# Patient Record
Sex: MALE | Race: WHITE | Employment: FULL TIME | URBAN - NONMETROPOLITAN AREA
[De-identification: names, ages, dates, MRNs, and addresses within clinical notes are randomized per-mention and may not be internally consistent; named-entity substitution may affect disease eponyms.]

---

## 2017-05-15 LAB
BASOPHILS ABSOLUTE: ABNORMAL /ΜL
BASOPHILS RELATIVE PERCENT: ABNORMAL %
BUN BLDV-MCNC: 45 MG/DL
CALCIUM SERPL-MCNC: 8.7 MG/DL
CHLORIDE BLD-SCNC: 101 MMOL/L
CO2: 20 MMOL/L
CREAT SERPL-MCNC: 3.23 MG/DL
EOSINOPHILS ABSOLUTE: ABNORMAL /ΜL
EOSINOPHILS RELATIVE PERCENT: ABNORMAL %
GFR CALCULATED: 22
GLUCOSE BLD-MCNC: 301 MG/DL
HCT VFR BLD CALC: 36.4 % (ref 41–53)
HEMOGLOBIN: 11.9 G/DL (ref 13.5–17.5)
LYMPHOCYTES ABSOLUTE: ABNORMAL /ΜL
LYMPHOCYTES RELATIVE PERCENT: ABNORMAL %
MCH RBC QN AUTO: ABNORMAL PG
MCHC RBC AUTO-ENTMCNC: ABNORMAL G/DL
MCV RBC AUTO: ABNORMAL FL
MONOCYTES ABSOLUTE: ABNORMAL /ΜL
MONOCYTES RELATIVE PERCENT: ABNORMAL %
NEUTROPHILS ABSOLUTE: ABNORMAL /ΜL
NEUTROPHILS RELATIVE PERCENT: ABNORMAL %
PLATELET # BLD: 240 K/ΜL
PMV BLD AUTO: ABNORMAL FL
POTASSIUM SERPL-SCNC: 5.9 MMOL/L
RBC # BLD: 4.06 10^6/ΜL
SODIUM BLD-SCNC: 136 MMOL/L
WBC # BLD: 5.5 10^3/ML

## 2017-05-17 ENCOUNTER — OFFICE VISIT (OUTPATIENT)
Dept: NEPHROLOGY | Age: 52
End: 2017-05-17

## 2017-05-17 VITALS
DIASTOLIC BLOOD PRESSURE: 76 MMHG | SYSTOLIC BLOOD PRESSURE: 148 MMHG | HEART RATE: 60 BPM | RESPIRATION RATE: 18 BRPM | WEIGHT: 123 LBS | HEIGHT: 62 IN | BODY MASS INDEX: 22.63 KG/M2 | OXYGEN SATURATION: 98 %

## 2017-05-17 DIAGNOSIS — N18.4 CKD (CHRONIC KIDNEY DISEASE) STAGE 4, GFR 15-29 ML/MIN (HCC): Primary | ICD-10-CM

## 2017-05-17 PROCEDURE — 99213 OFFICE O/P EST LOW 20 MIN: CPT | Performed by: INTERNAL MEDICINE

## 2017-06-16 LAB
BASOPHILS ABSOLUTE: ABNORMAL /ΜL
BASOPHILS RELATIVE PERCENT: ABNORMAL %
BUN BLDV-MCNC: 38 MG/DL
CALCIUM SERPL-MCNC: 8.9 MG/DL
CHLORIDE BLD-SCNC: 100 MMOL/L
CO2: 23 MMOL/L
CREAT SERPL-MCNC: 2.65 MG/DL
EOSINOPHILS ABSOLUTE: ABNORMAL /ΜL
EOSINOPHILS RELATIVE PERCENT: ABNORMAL %
GFR CALCULATED: 27
GLUCOSE BLD-MCNC: 228 MG/DL
HCT VFR BLD CALC: 38.8 % (ref 41–53)
HEMOGLOBIN: 12.7 G/DL (ref 13.5–17.5)
LYMPHOCYTES ABSOLUTE: ABNORMAL /ΜL
LYMPHOCYTES RELATIVE PERCENT: ABNORMAL %
MCH RBC QN AUTO: ABNORMAL PG
MCHC RBC AUTO-ENTMCNC: ABNORMAL G/DL
MCV RBC AUTO: ABNORMAL FL
MONOCYTES ABSOLUTE: ABNORMAL /ΜL
MONOCYTES RELATIVE PERCENT: ABNORMAL %
NEUTROPHILS ABSOLUTE: ABNORMAL /ΜL
NEUTROPHILS RELATIVE PERCENT: ABNORMAL %
PLATELET # BLD: 264 K/ΜL
PMV BLD AUTO: ABNORMAL FL
POTASSIUM SERPL-SCNC: 5.8 MMOL/L
RBC # BLD: 4.33 10^6/ΜL
SODIUM BLD-SCNC: 135 MMOL/L
WBC # BLD: 5.4 10^3/ML

## 2017-06-21 ENCOUNTER — OFFICE VISIT (OUTPATIENT)
Dept: NEPHROLOGY | Age: 52
End: 2017-06-21

## 2017-06-21 VITALS
BODY MASS INDEX: 22.63 KG/M2 | DIASTOLIC BLOOD PRESSURE: 62 MMHG | HEIGHT: 62 IN | SYSTOLIC BLOOD PRESSURE: 128 MMHG | RESPIRATION RATE: 18 BRPM | WEIGHT: 123 LBS | OXYGEN SATURATION: 96 % | HEART RATE: 61 BPM

## 2017-06-21 DIAGNOSIS — N18.30 CKD (CHRONIC KIDNEY DISEASE) STAGE 3, GFR 30-59 ML/MIN (HCC): Primary | ICD-10-CM

## 2017-06-21 PROCEDURE — 99213 OFFICE O/P EST LOW 20 MIN: CPT | Performed by: INTERNAL MEDICINE

## 2017-09-29 LAB
BASOPHILS ABSOLUTE: ABNORMAL /ΜL
BASOPHILS RELATIVE PERCENT: ABNORMAL %
BUN BLDV-MCNC: 33 MG/DL
CALCIUM SERPL-MCNC: 8.7 MG/DL
CHLORIDE BLD-SCNC: 101 MMOL/L
CO2: 21 MMOL/L
CREAT SERPL-MCNC: 2.31 MG/DL
EOSINOPHILS ABSOLUTE: ABNORMAL /ΜL
EOSINOPHILS RELATIVE PERCENT: ABNORMAL %
GFR CALCULATED: 32
GLUCOSE BLD-MCNC: 283 MG/DL
HCT VFR BLD CALC: 34.7 % (ref 41–53)
HEMOGLOBIN: 11.8 G/DL (ref 13.5–17.5)
LYMPHOCYTES ABSOLUTE: ABNORMAL /ΜL
LYMPHOCYTES RELATIVE PERCENT: ABNORMAL %
MCH RBC QN AUTO: ABNORMAL PG
MCHC RBC AUTO-ENTMCNC: ABNORMAL G/DL
MCV RBC AUTO: ABNORMAL FL
MONOCYTES ABSOLUTE: ABNORMAL /ΜL
MONOCYTES RELATIVE PERCENT: ABNORMAL %
NEUTROPHILS ABSOLUTE: ABNORMAL /ΜL
NEUTROPHILS RELATIVE PERCENT: ABNORMAL %
PLATELET # BLD: 217 K/ΜL
PMV BLD AUTO: ABNORMAL FL
POTASSIUM SERPL-SCNC: 4.8 MMOL/L
RBC # BLD: 3.77 10^6/ΜL
SODIUM BLD-SCNC: 138 MMOL/L
WBC # BLD: 4.9 10^3/ML

## 2017-10-02 ENCOUNTER — OFFICE VISIT (OUTPATIENT)
Dept: NEPHROLOGY | Age: 52
End: 2017-10-02
Payer: COMMERCIAL

## 2017-10-02 VITALS
RESPIRATION RATE: 18 BRPM | HEART RATE: 65 BPM | WEIGHT: 126 LBS | HEIGHT: 62 IN | DIASTOLIC BLOOD PRESSURE: 74 MMHG | SYSTOLIC BLOOD PRESSURE: 138 MMHG | BODY MASS INDEX: 23.19 KG/M2 | OXYGEN SATURATION: 97 %

## 2017-10-02 DIAGNOSIS — N18.30 CKD (CHRONIC KIDNEY DISEASE), STAGE III (HCC): Primary | ICD-10-CM

## 2017-10-02 PROCEDURE — 99213 OFFICE O/P EST LOW 20 MIN: CPT | Performed by: INTERNAL MEDICINE

## 2017-10-02 RX ORDER — TRAMADOL HYDROCHLORIDE 50 MG/1
TABLET ORAL
Refills: 0 | COMMUNITY
Start: 2017-07-01 | End: 2017-10-02

## 2017-10-02 NOTE — MR AVS SNAPSHOT
questions, ask your nurse or doctor. STOP taking these medications           traMADol 50 MG tablet   Commonly known as:  ULTRAM   Stopped by:  Shena Lerma, DO               Your Current Medications Are              Multiple Vitamin (MULTIVITAMIN) capsule Take by mouth    FREESTYLE LANCETS MIS     FREESTYLE INSULINX TEST strip     Insulin Lispro, Human, (HUMALOG PEN SC) Inject into the skin Sliding scale    isosorbide mononitrate (IMDUR) 30 MG CR tablet Take 30 mg by mouth daily    atorvastatin (LIPITOR) 80 MG tablet Take 80 mg by mouth daily    clopidogrel (PLAVIX) 75 MG tablet Take 75 mg by mouth daily    pantoprazole sodium (PROTONIX) 40 MG PACK packet Take 40 mg by mouth 2 times daily (before meals)    metoprolol (LOPRESSOR) 50 MG tablet Take 50 mg by mouth 2 times daily    hydrALAZINE (APRESOLINE) 50 MG tablet Take 50 mg by mouth 3 times daily    aspirin 81 MG tablet Take 81 mg by mouth daily    Insulin Degludec (TRESIBA FLEXTOUCH SC) Inject 9 Units into the skin nightly      Allergies           No Known Allergies         Additional Information        Basic Information     Date Of Birth Sex Race Ethnicity Preferred Language    1965 Male White Non-/Non  English      Problem List as of 10/2/2017  Date Reviewed: 11/16/2016                Systolic congestive heart failure (HCC)    Chest pain    Shortness of breath    Diabetes mellitus type 2 in nonobese [facility])    Essential hypertension    Chronic kidney disease, stage IV (severe) (HCC)    Coronary artery disease involving native coronary artery of native heart with angina pectoris (HCC)    Fatigue    Bloating    Anemia      Preventive Care        Date Due    Hepatitis C screening is recommended for all adults regardless of risk factors born between [facility] at least once (lifetime) who have never been tested.  1965    Diabetic Foot Exam 9/7/1974    Hemoglobin A1C (Test For Long-Term Glucose Control) 9/7/1974 Eye Exam By An Eye Doctor 9/7/1974    Cholesterol Screening 9/7/1974    HIV screening is recommended for all people regardless of risk factors  aged 15-65 years at least once (lifetime) who have never been HIV tested. 9/7/1979    Tetanus Combination Vaccine (1 - Tdap) 9/7/1983    Pneumococcal Vaccines (two) for adults aged 19-64  years who are immunocompromised or whose spleen is missing or not working  (1 of 3 - PCV13) 9/7/1983    Colonoscopy 9/7/2014    Yearly Flu Vaccine (1) 9/1/2017            MyChart Signup           Our records indicate that you have declined MyChart signup.

## 2017-10-02 NOTE — PROGRESS NOTES
[facility]    [address]  [address], [address]  [phone]       Progress Note    10/2/2017 2:32 PM    Pt Name:    Aubree Deluna  YOB: 1965  Primary Care Physician:  Jase Guallpa CNP       Chief Complaint:   Chief Complaint   Patient presents with    Chronic Kidney Disease     iii        History of Chief Complaint: CKD stage IIIB frm DM and HTN     Subjective:  I last saw the patient in clinic 06/21/17. I follow the patient for Chronic Kidney disease stage IIIB. Since our last visit the patient has not been hospitalized. The patient is sleeping well at night with 1-2 times per night nocturia. The patient has a good appetite and is remaining active. The patient denied N/V/C/D/SOB/CP. EGFR=32 Ml/Min       Objective:  VITALS:  /74 (Site: Left Arm, Position: Sitting, Cuff Size: Small Adult)  Pulse 65  Resp 18  Ht 5' 2\" (1.575 m)  Wt 126 lb (57.2 kg)  SpO2 97%  BMI 23.05 kg/m2  Weight:   Wt Readings from Last 3 Encounters:   10/02/17 126 lb (57.2 kg)   06/21/17 123 lb (55.8 kg)   05/17/17 123 lb (55.8 kg)     Body mass index is 23.05 kg/(m^2). Physical examination    General:  Alert and cooperative with exam  HEENT:  Head: Normocephalic, no lesions, without obvious abnormality. Neck:   No JVD and no bruits. Thyroid gland is normal  Lungs:  clear to auscultation bilaterally  Heart:  regular rate and rhythm, S1, S2 normal, no murmur, click, rub or gallop  Abdomen:  soft, non-tender; bowel sounds normal; no masses,  no organomegaly  Extremities:  extremities normal, atraumatic, no cyanosis or edema  Neurologic:  Mental status: Alert, oriented, thought content appropriate  Skin:                Warm and dry with no rashes. Muscles:         Hand  and leg strength are equal and strong bilaterally.      Lab Data      CBC:   Lab Results   Component Value Date    WBC 4.9 09/29/2017    HGB 11.8 (A) 09/29/2017    HCT 34.7 (A) 09/29/2017    MCV 91.7 04/09/2016     09/29/2017     BMP:    Lab Results   Component Value Date     09/29/2017     06/16/2017     05/15/2017    K 4.8 09/29/2017    K 5.8 06/16/2017    K 5.9 05/15/2017     09/29/2017     06/16/2017     05/15/2017    CO2 21 09/29/2017    CO2 23 06/16/2017    CO2 20 05/15/2017    BUN 33 09/29/2017    BUN 38 06/16/2017    BUN 45 05/15/2017    CREATININE 2.31 09/29/2017    CREATININE 2.65 06/16/2017    CREATININE 3.23 05/15/2017    GLUCOSE 283 09/29/2017    GLUCOSE 228 06/16/2017    GLUCOSE 301 05/15/2017      Hepatic:   Lab Results   Component Value Date    AST 23 04/09/2016    ALT 19 04/09/2016    BILITOT 0.3 04/09/2016    ALKPHOS 79 04/09/2016     BNP: No results found for: BNP  Lipids: No results found for: CHOL, HDL  INR: No results found for: INR  URINE: No results found for: NAUR, PROTUR  Lab Results   Component Value Date    NITRU NEGATIVE 04/09/2016    COLORU YELLOW 04/09/2016    PHUR 6.0 04/09/2016    WBCUA 0-2 04/09/2016    RBCUA 0-2 04/09/2016    YEAST NONE SEEN 04/09/2016    BACTERIA NONE 04/09/2016    LEUKOCYTESUR NEGATIVE 04/09/2016    UROBILINOGEN 0.2 04/09/2016    BILIRUBINUR NEGATIVE 04/09/2016    BLOODU NEGATIVE 04/09/2016    GLUCOSEU NEGATIVE 04/09/2016    KETUA NEGATIVE 04/09/2016      Microalbumen/Creatinine ratio:  No components found for: RUCREAT        Medications:    Current Outpatient Prescriptions   Medication Sig Dispense Refill    Multiple Vitamin (MULTIVITAMIN) capsule Take by mouth      FREESTYLE LANCETS MISC   0    FREESTYLE INSULINX TEST strip   0    Insulin Lispro, Human, (HUMALOG PEN SC) Inject into the skin Sliding scale      isosorbide mononitrate (IMDUR) 30 MG CR tablet Take 30 mg by mouth daily      atorvastatin (LIPITOR) 80 MG tablet Take 80 mg by mouth daily      clopidogrel (PLAVIX) 75 MG tablet Take 75 mg by mouth daily      pantoprazole sodium (PROTONIX) 40 MG PACK packet Take 40 mg by mouth 2 times daily (before meals)      metoprolol (LOPRESSOR) 50 MG tablet Take 50 mg by mouth 2 times daily      hydrALAZINE (APRESOLINE) 50 MG tablet Take 50 mg by mouth 3 times daily      aspirin 81 MG tablet Take 81 mg by mouth daily      Insulin Degludec (TRESIBA FLEXTOUCH SC) Inject 9 Units into the skin nightly       No current facility-administered medications for this visit. IMPRESSION:    Patient Active Problem List   Diagnosis Code    Systolic congestive heart failure ([facility]) I50.20    Chest pain R07.9    Shortness of breath R06.02    Diabetes mellitus type 2 in nonobese ([facility]) E11.9    Essential hypertension I10    Chronic kidney disease, stage IV (severe) ([facility]) N18.4    Coronary artery disease involving native coronary artery of native heart with angina pectoris ([facility]) I25.119    Fatigue R53.83    Bloating R14.0    Anemia D64.9    Anemia of chronic kidney failure N18.9, D63.1                PLAN:  1. We discussed the eGFR today. 2. We will continue all current medications without changes. 3. We will see the patient back in 4 months. Total time spent interviewing the patient and/or family, evaluating lab data and X-ray data and processing orders was 20 minutes. I personally spent more than 50% of the visit time face to face with the patient providing counseling and coordination of the patient's care.             _________________________________  Darrell Reeves.  Shade Ramirez,   [facility]      CC  Eloy Zurita, CNP

## 2018-02-20 LAB
BASOPHILS ABSOLUTE: ABNORMAL /ΜL
BASOPHILS RELATIVE PERCENT: ABNORMAL %
BUN BLDV-MCNC: 45 MG/DL
CALCIUM SERPL-MCNC: 8.8 MG/DL
CHLORIDE BLD-SCNC: 101 MMOL/L
CO2: 22 MMOL/L
CREAT SERPL-MCNC: 2.71 MG/DL
EOSINOPHILS ABSOLUTE: ABNORMAL /ΜL
EOSINOPHILS RELATIVE PERCENT: ABNORMAL %
GFR CALCULATED: 26
GLUCOSE BLD-MCNC: 95 MG/DL
HCT VFR BLD CALC: 32.6 % (ref 41–53)
HEMOGLOBIN: 11.2 G/DL (ref 13.5–17.5)
LYMPHOCYTES ABSOLUTE: ABNORMAL /ΜL
LYMPHOCYTES RELATIVE PERCENT: ABNORMAL %
MCH RBC QN AUTO: ABNORMAL PG
MCHC RBC AUTO-ENTMCNC: ABNORMAL G/DL
MCV RBC AUTO: ABNORMAL FL
MONOCYTES ABSOLUTE: ABNORMAL /ΜL
MONOCYTES RELATIVE PERCENT: ABNORMAL %
NEUTROPHILS ABSOLUTE: ABNORMAL /ΜL
NEUTROPHILS RELATIVE PERCENT: ABNORMAL %
PLATELET # BLD: 267 K/ΜL
PMV BLD AUTO: ABNORMAL FL
POTASSIUM SERPL-SCNC: 4.7 MMOL/L
RBC # BLD: 3.78 10^6/ΜL
SODIUM BLD-SCNC: 138 MMOL/L
WBC # BLD: 5.4 10^3/ML

## 2018-04-18 ENCOUNTER — OFFICE VISIT (OUTPATIENT)
Dept: NEPHROLOGY | Age: 53
End: 2018-04-18
Payer: MEDICARE

## 2018-04-18 VITALS
HEART RATE: 66 BPM | WEIGHT: 128 LBS | HEIGHT: 62 IN | SYSTOLIC BLOOD PRESSURE: 148 MMHG | BODY MASS INDEX: 23.55 KG/M2 | OXYGEN SATURATION: 99 % | RESPIRATION RATE: 18 BRPM | DIASTOLIC BLOOD PRESSURE: 72 MMHG

## 2018-04-18 DIAGNOSIS — N18.30 CKD (CHRONIC KIDNEY DISEASE), STAGE III (HCC): Primary | ICD-10-CM

## 2018-04-18 PROCEDURE — G8598 ASA/ANTIPLAT THER USED: HCPCS | Performed by: INTERNAL MEDICINE

## 2018-04-18 PROCEDURE — 3017F COLORECTAL CA SCREEN DOC REV: CPT | Performed by: INTERNAL MEDICINE

## 2018-04-18 PROCEDURE — 99213 OFFICE O/P EST LOW 20 MIN: CPT | Performed by: INTERNAL MEDICINE

## 2018-04-18 PROCEDURE — G8427 DOCREV CUR MEDS BY ELIG CLIN: HCPCS | Performed by: INTERNAL MEDICINE

## 2018-04-18 PROCEDURE — G8420 CALC BMI NORM PARAMETERS: HCPCS | Performed by: INTERNAL MEDICINE

## 2018-04-18 PROCEDURE — 1036F TOBACCO NON-USER: CPT | Performed by: INTERNAL MEDICINE

## 2018-04-18 RX ORDER — NITROGLYCERIN 0.4 MG/1
TABLET SUBLINGUAL
Refills: 0 | COMMUNITY
Start: 2018-01-25 | End: 2022-06-22

## 2018-04-18 RX ORDER — INSULIN LISPRO 100 [IU]/ML
INJECTION, SOLUTION INTRAVENOUS; SUBCUTANEOUS
Refills: 4 | Status: ON HOLD | COMMUNITY
Start: 2018-03-15 | End: 2019-04-10

## 2018-04-18 RX ORDER — FUROSEMIDE 20 MG/1
TABLET ORAL
Refills: 0 | Status: ON HOLD | COMMUNITY
Start: 2018-01-25 | End: 2019-04-10

## 2018-08-14 ENCOUNTER — TELEPHONE (OUTPATIENT)
Dept: NEPHROLOGY | Age: 53
End: 2018-08-14

## 2018-08-14 LAB
BASOPHILS ABSOLUTE: ABNORMAL /ΜL
BASOPHILS RELATIVE PERCENT: ABNORMAL %
BUN BLDV-MCNC: 35 MG/DL
CALCIUM SERPL-MCNC: 9.5 MG/DL
CHLORIDE BLD-SCNC: 101 MMOL/L
CO2: 20 MMOL/L
CREAT SERPL-MCNC: 3.22 MG/DL
EOSINOPHILS ABSOLUTE: ABNORMAL /ΜL
EOSINOPHILS RELATIVE PERCENT: ABNORMAL %
GFR CALCULATED: 22
GLUCOSE BLD-MCNC: 43 MG/DL
HCT VFR BLD CALC: 27.3 % (ref 41–53)
HEMOGLOBIN: 8.7 G/DL (ref 13.5–17.5)
LYMPHOCYTES ABSOLUTE: ABNORMAL /ΜL
LYMPHOCYTES RELATIVE PERCENT: ABNORMAL %
MCH RBC QN AUTO: ABNORMAL PG
MCHC RBC AUTO-ENTMCNC: ABNORMAL G/DL
MCV RBC AUTO: ABNORMAL FL
MONOCYTES ABSOLUTE: ABNORMAL /ΜL
MONOCYTES RELATIVE PERCENT: ABNORMAL %
NEUTROPHILS ABSOLUTE: ABNORMAL /ΜL
NEUTROPHILS RELATIVE PERCENT: ABNORMAL %
PLATELET # BLD: 286 K/ΜL
PMV BLD AUTO: ABNORMAL FL
POTASSIUM SERPL-SCNC: 4.8 MMOL/L
RBC # BLD: 3.5 10^6/ΜL
SODIUM BLD-SCNC: 138 MMOL/L
WBC # BLD: 5.9 10^3/ML

## 2018-08-14 NOTE — TELEPHONE ENCOUNTER
Marely Abel from [facility] lab called with a critical glucose of 43 on this patient this morning-I called the patient and he said that he didn't feel good when he got his labs done-he is a diabetic-the patient is getting something to eat now

## 2018-08-15 ENCOUNTER — OFFICE VISIT (OUTPATIENT)
Dept: NEPHROLOGY | Age: 53
End: 2018-08-15
Payer: MEDICARE

## 2018-08-15 VITALS
WEIGHT: 122 LBS | SYSTOLIC BLOOD PRESSURE: 136 MMHG | OXYGEN SATURATION: 96 % | DIASTOLIC BLOOD PRESSURE: 72 MMHG | HEIGHT: 62 IN | BODY MASS INDEX: 22.45 KG/M2 | RESPIRATION RATE: 18 BRPM | HEART RATE: 61 BPM

## 2018-08-15 DIAGNOSIS — N17.9 AKI (ACUTE KIDNEY INJURY) (HCC): Primary | ICD-10-CM

## 2018-08-15 DIAGNOSIS — N18.4 CKD (CHRONIC KIDNEY DISEASE), STAGE IV (HCC): ICD-10-CM

## 2018-08-15 PROCEDURE — G8427 DOCREV CUR MEDS BY ELIG CLIN: HCPCS | Performed by: INTERNAL MEDICINE

## 2018-08-15 PROCEDURE — 3017F COLORECTAL CA SCREEN DOC REV: CPT | Performed by: INTERNAL MEDICINE

## 2018-08-15 PROCEDURE — 99213 OFFICE O/P EST LOW 20 MIN: CPT | Performed by: INTERNAL MEDICINE

## 2018-08-15 PROCEDURE — G8420 CALC BMI NORM PARAMETERS: HCPCS | Performed by: INTERNAL MEDICINE

## 2018-08-15 PROCEDURE — G8598 ASA/ANTIPLAT THER USED: HCPCS | Performed by: INTERNAL MEDICINE

## 2018-08-15 PROCEDURE — 1036F TOBACCO NON-USER: CPT | Performed by: INTERNAL MEDICINE

## 2018-08-15 RX ORDER — INSULIN GLARGINE 100 [IU]/ML
18 INJECTION, SOLUTION SUBCUTANEOUS NIGHTLY
Status: ON HOLD | COMMUNITY
Start: 2018-07-30 | End: 2019-04-10

## 2018-08-15 RX ORDER — TERAZOSIN 1 MG/1
1 CAPSULE ORAL NIGHTLY
Qty: 30 CAPSULE | Refills: 3 | Status: ON HOLD | OUTPATIENT
Start: 2018-08-15 | End: 2018-11-25

## 2018-08-15 NOTE — PROGRESS NOTES
[facility]    [address] [facility]  [address], [address]  [phone]       Progress Note    8/15/2018 11:07 AM    Pt Name:    Tvein Deluna  YOB: 1965  Primary Care Physician:  PAOLA Larose CNP       Chief Complaint:   Chief Complaint   Patient presents with    Chronic Kidney Disease     iii        History of Chief Complaint: CKD stage IV frm DM and HTN     Subjective:  I last saw the patient in clinic 04/18/18. I follow the patient for Chronic Kidney disease stage IV. Since our last visit the patient has not been hospitalized. The patient is sleeping well at night with 1-2 times per night nocturia. The patient has a good appetite and is remaining active. The patient denied N/V/C/D/SOB/CP. He feels that he is not completely emptying his bladder      Last six eGFR readings:  Lab Results   Component Value Date    LABGLOM 22 08/14/2018    LABGLOM 26 02/20/2018    LABGLOM 32 09/29/2017    LABGLOM 27 06/16/2017    LABGLOM 22 05/15/2017    LABGLOM 30 11/08/2016    LABGLOM 21 04/09/2016          Objective:  VITALS:  /72 (Site: Left Arm, Position: Sitting, Cuff Size: Small Adult)   Pulse 61   Resp 18   Ht 5' 2\" (1.575 m)   Wt 122 lb (55.3 kg)   SpO2 96%   BMI 22.31 kg/m²   Weight:   Wt Readings from Last 3 Encounters:   08/15/18 122 lb (55.3 kg)   04/18/18 128 lb (58.1 kg)   10/02/17 126 lb (57.2 kg)     Body mass index is 22.31 kg/m². Physical examination    General:  Alert and cooperative with exam  HEENT:  Head: Normocephalic, no lesions, without obvious abnormality. Neck:   No JVD and no bruits.  Thyroid gland is normal  Lungs:  clear to auscultation bilaterally  Heart:  regular rate and rhythm, S1, S2 normal, no murmur, click, rub or gallop  Abdomen:  soft, non-tender; bowel sounds normal; no masses,  no organomegaly  Extremities:  extremities normal, atraumatic, no cyanosis or edema  Neurologic:  Mental status: Alert, oriented, thought content appropriate  Skin:                Warm and dry with no rashes. Muscles:         Hand  and leg strength are equal and strong bilaterally.      Lab Data      CBC:   Lab Results   Component Value Date    WBC 5.9 08/14/2018    HGB 8.7 (A) 08/14/2018    HCT 27.3 (A) 08/14/2018    MCV 91.7 04/09/2016     08/14/2018     BMP:    Lab Results   Component Value Date     08/14/2018     02/20/2018     09/29/2017    K 4.8 08/14/2018    K 4.7 02/20/2018    K 4.8 09/29/2017     08/14/2018     02/20/2018     09/29/2017    CO2 20 08/14/2018    CO2 22 02/20/2018    CO2 21 09/29/2017    BUN 35 08/14/2018    BUN 45 02/20/2018    BUN 33 09/29/2017    CREATININE 3.22 08/14/2018    CREATININE 2.71 02/20/2018    CREATININE 2.31 09/29/2017    GLUCOSE 43 08/14/2018    GLUCOSE 95 02/20/2018    GLUCOSE 283 09/29/2017      Hepatic:   Lab Results   Component Value Date    AST 23 04/09/2016    ALT 19 04/09/2016    BILITOT 0.3 04/09/2016    ALKPHOS 79 04/09/2016     BNP: No results found for: BNP  Lipids: No results found for: CHOL, HDL  INR: No results found for: INR  URINE: No results found for: NAUR, PROTUR  Lab Results   Component Value Date    NITRU NEGATIVE 04/09/2016    COLORU YELLOW 04/09/2016    PHUR 6.0 04/09/2016    WBCUA 0-2 04/09/2016    RBCUA 0-2 04/09/2016    YEAST NONE SEEN 04/09/2016    BACTERIA NONE 04/09/2016    LEUKOCYTESUR NEGATIVE 04/09/2016    UROBILINOGEN 0.2 04/09/2016    BILIRUBINUR NEGATIVE 04/09/2016    BLOODU NEGATIVE 04/09/2016    GLUCOSEU NEGATIVE 04/09/2016    KETUA NEGATIVE 04/09/2016      Microalbumen/Creatinine ratio:  No components found for: RUCREAT        Medications:    Current Outpatient Prescriptions   Medication Sig Dispense Refill    LANTUS SOLOSTAR 100 UNIT/ML injection pen       HUMALOG KWIKPEN 100 UNIT/ML pen INJECT 1 UNIT UNDER THE SKIN PER EVERY 8 CARBS AT MEALTIME THREE TIMES A DAY  4    furosemide (LASIX) 20 MG tablet TAKE 1 TABLET BY MOUTH EVERY DAY AS NEEDED FOR FLUID  0    Multiple Vitamin (MULTIVITAMIN) capsule Take by mouth      FREESTYLE LANCETS MISC   0    FREESTYLE INSULINX TEST strip   0    isosorbide mononitrate (IMDUR) 30 MG CR tablet Take 30 mg by mouth daily      atorvastatin (LIPITOR) 80 MG tablet Take 80 mg by mouth daily      clopidogrel (PLAVIX) 75 MG tablet Take 75 mg by mouth daily      pantoprazole sodium (PROTONIX) 40 MG PACK packet Take 40 mg by mouth 2 times daily (before meals)      metoprolol (LOPRESSOR) 50 MG tablet Take 50 mg by mouth 2 times daily      hydrALAZINE (APRESOLINE) 50 MG tablet Take 50 mg by mouth 3 times daily      aspirin 81 MG tablet Take 81 mg by mouth daily      nitroGLYCERIN (NITROSTAT) 0.4 MG SL tablet DISSOLVE 1 TABLET UNDER THE TONGUE EVERY 5 MIN AS NEEDED FOR CHEST PAIN  0     No current facility-administered medications for this visit. IMPRESSIONS:      1. Acute kidney injury. Perhaps from outlet obstruction  2. CKD stage IV from DM and HTN         PLAN:  1. We discussed the eGFR today. 2. We will continue all current medications without changes. 3. Adding flomax  4. Kidney biopsy. Will also look for huydronpehrosis  5. We will see the patient back in .75 months.               _________________________________  Rocky Pruett.  Tony Maxwell, DO  [facility]      CC  Nacho Jones, APRN - CNP

## 2018-08-16 ENCOUNTER — TELEPHONE (OUTPATIENT)
Dept: NEPHROLOGY | Age: 53
End: 2018-08-16

## 2018-08-17 NOTE — TELEPHONE ENCOUNTER
I called Donald Bravo about the information below. He expressed understanding and said he is going to write it down. He said I hope I don't forget.

## 2018-08-17 NOTE — TELEPHONE ENCOUNTER
Luisa Juniorjana does do renal biopsy. It is scheduled for 11:30 am arrival procedure at 12:30 pm 8/27/18 light breakfast 5 hours prior, need , heart and blood pressure medications with sip of water, blood thinner for 7 days.

## 2018-08-27 LAB
INR BLD: 1.1
PROTIME: 12.5 SECONDS

## 2018-08-28 DIAGNOSIS — N18.4 CKD (CHRONIC KIDNEY DISEASE), STAGE IV (HCC): ICD-10-CM

## 2018-08-28 DIAGNOSIS — N17.9 AKI (ACUTE KIDNEY INJURY) (HCC): ICD-10-CM

## 2018-09-17 PROBLEM — E11.9 DIABETES MELLITUS (HCC): Status: ACTIVE | Noted: 2018-09-17

## 2018-09-17 PROBLEM — N28.9 RENAL IMPAIRMENT: Status: ACTIVE | Noted: 2018-09-17

## 2018-09-17 PROBLEM — Z86.73 HISTORY OF CVA (CEREBROVASCULAR ACCIDENT): Status: ACTIVE | Noted: 2017-05-11

## 2018-09-17 PROBLEM — I63.9 CEREBROVASCULAR ACCIDENT (HCC): Status: ACTIVE | Noted: 2018-09-17

## 2018-09-17 PROBLEM — I21.9 MI (MYOCARDIAL INFARCTION) (HCC): Status: ACTIVE | Noted: 2018-09-17

## 2018-09-17 PROBLEM — I25.10 CORONARY ATHEROSCLEROSIS: Status: ACTIVE | Noted: 2017-05-11

## 2018-09-17 LAB
BASOPHILS ABSOLUTE: ABNORMAL /ΜL
BASOPHILS RELATIVE PERCENT: ABNORMAL %
BUN BLDV-MCNC: 53 MG/DL
CALCIUM SERPL-MCNC: 9.5 MG/DL
CHLORIDE BLD-SCNC: 105 MMOL/L
CO2: 20 MMOL/L
CREAT SERPL-MCNC: 3.31 MG/DL
EOSINOPHILS ABSOLUTE: ABNORMAL /ΜL
EOSINOPHILS RELATIVE PERCENT: ABNORMAL %
GFR CALCULATED: 21
GLUCOSE BLD-MCNC: 146 MG/DL
HCT VFR BLD CALC: 24.5 % (ref 41–53)
HEMOGLOBIN: 7.7 G/DL (ref 13.5–17.5)
LYMPHOCYTES ABSOLUTE: ABNORMAL /ΜL
LYMPHOCYTES RELATIVE PERCENT: ABNORMAL %
MCH RBC QN AUTO: ABNORMAL PG
MCHC RBC AUTO-ENTMCNC: ABNORMAL G/DL
MCV RBC AUTO: ABNORMAL FL
MONOCYTES ABSOLUTE: ABNORMAL /ΜL
MONOCYTES RELATIVE PERCENT: ABNORMAL %
NEUTROPHILS ABSOLUTE: ABNORMAL /ΜL
NEUTROPHILS RELATIVE PERCENT: ABNORMAL %
PLATELET # BLD: 319 K/ΜL
PMV BLD AUTO: ABNORMAL FL
POTASSIUM SERPL-SCNC: 4.8 MMOL/L
RBC # BLD: 3.26 10^6/ΜL
SODIUM BLD-SCNC: 138 MMOL/L
WBC # BLD: 5.2 10^3/ML

## 2018-09-19 ENCOUNTER — OFFICE VISIT (OUTPATIENT)
Dept: NEPHROLOGY | Age: 53
End: 2018-09-19
Payer: MEDICARE

## 2018-09-19 VITALS
HEIGHT: 62 IN | RESPIRATION RATE: 18 BRPM | OXYGEN SATURATION: 95 % | BODY MASS INDEX: 21.9 KG/M2 | WEIGHT: 119 LBS | DIASTOLIC BLOOD PRESSURE: 62 MMHG | HEART RATE: 68 BPM | SYSTOLIC BLOOD PRESSURE: 148 MMHG

## 2018-09-19 DIAGNOSIS — N18.4 CKD (CHRONIC KIDNEY DISEASE), STAGE IV (HCC): Primary | ICD-10-CM

## 2018-09-19 PROCEDURE — G8428 CUR MEDS NOT DOCUMENT: HCPCS | Performed by: INTERNAL MEDICINE

## 2018-09-19 PROCEDURE — 99213 OFFICE O/P EST LOW 20 MIN: CPT | Performed by: INTERNAL MEDICINE

## 2018-09-19 PROCEDURE — 3017F COLORECTAL CA SCREEN DOC REV: CPT | Performed by: INTERNAL MEDICINE

## 2018-09-19 PROCEDURE — 1036F TOBACCO NON-USER: CPT | Performed by: INTERNAL MEDICINE

## 2018-09-19 PROCEDURE — G8420 CALC BMI NORM PARAMETERS: HCPCS | Performed by: INTERNAL MEDICINE

## 2018-09-19 PROCEDURE — G8598 ASA/ANTIPLAT THER USED: HCPCS | Performed by: INTERNAL MEDICINE

## 2018-09-19 NOTE — PROGRESS NOTES
[facility]    [address] [facility]  [address], [address]  [phone]       Progress Note    9/19/2018 9:58 AM    Pt Name:    Ene Deluna  YOB: 1965  Primary Care Physician:  PAOLA Peacock CNP       Chief Complaint:   Chief Complaint   Patient presents with    Chronic Kidney Disease     iii    Nephropathy    Diabetes    Hypertension        History of Chief Complaint: CKD stage IV frm DM and HTN     Subjective:  I last saw the patient in clinic 08/15/18. I follow the patient for Chronic Kidney disease stage IV. Since our last visit the patient has not been hospitalized. The patient is sleeping well at night with 1-2 times per night nocturia. The patient has a good appetite and is remaining active. The patient denied N/V/C/D/SOB/CP. Kidney biopsy showed: severe interstitial fibrosis, advanced diabetic nephrosclerosis and hypertensive nephrosclerosis. Last six eGFR readings:  Lab Results   Component Value Date    LABGLOM 21 09/17/2018    LABGLOM 22 08/14/2018    LABGLOM 26 02/20/2018    LABGLOM 32 09/29/2017    LABGLOM 27 06/16/2017    LABGLOM 22 05/15/2017    LABGLOM 21 04/09/2016          Objective:  VITALS:  BP (!) 148/62 (Site: Left Upper Arm, Position: Sitting, Cuff Size: Small Adult)   Pulse 68   Resp 18   Ht 5' 2\" (1.575 m)   Wt 119 lb (54 kg)   SpO2 95%   BMI 21.77 kg/m²   Weight:   Wt Readings from Last 3 Encounters:   09/19/18 119 lb (54 kg)   08/15/18 122 lb (55.3 kg)   04/18/18 128 lb (58.1 kg)     Body mass index is 21.77 kg/m². Physical examination    General:  Alert and cooperative with exam  HEENT:  Head: Normocephalic, no lesions, without obvious abnormality. Neck:   No JVD and no bruits.  Thyroid gland is normal  Lungs:  clear to auscultation bilaterally  Heart:  regular rate and rhythm, S1, S2 normal, no murmur, click, rub or gallop  Abdomen:  soft, non-tender; bowel sounds normal; no masses,  no organomegaly  Extremities: extremities normal, atraumatic, no cyanosis or edema  Neurologic:  Mental status: Alert, oriented, thought content appropriate  Skin:                Warm and dry with no rashes. Muscles:         Hand  and leg strength are equal and strong bilaterally.      Lab Data      CBC:   Lab Results   Component Value Date    WBC 5.2 09/17/2018    HGB 7.7 (A) 09/17/2018    HCT 24.5 (A) 09/17/2018    MCV 91.7 04/09/2016     09/17/2018     BMP:    Lab Results   Component Value Date     09/17/2018     08/14/2018     02/20/2018    K 4.8 09/17/2018    K 4.8 08/14/2018    K 4.7 02/20/2018     09/17/2018     08/14/2018     02/20/2018    CO2 20 09/17/2018    CO2 20 08/14/2018    CO2 22 02/20/2018    BUN 53 09/17/2018    BUN 35 08/14/2018    BUN 45 02/20/2018    CREATININE 3.31 09/17/2018    CREATININE 3.22 08/14/2018    CREATININE 2.71 02/20/2018    GLUCOSE 146 09/17/2018    GLUCOSE 43 08/14/2018    GLUCOSE 95 02/20/2018      Hepatic:   Lab Results   Component Value Date    AST 23 04/09/2016    ALT 19 04/09/2016    BILITOT 0.3 04/09/2016    ALKPHOS 79 04/09/2016     BNP: No results found for: BNP  Lipids: No results found for: CHOL, HDL  INR:   Lab Results   Component Value Date    INR 1.1 08/27/2018     URINE: No results found for: NAUR, PROTUR  Lab Results   Component Value Date    NITRU NEGATIVE 04/09/2016    COLORU YELLOW 04/09/2016    PHUR 6.0 04/09/2016    WBCUA 0-2 04/09/2016    RBCUA 0-2 04/09/2016    YEAST NONE SEEN 04/09/2016    BACTERIA NONE 04/09/2016    LEUKOCYTESUR NEGATIVE 04/09/2016    UROBILINOGEN 0.2 04/09/2016    BILIRUBINUR NEGATIVE 04/09/2016    BLOODU NEGATIVE 04/09/2016    GLUCOSEU NEGATIVE 04/09/2016    KETUA NEGATIVE 04/09/2016      Microalbumen/Creatinine ratio:  No components found for: RUCREAT        Medications:    Current Outpatient Prescriptions   Medication Sig Dispense Refill    LANTUS SOLOSTAR 100 UNIT/ML injection pen       terazosin (HYTRIN) 1 MG capsule Take 1 capsule by mouth nightly 30 capsule 3    HUMALOG KWIKPEN 100 UNIT/ML pen INJECT 1 UNIT UNDER THE SKIN PER EVERY 8 CARBS AT MEALTIME THREE TIMES A DAY  4    furosemide (LASIX) 20 MG tablet TAKE 1 TABLET BY MOUTH EVERY DAY AS NEEDED FOR FLUID  0    nitroGLYCERIN (NITROSTAT) 0.4 MG SL tablet DISSOLVE 1 TABLET UNDER THE TONGUE EVERY 5 MIN AS NEEDED FOR CHEST PAIN  0    Multiple Vitamin (MULTIVITAMIN) capsule Take by mouth      FREESTYLE LANCETS MISC   0    FREESTYLE INSULINX TEST strip   0    isosorbide mononitrate (IMDUR) 30 MG CR tablet Take 30 mg by mouth daily      atorvastatin (LIPITOR) 80 MG tablet Take 80 mg by mouth daily      clopidogrel (PLAVIX) 75 MG tablet Take 75 mg by mouth daily      pantoprazole sodium (PROTONIX) 40 MG PACK packet Take 40 mg by mouth 2 times daily (before meals)      metoprolol (LOPRESSOR) 50 MG tablet Take 50 mg by mouth 2 times daily      hydrALAZINE (APRESOLINE) 50 MG tablet Take 50 mg by mouth 3 times daily      aspirin 81 MG tablet Take 81 mg by mouth daily       No current facility-administered medications for this visit. IMPRESSIONS:      Kidney disease: CKD stage IV from DM, HTN and severe interstitial fibrosis  Anemia: from CKD-stable  Bone and mineral metabolism: from CKD-stable       PLAN:  1. We discussed the eGFR today. 2. We will continue all current medications without changes. 3. He is interested in getting kidney transplant before needing dialysis. Will have him see Dr. Anton Wynne  4. We will see the patient back in 2 months.               _________________________________  Shivani Reynolds.  Isamar Jaramillo,   [facility]      CC  Naman Cardona, APRN - CNP

## 2018-11-12 LAB
ALBUMIN SERPL-MCNC: 4 G/DL
ALP BLD-CCNC: 120 U/L
ALT SERPL-CCNC: 24 U/L
ANION GAP SERPL CALCULATED.3IONS-SCNC: NORMAL MMOL/L
AST SERPL-CCNC: 24 U/L
B-TYPE NATRIURETIC PEPTIDE: 2800 PG/ML
BASOPHILS ABSOLUTE: ABNORMAL /ΜL
BASOPHILS RELATIVE PERCENT: ABNORMAL %
BILIRUB SERPL-MCNC: 0.4 MG/DL (ref 0.1–1.4)
BUN BLDV-MCNC: 49 MG/DL
CALCIUM SERPL-MCNC: 9 MG/DL
CHLORIDE BLD-SCNC: 103 MMOL/L
CO2: 21 MMOL/L
CREAT SERPL-MCNC: 3.67 MG/DL
EOSINOPHILS ABSOLUTE: ABNORMAL /ΜL
EOSINOPHILS RELATIVE PERCENT: ABNORMAL %
GFR CALCULATED: 18
GLUCOSE BLD-MCNC: 259 MG/DL
HCT VFR BLD CALC: 22.4 % (ref 41–53)
HEMOGLOBIN: 6.9 G/DL (ref 13.5–17.5)
LYMPHOCYTES ABSOLUTE: ABNORMAL /ΜL
LYMPHOCYTES RELATIVE PERCENT: ABNORMAL %
MCH RBC QN AUTO: ABNORMAL PG
MCHC RBC AUTO-ENTMCNC: ABNORMAL G/DL
MCV RBC AUTO: ABNORMAL FL
MONOCYTES ABSOLUTE: ABNORMAL /ΜL
MONOCYTES RELATIVE PERCENT: ABNORMAL %
NEUTROPHILS ABSOLUTE: ABNORMAL /ΜL
NEUTROPHILS RELATIVE PERCENT: ABNORMAL %
PLATELET # BLD: 255 K/ΜL
PMV BLD AUTO: ABNORMAL FL
POTASSIUM SERPL-SCNC: 5.1 MMOL/L
RBC # BLD: 3.23 10^6/ΜL
SODIUM BLD-SCNC: 137 MMOL/L
TOTAL PROTEIN: 6.9
WBC # BLD: 5.5 10^3/ML

## 2018-11-13 ENCOUNTER — TELEPHONE (OUTPATIENT)
Dept: NEPHROLOGY | Age: 53
End: 2018-11-13

## 2018-11-19 LAB
BASOPHILS ABSOLUTE: ABNORMAL /ΜL
BASOPHILS RELATIVE PERCENT: ABNORMAL %
BUN BLDV-MCNC: 44 MG/DL
CALCIUM SERPL-MCNC: 9.1 MG/DL
CHLORIDE BLD-SCNC: 102 MMOL/L
CO2: 21 MMOL/L
CREAT SERPL-MCNC: 3.37 MG/DL
EOSINOPHILS ABSOLUTE: ABNORMAL /ΜL
EOSINOPHILS RELATIVE PERCENT: ABNORMAL %
GFR CALCULATED: 20
GLUCOSE BLD-MCNC: 267 MG/DL
HCT VFR BLD CALC: 23.7 % (ref 41–53)
HEMOGLOBIN: 7.2 G/DL (ref 13.5–17.5)
LYMPHOCYTES ABSOLUTE: ABNORMAL /ΜL
LYMPHOCYTES RELATIVE PERCENT: ABNORMAL %
MCH RBC QN AUTO: ABNORMAL PG
MCHC RBC AUTO-ENTMCNC: ABNORMAL G/DL
MCV RBC AUTO: ABNORMAL FL
MONOCYTES ABSOLUTE: ABNORMAL /ΜL
MONOCYTES RELATIVE PERCENT: ABNORMAL %
NEUTROPHILS ABSOLUTE: ABNORMAL /ΜL
NEUTROPHILS RELATIVE PERCENT: ABNORMAL %
PLATELET # BLD: 333 K/ΜL
PMV BLD AUTO: ABNORMAL FL
POTASSIUM SERPL-SCNC: 4.8 MMOL/L
RBC # BLD: 3.4 10^6/ΜL
SODIUM BLD-SCNC: 137 MMOL/L
WBC # BLD: 5.4 10^3/ML

## 2018-11-20 PROBLEM — N28.9 RENAL IMPAIRMENT: Status: RESOLVED | Noted: 2018-09-17 | Resolved: 2018-11-20

## 2018-11-20 PROBLEM — E11.9 DIABETES MELLITUS (HCC): Status: RESOLVED | Noted: 2018-09-17 | Resolved: 2018-11-20

## 2018-11-23 PROBLEM — R73.9 HYPERGLYCEMIA: Status: ACTIVE | Noted: 2018-11-23

## 2018-11-24 ENCOUNTER — HOSPITAL ENCOUNTER (INPATIENT)
Age: 53
LOS: 1 days | Discharge: HOME | DRG: 194 | End: 2018-11-25
Attending: INTERNAL MEDICINE | Admitting: INTERNAL MEDICINE
Payer: MEDICARE

## 2018-11-24 ENCOUNTER — APPOINTMENT (OUTPATIENT)
Dept: GENERAL RADIOLOGY | Age: 53
DRG: 194 | End: 2018-11-24
Attending: INTERNAL MEDICINE
Payer: MEDICARE

## 2018-11-24 PROBLEM — E11.65 DIABETES MELLITUS WITH HYPERGLYCEMIA (HCC): Status: ACTIVE | Noted: 2018-11-23

## 2018-11-24 PROBLEM — D63.1 ANEMIA IN CHRONIC KIDNEY DISEASE: Status: ACTIVE | Noted: 2018-11-24

## 2018-11-24 PROBLEM — N18.9 ANEMIA IN CHRONIC KIDNEY DISEASE: Status: ACTIVE | Noted: 2018-11-24

## 2018-11-24 PROBLEM — J18.9 PNEUMONIA: Status: ACTIVE | Noted: 2018-11-24

## 2018-11-24 PROBLEM — D64.9 SYMPTOMATIC ANEMIA: Status: ACTIVE | Noted: 2018-11-24

## 2018-11-24 LAB
ABSOLUTE RETIC #: 44 THOU/MM3 (ref 20–115)
ALBUMIN SERPL-MCNC: 3.8 G/DL (ref 3.5–5.1)
ALP BLD-CCNC: 149 U/L (ref 38–126)
ALT SERPL-CCNC: 54 U/L (ref 11–66)
ANION GAP SERPL CALCULATED.3IONS-SCNC: 17 MEQ/L (ref 8–16)
AST SERPL-CCNC: 40 U/L (ref 5–40)
BASOPHILS # BLD: 0.4 %
BASOPHILS ABSOLUTE: 0 THOU/MM3 (ref 0–0.1)
BILIRUB SERPL-MCNC: 0.5 MG/DL (ref 0.3–1.2)
BUN BLDV-MCNC: 47 MG/DL (ref 7–22)
CALCIUM SERPL-MCNC: 9.2 MG/DL (ref 8.5–10.5)
CHLORIDE BLD-SCNC: 105 MEQ/L (ref 98–111)
CO2: 15 MEQ/L (ref 23–33)
CREAT SERPL-MCNC: 3.1 MG/DL (ref 0.4–1.2)
EKG ATRIAL RATE: 76 BPM
EKG P AXIS: 40 DEGREES
EKG P-R INTERVAL: 148 MS
EKG Q-T INTERVAL: 416 MS
EKG QRS DURATION: 86 MS
EKG QTC CALCULATION (BAZETT): 468 MS
EKG R AXIS: 47 DEGREES
EKG T AXIS: -101 DEGREES
EKG VENTRICULAR RATE: 76 BPM
EOSINOPHIL # BLD: 1.9 %
EOSINOPHILS ABSOLUTE: 0.1 THOU/MM3 (ref 0–0.4)
ERYTHROCYTE [DISTWIDTH] IN BLOOD BY AUTOMATED COUNT: 19.9 % (ref 11.5–14.5)
ERYTHROCYTE [DISTWIDTH] IN BLOOD BY AUTOMATED COUNT: 53.4 FL (ref 35–45)
FERRITIN: 15 NG/ML (ref 22–322)
FOLATE: > 20 NG/ML (ref 4.8–24.2)
GFR SERPL CREATININE-BSD FRML MDRD: 21 ML/MIN/1.73M2
GLUCOSE BLD-MCNC: 124 MG/DL (ref 70–108)
GLUCOSE BLD-MCNC: 125 MG/DL (ref 70–108)
GLUCOSE BLD-MCNC: 135 MG/DL (ref 70–108)
GLUCOSE BLD-MCNC: 266 MG/DL (ref 70–108)
GLUCOSE BLD-MCNC: 310 MG/DL (ref 70–108)
GLUCOSE BLD-MCNC: 333 MG/DL (ref 70–108)
HCT VFR BLD CALC: 28.2 % (ref 42–52)
HEMOGLOBIN: 8.3 GM/DL (ref 14–18)
IMMATURE GRANS (ABS): 0.03 THOU/MM3 (ref 0–0.07)
IMMATURE GRANULOCYTES: 0.4 %
IMMATURE RETIC FRACT: 43.6 % (ref 2.3–13.4)
INR BLD: 1.05 (ref 0.85–1.13)
IRON SATURATION: 5 % (ref 20–50)
IRON: 21 UG/DL (ref 65–195)
LACTIC ACID: 0.6 MMOL/L (ref 0.5–2.2)
LV EF: 58 %
LVEF MODALITY: NORMAL
LYMPHOCYTES # BLD: 19.7 %
LYMPHOCYTES ABSOLUTE: 1.4 THOU/MM3 (ref 1–4.8)
MCH RBC QN AUTO: 22.1 PG (ref 26–33)
MCHC RBC AUTO-ENTMCNC: 29.4 GM/DL (ref 32.2–35.5)
MCV RBC AUTO: 75 FL (ref 80–94)
MONOCYTES # BLD: 8.7 %
MONOCYTES ABSOLUTE: 0.6 THOU/MM3 (ref 0.4–1.3)
NUCLEATED RED BLOOD CELLS: 0 /100 WBC
PLATELET # BLD: 302 THOU/MM3 (ref 130–400)
PMV BLD AUTO: 10.9 FL (ref 9.4–12.4)
POTASSIUM REFLEX MAGNESIUM: 4.4 MEQ/L (ref 3.5–5.2)
PROCALCITONIN: 0.14 NG/ML (ref 0.01–0.09)
RBC # BLD: 3.76 MILL/MM3 (ref 4.7–6.1)
RETIC HEMOGLOBIN: 18.4 PG (ref 28.2–35.7)
RETICULOCYTE ABSOLUTE COUNT: 1.1 % (ref 0.5–2)
SEG NEUTROPHILS: 68.9 %
SEGMENTED NEUTROPHILS ABSOLUTE COUNT: 4.8 THOU/MM3 (ref 1.8–7.7)
SODIUM BLD-SCNC: 137 MEQ/L (ref 135–145)
TOTAL IRON BINDING CAPACITY: 384 UG/DL (ref 171–450)
TOTAL PROTEIN: 6.8 G/DL (ref 6.1–8)
VITAMIN B-12: 471 PG/ML (ref 211–911)
WBC # BLD: 7 THOU/MM3 (ref 4.8–10.8)

## 2018-11-24 PROCEDURE — 83540 ASSAY OF IRON: CPT

## 2018-11-24 PROCEDURE — 93306 TTE W/DOPPLER COMPLETE: CPT

## 2018-11-24 PROCEDURE — 85025 COMPLETE CBC W/AUTO DIFF WBC: CPT

## 2018-11-24 PROCEDURE — 6370000000 HC RX 637 (ALT 250 FOR IP): Performed by: HOSPITALIST

## 2018-11-24 PROCEDURE — 85046 RETICYTE/HGB CONCENTRATE: CPT

## 2018-11-24 PROCEDURE — 87899 AGENT NOS ASSAY W/OPTIC: CPT

## 2018-11-24 PROCEDURE — 82728 ASSAY OF FERRITIN: CPT

## 2018-11-24 PROCEDURE — 82607 VITAMIN B-12: CPT

## 2018-11-24 PROCEDURE — 2580000003 HC RX 258: Performed by: INTERNAL MEDICINE

## 2018-11-24 PROCEDURE — 83550 IRON BINDING TEST: CPT

## 2018-11-24 PROCEDURE — 83605 ASSAY OF LACTIC ACID: CPT

## 2018-11-24 PROCEDURE — 36415 COLL VENOUS BLD VENIPUNCTURE: CPT

## 2018-11-24 PROCEDURE — 87449 NOS EACH ORGANISM AG IA: CPT

## 2018-11-24 PROCEDURE — 6370000000 HC RX 637 (ALT 250 FOR IP): Performed by: INTERNAL MEDICINE

## 2018-11-24 PROCEDURE — 84145 PROCALCITONIN (PCT): CPT

## 2018-11-24 PROCEDURE — 6360000002 HC RX W HCPCS: Performed by: INTERNAL MEDICINE

## 2018-11-24 PROCEDURE — 87040 BLOOD CULTURE FOR BACTERIA: CPT

## 2018-11-24 PROCEDURE — 82746 ASSAY OF FOLIC ACID SERUM: CPT

## 2018-11-24 PROCEDURE — 1200000003 HC TELEMETRY R&B

## 2018-11-24 PROCEDURE — 71046 X-RAY EXAM CHEST 2 VIEWS: CPT

## 2018-11-24 PROCEDURE — 99223 1ST HOSP IP/OBS HIGH 75: CPT | Performed by: HOSPITALIST

## 2018-11-24 PROCEDURE — 80053 COMPREHEN METABOLIC PANEL: CPT

## 2018-11-24 PROCEDURE — 2580000003 HC RX 258: Performed by: HOSPITALIST

## 2018-11-24 PROCEDURE — 82948 REAGENT STRIP/BLOOD GLUCOSE: CPT

## 2018-11-24 PROCEDURE — 99221 1ST HOSP IP/OBS SF/LOW 40: CPT | Performed by: INTERNAL MEDICINE

## 2018-11-24 PROCEDURE — 93005 ELECTROCARDIOGRAM TRACING: CPT | Performed by: INTERNAL MEDICINE

## 2018-11-24 PROCEDURE — 85610 PROTHROMBIN TIME: CPT

## 2018-11-24 PROCEDURE — 6360000002 HC RX W HCPCS: Performed by: HOSPITALIST

## 2018-11-24 RX ORDER — ISOSORBIDE MONONITRATE 30 MG/1
30 TABLET, EXTENDED RELEASE ORAL DAILY
Status: DISCONTINUED | OUTPATIENT
Start: 2018-11-24 | End: 2018-11-25 | Stop reason: HOSPADM

## 2018-11-24 RX ORDER — HYDRALAZINE HYDROCHLORIDE 50 MG/1
50 TABLET, FILM COATED ORAL 3 TIMES DAILY
Status: DISCONTINUED | OUTPATIENT
Start: 2018-11-24 | End: 2018-11-25

## 2018-11-24 RX ORDER — HEPARIN SODIUM 5000 [USP'U]/ML
5000 INJECTION, SOLUTION INTRAVENOUS; SUBCUTANEOUS EVERY 8 HOURS SCHEDULED
Status: DISCONTINUED | OUTPATIENT
Start: 2018-11-24 | End: 2018-11-25 | Stop reason: HOSPADM

## 2018-11-24 RX ORDER — FUROSEMIDE 20 MG/1
20 TABLET ORAL DAILY PRN
Status: DISCONTINUED | OUTPATIENT
Start: 2018-11-24 | End: 2018-11-25 | Stop reason: HOSPADM

## 2018-11-24 RX ORDER — METOPROLOL TARTRATE 50 MG/1
50 TABLET, FILM COATED ORAL 2 TIMES DAILY
Status: DISCONTINUED | OUTPATIENT
Start: 2018-11-24 | End: 2018-11-25 | Stop reason: HOSPADM

## 2018-11-24 RX ORDER — INSULIN GLARGINE 100 [IU]/ML
18 INJECTION, SOLUTION SUBCUTANEOUS NIGHTLY
Status: DISCONTINUED | OUTPATIENT
Start: 2018-11-24 | End: 2018-11-25 | Stop reason: HOSPADM

## 2018-11-24 RX ORDER — ALBUTEROL SULFATE 2.5 MG/3ML
2.5 SOLUTION RESPIRATORY (INHALATION) EVERY 4 HOURS PRN
Status: DISCONTINUED | OUTPATIENT
Start: 2018-11-24 | End: 2018-11-25 | Stop reason: HOSPADM

## 2018-11-24 RX ORDER — MULTIVITAMIN WITH FOLIC ACID 400 MCG
1 TABLET ORAL DAILY
Status: DISCONTINUED | OUTPATIENT
Start: 2018-11-24 | End: 2018-11-25 | Stop reason: HOSPADM

## 2018-11-24 RX ORDER — ATORVASTATIN CALCIUM 80 MG/1
80 TABLET, FILM COATED ORAL DAILY
Status: DISCONTINUED | OUTPATIENT
Start: 2018-11-24 | End: 2018-11-25 | Stop reason: HOSPADM

## 2018-11-24 RX ORDER — SODIUM CHLORIDE 0.9 % (FLUSH) 0.9 %
10 SYRINGE (ML) INJECTION PRN
Status: DISCONTINUED | OUTPATIENT
Start: 2018-11-24 | End: 2018-11-25 | Stop reason: HOSPADM

## 2018-11-24 RX ORDER — CLOPIDOGREL BISULFATE 75 MG/1
75 TABLET ORAL DAILY
Status: DISCONTINUED | OUTPATIENT
Start: 2018-11-24 | End: 2018-11-25 | Stop reason: HOSPADM

## 2018-11-24 RX ORDER — FUROSEMIDE 10 MG/ML
40 INJECTION INTRAMUSCULAR; INTRAVENOUS ONCE
Status: COMPLETED | OUTPATIENT
Start: 2018-11-24 | End: 2018-11-24

## 2018-11-24 RX ORDER — ASPIRIN 81 MG/1
81 TABLET, CHEWABLE ORAL DAILY
Status: DISCONTINUED | OUTPATIENT
Start: 2018-11-24 | End: 2018-11-25 | Stop reason: HOSPADM

## 2018-11-24 RX ORDER — PANTOPRAZOLE SODIUM 40 MG/1
40 TABLET, DELAYED RELEASE ORAL
Status: DISCONTINUED | OUTPATIENT
Start: 2018-11-24 | End: 2018-11-25 | Stop reason: HOSPADM

## 2018-11-24 RX ORDER — DEXTROSE MONOHYDRATE 25 G/50ML
12.5 INJECTION, SOLUTION INTRAVENOUS PRN
Status: DISCONTINUED | OUTPATIENT
Start: 2018-11-24 | End: 2018-11-25 | Stop reason: HOSPADM

## 2018-11-24 RX ORDER — NICOTINE POLACRILEX 4 MG
15 LOZENGE BUCCAL PRN
Status: DISCONTINUED | OUTPATIENT
Start: 2018-11-24 | End: 2018-11-25 | Stop reason: HOSPADM

## 2018-11-24 RX ORDER — SODIUM CHLORIDE 0.9 % (FLUSH) 0.9 %
10 SYRINGE (ML) INJECTION EVERY 12 HOURS SCHEDULED
Status: DISCONTINUED | OUTPATIENT
Start: 2018-11-24 | End: 2018-11-25 | Stop reason: HOSPADM

## 2018-11-24 RX ORDER — HYDRALAZINE HYDROCHLORIDE 20 MG/ML
10 INJECTION INTRAMUSCULAR; INTRAVENOUS EVERY 6 HOURS PRN
Status: DISCONTINUED | OUTPATIENT
Start: 2018-11-24 | End: 2018-11-24

## 2018-11-24 RX ORDER — ONDANSETRON 2 MG/ML
4 INJECTION INTRAMUSCULAR; INTRAVENOUS EVERY 6 HOURS PRN
Status: DISCONTINUED | OUTPATIENT
Start: 2018-11-24 | End: 2018-11-25 | Stop reason: HOSPADM

## 2018-11-24 RX ORDER — LEVOFLOXACIN 250 MG/1
250 TABLET ORAL EVERY 24 HOURS
Status: DISCONTINUED | OUTPATIENT
Start: 2018-11-24 | End: 2018-11-25 | Stop reason: HOSPADM

## 2018-11-24 RX ORDER — HYDRALAZINE HYDROCHLORIDE 20 MG/ML
10 INJECTION INTRAMUSCULAR; INTRAVENOUS EVERY 6 HOURS PRN
Status: DISCONTINUED | OUTPATIENT
Start: 2018-11-24 | End: 2018-11-25 | Stop reason: HOSPADM

## 2018-11-24 RX ORDER — DEXTROSE MONOHYDRATE 50 MG/ML
100 INJECTION, SOLUTION INTRAVENOUS PRN
Status: DISCONTINUED | OUTPATIENT
Start: 2018-11-24 | End: 2018-11-25 | Stop reason: HOSPADM

## 2018-11-24 RX ORDER — NITROGLYCERIN 0.4 MG/1
0.4 TABLET SUBLINGUAL EVERY 5 MIN PRN
Status: DISCONTINUED | OUTPATIENT
Start: 2018-11-24 | End: 2018-11-25 | Stop reason: HOSPADM

## 2018-11-24 RX ORDER — HYDRALAZINE HYDROCHLORIDE 20 MG/ML
10 INJECTION INTRAMUSCULAR; INTRAVENOUS EVERY 6 HOURS PRN
Status: DISCONTINUED | OUTPATIENT
Start: 2018-11-24 | End: 2018-11-24 | Stop reason: SDUPTHER

## 2018-11-24 RX ADMIN — Medication 7 UNITS: at 21:27

## 2018-11-24 RX ADMIN — HYDRALAZINE HYDROCHLORIDE 50 MG: 50 TABLET, FILM COATED ORAL at 21:25

## 2018-11-24 RX ADMIN — HYDRALAZINE HYDROCHLORIDE 10 MG: 20 INJECTION INTRAMUSCULAR; INTRAVENOUS at 01:18

## 2018-11-24 RX ADMIN — METOPROLOL TARTRATE 50 MG: 50 TABLET, FILM COATED ORAL at 21:25

## 2018-11-24 RX ADMIN — ASPIRIN 81 MG 81 MG: 81 TABLET ORAL at 07:43

## 2018-11-24 RX ADMIN — INSULIN LISPRO 13 UNITS: 100 INJECTION, SOLUTION INTRAVENOUS; SUBCUTANEOUS at 17:50

## 2018-11-24 RX ADMIN — PANTOPRAZOLE SODIUM 40 MG: 40 TABLET, DELAYED RELEASE ORAL at 17:43

## 2018-11-24 RX ADMIN — HYDRALAZINE HYDROCHLORIDE 50 MG: 50 TABLET, FILM COATED ORAL at 14:45

## 2018-11-24 RX ADMIN — DARBEPOETIN ALFA 100 MCG: 100 INJECTION, SOLUTION INTRAVENOUS; SUBCUTANEOUS at 17:44

## 2018-11-24 RX ADMIN — ISOSORBIDE MONONITRATE 30 MG: 30 TABLET ORAL at 07:43

## 2018-11-24 RX ADMIN — LEVOFLOXACIN 250 MG: 250 TABLET, FILM COATED ORAL at 21:26

## 2018-11-24 RX ADMIN — Medication 10 ML: at 09:02

## 2018-11-24 RX ADMIN — HEPARIN SODIUM 5000 UNITS: 5000 INJECTION INTRAVENOUS; SUBCUTANEOUS at 21:26

## 2018-11-24 RX ADMIN — CLOPIDOGREL BISULFATE 75 MG: 75 TABLET ORAL at 07:43

## 2018-11-24 RX ADMIN — FUROSEMIDE 40 MG: 10 INJECTION, SOLUTION INTRAMUSCULAR; INTRAVENOUS at 05:46

## 2018-11-24 RX ADMIN — Medication 11 UNITS: at 12:33

## 2018-11-24 RX ADMIN — METOPROLOL TARTRATE 50 MG: 50 TABLET, FILM COATED ORAL at 07:43

## 2018-11-24 RX ADMIN — ATORVASTATIN CALCIUM 80 MG: 80 TABLET, FILM COATED ORAL at 21:25

## 2018-11-24 RX ADMIN — Medication 10 ML: at 21:28

## 2018-11-24 RX ADMIN — INSULIN GLARGINE 18 UNITS: 100 INJECTION, SOLUTION SUBCUTANEOUS at 21:26

## 2018-11-24 RX ADMIN — THERA TABS 1 TABLET: TAB at 07:43

## 2018-11-24 RX ADMIN — HEPARIN SODIUM 5000 UNITS: 5000 INJECTION INTRAVENOUS; SUBCUTANEOUS at 14:43

## 2018-11-24 RX ADMIN — HEPARIN SODIUM 5000 UNITS: 5000 INJECTION INTRAVENOUS; SUBCUTANEOUS at 05:46

## 2018-11-24 RX ADMIN — IRON SUCROSE 200 MG: 20 INJECTION, SOLUTION INTRAVENOUS at 19:30

## 2018-11-24 RX ADMIN — PANTOPRAZOLE SODIUM 40 MG: 40 TABLET, DELAYED RELEASE ORAL at 05:46

## 2018-11-24 RX ADMIN — HYDRALAZINE HYDROCHLORIDE 50 MG: 50 TABLET, FILM COATED ORAL at 07:43

## 2018-11-24 ASSESSMENT — PAIN SCALES - GENERAL
PAINLEVEL_OUTOF10: 0
PAINLEVEL_OUTOF10: 0
PAINLEVEL_OUTOF10: 2

## 2018-11-24 ASSESSMENT — PAIN DESCRIPTION - PAIN TYPE: TYPE: OTHER (COMMENT)

## 2018-11-24 NOTE — CONSULTS
artery disease) October 2015    MI     Cerebral artery occlusion with cerebral infarction [facility])  march 2015    left side    CHF (congestive heart failure) (HCC)     Chronic kidney disease     Diabetes mellitus ([address]     Hyperlipidemia     Hypertension         Past Surgical History:  Past Surgical History:   Procedure Laterality Date    APPENDECTOMY      CARDIAC SURGERY  Oct. 2015    2 stents placed        Family History:  Family History   Problem Relation Age of Onset    Adopted: Yes    Diabetes Mother     Cancer Sister         Social History:  Social History     Social History    Marital status:      Spouse name: Li Abarca    Number of children: N/A    Years of education: N/A     Occupational History    Not on file. Social History Main Topics    Smoking status: Former Smoker     Packs/day: 1.50     Years: 30.00     Quit date: 10/9/2015    Smokeless tobacco: Former User      Comment: currently uses     Alcohol use 2.4 oz/week     4 Cans of beer per week      Comment: occasional, once month    Drug use: No    Sexual activity: Yes     Partners: Female     Other Topics Concern    Not on file     Social History Narrative    No narrative on file        Home Medications:  Prior to Admission medications    Medication Sig Start Date End Date Taking?  Authorizing Provider   LANTUS SOLOSTAR 100 UNIT/ML injection pen Inject 18 Units into the skin nightly  7/30/18  Yes Historical Provider, MD   HUMALOG KWIKPEN 100 UNIT/ML pen INJECT 1 UNIT UNDER THE SKIN PER EVERY 8 CARBS AT MEALTIME THREE TIMES A DAY 3/15/18  Yes Historical Provider, MD   furosemide (LASIX) 20 MG tablet TAKE 1 TABLET BY MOUTH EVERY DAY AS NEEDED FOR FLUID 1/25/18  Yes Historical Provider, MD   FREESTYLE LANCETS MISC  3/31/16  Yes Historical Provider, MD   FREESTYLE INSULINX TEST strip  3/31/16  Yes Historical Provider, MD   isosorbide mononitrate (IMDUR) 30 MG CR tablet Take 30 mg by mouth daily   Yes Historical atraumatic, no cyanosis or edema  Neurologic: Mental status: Alert, oriented, thought content appropriate  PSY: No evidence of depression. Mood is normal for the patient. Skin: Warm and dry. No unusual lesions or rashes noted. Muscles: Hand  is equal and strong bilaterally. Leg strength is equal and strong. Skeletal: No bony or skeletal abnormalities noted. Admission weight: 123 lb 4.8 oz (55.9 kg)  Wt Readings from Last 3 Encounters:   11/24/18 123 lb 4.8 oz (55.9 kg)   09/19/18 119 lb (54 kg)   08/15/18 122 lb (55.3 kg)     Body mass index is 22.55 kg/m². Clinical Impressions:    1. CKD stage IV from biopsy proven DM and HTN  2. Anemia. Probably from decreased production of erythropoietin from his kidney disease. 3. DM  4. HTN  5. Candidate for combined kidney and pancrease transplant. Plan of Care    1. No blood transfusions without the approval of nephrology unless life threatening. 2. Will start him on aranesp. He should receive this every 14 days outpatient. 3. Will check iron stores. It is OK with nephrology for the primary physician to discharge the patient when ready. Follow up visit with Dr. Petra Stewart in 14-21 days with CBC and BMP drawn 48 hours prior to the visit. Catalina Grimaldo DO  5. Silverio Grimaldo DO  [facility].

## 2018-11-24 NOTE — H&P
History & Physical        Patient:  Sarah Sanders  YOB: 1965    MRN: [id number]     Acct: [de-identified]    PCP: PAOLA De La Rosa CNP    Date of Admission: 11/24/2018    Date of Service: Pt seen/examined on 11/24/18  and Admitted to Inpatient with expected LOS greater than two midnights due to medical therapy. Chief Complaint/Reason for transfer: symptomatic anemia, pneumonia. History Of Present Illness:     46 y.o. male, with CKD 4, DM 1, CAD, CHF, directly [place] [facility] [place] with dizziness off for about 3 months duration. Evaluation remarkable for hemoglobin 7.3,  pneumonia. Was reportedly started on IV as gentamicin and Rocephin, transfuse 4 units of packed wrists as prior to transfer to [address] [place] [address]. Reports shortness of breath, orthopnea, cough productive of yellowish sputum. No fever, chills, nausea, vomiting. Past Medical History:          Diagnosis Date    Broken ankle 2016    Broke right ankle.  CAD (coronary artery disease) October 2015    MI     Cerebral artery occlusion with cerebral infarction [facility])  march 2015    left side    CHF (congestive heart failure) (HCC)     Chronic kidney disease     Diabetes mellitus ([facility] [address]     Hyperlipidemia     Hypertension        Past Surgical History:          Procedure Laterality Date    APPENDECTOMY      CARDIAC SURGERY  Oct. 2015    2 stents placed       Medications Prior to Admission:      Prior to Admission medications    Medication Sig Start Date End Date Taking?  Authorizing Provider   ALEXSANDER SOLOSTAR 100 UNIT/ML injection pen  7/30/18   Historical Provider, MD   terazosin (HYTRIN) 1 MG capsule Take 1 capsule by mouth nightly 8/15/18   Ann Grimaldo DO   HUMALOG KWIKPEN 100 UNIT/ML pen INJECT 1 UNIT UNDER THE SKIN PER EVERY 8 CARBS AT MEALTIME THREE TIMES A DAY 3/15/18   Historical Provider, MD   furosemide (LASIX) 20 MG tablet TAKE 1 TABLET BY MOUTH EVERY DAY AS NEEDED stated age and cooperative. HEENT:  Normal cephalic, atraumatic without obvious deformity. Pupils equal, round, and reactive to light. Extra ocular muscles intact. Conjunctivae/corneas clear. Neck: Supple, with full range of motion. Jugular venous distention. Trachea midline. Respiratory:  Normal respiratory effort. Fairly good entry bilaterally, with by basilar crackles. No wheezes. Cardiovascular:  Regular rate and rhythm with normal S1/S2 without murmurs, rubs or gallops. Abdomen: Soft, non-tender, non-distended with normal bowel sounds. Musculoskeletal:  No clubbing, cyanosis or edema bilaterally. Full range of motion without deformity. Skin: Skin color, texture, turgor normal.  No rashes or lesions. Neurologic:  Neurovascularly intact without any focal sensory/motor deficits. Cranial nerves: II-XII intact, grossly non-focal.  Psychiatric:  Alert and oriented, thought content appropriate, normal insight  Capillary Refill: Brisk,< 3 seconds   Peripheral Pulses: +2 palpable, equal bilaterally       Labs:     No results for input(s): WBC, HGB, HCT, PLT in the last 72 hours. No results for input(s): NA, K, CL, CO2, BUN, CREATININE, CALCIUM, PHOS in the last 72 hours. Invalid input(s): MAGNES  No results for input(s): AST, ALT, BILIDIR, BILITOT, ALKPHOS in the last 72 hours. No results for input(s): INR in the last 72 hours. No results for input(s): Frann Goes in the last 72 hours. Urinalysis:      Lab Results   Component Value Date    NITRU NEGATIVE 04/09/2016    WBCUA 0-2 04/09/2016    BACTERIA NONE 04/09/2016    RBCUA 0-2 04/09/2016    BLOODU NEGATIVE 04/09/2016    GLUCOSEU NEGATIVE 04/09/2016       Intake & Output:  No intake/output data recorded. No intake/output data recorded. Radiology:     CXR: I have reviewed the CXR with the following interpretation: right basilar infiltrate    XR CHEST STANDARD (2 VW)   Final Result   Impression:   1.  Right basilar atelectasis and or infiltrate with small effusion. 2. Changes of COPD. **This report has been created using voice recognition software. It may contain minor errors which are inherent in voice recognition technology. **      Final report electronically signed by Dr. Giana Wilson on 11/24/2018 3:30 AM               DVT prophylaxis: [] Lovenox                                 [] SCDs                                 [x] SQ Heparin                                 [] Encourage ambulation           [] Already on Anticoagulation    Code Status: Full      PT/OT Eval Status: pending    Disposition:    [x] Home       [] TCU       [] Rehab       [] Psych       [] SNF       [] [place]       [] Other-    ASSESSMENT:    Active Hospital Problems    Diagnosis Date Noted    Hyperglycemia [R73.9] 11/23/2018       PLAN:    Right basilar infiltrate concerning for Community acquired pneumonia(CAP). empiric IV azithromycin and Rocephin. blood culture, sputum c/s, Urine legionella Ag, urine strep pneumo Ag pending. DM 1 with hyperglycemia. SSI. Resume lantus    CAD. No acute issues. Resume cardioprotective regimen    Possible acute CHF exacerbation. IV furosemide. 2decho    CKD 4 per patient. Consult nephrology    HTN stage 2, uncontrolled. IV Hydralazine prn with parameters. Resume metoprolol, imdur    HLD. Resume atorvastatin    GERD. Resume PPI        Thank you PAOLA Machuca - SHAILESH for the opportunity to be involved in this patient's care.     Electronically signed by Delta Delgado MD on 11/24/2018 at 1:16 AM

## 2018-11-24 NOTE — FLOWSHEET NOTE
11/24/18 0100   Provider Notification   Reason for Communication Evaluate   Provider Name Dr. Ashley Farr   Provider Notification Physician   Method of Communication Secure Message   Response Waiting for response   Notification Time 0100     Advising that pt arrived.

## 2018-11-24 NOTE — PROGRESS NOTES
Hosptialist update note:   Pt seen by my colleague, Dr. Celeste Wilson, earlier today. Pt with CKD4, AOCD, who was admitted with \"symptomatic anemia\", possible bacterial pneumonia. Patient reports complaints of dizziness that's been on and off for the past couple of months. He was noted to have Hb of 7.3 at [facility], transfused 1 unit of PRBC and transferred here for further evaluation. He has baseline anemia, likely from CKD, with recent Hb of 7.2 on 11/19. He has not seen blood in his stool. He also reports chronic cough without fever or chills. CXR with possible infiltrate vs atelectasis. > On exam, appears stable with no significant findings. Exam was unremarkable.  > S/p 1 unit of PRBC prior to transfer. Monitor Hb closely. Check iron studies, occult stool. One dose of venofer today. Aranesp per nephro.  > Reviewed CXR, suspect more of atelectasis. Change zithromax/rocephin to PO levaquin starting tomorrow; check QT.  > Will monitor overnight. Possible discharge in the next day or two.        SIGNED: Amalia Persaud MD . 11/24/2018

## 2018-11-25 ENCOUNTER — TELEPHONE (OUTPATIENT)
Dept: NEPHROLOGY | Age: 53
End: 2018-11-25

## 2018-11-25 VITALS
TEMPERATURE: 98.1 F | BODY MASS INDEX: 22.1 KG/M2 | OXYGEN SATURATION: 96 % | WEIGHT: 120.1 LBS | DIASTOLIC BLOOD PRESSURE: 76 MMHG | HEART RATE: 77 BPM | RESPIRATION RATE: 16 BRPM | HEIGHT: 62 IN | SYSTOLIC BLOOD PRESSURE: 164 MMHG

## 2018-11-25 PROBLEM — J15.9 BACTERIAL PNEUMONIA: Status: ACTIVE | Noted: 2018-11-25

## 2018-11-25 PROBLEM — D50.9 IRON DEFICIENCY ANEMIA: Status: ACTIVE | Noted: 2018-11-25

## 2018-11-25 LAB
ALBUMIN SERPL-MCNC: 3.2 G/DL (ref 3.5–5.1)
ANION GAP SERPL CALCULATED.3IONS-SCNC: 14 MEQ/L (ref 8–16)
BASOPHILS # BLD: 0.4 %
BASOPHILS ABSOLUTE: 0 THOU/MM3 (ref 0–0.1)
BUN BLDV-MCNC: 45 MG/DL (ref 7–22)
CALCIUM SERPL-MCNC: 8.8 MG/DL (ref 8.5–10.5)
CHLORIDE BLD-SCNC: 103 MEQ/L (ref 98–111)
CO2: 18 MEQ/L (ref 23–33)
CREAT SERPL-MCNC: 3.1 MG/DL (ref 0.4–1.2)
EOSINOPHIL # BLD: 1.7 %
EOSINOPHILS ABSOLUTE: 0.1 THOU/MM3 (ref 0–0.4)
ERYTHROCYTE [DISTWIDTH] IN BLOOD BY AUTOMATED COUNT: 20.3 % (ref 11.5–14.5)
ERYTHROCYTE [DISTWIDTH] IN BLOOD BY AUTOMATED COUNT: 52.9 FL (ref 35–45)
GFR SERPL CREATININE-BSD FRML MDRD: 21 ML/MIN/1.73M2
GLUCOSE BLD-MCNC: 107 MG/DL (ref 70–108)
GLUCOSE BLD-MCNC: 186 MG/DL (ref 70–108)
GLUCOSE BLD-MCNC: 297 MG/DL (ref 70–108)
GLUCOSE BLD-MCNC: 320 MG/DL (ref 70–108)
GLUCOSE BLD-MCNC: 48 MG/DL (ref 70–108)
GLUCOSE BLD-MCNC: 62 MG/DL (ref 70–108)
HCT VFR BLD CALC: 28.5 % (ref 42–52)
HEMOGLOBIN: 8.4 GM/DL (ref 14–18)
IMMATURE GRANS (ABS): 0.04 THOU/MM3 (ref 0–0.07)
IMMATURE GRANULOCYTES: 0.5 %
LEGIONELLA URINARY AG: NEGATIVE
LYMPHOCYTES # BLD: 8.5 %
LYMPHOCYTES ABSOLUTE: 0.7 THOU/MM3 (ref 1–4.8)
MAGNESIUM: 1.9 MG/DL (ref 1.6–2.4)
MCH RBC QN AUTO: 21.7 PG (ref 26–33)
MCHC RBC AUTO-ENTMCNC: 29.5 GM/DL (ref 32.2–35.5)
MCV RBC AUTO: 73.6 FL (ref 80–94)
MONOCYTES # BLD: 8.4 %
MONOCYTES ABSOLUTE: 0.7 THOU/MM3 (ref 0.4–1.3)
NUCLEATED RED BLOOD CELLS: 0 /100 WBC
PHOSPHORUS: 3.7 MG/DL (ref 2.4–4.7)
PLATELET # BLD: 301 THOU/MM3 (ref 130–400)
PMV BLD AUTO: 10.7 FL (ref 9.4–12.4)
POTASSIUM SERPL-SCNC: 4.5 MEQ/L (ref 3.5–5.2)
RBC # BLD: 3.87 MILL/MM3 (ref 4.7–6.1)
SEG NEUTROPHILS: 80.5 %
SEGMENTED NEUTROPHILS ABSOLUTE COUNT: 6.7 THOU/MM3 (ref 1.8–7.7)
SODIUM BLD-SCNC: 135 MEQ/L (ref 135–145)
STREP PNEUMO AG, UR: NEGATIVE
WBC # BLD: 8.3 THOU/MM3 (ref 4.8–10.8)

## 2018-11-25 PROCEDURE — 83735 ASSAY OF MAGNESIUM: CPT

## 2018-11-25 PROCEDURE — 85025 COMPLETE CBC W/AUTO DIFF WBC: CPT

## 2018-11-25 PROCEDURE — 36415 COLL VENOUS BLD VENIPUNCTURE: CPT

## 2018-11-25 PROCEDURE — 82948 REAGENT STRIP/BLOOD GLUCOSE: CPT

## 2018-11-25 PROCEDURE — 6370000000 HC RX 637 (ALT 250 FOR IP): Performed by: INTERNAL MEDICINE

## 2018-11-25 PROCEDURE — 6370000000 HC RX 637 (ALT 250 FOR IP): Performed by: HOSPITALIST

## 2018-11-25 PROCEDURE — 99239 HOSP IP/OBS DSCHRG MGMT >30: CPT | Performed by: INTERNAL MEDICINE

## 2018-11-25 PROCEDURE — 2580000003 HC RX 258: Performed by: HOSPITALIST

## 2018-11-25 PROCEDURE — 80069 RENAL FUNCTION PANEL: CPT

## 2018-11-25 PROCEDURE — 6360000002 HC RX W HCPCS: Performed by: HOSPITALIST

## 2018-11-25 RX ORDER — LEVOFLOXACIN 250 MG/1
250 TABLET ORAL EVERY 24 HOURS
Qty: 5 TABLET | Refills: 0 | Status: SHIPPED | OUTPATIENT
Start: 2018-11-25 | End: 2018-11-30

## 2018-11-25 RX ORDER — DOCUSATE SODIUM 100 MG/1
100 CAPSULE, LIQUID FILLED ORAL 2 TIMES DAILY
Qty: 60 CAPSULE | Refills: 0 | Status: SHIPPED | OUTPATIENT
Start: 2018-11-25 | End: 2018-12-19 | Stop reason: ALTCHOICE

## 2018-11-25 RX ORDER — GREEN TEA/HOODIA GORDONII 315-12.5MG
1 CAPSULE ORAL DAILY
Qty: 30 TABLET | Refills: 0 | Status: SHIPPED | OUTPATIENT
Start: 2018-11-25 | End: 2018-12-19 | Stop reason: ALTCHOICE

## 2018-11-25 RX ORDER — HYDRALAZINE HYDROCHLORIDE 25 MG/1
25 TABLET, FILM COATED ORAL ONCE
Status: COMPLETED | OUTPATIENT
Start: 2018-11-25 | End: 2018-11-25

## 2018-11-25 RX ORDER — HYDRALAZINE HYDROCHLORIDE 50 MG/1
75 TABLET, FILM COATED ORAL 3 TIMES DAILY
Qty: 135 TABLET | Refills: 0 | Status: ON HOLD | OUTPATIENT
Start: 2018-11-25 | End: 2019-04-18 | Stop reason: HOSPADM

## 2018-11-25 RX ORDER — FERROUS SULFATE 325(65) MG
325 TABLET ORAL
Qty: 90 TABLET | Refills: 3 | Status: ON HOLD | OUTPATIENT
Start: 2018-11-25 | End: 2019-04-10

## 2018-11-25 RX ORDER — FERROUS SULFATE 325(65) MG
325 TABLET ORAL
Status: DISCONTINUED | OUTPATIENT
Start: 2018-11-25 | End: 2018-11-25 | Stop reason: HOSPADM

## 2018-11-25 RX ADMIN — METOPROLOL TARTRATE 50 MG: 50 TABLET, FILM COATED ORAL at 08:59

## 2018-11-25 RX ADMIN — HEPARIN SODIUM 5000 UNITS: 5000 INJECTION INTRAVENOUS; SUBCUTANEOUS at 05:34

## 2018-11-25 RX ADMIN — THERA TABS 1 TABLET: TAB at 08:59

## 2018-11-25 RX ADMIN — ISOSORBIDE MONONITRATE 30 MG: 30 TABLET ORAL at 09:00

## 2018-11-25 RX ADMIN — INSULIN LISPRO 8 UNITS: 100 INJECTION, SOLUTION INTRAVENOUS; SUBCUTANEOUS at 12:56

## 2018-11-25 RX ADMIN — HYDRALAZINE HYDROCHLORIDE 50 MG: 50 TABLET, FILM COATED ORAL at 08:59

## 2018-11-25 RX ADMIN — INSULIN LISPRO 8 UNITS: 100 INJECTION, SOLUTION INTRAVENOUS; SUBCUTANEOUS at 09:01

## 2018-11-25 RX ADMIN — PANTOPRAZOLE SODIUM 40 MG: 40 TABLET, DELAYED RELEASE ORAL at 05:34

## 2018-11-25 RX ADMIN — ASPIRIN 81 MG 81 MG: 81 TABLET ORAL at 08:59

## 2018-11-25 RX ADMIN — FERROUS SULFATE TAB 325 MG (65 MG ELEMENTAL FE) 325 MG: 325 (65 FE) TAB at 13:09

## 2018-11-25 RX ADMIN — CLOPIDOGREL BISULFATE 75 MG: 75 TABLET ORAL at 08:59

## 2018-11-25 RX ADMIN — Medication 10 ML: at 09:00

## 2018-11-25 RX ADMIN — HYDRALAZINE HYDROCHLORIDE 25 MG: 25 TABLET, FILM COATED ORAL at 11:52

## 2018-11-25 ASSESSMENT — PAIN SCALES - GENERAL
PAINLEVEL_OUTOF10: 0

## 2018-11-25 NOTE — DISCHARGE SUMMARY
Hospital Discharge Summary    Patient's PCP: PAOLA Lazcano CNP  Admit Date: 11/24/2018   Discharge Date: 11/25/2018    Admitting Physician: Dr. Melany Ruiz MD  Discharge Physician: Dr. Chung Haskins:   Jonh Covarrubias HPI: Patient admitted with shortness of breath. Brief hospital course: 53M with hx of CKD4, AOCD, who was admitted with \"symptomatic anemia\", possible bacterial pneumonia. Patient reports complaints of dizziness that's been on and off for the past couple of months. He was noted to have Hb of 7.3 at [facility], transfused 1 unit of PRBC and transferred here for further evaluation. He has baseline anemia, likely from CKD, with recent Hb of 7.2 on 11/19. He has not seen blood in his stool. He also reports chronic cough without fever or chills. CXR with possible infiltrate vs atelectasis. He was treated with antibiotics for possible bacterial pneumonia, to complete course as outpatient. He received 1 unit of PRBC prior to arrival here and Hb has since remained stable. He denies bloody stools or emesis. Iron studies confirm iron deficiency and he received a dose of venofer, to start iron tabs on discharge. He will need aranesp as outpatient - to follow up with nephrology. He reports improvement of symptoms, feeling at baseline. He is being discharged home in stable condition. Invasive procedures:  None.     Discharge Diagnoses:   Principal Problem:    Symptomatic anemia  Active Problems:    Bacterial pneumonia    Essential hypertension    CKD (chronic kidney disease) stage 4, GFR 15-29 ml/min (HCC)    Coronary artery disease involving native coronary artery of native heart with angina pectoris (HCC)    Anemia associated with chronic renal failure    History of CVA (cerebrovascular accident)    Diabetes mellitus with hyperglycemia (HCC)    Anemia in chronic kidney disease    Iron deficiency anemia  Resolved Problems:    * No resolved hospital

## 2018-11-25 NOTE — TELEPHONE ENCOUNTER
Pt being discharged from [facility] 11/25/2018 DX: symptomatic anemia, requested timeframe for appt w/ Dr. Austin León in 1 week.

## 2018-11-25 NOTE — PLAN OF CARE
Problem: Falls - Risk of:  Goal: Will remain free from falls  Will remain free from falls   Outcome: Ongoing  Pt free from falls this shift. Bed alarm on, 2/4 side rails up, call light in reach, fall band on, nonskid socks on, clear pathway, bed in locked and lowest position. Will continue to monitor. Goal: Absence of physical injury  Absence of physical injury   Outcome: Ongoing  Pt free from physical injury this shift. Bed alarm on, 2/4 side rails up, call light in reach, fall band on, nonskid socks on, clear pathway, bed in locked and lowest position. Will continue to monitor. Problem: Pain:  Goal: Pain level will decrease  Pain level will decrease   Outcome: Ongoing  Pt denied pain this shift. Will continue to monitor and manage pain appropriately. Problem: Discharge Planning:  Goal: Discharged to appropriate level of care  Discharged to appropriate level of care   Outcome: Ongoing  Pt plans to be discharged to home. Will continue to monitor. Problem: Skin Integrity:  Goal: Will show no infection signs and symptoms  Will show no infection signs and symptoms   Outcome: Ongoing  No signs or symptoms of infection noted this shift. Pt afebrile. Will continue to monitor. Goal: Absence of new skin breakdown  Absence of new skin breakdown   Outcome: Ongoing  No new signs of skin breakdown noted this shift. Will continue to monitor. Problem: Serum Glucose Level - Abnormal:  Goal: Ability to maintain appropriate glucose levels will improve to within specified parameters  Ability to maintain appropriate glucose levels will improve to within specified parameters   Outcome: Ongoing  Pt glucose in 300s this shift. Managed with lantus and humalog. Will continue to monitor and manage blood glucose appropriately. Comments: Care plan reviewed with patient. Patient verbalizes understanding of the plan of care and contribute to goal setting.

## 2018-11-26 ENCOUNTER — TELEPHONE (OUTPATIENT)
Dept: NEPHROLOGY | Age: 53
End: 2018-11-26

## 2018-11-26 PROCEDURE — 93010 ELECTROCARDIOGRAM REPORT: CPT | Performed by: INTERNAL MEDICINE

## 2018-11-29 LAB
BLOOD CULTURE, ROUTINE: NORMAL
BLOOD CULTURE, ROUTINE: NORMAL

## 2018-11-29 NOTE — TELEPHONE ENCOUNTER
Zayra Jacobsen called me back. She said the Dr. Ramakrishna [place] NP on call will co-sign the order and she will fax it to [facility]. Then we can schedule the Aranesp injections.

## 2018-11-29 NOTE — TELEPHONE ENCOUNTER
Spoke to Momo Lyles at [place]. She has not received the order from [facility] office yet. We need a co-signature for Aranesp injections.

## 2018-12-19 ENCOUNTER — OFFICE VISIT (OUTPATIENT)
Dept: NEPHROLOGY | Age: 53
End: 2018-12-19
Payer: MEDICARE

## 2018-12-19 VITALS
HEART RATE: 62 BPM | BODY MASS INDEX: 23.37 KG/M2 | SYSTOLIC BLOOD PRESSURE: 116 MMHG | RESPIRATION RATE: 18 BRPM | DIASTOLIC BLOOD PRESSURE: 58 MMHG | HEIGHT: 62 IN | WEIGHT: 127 LBS | OXYGEN SATURATION: 96 %

## 2018-12-19 DIAGNOSIS — N18.4 CKD (CHRONIC KIDNEY DISEASE), STAGE IV (HCC): Primary | ICD-10-CM

## 2018-12-19 PROCEDURE — 3017F COLORECTAL CA SCREEN DOC REV: CPT | Performed by: INTERNAL MEDICINE

## 2018-12-19 PROCEDURE — 1111F DSCHRG MED/CURRENT MED MERGE: CPT | Performed by: INTERNAL MEDICINE

## 2018-12-19 PROCEDURE — G8484 FLU IMMUNIZE NO ADMIN: HCPCS | Performed by: INTERNAL MEDICINE

## 2018-12-19 PROCEDURE — G8598 ASA/ANTIPLAT THER USED: HCPCS | Performed by: INTERNAL MEDICINE

## 2018-12-19 PROCEDURE — 99213 OFFICE O/P EST LOW 20 MIN: CPT | Performed by: INTERNAL MEDICINE

## 2018-12-19 PROCEDURE — G8420 CALC BMI NORM PARAMETERS: HCPCS | Performed by: INTERNAL MEDICINE

## 2018-12-19 PROCEDURE — 1036F TOBACCO NON-USER: CPT | Performed by: INTERNAL MEDICINE

## 2018-12-19 PROCEDURE — G8427 DOCREV CUR MEDS BY ELIG CLIN: HCPCS | Performed by: INTERNAL MEDICINE

## 2018-12-19 NOTE — PROGRESS NOTES
organomegaly  Extremities:  extremities normal, atraumatic, no cyanosis or edema  Neurologic:  Mental status: Alert, oriented, thought content appropriate  Skin:                Warm and dry with no rashes. Muscles:         Hand  and leg strength are equal and strong bilaterally.      Lab Data      CBC:   Lab Results   Component Value Date    WBC 8.3 11/25/2018    HGB 8.4 (L) 11/25/2018    HCT 28.5 (L) 11/25/2018    MCV 73.6 (L) 11/25/2018     11/25/2018     BMP:    Lab Results   Component Value Date     11/25/2018     11/24/2018     11/19/2018    K 4.5 11/25/2018    K 4.4 11/24/2018    K 4.8 11/19/2018     11/25/2018     11/24/2018     11/19/2018    CO2 18 (L) 11/25/2018    CO2 15 (L) 11/24/2018    CO2 21 11/19/2018    BUN 45 (H) 11/25/2018    BUN 47 (H) 11/24/2018    BUN 44 11/19/2018    CREATININE 3.1 (HH) 11/25/2018    CREATININE 3.1 (HH) 11/24/2018    CREATININE 3.37 11/19/2018    GLUCOSE 297 (H) 11/25/2018    GLUCOSE 125 (H) 11/24/2018    GLUCOSE 267 11/19/2018      Hepatic:   Lab Results   Component Value Date    AST 40 11/24/2018    AST 24 11/12/2018    AST 23 04/09/2016    ALT 54 11/24/2018    ALT 24 11/12/2018    ALT 19 04/09/2016    BILITOT 0.5 11/24/2018    BILITOT 0.4 11/12/2018    BILITOT 0.3 04/09/2016    ALKPHOS 149 (H) 11/24/2018    ALKPHOS 120 11/12/2018    ALKPHOS 79 04/09/2016     BNP:   Lab Results   Component Value Date    BNP 2,800.0 11/12/2018     Lipids: No results found for: CHOL, HDL  INR:   Lab Results   Component Value Date    INR 1.05 11/24/2018    INR 1.1 08/27/2018     URINE: No results found for: NAUR, PROTUR  Lab Results   Component Value Date    NITRU NEGATIVE 04/09/2016    COLORU YELLOW 04/09/2016    PHUR 6.0 04/09/2016    WBCUA 0-2 04/09/2016    RBCUA 0-2 04/09/2016    YEAST NONE SEEN 04/09/2016    BACTERIA NONE 04/09/2016    LEUKOCYTESUR NEGATIVE 04/09/2016    UROBILINOGEN 0.2 04/09/2016    BILIRUBINUR NEGATIVE 04/09/2016    BLOODU

## 2019-01-08 LAB
BASOPHILS ABSOLUTE: ABNORMAL /ΜL
BASOPHILS RELATIVE PERCENT: ABNORMAL %
BUN BLDV-MCNC: 34 MG/DL
CALCIUM SERPL-MCNC: 8.6 MG/DL
CHLORIDE BLD-SCNC: 100 MMOL/L
CO2: 22 MMOL/L
CREAT SERPL-MCNC: 2.78 MG/DL
EOSINOPHILS ABSOLUTE: ABNORMAL /ΜL
EOSINOPHILS RELATIVE PERCENT: ABNORMAL %
GFR CALCULATED: 25
GLUCOSE BLD-MCNC: 304 MG/DL
HCT VFR BLD CALC: 28.6 % (ref 41–53)
HEMOGLOBIN: 8.8 G/DL (ref 13.5–17.5)
LYMPHOCYTES ABSOLUTE: ABNORMAL /ΜL
LYMPHOCYTES RELATIVE PERCENT: ABNORMAL %
MCH RBC QN AUTO: ABNORMAL PG
MCHC RBC AUTO-ENTMCNC: ABNORMAL G/DL
MCV RBC AUTO: ABNORMAL FL
MONOCYTES ABSOLUTE: ABNORMAL /ΜL
MONOCYTES RELATIVE PERCENT: ABNORMAL %
NEUTROPHILS ABSOLUTE: ABNORMAL /ΜL
NEUTROPHILS RELATIVE PERCENT: ABNORMAL %
PLATELET # BLD: 289 K/ΜL
PMV BLD AUTO: ABNORMAL FL
POTASSIUM SERPL-SCNC: 4.6 MMOL/L
RBC # BLD: 3.88 10^6/ΜL
SODIUM BLD-SCNC: 133 MMOL/L
WBC # BLD: 5.2 10^3/ML

## 2019-01-16 ENCOUNTER — OFFICE VISIT (OUTPATIENT)
Dept: NEPHROLOGY | Age: 54
End: 2019-01-16
Payer: MEDICARE

## 2019-01-16 VITALS
HEART RATE: 62 BPM | RESPIRATION RATE: 18 BRPM | HEIGHT: 62 IN | DIASTOLIC BLOOD PRESSURE: 72 MMHG | BODY MASS INDEX: 23.19 KG/M2 | OXYGEN SATURATION: 97 % | WEIGHT: 126 LBS | SYSTOLIC BLOOD PRESSURE: 136 MMHG

## 2019-01-16 DIAGNOSIS — N18.4 CKD (CHRONIC KIDNEY DISEASE), STAGE IV (HCC): Primary | ICD-10-CM

## 2019-01-16 PROCEDURE — 1036F TOBACCO NON-USER: CPT | Performed by: INTERNAL MEDICINE

## 2019-01-16 PROCEDURE — G8420 CALC BMI NORM PARAMETERS: HCPCS | Performed by: INTERNAL MEDICINE

## 2019-01-16 PROCEDURE — 99213 OFFICE O/P EST LOW 20 MIN: CPT | Performed by: INTERNAL MEDICINE

## 2019-01-16 PROCEDURE — G8427 DOCREV CUR MEDS BY ELIG CLIN: HCPCS | Performed by: INTERNAL MEDICINE

## 2019-01-16 PROCEDURE — G8484 FLU IMMUNIZE NO ADMIN: HCPCS | Performed by: INTERNAL MEDICINE

## 2019-01-16 PROCEDURE — 3017F COLORECTAL CA SCREEN DOC REV: CPT | Performed by: INTERNAL MEDICINE

## 2019-01-16 PROCEDURE — G8598 ASA/ANTIPLAT THER USED: HCPCS | Performed by: INTERNAL MEDICINE

## 2019-02-27 LAB
HCT VFR BLD CALC: 30.4 % (ref 41–53)
HEMOGLOBIN: 9.3 G/DL (ref 13.5–17.5)

## 2019-03-13 LAB
HCT VFR BLD CALC: 30.1 % (ref 41–53)
HEMOGLOBIN: 9.1 G/DL (ref 13.5–17.5)

## 2019-03-18 ENCOUNTER — TELEPHONE (OUTPATIENT)
Dept: NEPHROLOGY | Age: 54
End: 2019-03-18

## 2019-04-10 ENCOUNTER — HOSPITAL ENCOUNTER (EMERGENCY)
Age: 54
Discharge: HOME | DRG: 291 | End: 2019-04-10
Attending: FAMILY MEDICINE
Payer: COMMERCIAL

## 2019-04-10 ENCOUNTER — APPOINTMENT (OUTPATIENT)
Dept: GENERAL RADIOLOGY | Age: 54
DRG: 291 | End: 2019-04-10
Payer: COMMERCIAL

## 2019-04-10 ENCOUNTER — HOSPITAL ENCOUNTER (INPATIENT)
Age: 54
LOS: 8 days | Discharge: HOME | DRG: 291 | End: 2019-04-18
Attending: FAMILY MEDICINE | Admitting: FAMILY MEDICINE
Payer: COMMERCIAL

## 2019-04-10 ENCOUNTER — APPOINTMENT (OUTPATIENT)
Dept: CT IMAGING | Age: 54
DRG: 291 | End: 2019-04-10
Payer: COMMERCIAL

## 2019-04-10 VITALS
DIASTOLIC BLOOD PRESSURE: 78 MMHG | RESPIRATION RATE: 18 BRPM | SYSTOLIC BLOOD PRESSURE: 155 MMHG | BODY MASS INDEX: 21.69 KG/M2 | HEART RATE: 58 BPM | HEIGHT: 66 IN | WEIGHT: 135 LBS | OXYGEN SATURATION: 92 % | TEMPERATURE: 97.9 F

## 2019-04-10 DIAGNOSIS — N17.9 AKI (ACUTE KIDNEY INJURY) (HCC): ICD-10-CM

## 2019-04-10 DIAGNOSIS — N17.9 ACUTE KIDNEY INJURY SUPERIMPOSED ON CHRONIC KIDNEY DISEASE (HCC): Primary | ICD-10-CM

## 2019-04-10 DIAGNOSIS — N18.4 CKD (CHRONIC KIDNEY DISEASE) STAGE 4, GFR 15-29 ML/MIN (HCC): Primary | ICD-10-CM

## 2019-04-10 DIAGNOSIS — R77.8 ELEVATED TROPONIN: ICD-10-CM

## 2019-04-10 DIAGNOSIS — E03.9 HYPOTHYROIDISM, UNSPECIFIED TYPE: ICD-10-CM

## 2019-04-10 DIAGNOSIS — N18.9 ACUTE KIDNEY INJURY SUPERIMPOSED ON CHRONIC KIDNEY DISEASE (HCC): Primary | ICD-10-CM

## 2019-04-10 DIAGNOSIS — I50.9 ACUTE ON CHRONIC CONGESTIVE HEART FAILURE, UNSPECIFIED HEART FAILURE TYPE (HCC): ICD-10-CM

## 2019-04-10 DIAGNOSIS — R06.09 EXERTIONAL DYSPNEA: ICD-10-CM

## 2019-04-10 PROBLEM — G93.40 ENCEPHALOPATHY: Status: ACTIVE | Noted: 2019-04-10

## 2019-04-10 LAB
ALBUMIN SERPL-MCNC: 3.6 G/DL (ref 3.5–5.1)
ALBUMIN SERPL-MCNC: 4 G/DL (ref 3.5–5.1)
ALP BLD-CCNC: 129 U/L (ref 38–126)
ALP BLD-CCNC: 138 U/L (ref 38–126)
ALT SERPL-CCNC: 38 U/L (ref 11–66)
ALT SERPL-CCNC: 47 U/L (ref 11–66)
ANION GAP SERPL CALCULATED.3IONS-SCNC: 13 MEQ/L (ref 8–16)
ANION GAP SERPL CALCULATED.3IONS-SCNC: 9 MEQ/L (ref 8–16)
APTT: 44.9 SECONDS (ref 22–38)
AST SERPL-CCNC: 62 U/L (ref 5–40)
AST SERPL-CCNC: 75 U/L (ref 5–40)
AVERAGE GLUCOSE: 159 MG/DL (ref 70–126)
BACTERIA: ABNORMAL /HPF
BASE EXCESS MIXED: -5.7 MMOL/L (ref -2–3)
BASOPHILS # BLD: 0.4 %
BASOPHILS # BLD: 0.7 %
BASOPHILS ABSOLUTE: 0 THOU/MM3 (ref 0–0.1)
BASOPHILS ABSOLUTE: 0 THOU/MM3 (ref 0–0.1)
BILIRUB SERPL-MCNC: 0.6 MG/DL (ref 0.3–1.2)
BILIRUB SERPL-MCNC: 0.7 MG/DL (ref 0.3–1.2)
BILIRUBIN DIRECT: 0.3 MG/DL (ref 0–0.3)
BILIRUBIN URINE: NEGATIVE
BLOOD, URINE: ABNORMAL
BUN BLDV-MCNC: 33 MG/DL (ref 7–22)
BUN BLDV-MCNC: 36 MG/DL (ref 7–22)
CALCIUM SERPL-MCNC: 8.8 MG/DL (ref 8.5–10.5)
CALCIUM SERPL-MCNC: 8.9 MG/DL (ref 8.5–10.5)
CASTS 2: ABNORMAL /LPF
CASTS UA: ABNORMAL /LPF
CHARACTER, URINE: CLEAR
CHLORIDE BLD-SCNC: 103 MEQ/L (ref 98–111)
CHLORIDE BLD-SCNC: 103 MEQ/L (ref 98–111)
CO2: 18 MEQ/L (ref 23–33)
CO2: 22 MEQ/L (ref 23–33)
COLLECTED BY:: ABNORMAL
COLOR: YELLOW
CREAT SERPL-MCNC: 3 MG/DL (ref 0.4–1.2)
CREAT SERPL-MCNC: 3.1 MG/DL (ref 0.4–1.2)
CRENATED RBC'S: ABNORMAL
CRYSTALS, UA: ABNORMAL
DEVICE: ABNORMAL
EKG ATRIAL RATE: 60 BPM
EKG P AXIS: 34 DEGREES
EKG P-R INTERVAL: 152 MS
EKG Q-T INTERVAL: 468 MS
EKG QRS DURATION: 82 MS
EKG QTC CALCULATION (BAZETT): 468 MS
EKG R AXIS: 46 DEGREES
EKG T AXIS: -90 DEGREES
EKG VENTRICULAR RATE: 60 BPM
EOSINOPHIL # BLD: 0.5 %
EOSINOPHIL # BLD: 2.4 %
EOSINOPHILS ABSOLUTE: 0 THOU/MM3 (ref 0–0.4)
EOSINOPHILS ABSOLUTE: 0.1 THOU/MM3 (ref 0–0.4)
EPITHELIAL CELLS, UA: ABNORMAL /HPF
ERYTHROCYTE [DISTWIDTH] IN BLOOD BY AUTOMATED COUNT: 22 % (ref 11.5–14.5)
ERYTHROCYTE [DISTWIDTH] IN BLOOD BY AUTOMATED COUNT: 22 % (ref 11.5–14.5)
ERYTHROCYTE [DISTWIDTH] IN BLOOD BY AUTOMATED COUNT: 58.6 FL (ref 35–45)
ERYTHROCYTE [DISTWIDTH] IN BLOOD BY AUTOMATED COUNT: 59.6 FL (ref 35–45)
GFR SERPL CREATININE-BSD FRML MDRD: 21 ML/MIN/1.73M2
GFR SERPL CREATININE-BSD FRML MDRD: 22 ML/MIN/1.73M2
GLUCOSE BLD-MCNC: 101 MG/DL (ref 70–108)
GLUCOSE BLD-MCNC: 223 MG/DL (ref 70–108)
GLUCOSE BLD-MCNC: 253 MG/DL (ref 70–108)
GLUCOSE BLD-MCNC: 261 MG/DL (ref 70–108)
GLUCOSE URINE: NEGATIVE MG/DL
HBA1C MFR BLD: 7.3 % (ref 4.4–6.4)
HCO3, MIXED: 19 MMOL/L (ref 23–28)
HCT VFR BLD CALC: 28.7 % (ref 42–52)
HCT VFR BLD CALC: 30.2 % (ref 42–52)
HEMOGLOBIN: 8 GM/DL (ref 14–18)
HEMOGLOBIN: 8.4 GM/DL (ref 14–18)
HYPOCHROMIA: PRESENT
HYPOCHROMIA: PRESENT
IMMATURE GRANS (ABS): 0.01 THOU/MM3 (ref 0–0.07)
IMMATURE GRANS (ABS): 0.02 THOU/MM3 (ref 0–0.07)
IMMATURE GRANULOCYTES: 0.2 %
IMMATURE GRANULOCYTES: 0.4 %
INR BLD: 1.01 (ref 0.85–1.13)
KETONES, URINE: NEGATIVE
LEUKOCYTE ESTERASE, URINE: NEGATIVE
LIPASE: 32 U/L (ref 5.6–51.3)
LYMPHOCYTES # BLD: 14.8 %
LYMPHOCYTES # BLD: 15.6 %
LYMPHOCYTES ABSOLUTE: 0.8 THOU/MM3 (ref 1–4.8)
LYMPHOCYTES ABSOLUTE: 0.9 THOU/MM3 (ref 1–4.8)
MCH RBC QN AUTO: 20.8 PG (ref 26–33)
MCH RBC QN AUTO: 20.9 PG (ref 26–33)
MCHC RBC AUTO-ENTMCNC: 27.8 GM/DL (ref 32.2–35.5)
MCHC RBC AUTO-ENTMCNC: 27.9 GM/DL (ref 32.2–35.5)
MCV RBC AUTO: 74.9 FL (ref 80–94)
MCV RBC AUTO: 74.9 FL (ref 80–94)
MISCELLANEOUS 2: ABNORMAL
MONOCYTES # BLD: 10.1 %
MONOCYTES # BLD: 9.4 %
MONOCYTES ABSOLUTE: 0.5 THOU/MM3 (ref 0.4–1.3)
MONOCYTES ABSOLUTE: 0.6 THOU/MM3 (ref 0.4–1.3)
NITRITE, URINE: NEGATIVE
NUCLEATED RED BLOOD CELLS: 0 /100 WBC
NUCLEATED RED BLOOD CELLS: 0 /100 WBC
O2 SAT, MIXED: 78 %
OSMOLALITY CALCULATION: 275.6 MOSMOL/KG (ref 275–300)
PCO2, MIXED VENOUS: 31 MMHG (ref 41–51)
PH UA: 7 (ref 5–9)
PH, MIXED: 7.38 (ref 7.31–7.41)
PLATELET # BLD: 246 THOU/MM3 (ref 130–400)
PLATELET # BLD: 259 THOU/MM3 (ref 130–400)
PLATELET ESTIMATE: ADEQUATE
PMV BLD AUTO: 10.4 FL (ref 9.4–12.4)
PMV BLD AUTO: 10.7 FL (ref 9.4–12.4)
PO2 MIXED: 42 MMHG (ref 25–40)
POIKILOCYTES: ABNORMAL
POIKILOCYTES: ABNORMAL
POTASSIUM REFLEX MAGNESIUM: 4.9 MEQ/L (ref 3.5–5.2)
POTASSIUM REFLEX MAGNESIUM: 6.1 MEQ/L (ref 3.5–5.2)
PRO-BNP: ABNORMAL PG/ML (ref 0–900)
PRO-BNP: ABNORMAL PG/ML (ref 0–900)
PROTEIN UA: 300
RBC # BLD: 3.83 MILL/MM3 (ref 4.7–6.1)
RBC # BLD: 4.03 MILL/MM3 (ref 4.7–6.1)
RBC URINE: ABNORMAL /HPF
RENAL EPITHELIAL, UA: ABNORMAL
SCAN OF BLOOD SMEAR: NORMAL
SCAN OF BLOOD SMEAR: NORMAL
SEG NEUTROPHILS: 71 %
SEG NEUTROPHILS: 74.5 %
SEGMENTED NEUTROPHILS ABSOLUTE COUNT: 4.1 THOU/MM3 (ref 1.8–7.7)
SEGMENTED NEUTROPHILS ABSOLUTE COUNT: 4.2 THOU/MM3 (ref 1.8–7.7)
SODIUM BLD-SCNC: 134 MEQ/L (ref 135–145)
SODIUM BLD-SCNC: 134 MEQ/L (ref 135–145)
SPECIFIC GRAVITY, URINE: 1.01 (ref 1–1.03)
T3 TOTAL: 51 NG/DL (ref 72–181)
T4 FREE: 0.46 NG/DL (ref 0.93–1.76)
TOTAL PROTEIN: 6.6 G/DL (ref 6.1–8)
TOTAL PROTEIN: 7.2 G/DL (ref 6.1–8)
TROPONIN T: 0.13 NG/ML
TSH SERPL DL<=0.05 MIU/L-ACNC: 150.6 UIU/ML (ref 0.4–4.2)
UROBILINOGEN, URINE: 1 EU/DL (ref 0–1)
WBC # BLD: 5.6 THOU/MM3 (ref 4.8–10.8)
WBC # BLD: 5.8 THOU/MM3 (ref 4.8–10.8)
WBC UA: ABNORMAL /HPF
YEAST: ABNORMAL

## 2019-04-10 PROCEDURE — 74176 CT ABD & PELVIS W/O CONTRAST: CPT

## 2019-04-10 PROCEDURE — 80076 HEPATIC FUNCTION PANEL: CPT

## 2019-04-10 PROCEDURE — 2060000000 HC ICU INTERMEDIATE R&B

## 2019-04-10 PROCEDURE — 85025 COMPLETE CBC W/AUTO DIFF WBC: CPT

## 2019-04-10 PROCEDURE — 83880 ASSAY OF NATRIURETIC PEPTIDE: CPT

## 2019-04-10 PROCEDURE — 83690 ASSAY OF LIPASE: CPT

## 2019-04-10 PROCEDURE — 6360000002 HC RX W HCPCS: Performed by: PHYSICIAN ASSISTANT

## 2019-04-10 PROCEDURE — 71046 X-RAY EXAM CHEST 2 VIEWS: CPT

## 2019-04-10 PROCEDURE — 2580000003 HC RX 258: Performed by: PHYSICIAN ASSISTANT

## 2019-04-10 PROCEDURE — 82948 REAGENT STRIP/BLOOD GLUCOSE: CPT

## 2019-04-10 PROCEDURE — 84484 ASSAY OF TROPONIN QUANT: CPT

## 2019-04-10 PROCEDURE — 85610 PROTHROMBIN TIME: CPT

## 2019-04-10 PROCEDURE — 2709999900 HC NON-CHARGEABLE SUPPLY

## 2019-04-10 PROCEDURE — 84439 ASSAY OF FREE THYROXINE: CPT

## 2019-04-10 PROCEDURE — 99285 EMERGENCY DEPT VISIT HI MDM: CPT

## 2019-04-10 PROCEDURE — 83036 HEMOGLOBIN GLYCOSYLATED A1C: CPT

## 2019-04-10 PROCEDURE — 36415 COLL VENOUS BLD VENIPUNCTURE: CPT

## 2019-04-10 PROCEDURE — 85730 THROMBOPLASTIN TIME PARTIAL: CPT

## 2019-04-10 PROCEDURE — 80048 BASIC METABOLIC PNL TOTAL CA: CPT

## 2019-04-10 PROCEDURE — 6370000000 HC RX 637 (ALT 250 FOR IP): Performed by: PHYSICIAN ASSISTANT

## 2019-04-10 PROCEDURE — 84480 ASSAY TRIIODOTHYRONINE (T3): CPT

## 2019-04-10 PROCEDURE — 81001 URINALYSIS AUTO W/SCOPE: CPT

## 2019-04-10 PROCEDURE — 93005 ELECTROCARDIOGRAM TRACING: CPT | Performed by: NURSE PRACTITIONER

## 2019-04-10 PROCEDURE — 2500000003 HC RX 250 WO HCPCS: Performed by: PHYSICIAN ASSISTANT

## 2019-04-10 PROCEDURE — 96374 THER/PROPH/DIAG INJ IV PUSH: CPT

## 2019-04-10 PROCEDURE — 96375 TX/PRO/DX INJ NEW DRUG ADDON: CPT

## 2019-04-10 PROCEDURE — 84443 ASSAY THYROID STIM HORMONE: CPT

## 2019-04-10 PROCEDURE — 6360000002 HC RX W HCPCS: Performed by: NURSE PRACTITIONER

## 2019-04-10 PROCEDURE — 80053 COMPREHEN METABOLIC PANEL: CPT

## 2019-04-10 RX ORDER — METOPROLOL TARTRATE 50 MG/1
50 TABLET, FILM COATED ORAL 2 TIMES DAILY
Status: DISCONTINUED | OUTPATIENT
Start: 2019-04-10 | End: 2019-04-18 | Stop reason: HOSPADM

## 2019-04-10 RX ORDER — MORPHINE SULFATE 4 MG/ML
4 INJECTION, SOLUTION INTRAMUSCULAR; INTRAVENOUS ONCE
Status: COMPLETED | OUTPATIENT
Start: 2019-04-10 | End: 2019-04-10

## 2019-04-10 RX ORDER — LEVOTHYROXINE SODIUM 0.03 MG/1
25 TABLET ORAL ONCE
Status: DISCONTINUED | OUTPATIENT
Start: 2019-04-10 | End: 2019-04-10 | Stop reason: HOSPADM

## 2019-04-10 RX ORDER — LEVOTHYROXINE SODIUM 0.03 MG/1
25 TABLET ORAL DAILY
Status: DISCONTINUED | OUTPATIENT
Start: 2019-04-11 | End: 2019-04-11

## 2019-04-10 RX ORDER — CLOPIDOGREL BISULFATE 75 MG/1
75 TABLET ORAL DAILY
Status: DISCONTINUED | OUTPATIENT
Start: 2019-04-10 | End: 2019-04-18 | Stop reason: HOSPADM

## 2019-04-10 RX ORDER — HEPARIN SODIUM 5000 [USP'U]/ML
5000 INJECTION, SOLUTION INTRAVENOUS; SUBCUTANEOUS EVERY 8 HOURS SCHEDULED
Status: DISCONTINUED | OUTPATIENT
Start: 2019-04-10 | End: 2019-04-12

## 2019-04-10 RX ORDER — HYDRALAZINE HYDROCHLORIDE 50 MG/1
50 TABLET, FILM COATED ORAL 3 TIMES DAILY
Status: DISCONTINUED | OUTPATIENT
Start: 2019-04-10 | End: 2019-04-12

## 2019-04-10 RX ORDER — LEVOTHYROXINE SODIUM 25 UG/1
25 CAPSULE ORAL DAILY
Qty: 30 CAPSULE | Refills: 0 | Status: ON HOLD | OUTPATIENT
Start: 2019-04-10 | End: 2019-04-10

## 2019-04-10 RX ORDER — ISOSORBIDE MONONITRATE 30 MG/1
30 TABLET, EXTENDED RELEASE ORAL DAILY
Status: DISCONTINUED | OUTPATIENT
Start: 2019-04-10 | End: 2019-04-12

## 2019-04-10 RX ORDER — ONDANSETRON 2 MG/ML
4 INJECTION INTRAMUSCULAR; INTRAVENOUS ONCE
Status: COMPLETED | OUTPATIENT
Start: 2019-04-10 | End: 2019-04-10

## 2019-04-10 RX ORDER — DEXTROSE MONOHYDRATE 50 MG/ML
100 INJECTION, SOLUTION INTRAVENOUS PRN
Status: DISCONTINUED | OUTPATIENT
Start: 2019-04-10 | End: 2019-04-18 | Stop reason: HOSPADM

## 2019-04-10 RX ORDER — PANTOPRAZOLE SODIUM 40 MG/1
40 TABLET, DELAYED RELEASE ORAL
Status: DISCONTINUED | OUTPATIENT
Start: 2019-04-11 | End: 2019-04-15

## 2019-04-10 RX ORDER — LEVOTHYROXINE SODIUM 0.03 MG/1
25 TABLET ORAL DAILY
Status: ON HOLD | COMMUNITY
End: 2019-04-18 | Stop reason: HOSPADM

## 2019-04-10 RX ORDER — ASPIRIN 81 MG/1
81 TABLET, CHEWABLE ORAL DAILY
Status: DISCONTINUED | OUTPATIENT
Start: 2019-04-10 | End: 2019-04-18 | Stop reason: HOSPADM

## 2019-04-10 RX ORDER — DEXTROSE MONOHYDRATE 25 G/50ML
25 INJECTION, SOLUTION INTRAVENOUS ONCE
Status: COMPLETED | OUTPATIENT
Start: 2019-04-10 | End: 2019-04-10

## 2019-04-10 RX ORDER — SODIUM CHLORIDE 0.9 % (FLUSH) 0.9 %
10 SYRINGE (ML) INJECTION PRN
Status: DISCONTINUED | OUTPATIENT
Start: 2019-04-10 | End: 2019-04-18 | Stop reason: HOSPADM

## 2019-04-10 RX ORDER — ACETAMINOPHEN 325 MG/1
650 TABLET ORAL EVERY 4 HOURS PRN
Status: DISCONTINUED | OUTPATIENT
Start: 2019-04-10 | End: 2019-04-18 | Stop reason: HOSPADM

## 2019-04-10 RX ORDER — ATORVASTATIN CALCIUM 80 MG/1
80 TABLET, FILM COATED ORAL DAILY
Status: DISCONTINUED | OUTPATIENT
Start: 2019-04-10 | End: 2019-04-18 | Stop reason: HOSPADM

## 2019-04-10 RX ORDER — NICOTINE POLACRILEX 4 MG
15 LOZENGE BUCCAL PRN
Status: DISCONTINUED | OUTPATIENT
Start: 2019-04-10 | End: 2019-04-18 | Stop reason: HOSPADM

## 2019-04-10 RX ORDER — SODIUM POLYSTYRENE SULFONATE 15 G/60ML
15 SUSPENSION ORAL; RECTAL ONCE
Status: COMPLETED | OUTPATIENT
Start: 2019-04-10 | End: 2019-04-10

## 2019-04-10 RX ORDER — SODIUM CHLORIDE 9 MG/ML
INJECTION, SOLUTION INTRAVENOUS CONTINUOUS
Status: DISCONTINUED | OUTPATIENT
Start: 2019-04-10 | End: 2019-04-11

## 2019-04-10 RX ORDER — INSULIN GLARGINE 100 [IU]/ML
13 INJECTION, SOLUTION SUBCUTANEOUS NIGHTLY
Status: DISCONTINUED | OUTPATIENT
Start: 2019-04-10 | End: 2019-04-11

## 2019-04-10 RX ORDER — DEXTROSE MONOHYDRATE 25 G/50ML
12.5 INJECTION, SOLUTION INTRAVENOUS PRN
Status: DISCONTINUED | OUTPATIENT
Start: 2019-04-10 | End: 2019-04-18 | Stop reason: HOSPADM

## 2019-04-10 RX ORDER — SODIUM CHLORIDE 0.9 % (FLUSH) 0.9 %
10 SYRINGE (ML) INJECTION EVERY 12 HOURS SCHEDULED
Status: DISCONTINUED | OUTPATIENT
Start: 2019-04-10 | End: 2019-04-18 | Stop reason: HOSPADM

## 2019-04-10 RX ORDER — ONDANSETRON 2 MG/ML
4 INJECTION INTRAMUSCULAR; INTRAVENOUS EVERY 6 HOURS PRN
Status: DISCONTINUED | OUTPATIENT
Start: 2019-04-10 | End: 2019-04-18 | Stop reason: HOSPADM

## 2019-04-10 RX ADMIN — HEPARIN SODIUM 5000 UNITS: 5000 INJECTION INTRAVENOUS; SUBCUTANEOUS at 21:46

## 2019-04-10 RX ADMIN — ONDANSETRON 4 MG: 2 INJECTION INTRAMUSCULAR; INTRAVENOUS at 02:36

## 2019-04-10 RX ADMIN — INSULIN GLARGINE 13 UNITS: 100 INJECTION, SOLUTION SUBCUTANEOUS at 22:31

## 2019-04-10 RX ADMIN — SODIUM CHLORIDE: 9 INJECTION, SOLUTION INTRAVENOUS at 21:46

## 2019-04-10 RX ADMIN — METOPROLOL TARTRATE 50 MG: 50 TABLET, FILM COATED ORAL at 21:45

## 2019-04-10 RX ADMIN — SODIUM POLYSTYRENE SULFONATE 15 G: 15 SUSPENSION ORAL; RECTAL at 23:30

## 2019-04-10 RX ADMIN — ASPIRIN 81 MG 81 MG: 81 TABLET ORAL at 21:45

## 2019-04-10 RX ADMIN — ATORVASTATIN CALCIUM 80 MG: 80 TABLET, FILM COATED ORAL at 21:45

## 2019-04-10 RX ADMIN — ISOSORBIDE MONONITRATE 30 MG: 30 TABLET ORAL at 21:45

## 2019-04-10 RX ADMIN — DEXTROSE MONOHYDRATE 25 G: 25 INJECTION, SOLUTION INTRAVENOUS at 23:36

## 2019-04-10 RX ADMIN — MORPHINE SULFATE 4 MG: 4 INJECTION INTRAVENOUS at 02:37

## 2019-04-10 RX ADMIN — INSULIN LISPRO 3 UNITS: 100 INJECTION, SOLUTION INTRAVENOUS; SUBCUTANEOUS at 22:31

## 2019-04-10 RX ADMIN — Medication 50 MEQ: at 23:40

## 2019-04-10 RX ADMIN — CLOPIDOGREL BISULFATE 75 MG: 75 TABLET, FILM COATED ORAL at 21:45

## 2019-04-10 RX ADMIN — HYDRALAZINE HYDROCHLORIDE 50 MG: 50 TABLET, FILM COATED ORAL at 21:45

## 2019-04-10 RX ADMIN — INSULIN HUMAN 10 UNITS: 100 INJECTION, SOLUTION PARENTERAL at 23:33

## 2019-04-10 ASSESSMENT — ENCOUNTER SYMPTOMS
NAUSEA: 0
SHORTNESS OF BREATH: 1
DIARRHEA: 0
CHEST TIGHTNESS: 0
ANAL BLEEDING: 0
VOMITING: 0
ABDOMINAL DISTENTION: 1
COUGH: 0
ABDOMINAL PAIN: 0

## 2019-04-10 ASSESSMENT — PAIN DESCRIPTION - PAIN TYPE: TYPE: ACUTE PAIN

## 2019-04-10 ASSESSMENT — PAIN SCALES - GENERAL: PAINLEVEL_OUTOF10: 7

## 2019-04-10 ASSESSMENT — PAIN DESCRIPTION - LOCATION: LOCATION: ABDOMEN;FLANK

## 2019-04-10 NOTE — PROGRESS NOTES
Transfer  Report from [facility]: [facility]   Referring Physician: Dr. Nany Friedman   Accepting Physician: Dr. Radha Norman   Patient Condition:found unresponsive at home last evening by wife, stage 4 Kidney disease, Diabetic, glucose was 77, given D10, vitals--194/83-94.3-66-17-92% on 2 liters nc currently has bear hugger on , stated he also has pneumonia,            Potential floor/room:stepdown 4A08    IV Drips D10

## 2019-04-10 NOTE — ED PROVIDER NOTES
[facility] EMERGENCY DEPT      CHIEF COMPLAINT       Chief Complaint   Patient presents with    Nausea    Abdominal Pain    Flank Pain       Nurses Notes reviewed and I agree except as noted in the HPI. HISTORY OF PRESENT ILLNESS    Anselmo Pedraza a 46 y.o. male who presents  to the emergency Department with complaints of not feeling well feeling fatigued tired swelling of the legs and subjective abdominal distention. Patient said that the symptoms have been going on for a while but over the last 1 week it has gotten  worse. He has history of chronic kidney disease and he is scheduled for transplant list at [place]. He is being prepped for dialysis but in the meantime if  he can get to transplant before dialysis that would be a better option. Patient said that anytime he tries to do anything at all he gets very short of breath for example just putting on his own socks makes him  feel short of breath. He has no cough or congestion ,while    Resting he is not short of breath. He has no associated fevers. He denies headache neck pain or back pain he has no nausea vomiting or diarrhea no melena no hematuria no dysuria or urgency. He has no GI or  symptoms except for subjective abdominal distention most likely some ascites. He has no associated musculoskeletal dermatologic or neurologic symptoms. Clinically looks well in no acute distress with 3+ edema of the legs I suspect mild ascites with only mild subjective shifting dullness    REVIEW OF SYSTEMS     Review of Systems   Constitutional: Negative. Negative for chills and fever. HENT: Negative. Respiratory: Positive for shortness of breath. Negative for cough and chest tightness. Cardiovascular: Positive for leg swelling. Negative for chest pain and palpitations. Gastrointestinal: Positive for abdominal distention. Negative for abdominal pain, anal bleeding, diarrhea, nausea and vomiting. Genitourinary: Negative. Musculoskeletal: Negative. Skin: Negative. Negative for rash and wound. Neurological: Positive for weakness. Negative for speech difficulty. Hematological: Negative. All other systems reviewed and are negative. PASTMEDICAL HISTORY   [unfilled]    SURGICAL HISTORY      has a past surgical history that includes Appendectomy and Cardiac surgery (Oct. 2015). CURRENT MEDICATIONS       Previous Medications    ASPIRIN 81 MG TABLET    Take 81 mg by mouth daily    ATORVASTATIN (LIPITOR) 80 MG TABLET    Take 80 mg by mouth daily    CLOPIDOGREL (PLAVIX) 75 MG TABLET    Take 75 mg by mouth daily    FERROUS SULFATE 325 (65 FE) MG TABLET    Take 1 tablet by mouth 3 times daily (with meals)    FREESTYLE INSULINX TEST STRIP        FREESTYLE LANCETS MISC        FUROSEMIDE (LASIX) 20 MG TABLET    TAKE 1 TABLET BY MOUTH EVERY DAY AS NEEDED FOR FLUID    HUMALOG KWIKPEN 100 UNIT/ML PEN    INJECT 1 UNIT UNDER THE SKIN PER EVERY 8 CARBS AT MEALTIME THREE TIMES A DAY    HYDRALAZINE (APRESOLINE) 50 MG TABLET    Take 1.5 tablets by mouth 3 times daily    ISOSORBIDE MONONITRATE (IMDUR) 30 MG CR TABLET    Take 30 mg by mouth daily    LANTUS SOLOSTAR 100 UNIT/ML INJECTION PEN    Inject 18 Units into the skin nightly     METOPROLOL (LOPRESSOR) 50 MG TABLET    Take 50 mg by mouth 2 times daily    NITROGLYCERIN (NITROSTAT) 0.4 MG SL TABLET    DISSOLVE 1 TABLET UNDER THE TONGUE EVERY 5 MIN AS NEEDED FOR CHEST PAIN    PANTOPRAZOLE SODIUM (PROTONIX) 40 MG PACK PACKET    Take 40 mg by mouth 2 times daily (before meals)       ALLERGIES     is allergic to aranesp (albumin free) [darbepoetin eri]. FAMILY HISTORY     is adopted. family history includes Cancer in his sister; Diabetes in his mother. He was adopted. SOCIAL HISTORY      reports that he quit smoking about 3 years ago. He has a 45.00 pack-year smoking history. He has quit using smokeless tobacco. He reports that he drinks about 2.4 oz of alcohol per week.  He reports that he does not use drugs. PHYSICAL EXAM     INITIAL VITALS:  height is 5' 6\" (1.676 m) and weight is 135 lb (61.2 kg). His oral temperature is 97.9 °F (36.6 °C). His blood pressure is 155/78 (abnormal) and his pulse is 63. His respiration is 14 and oxygen saturation is 98%. Physical Exam   Constitutional: He is oriented to person, place, and time. He appears well-developed and well-nourished. No distress. HENT:   Head: Normocephalic and atraumatic. Right Ear: External ear normal.   Left Ear: External ear normal.   Nose: Nose normal.   Eyes: Pupils are equal, round, and reactive to light. Conjunctivae and EOM are normal. Right eye exhibits no discharge. Left eye exhibits no discharge. No scleral icterus. Neck: Normal range of motion. Neck supple. No JVD present. No tracheal deviation present. No thyromegaly present. Cardiovascular: Normal rate, regular rhythm, normal heart sounds and intact distal pulses. No murmur heard. Pulmonary/Chest: Effort normal and breath sounds normal. No respiratory distress. He has no wheezes. He has no rales. He exhibits no tenderness. Abdominal: Soft. Bowel sounds are normal. He exhibits fluid wave. He exhibits no distension and no mass. There is no hepatosplenomegaly. There is no tenderness. There is no rebound and no guarding. Musculoskeletal: Normal range of motion. He exhibits no edema or tenderness. Lymphadenopathy:     He has no cervical adenopathy. He has no axillary adenopathy. Neurological: He is alert and oriented to person, place, and time. No cranial nerve deficit. Coordination normal.   Skin: Skin is warm. No rash noted. He is not diaphoretic. No erythema. No pallor. 2-3+ edema of the legs pitting in nature bilaterally.              DIFFERENTIALDIAGNOSIS:       DIAGNOSTIC RESULTS     EKG: All EKG's are interpreted by the Emergency Department Physician who either signs or Co-signs this chart inthe absence of a cardiologist.      RADIOLOGY: non-plain film images(s) such as CT, Ultrasound and MRI are read by the radiologist.  Plain radiographic images are visualized and preliminarily interpreted by the emergency physician unless otherwise stated below.   CT ABDOMEN PELVIS WO CONTRAST Additional Contrast? None    (Results Pending)   XR CHEST STANDARD (2 VW)    (Results Pending)       LABS:   Labs Reviewed   CBC WITH AUTO DIFFERENTIAL - Abnormal; Notable for the following components:       Result Value    RBC 4.03 (*)     Hemoglobin 8.4 (*)     Hematocrit 30.2 (*)     MCV 74.9 (*)     MCH 20.8 (*)     MCHC 27.8 (*)     RDW-CV 22.0 (*)     RDW-SD 59.6 (*)     Lymphocytes # 0.9 (*)     All other components within normal limits   COMPREHENSIVE METABOLIC PANEL W/ REFLEX TO MG FOR LOW K - Abnormal; Notable for the following components:    CREATININE 3.1 (*)     BUN 33 (*)     Sodium 134 (*)     CO2 18 (*)     AST 75 (*)     Alkaline Phosphatase 138 (*)     All other components within normal limits   TROPONIN - Abnormal; Notable for the following components:    Troponin T 0.131 (*)     All other components within normal limits   BRAIN NATRIURETIC PEPTIDE - Abnormal; Notable for the following components:    Pro-BNP 19185.0 (*)     All other components within normal limits   APTT - Abnormal; Notable for the following components:    aPTT 44.9 (*)     All other components within normal limits   BLOOD GAS, VENOUS - Abnormal; Notable for the following components:    PCO2, MIXED VENOUS 31 (*)     PO2, Mixed 42 (*)     HCO3, Mixed 19 (*)     Base Exc, Mixed -5.7 (*)     All other components within normal limits   GLOMERULAR FILTRATION RATE, ESTIMATED - Abnormal; Notable for the following components:    Est, Glom Filt Rate 21 (*)     All other components within normal limits   LIPASE   PROTIME-INR   SCAN OF BLOOD SMEAR   ANION GAP   OSMOLALITY   TSH WITHOUT REFLEX   URINE RT REFLEX TO CULTURE       EMERGENCY DEPARTMENT COURSE:   Vitals:    Vitals:    04/10/19 0105   BP: (!) 155/78   Pulse: 63   Resp: 14   Temp: 97.9 °F (36.6 °C)   TempSrc: Oral   SpO2: 98%   Weight: 135 lb (61.2 kg)   Height: 5' 6\" (1.676 m)     MDM    Patient presented to the emergency Department with leg swelling and dyspnea on exertion subjective abdominal distention and initial lab work revealed a normal blood gas he is not acidotic. Hemoglobin is low at 8.4 but is chronically low. He has chronic kidney disease in today crying and 3.1 BUN 33 pretty much unchanged from previous sodium 134 and electrolytes are normal.  Liver function slightly elevated but within acceptable limits. CT abdomen and chest x-ray pending at this time. Final disposition by Tommy Carmoan provider. FINAL IMPRESSION      1. Acute kidney injury superimposed on chronic kidney disease ([address]    2. Exertional dyspnea          DISPOSITION/PLAN     DISPOSITION Ed Observation    PATIENT REFERRED TO:  No follow-up provider specified.     DISCHARGE MEDICATIONS:  New Prescriptions    No medications on file       (Please note that portions of this note were completed with avoice recognition program.  Efforts were made to edit the dictations but occasionally words are mis-transcribed.)    Victor Bryant, MD Magnolia Dance, MD  04/10/19 2823

## 2019-04-10 NOTE — LETTER
Beneficiary Notification Letter     This [place] Provider is Participating in an Innovative Payment and [address] [place] [address] [place] from [place]:   Gio is participating in a Medicare initiative called the [facility] for [address] [facility]. You are receiving this letter because your health care provider has identified you as a patient who is receiving care through this initiative. Health care providers participating in the [facility] [facility], including Gio, will work with Medicare to improve care for patients. Your Medicare rights have not been changed. You still have all the same Medicare rights and protections, including the right to choose which hospital, doctor, or other health care provider you see. However, because Gio chose to participate in the [address] [place] [address], all Medicare beneficiaries who meet the eligibility criteria of this initiative will receive care under the initiative. If you do not wish to receive care under the Bundled Payments [facility] [address] [facility], you must choose a health care provider that does not participate in this initiative for your care. Regardless of which health care provider you see, Medicare will continue to cover all of your medically necessary services. Bundled Payments for Care Improvement Advanced aims to help improve your care     The Bundled Payments [facility] [address] [facility] is an innovative Medicare initiative that encourages your doctors, hospitals, and other health care providers to work more closely together so you get better care during and following certain hospital stays.  In this initiative, doctors and hospitals may work closely with certain health care providers and suppliers that help patients recover after discharge from the hospital, including skilled nursing facilities, home health agencies, inpatient rehabilitation facilities, and long term care hospitals. [address] [place] [address] is working closely with the doctors and other health care providers that care for you during and following your hospital stay and for a period of time after you leave the hospital. By working together, the health care providers are trying to more efficiently provide well-managed, high quality, patient-centered care as you undergo treatment. Hospitals, doctors, and other health care providers that care for you following a hospital stay may receive an additional payment for providing better, more coordinated health care. Medicare will monitor your care to make sure you and others get high quality care. Your feedback is important     Medicare may also ask you to answer a survey about the services and care you received from [address] [place] [address]. The survey will be mailed to you. Your feedback will improve care for all people with Medicare who receive care from [address] [place] [address]. Completion of this survey is optional.     Get more information     For more information about the Bundled Payments for [address] [place] [address], you can:    · Visit the CMS BPCI Advanced Website at http://conway-cage.net/ initiatives/bpci-advanced   · Call the [facility] BPCI-A team at (630) [phone]. · Call 1-800-MEDICARE (4-763.833.5281). TTY users can call 2-718.958.5187     If you have concerns or complaints about your care, talk to your health care provider, or contact your Beneficiary and Family Centered Quality Improvement Organization [facility]). To get your [facility]'s phone number, visit Medicare.gov/contacts or call 1-800-MEDICARE. · To find a different hospital, visit www. [email or web address] or call

## 2019-04-11 ENCOUNTER — APPOINTMENT (OUTPATIENT)
Dept: ULTRASOUND IMAGING | Age: 54
DRG: 291 | End: 2019-04-11
Attending: FAMILY MEDICINE
Payer: COMMERCIAL

## 2019-04-11 LAB
ANION GAP SERPL CALCULATED.3IONS-SCNC: 10 MEQ/L (ref 8–16)
BUN BLDV-MCNC: 34 MG/DL (ref 7–22)
CALCIUM SERPL-MCNC: 8 MG/DL (ref 8.5–10.5)
CHLORIDE BLD-SCNC: 105 MEQ/L (ref 98–111)
CO2: 22 MEQ/L (ref 23–33)
CREAT SERPL-MCNC: 3 MG/DL (ref 0.4–1.2)
EOSINOPHIL SMEAR: NORMAL
FERRITIN: 19 NG/ML (ref 22–322)
GFR SERPL CREATININE-BSD FRML MDRD: 22 ML/MIN/1.73M2
GLUCOSE BLD-MCNC: 214 MG/DL (ref 70–108)
GLUCOSE BLD-MCNC: 227 MG/DL (ref 70–108)
GLUCOSE BLD-MCNC: 227 MG/DL (ref 70–108)
GLUCOSE BLD-MCNC: 248 MG/DL (ref 70–108)
GLUCOSE BLD-MCNC: 273 MG/DL (ref 70–108)
GLUCOSE BLD-MCNC: 330 MG/DL (ref 70–108)
GLUCOSE BLD-MCNC: 37 MG/DL (ref 70–108)
GLUCOSE BLD-MCNC: 47 MG/DL (ref 70–108)
IRON SATURATION: 7 % (ref 20–50)
IRON: 24 UG/DL (ref 65–195)
POTASSIUM SERPL-SCNC: 5.3 MEQ/L (ref 3.5–5.2)
SODIUM BLD-SCNC: 137 MEQ/L (ref 135–145)
SPECIMEN: NORMAL
TOTAL IRON BINDING CAPACITY: 327 UG/DL (ref 171–450)
TSH SERPL DL<=0.05 MIU/L-ACNC: 114.4 UIU/ML (ref 0.4–4.2)

## 2019-04-11 PROCEDURE — 83550 IRON BINDING TEST: CPT

## 2019-04-11 PROCEDURE — 99223 1ST HOSP IP/OBS HIGH 75: CPT | Performed by: PHYSICIAN ASSISTANT

## 2019-04-11 PROCEDURE — 6360000002 HC RX W HCPCS: Performed by: PHYSICIAN ASSISTANT

## 2019-04-11 PROCEDURE — 2060000000 HC ICU INTERMEDIATE R&B

## 2019-04-11 PROCEDURE — 6360000002 HC RX W HCPCS: Performed by: NURSE PRACTITIONER

## 2019-04-11 PROCEDURE — 2580000003 HC RX 258: Performed by: NURSE PRACTITIONER

## 2019-04-11 PROCEDURE — 82948 REAGENT STRIP/BLOOD GLUCOSE: CPT

## 2019-04-11 PROCEDURE — 6370000000 HC RX 637 (ALT 250 FOR IP): Performed by: PHYSICIAN ASSISTANT

## 2019-04-11 PROCEDURE — 36415 COLL VENOUS BLD VENIPUNCTURE: CPT

## 2019-04-11 PROCEDURE — 99221 1ST HOSP IP/OBS SF/LOW 40: CPT | Performed by: NURSE PRACTITIONER

## 2019-04-11 PROCEDURE — 93010 ELECTROCARDIOGRAM REPORT: CPT | Performed by: INTERNAL MEDICINE

## 2019-04-11 PROCEDURE — 6370000000 HC RX 637 (ALT 250 FOR IP): Performed by: HOSPITALIST

## 2019-04-11 PROCEDURE — 89190 NASAL SMEAR FOR EOSINOPHILS: CPT

## 2019-04-11 PROCEDURE — 86376 MICROSOMAL ANTIBODY EACH: CPT

## 2019-04-11 PROCEDURE — 6360000002 HC RX W HCPCS: Performed by: HOSPITALIST

## 2019-04-11 PROCEDURE — 99223 1ST HOSP IP/OBS HIGH 75: CPT | Performed by: INTERNAL MEDICINE

## 2019-04-11 PROCEDURE — 2709999900 HC NON-CHARGEABLE SUPPLY

## 2019-04-11 PROCEDURE — 84443 ASSAY THYROID STIM HORMONE: CPT

## 2019-04-11 PROCEDURE — 80048 BASIC METABOLIC PNL TOTAL CA: CPT

## 2019-04-11 PROCEDURE — 82728 ASSAY OF FERRITIN: CPT

## 2019-04-11 PROCEDURE — 6370000000 HC RX 637 (ALT 250 FOR IP): Performed by: INTERNAL MEDICINE

## 2019-04-11 PROCEDURE — 99233 SBSQ HOSP IP/OBS HIGH 50: CPT | Performed by: HOSPITALIST

## 2019-04-11 PROCEDURE — 2500000003 HC RX 250 WO HCPCS: Performed by: HOSPITALIST

## 2019-04-11 PROCEDURE — 83540 ASSAY OF IRON: CPT

## 2019-04-11 PROCEDURE — 86800 THYROGLOBULIN ANTIBODY: CPT

## 2019-04-11 PROCEDURE — 2580000003 HC RX 258: Performed by: PHYSICIAN ASSISTANT

## 2019-04-11 RX ORDER — LEVOTHYROXINE SODIUM 0.1 MG/1
200 TABLET ORAL DAILY
Status: DISCONTINUED | OUTPATIENT
Start: 2019-04-11 | End: 2019-04-15

## 2019-04-11 RX ORDER — BUMETANIDE 0.25 MG/ML
1 INJECTION, SOLUTION INTRAMUSCULAR; INTRAVENOUS 2 TIMES DAILY
Status: DISCONTINUED | OUTPATIENT
Start: 2019-04-11 | End: 2019-04-12

## 2019-04-11 RX ORDER — DEXAMETHASONE SODIUM PHOSPHATE 4 MG/ML
4 INJECTION, SOLUTION INTRA-ARTICULAR; INTRALESIONAL; INTRAMUSCULAR; INTRAVENOUS; SOFT TISSUE EVERY 8 HOURS
Status: DISCONTINUED | OUTPATIENT
Start: 2019-04-11 | End: 2019-04-13

## 2019-04-11 RX ORDER — FERROUS SULFATE 325(65) MG
325 TABLET ORAL
Status: DISCONTINUED | OUTPATIENT
Start: 2019-04-11 | End: 2019-04-18 | Stop reason: HOSPADM

## 2019-04-11 RX ORDER — LEVOTHYROXINE SODIUM 0.05 MG/1
50 TABLET ORAL DAILY
Status: DISCONTINUED | OUTPATIENT
Start: 2019-04-12 | End: 2019-04-11

## 2019-04-11 RX ORDER — INSULIN GLARGINE 100 [IU]/ML
13 INJECTION, SOLUTION SUBCUTANEOUS NIGHTLY
Status: DISCONTINUED | OUTPATIENT
Start: 2019-04-11 | End: 2019-04-13

## 2019-04-11 RX ADMIN — IRON SUCROSE 300 MG: 20 INJECTION, SOLUTION INTRAVENOUS at 15:04

## 2019-04-11 RX ADMIN — DEXAMETHASONE SODIUM PHOSPHATE 4 MG: 4 INJECTION, SOLUTION INTRAMUSCULAR; INTRAVENOUS at 17:34

## 2019-04-11 RX ADMIN — BUMETANIDE 1 MG: 0.25 INJECTION INTRAMUSCULAR; INTRAVENOUS at 20:56

## 2019-04-11 RX ADMIN — DEXTROSE 15 G: 15 GEL ORAL at 05:26

## 2019-04-11 RX ADMIN — METOPROLOL TARTRATE 50 MG: 50 TABLET, FILM COATED ORAL at 20:56

## 2019-04-11 RX ADMIN — ATORVASTATIN CALCIUM 80 MG: 80 TABLET, FILM COATED ORAL at 20:56

## 2019-04-11 RX ADMIN — HYDRALAZINE HYDROCHLORIDE 50 MG: 50 TABLET, FILM COATED ORAL at 15:04

## 2019-04-11 RX ADMIN — HEPARIN SODIUM 5000 UNITS: 5000 INJECTION INTRAVENOUS; SUBCUTANEOUS at 15:04

## 2019-04-11 RX ADMIN — PANTOPRAZOLE SODIUM 40 MG: 40 TABLET, DELAYED RELEASE ORAL at 06:07

## 2019-04-11 RX ADMIN — HEPARIN SODIUM 5000 UNITS: 5000 INJECTION INTRAVENOUS; SUBCUTANEOUS at 06:08

## 2019-04-11 RX ADMIN — LEVOTHYROXINE SODIUM 200 MCG: 100 TABLET ORAL at 18:35

## 2019-04-11 RX ADMIN — BUMETANIDE 1 MG: 0.25 INJECTION INTRAMUSCULAR; INTRAVENOUS at 09:58

## 2019-04-11 RX ADMIN — DEXTROSE MONOHYDRATE 12.5 G: 25 INJECTION, SOLUTION INTRAVENOUS at 05:44

## 2019-04-11 RX ADMIN — FERROUS SULFATE TAB 325 MG (65 MG ELEMENTAL FE) 325 MG: 325 (65 FE) TAB at 17:35

## 2019-04-11 RX ADMIN — METOPROLOL TARTRATE 50 MG: 50 TABLET, FILM COATED ORAL at 09:58

## 2019-04-11 RX ADMIN — ISOSORBIDE MONONITRATE 30 MG: 30 TABLET ORAL at 20:56

## 2019-04-11 RX ADMIN — HYDRALAZINE HYDROCHLORIDE 50 MG: 50 TABLET, FILM COATED ORAL at 09:58

## 2019-04-11 RX ADMIN — FERROUS SULFATE TAB 325 MG (65 MG ELEMENTAL FE) 325 MG: 325 (65 FE) TAB at 09:58

## 2019-04-11 RX ADMIN — INSULIN LISPRO 3 UNITS: 100 INJECTION, SOLUTION INTRAVENOUS; SUBCUTANEOUS at 17:57

## 2019-04-11 RX ADMIN — CLOPIDOGREL BISULFATE 75 MG: 75 TABLET, FILM COATED ORAL at 20:56

## 2019-04-11 RX ADMIN — INSULIN GLARGINE 13 UNITS: 100 INJECTION, SOLUTION SUBCUTANEOUS at 21:19

## 2019-04-11 RX ADMIN — HYDRALAZINE HYDROCHLORIDE 50 MG: 50 TABLET, FILM COATED ORAL at 20:56

## 2019-04-11 RX ADMIN — ASPIRIN 81 MG 81 MG: 81 TABLET ORAL at 20:56

## 2019-04-11 RX ADMIN — LEVOTHYROXINE SODIUM 25 MCG: 25 TABLET ORAL at 06:07

## 2019-04-11 RX ADMIN — HEPARIN SODIUM 5000 UNITS: 5000 INJECTION INTRAVENOUS; SUBCUTANEOUS at 23:27

## 2019-04-11 RX ADMIN — PANTOPRAZOLE SODIUM 40 MG: 40 TABLET, DELAYED RELEASE ORAL at 17:37

## 2019-04-11 RX ADMIN — Medication 10 ML: at 21:03

## 2019-04-11 ASSESSMENT — PAIN SCALES - GENERAL: PAINLEVEL_OUTOF10: 0

## 2019-04-11 NOTE — PROGRESS NOTES
Pt admitted to  08 transported by cart by EMS. Complaints: none at this time. IV site free of s/s of infection or infiltration. Vital signs obtained. Assessment and data collection initiated. Oriented to room. Policies and procedures for  explained All questions answered with no further questions at this time. Fall prevention and safety brochure discussed with patient. Juani Westfall 4/10/2019 10:24 PM     Patient arrived to  from [facility]. Patient had no complaints, no pain at this time. Patient vital signs were obtained. 04/10/19 1900   Vital Signs   Temp 97.5 °F (36.4 °C)   Temp Source Oral   Pulse 56   Heart Rate Source Monitor   Resp 18   BP (!) 181/90   BP Location Left upper arm   BP Upper/Lower Upper   MAP (mmHg) 127   Patient Position Semi fowlers   Level of Consciousness 0   MEWS Score 1   Patient Currently in Pain Denies   Height and Weight   Height 5' 6\" (1.676 m)   Weight 129 lb 8 oz (58.7 kg)   Weight Method Bed scale   BSA (Calculated - sq m) 1.65 sq meters   BMI (Calculated) 20.9   Oxygen Therapy   SpO2 93 %   Pulse Oximeter Device Mode Intermittent   Pulse Oximeter Device Location Finger   O2 Device None (Room air)     Patient stated they did not receive their normal medications at [facility] and this is why his blood pressure is so high. . RN notified ROSALINDA Gage of patient arrival to floor and patient elevated BP. Patient began using vape pen in room and attempted to hide vape pen behind his back in the blankets. When asked if patient smoked or had any smoking devices, Patient stated \"I'm not sure\". RN asked if she could look through belonging bags and in the patient bed and patient agreed this was okay. RN then found vape pen and locked in the cabinet in patient room.

## 2019-04-11 NOTE — PROGRESS NOTES
0400: RN asked patient if he would like RN to check patient blood sugar due to patient stating that it would get low. Patient refused RN to check blood sugar. 1737: Patient placed call light on and stated that he felt like he had low blood sugar. 9580: RN assessed blood sugar and it was 37.  0526: 2 tubes of Glucose oral gel was administered to patient. Patient requested dallas crackers with peanut butter and apple juice, supplied. 0542: Recheck of patient blood sugar was 47 after administration of glucose oral gel. 80: RN administered dextrose 12.5 g IV.  0556: Recheck of patient blood sugar 248. Will continue to monitor.

## 2019-04-11 NOTE — H&P
HOSPITALIST ADMISSION H&P      REASON FOR ADMISSION:  Encephalopathy  ESTIMATED LENGTH OF STAY:2-3 days    ATTENDING/ADMITTING PHYSICIAN: Mg Stewart PA-C  PCP: PAOLA Michaud CNP    HISTORY OF PRESENT ILLNESS:      The patient is a 46 y.o. male patient of PAOLA Michaud CNP who was transferred from [facility].  The patient was in the ER the night before to have his kidneys evaluated. He is known to have significant kidney disease and wishes to be on the transplant list.  He states he \"felt as if his kidneys were shutting down\" but was instead diagnosed with CHF and hypothyroidism. He signed out AMA \"to process my new diagnosis\". Upon returning home he went to bed and when his wife attempted to wake him she was unable. EMS found his sugar in the low 20s and temp was low. He was evaluated at [facility] ED and later transferred to [facility]. [facility] to see his nephrologist.       See below for additional PMH. Patient spdx-ingsstlduf-tihbjlxg-available records reviewed, including, but not limited to,       Past Medical History:   Diagnosis Date    Broken ankle 2016    Broke right ankle.     CAD (coronary artery disease) October 2015    MI     Cerebral artery occlusion with cerebral infarction [facility])  march 2015    left side    CHF (congestive heart failure) (HCC)     Chronic kidney disease     Diabetes mellitus ([address]     Hyperlipidemia     Hypertension            Past Surgical History:   Procedure Laterality Date    APPENDECTOMY      CARDIAC SURGERY  Oct. 2015    2 stents placed       Medications Prior to Admission:    Medications Prior to Admission: Insulin Degludec (TRESIBA) 100 UNIT/ML SOLN, Inject 13 Units into the skin nightly   insulin regular (HUMULIN R;NOVOLIN R) 100 UNIT/ML injection, Inject into the skin See Admin Instructions 1 unit for every 8 carbs consumed In addition:In the evening, If BS>200  200-250= 1unit 251-300= 2 units 301-350=3 units  hydrALAZINE (APRESOLINE) 50 MG tablet, Take 1.5 tablets by mouth 3 times daily (Patient taking differently: Take 50 mg by mouth 3 times daily )  isosorbide mononitrate (IMDUR) 30 MG CR tablet, Take 30 mg by mouth daily  atorvastatin (LIPITOR) 80 MG tablet, Take 80 mg by mouth daily  clopidogrel (PLAVIX) 75 MG tablet, Take 75 mg by mouth daily  pantoprazole sodium (PROTONIX) 40 MG PACK packet, Take 40 mg by mouth 2 times daily (before meals)  metoprolol (LOPRESSOR) 50 MG tablet, Take 50 mg by mouth 2 times daily  aspirin 81 MG tablet, Take 81 mg by mouth daily  levothyroxine (SYNTHROID) 25 MCG tablet, Take 25 mcg by mouth Daily  [DISCONTINUED] Levothyroxine Sodium 25 MCG CAPS, Take 25 mcg by mouth Daily  [DISCONTINUED] ferrous sulfate 325 (65 Fe) MG tablet, Take 1 tablet by mouth 3 times daily (with meals)  [DISCONTINUED] LANTUS SOLOSTAR 100 UNIT/ML injection pen, Inject 18 Units into the skin nightly   nitroGLYCERIN (NITROSTAT) 0.4 MG SL tablet, DISSOLVE 1 TABLET UNDER THE TONGUE EVERY 5 MIN AS NEEDED FOR CHEST PAIN  [DISCONTINUED] HUMALOG KWIKPEN 100 UNIT/ML pen, INJECT 1 UNIT UNDER THE SKIN PER EVERY 8 CARBS AT MEALTIME THREE TIMES A DAY  [DISCONTINUED] furosemide (LASIX) 20 MG tablet, TAKE 1 TABLET BY MOUTH EVERY DAY AS NEEDED FOR FLUID  FREESTYLE LANCETS MISC,   FREESTYLE INSULINX TEST strip,     Allergies:    Aranesp (albumin free) [darbepoetin eri]    Social History:    reports that he quit smoking about 3 years ago. He has a 45.00 pack-year smoking history. He has quit using smokeless tobacco. He reports that he drinks about 2.4 oz of alcohol per week. He reports that he does not use drugs. Family History:   family history includes Cancer in his sister; Diabetes in his mother. He was adopted.     REVIEW OF SYSTEMS:  See HPI and problem list; otherwise no other new complaints with respect to HEENT, neck, pulmonary, coronary, GI, , endocrine, musculoskeletal, immune system/connective tissue disease, hematologic, neuropsych, skin, lymphatics, or malignancies. PHYSICAL EXAM:  Vitals: There were no vitals taken for this visit. HEENT: PERRLA, Mucosa Pink, Moist, EMOI, Normocephalic and Atraumatic  Neck: Supple, Carotid Pulses Present, No Bruits, No Masses, Tenderness, Nodularity and No Lymphadenopathy  Chest/Lungs: Clear to Auscultation without Rales, Rhonchi, or Wheezes  Cardiac: Regular Rate and Rhythm without Rubs, Clicks, Gallops, or Murmurs  GI/Abdomen:  Bowel Sounds Present, Soft, Non-tender, without Guarding or Rebound Tenderness, No Masses and No Tenderness  : Not examined  EXT/Skin: No Edema, No Cyanosis, No Clubbing and Normal Skin Turgor  Neuro: Alert and Oriented, to Person, to Time, to Place, to Situation, No Localizing Signs/Symptoms, Cranial Nerves II-XII Grossly Intact, Sensory Grossly Intact and Motor Sensory Intact        LABS:    Recent Results (from the past 168 hour(s))   CBC Auto Differential    Collection Time: 04/10/19  1:04 AM   Result Value Ref Range    WBC 5.8 4.8 - 10.8 thou/mm3    RBC 4.03 (L) 4.70 - 6.10 mill/mm3    Hemoglobin 8.4 (L) 14.0 - 18.0 gm/dl    Hematocrit 30.2 (L) 42.0 - 52.0 %    MCV 74.9 (L) 80.0 - 94.0 fL    MCH 20.8 (L) 26.0 - 33.0 pg    MCHC 27.8 (L) 32.2 - 35.5 gm/dl    RDW-CV 22.0 (H) 11.5 - 14.5 %    RDW-SD 59.6 (H) 35.0 - 45.0 fL    Platelets 909 738 - 689 thou/mm3    MPV 10.4 9.4 - 12.4 fL    Seg Neutrophils 71.0 %    Lymphocytes 15.6 %    Monocytes 10.1 %    Eosinophils 2.4 %    Basophils 0.7 %    Immature Granulocytes 0.2 %    Platelet Estimate ADEQUATE Adequate    Segs Absolute 4.1 1.8 - 7.7 thou/mm3    Lymphocytes # 0.9 (L) 1.0 - 4.8 thou/mm3    Monocytes # 0.6 0.4 - 1.3 thou/mm3    Eosinophils # 0.1 0.0 - 0.4 thou/mm3    Basophils # 0.0 0.0 - 0.1 thou/mm3    Immature Grans (Abs) 0.01 0.00 - 0.07 thou/mm3    nRBC 0 /100 wbc    Hypochromia PRESENT Absent    Poikilocytes 1+ Absent   Comprehensive Metabolic Panel w/ Reflex to MG    Collection Time: 04/10/19  1:04 AM Result Value Ref Range    Glucose 101 70 - 108 mg/dL    CREATININE 3.1 (HH) 0.4 - 1.2 mg/dL    BUN 33 (H) 7 - 22 mg/dL    Sodium 134 (L) 135 - 145 meq/L    Potassium reflex Magnesium 4.9 3.5 - 5.2 meq/L    Chloride 103 98 - 111 meq/L    CO2 18 (L) 23 - 33 meq/L    Calcium 8.8 8.5 - 10.5 mg/dL    AST 75 (H) 5 - 40 U/L    Alkaline Phosphatase 138 (H) 38 - 126 U/L    Total Protein 7.2 6.1 - 8.0 g/dL    Alb 4.0 3.5 - 5.1 g/dL    Total Bilirubin 0.7 0.3 - 1.2 mg/dL    ALT 47 11 - 66 U/L   Lipase    Collection Time: 04/10/19  1:04 AM   Result Value Ref Range    Lipase 32.0 5.6 - 51.3 U/L   Troponin    Collection Time: 04/10/19  1:04 AM   Result Value Ref Range    Troponin T 0.131 (A) ng/ml   Brain Natriuretic Peptide    Collection Time: 04/10/19  1:04 AM   Result Value Ref Range    Pro-BNP 70244.0 (H) 0.0 - 900.0 pg/mL   Protime-INR    Collection Time: 04/10/19  1:04 AM   Result Value Ref Range    INR 1.01 0.85 - 1.13   APTT    Collection Time: 04/10/19  1:04 AM   Result Value Ref Range    aPTT 44.9 (H) 22.0 - 38.0 seconds   TSH without Reflex    Collection Time: 04/10/19  1:04 AM   Result Value Ref Range    .600 (H) 0.400 - 4.20 uIU/mL   Scan of Blood Smear    Collection Time: 04/10/19  1:04 AM   Result Value Ref Range    SCAN OF BLOOD SMEAR see below    Anion Gap    Collection Time: 04/10/19  1:04 AM   Result Value Ref Range    Anion Gap 13.0 8.0 - 16.0 meq/L   Glomerular Filtration Rate, Estimated    Collection Time: 04/10/19  1:04 AM   Result Value Ref Range    Est, Glom Filt Rate 21 (A) ml/min/1.73m2   Osmolality    Collection Time: 04/10/19  1:04 AM   Result Value Ref Range    Osmolality Calc 275.6 275.0 - 300 mOsmol/kg   T4, Free    Collection Time: 04/10/19  1:04 AM   Result Value Ref Range    T4 Free 0.46 (L) 0.93 - 1.76 ng/dL   T3    Collection Time: 04/10/19  1:04 AM   Result Value Ref Range    T3, Total 51 (L) 72 - 181 ng/dL   Blood gas, venous    Collection Time: 04/10/19  1:31 AM   Result Value Ref 70 - 108 mg/dL    BUN 36 (H) 7 - 22 mg/dL    CREATININE 3.0 (HH) 0.4 - 1.2 mg/dL    Calcium 8.9 8.5 - 10.5 mg/dL   CBC auto differential    Collection Time: 04/10/19  8:59 PM   Result Value Ref Range    WBC 5.6 4.8 - 10.8 thou/mm3    RBC 3.83 (L) 4.70 - 6.10 mill/mm3    Hemoglobin 8.0 (L) 14.0 - 18.0 gm/dl    Hematocrit 28.7 (L) 42.0 - 52.0 %    MCV 74.9 (L) 80.0 - 94.0 fL    MCH 20.9 (L) 26.0 - 33.0 pg    MCHC 27.9 (L) 32.2 - 35.5 gm/dl    RDW-CV 22.0 (H) 11.5 - 14.5 %    RDW-SD 58.6 (H) 35.0 - 45.0 fL    Platelets 817 549 - 694 thou/mm3    MPV 10.7 9.4 - 12.4 fL    Seg Neutrophils 74.5 %    Lymphocytes 14.8 %    Monocytes 9.4 %    Eosinophils 0.5 %    Basophils 0.4 %    Immature Granulocytes 0.4 %    Segs Absolute 4.2 1.8 - 7.7 thou/mm3    Lymphocytes # 0.8 (L) 1.0 - 4.8 thou/mm3    Monocytes # 0.5 0.4 - 1.3 thou/mm3    Eosinophils # 0.0 0.0 - 0.4 thou/mm3    Basophils # 0.0 0.0 - 0.1 thou/mm3    Immature Grans (Abs) 0.02 0.00 - 0.07 thou/mm3    nRBC 0 /100 wbc    CRENATED RBC'S 1+ Absent    Hypochromia PRESENT Absent    Poikilocytes 1+ Absent   Hepatic function panel    Collection Time: 04/10/19  8:59 PM   Result Value Ref Range    Alb 3.6 3.5 - 5.1 g/dL    Total Bilirubin 0.6 0.3 - 1.2 mg/dL    Bilirubin, Direct 0.3 0.0 - 0.3 mg/dL    Alkaline Phosphatase 129 (H) 38 - 126 U/L    AST 62 (H) 5 - 40 U/L    ALT 38 11 - 66 U/L    Total Protein 6.6 6.1 - 8.0 g/dL   Hemoglobin A1c    Collection Time: 04/10/19  8:59 PM   Result Value Ref Range    Hemoglobin A1C 7.3 (H) 4.4 - 6.4 %    AVERAGE GLUCOSE 159 (H) 70 - 126 mg/dL   Scan of Blood Smear    Collection Time: 04/10/19  8:59 PM   Result Value Ref Range    SCAN OF BLOOD SMEAR see below    Anion Gap    Collection Time: 04/10/19  8:59 PM   Result Value Ref Range    Anion Gap 9.0 8.0 - 16.0 meq/L   Glomerular Filtration Rate, Estimated    Collection Time: 04/10/19  8:59 PM   Result Value Ref Range    Est, Glom Filt Rate 22 (A) ml/min/1.73m2   POCT Glucose    Collection Time: 04/10/19 10:30 PM   Result Value Ref Range    POC Glucose 253 (H) 70 - 108 mg/dl         ASSESSMENT:      Patient Active Problem List   Diagnosis    CH[facility]congestive [facility] failure) ([facility])    DM type 2 causing CKD stage 5 ([facility])    Essential hypertension    CKD (chronic kidney disease) stage 4, GFR 15-29 ml/min ([facility])    Coronary artery disease involving native coronary artery of native heart with angina pectoris (HCC)    Anemia associated with chronic renal failure    Anemia of chronic kidney failure    Coronary atherosclerosis    Diabetes mellitus ([address]    History of CVA (cerebrovascular accident)    MI (myocardial infarction) ([address]    Cerebrovascular accident ([address]    Type 1 diabetes mellitus with hyperglycemia (HCC)    Diabetes mellitus with hyperglycemia ([facility])    Anemia in chronic kidney disease    Symptomatic anemia    Bacterial pneumonia    Iron deficiency anemia    Encephalopathy       PLAN:    1. Stage IV CKD - patient's kidneys are at baseline at this time. 2. DM - sliding scale insulin is ordered, patient would rather he dictate his insulin dosing, will keep medium sliding scale and make changes for any persistently high or low sugars. 3. CHF - patient is requiring O2, no edema, no ascites  4.  Anemia - seems to be chronic, CBC daily , no bleeding source noted    Home medications reviewed  See orders     Note that over 50 minutes was spent in evaluation of the patient, review of the chart and pertinent records, discussion with family/staff, etc    [employer]  PA-C  8:10 PM  4/10/2019

## 2019-04-11 NOTE — PLAN OF CARE
Problem: Falls - Risk of:  Goal: Will remain free from falls  Description  Will remain free from falls  4/11/2019 1803 by Marcus Frausto  Outcome: Met This Shift  Note:   Patient remained free from falls this shift. Fall precautions in place. Fall arm band on, fall sign posted, and non skid footwear on. Patient educated to use call light when in need of assistance with ambulating, toileting, and daily care needs. Bed exit alarm, bed in low position, and wheels locked. Hourly rounding completed to anticipate patient care needs. 4/11/2019 1529 by Marcus Frausto  Outcome: Met This Shift  Note:   Patient free from falls this shift. Fall precautions in place. Problem: Cardiac:  Goal: Ability to maintain vital signs within normal range will improve  Description  Ability to maintain vital signs within normal range will improve  Outcome: Met This Shift  Note:   Patient has medications on board to stabilize vital signs. Progress made and normal limits met this shift. Vitals:    04/11/19 0353 04/11/19 0949 04/11/19 1146 04/11/19 1542   BP: (!) 147/71 (!) 166/71 (!) 154/68 137/64   Pulse: 61 61 58 64   Resp: 16 18     Temp: 97.8 °F (36.6 °C) 98 °F (36.7 °C) 97.5 °F (36.4 °C) 98.3 °F (36.8 °C)   TempSrc: Oral Axillary Oral Oral   SpO2: 96% 94% 91% 90%   Weight: 126 lb 14.4 oz (57.6 kg)      Height: 5' 6\" (1.676 m)               Problem: Coping:  Goal: Ability to identify and develop effective coping behavior will improve  Description  Ability to identify and develop effective coping behavior will improve  Outcome: Met This Shift  Note:   Patient has effective coping skills for current situation. Has plans lined up to care for current situation.       Problem: Physical Regulation:  Goal: Complications related to the disease process, condition or treatment will be avoided or minimized  Description  Complications related to the disease process, condition or treatment will be avoided or minimized  Outcome: Met This Shift  Note:   Patient being promptly treated to decrease risk of complications associated with current condition. Problem: Discharge Planning:  Goal: Discharged to appropriate level of care  Description  Discharged to appropriate level of care  Outcome: Met This Shift  Note:   Patient planned to discharge home with significant other. Problem: Skin Integrity:  Goal: Will show no infection signs and symptoms  Description  Will show no infection signs and symptoms  Outcome: Met This Shift  Note:   Patient skin integrity remains intact. See skin assessment in head to toe for further details. Problem: Pain:  Goal: Pain level will decrease  Description  Pain level will decrease  Outcome: Met This Shift  Note:   Patient reports no signs or symptoms of pain this shift. Problem: Serum Glucose Level - Abnormal:  Goal: Ability to maintain appropriate glucose levels has stabilized  Description  Ability to maintain appropriate glucose levels has stabilized  Outcome: Ongoing  Note:   Glucose maintained per patient scale. Doctor says to allow patient to control sugars. Problem: Activity:  Goal: Risk for activity intolerance will decrease  Description  Risk for activity intolerance will decrease  Outcome: Ongoing  Note:   Patient remains fatigued with activity this shift. Problem: Nutritional:  Goal: Maintenance of adequate nutrition will be supported  Description  Maintenance of adequate nutrition will be supported  Outcome: Ongoing  Note:   Patient states having poor appetite. At least 50% of meals eaten this shift. Will continue to offer food choices that the client prefers that also comply with carb control diet. Care plan discussed with patient. Patient verbalized understanding.

## 2019-04-11 NOTE — FLOWSHEET NOTE
04/10/19 2153   Provider Notification   Reason for Communication Critical Value (comment)  (K+ and Cr )   Provider [place]   Provider Notification Physician Assistant   Method of Communication Secure Message   Response See orders   Notification Time 2153     RN messaged [facility] [place] with critical values. RN was notified at 2152 that patient CR was 3.0 and K+ was 6.1 Halima [facility] [place] ordered D5 IV solution, Insulin IV, sodium bicarb injection, and Kayexalate. RN will administer.

## 2019-04-11 NOTE — CONSULTS
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 Past Medical, Surgical, Family, Social and Allergy Histories:  I have reviewed the patient's medical history in detail and updated the computerized patient record. has a past medical history of Broken ankle, CAD (coronary artery disease), Cerebral artery occlusion with cerebral infarction [facility]), CHF (congestive heart failure) ([facility] [address] Chronic kidney disease, Diabetes mellitus ([facility] [address] Hyperlipidemia, and Hypertension. has a past surgical history that includes Appendectomy and Cardiac surgery for stents (Oct. 2015). reports that he quit smoking about 3 years ago. He has a 45.00 pack-year smoking history. He has quit using smokeless tobacco. He reports that he drinks about 2.4 oz of alcohol per week. He reports that he does not use drugs. family history includes Cancer in his sister; Diabetes in his mother. He was adopted. He is  and has children. He is disabled from complications of diabetes.   Allergies   Allergen Reactions    Aranesp (Albumin Free) [Darbepoetin Damon] Hives     Outpatient Medications Marked as Taking for the 4/10/19 encounter [facility] Encounter)   Medication Sig Dispense Refill    Insulin Degludec (TRESIBA) 100 UNIT/ML SOLN Inject 13 Units into the skin nightly       insulin regular (HUMULIN R;NOVOLIN R) 100 UNIT/ML injection Inject into the skin See Admin Instructions 1 unit for every 8 carbs consumed  In addition:In the evening, If BS>200   200-250= 1unit  251-300= 2 units  301-350=3 units      hydrALAZINE (APRESOLINE) 50 MG tablet Take 1.5 tablets by mouth 3 times daily (Patient taking differently: Take 50 mg by mouth 3 times daily ) 135 tablet 0    isosorbide mononitrate (IMDUR) 30 MG CR tablet Take 30 mg by mouth daily      atorvastatin (LIPITOR) 80 MG tablet Take 80 mg by mouth daily      clopidogrel (PLAVIX) 75 MG tablet Take 75 mg by mouth daily      pantoprazole sodium (PROTONIX) 40 MG PACK packet Take 40 mg by mouth 2 times daily (before meals)      metoprolol (LOPRESSOR) 50 MG tablet Take 50 mg by mouth 2 times daily      aspirin 81 MG tablet Take 81 mg by mouth daily       Current Facility-Administered Medications   Medication Dose Route Frequency Provider Last Rate Last Dose    ferrous sulfate tablet 325 mg  325 mg Oral TID  Steffanie Berrios MD   325 mg at 04/11/19 0958    bumetanide (BUMEX) injection 1 mg  1 mg Intravenous BID Steffanie Berrios MD   1 mg at 04/11/19 0958    iron sucrose (VENOFER) 300 mg in sodium chloride 0.9 % 250 mL IVPB  300 mg Intravenous Q48H PAOLA Rojas .7 mL/hr at 04/11/19 1504 300 mg at 04/11/19 1504    [START ON 4/12/2019] darbepoetin eri-polysorbate (ARANESP) injection 100 mcg  100 mcg Subcutaneous Once PAOLA Rojas CNP        [START ON 4/12/2019] levothyroxine (SYNTHROID) tablet 50 mcg  50 mcg Oral Daily Steffanie Berrios MD        Dexamethasone Sodium Phosphate injection 4 mg  4 mg Intravenous Q8H Steffanie Berrios MD        aspirin chewable tablet 81 mg  81 mg Oral Daily Kira England PA-C   81 mg at 04/10/19 2145    atorvastatin (LIPITOR) tablet 80 mg  80 mg Oral Daily Mela Marie PA-C   80 mg at 04/10/19 2145    clopidogrel (PLAVIX) tablet 75 mg  75 mg Oral Daily Kira England PA-C   75 mg at 04/10/19 2145    hydrALAZINE (APRESOLINE) tablet 50 mg  50 mg Oral TID Kira England PA-C   50 mg at 04/11/19 1504    isosorbide mononitrate (IMDUR) extended release tablet 30 mg  30 mg Oral Daily Kira England PA-C   30 mg at 04/10/19 2145    metoprolol tartrate (LOPRESSOR) tablet 50 mg  50 mg Oral BID Kira England PA-C   50 mg at 04/11/19 0958    pantoprazole (PROTONIX) tablet 40 mg  40 mg Oral BID SUMMER Mederos PA-C   40 mg at 04/11/19 0607    sodium chloride flush 0.9 % injection 10 mL  10 mL Intravenous 2 times per day Jason Vega PA-C        sodium chloride flush 0.9 % injection 10 mL  10 mL Intravenous PRN Jason Vega PA-C        magnesium hydroxide (MILK OF MAGNESIA) 400 MG/5ML suspension 30 mL  30 mL Oral Daily PRN TAQUERIA McgovernC        ondansetron (ZOFRAN) injection 4 mg  4 mg Intravenous Q6H PRN Jason Vega PA-C        heparin (porcine) injection 5,000 Units  5,000 Units Subcutaneous 3 times per day TAQUERIA McgovernC   5,000 Units at 04/11/19 1504    acetaminophen (TYLENOL) tablet 650 mg  650 mg Oral Q4H PRN Jason Vega PA-C        insulin lispro (HUMALOG) injection vial 0-12 Units  0-12 Units Subcutaneous TID AMILCAR De Anda PA-C        insulin lispro (HUMALOG) injection vial 0-6 Units  0-6 Units Subcutaneous Nightly Jason Vega PA-C   3 Units at 04/10/19 2231    glucose (GLUTOSE) 40 % oral gel 15 g  15 g Oral PRN Jason Vega PA-C   15 g at 04/11/19 0526    dextrose 50 % solution 12.5 g  12.5 g Intravenous PRN TAQUERIA McgovernC   12.5 g at 04/11/19 0544    glucagon (rDNA) injection 1 mg  1 mg Intramuscular PRN Mela Mederos PA-C        dextrose 5 % solution  100 mL/hr Intravenous PRN Jason Vega PA-C          Medications Prior to Admission: Insulin Degludec (TRESIBA) 100 UNIT/ML SOLN, Inject 13 Units into the skin nightly   insulin regular (HUMULIN R;NOVOLIN R) 100 UNIT/ML injection, Inject into the skin See Admin Instructions 1 unit for every 8 carbs consumed In addition:In the evening, If BS>200  200-250= 1unit 251-300= 2 units 301-350=3 units  hydrALAZINE (APRESOLINE) 50 MG tablet, Take 1.5 tablets by mouth 3 times daily (Patient taking differently: Take 50 mg by mouth 3 times daily )  isosorbide mononitrate (IMDUR) 30 MG CR tablet, Take 30 mg by mouth daily  atorvastatin (LIPITOR) 80 MG tablet, Take 80 mg by mouth daily  clopidogrel (PLAVIX) 75 MG tablet, Take 75 mg by mouth bowel sounds normal; no masses,  no organomegaly   Extremities: extremities normal, atraumatic, no cyanosis or edema   Skin: warm and dry, no hyperpigmentation, vitiligo, or suspicious lesions   Pulses: 2+ and symmetric   Neuro: normal without focal findings, mental status, speech normal, alert and oriented x3, KARLA and reflexes normal and symmetric     Lab Review    CBC:  Lab Results   Component Value Date    WBC 5.6 04/10/2019    RBC 3.83 04/10/2019    HGB 8.0 04/10/2019    HCT 28.7 04/10/2019    MCV 74.9 04/10/2019    MCH 20.9 04/10/2019    MCHC 27.9 04/10/2019    RDW 16.2 04/09/2016     04/10/2019    MPV 10.7 04/10/2019     CMP:    Lab Results   Component Value Date     04/11/2019    K 5.3 04/11/2019    K 6.1 04/10/2019     04/11/2019    CO2 22 04/11/2019    BUN 34 04/11/2019    CREATININE 3.0 04/11/2019    LABGLOM 22 04/11/2019    GLUCOSE 227 04/11/2019    PROT 6.6 04/10/2019    LABALBU 3.6 04/10/2019    CALCIUM 8.0 04/11/2019    BILITOT 0.6 04/10/2019    ALKPHOS 129 04/10/2019    AST 62 04/10/2019    ALT 38 04/10/2019     Hepatic Function Panel:    Lab Results   Component Value Date    ALKPHOS 129 04/10/2019    ALT 38 04/10/2019    AST 62 04/10/2019    PROT 6.6 04/10/2019    BILITOT 0.6 04/10/2019    BILIDIR 0.3 04/10/2019    LABALBU 3.6 04/10/2019     Magnesium:    Lab Results   Component Value Date    MG 1.9 11/25/2018   Results for HAND, William Manzanoine (MRN [id number]) as of 4/11/2019 17:02   Ref. Range 4/11/2019 05:42 4/11/2019 05:56 4/11/2019 08:10 4/11/2019 09:31 4/11/2019 13:16   POC Glucose Latest Ref Range: 70 - 108 mg/dl 47 (L) 248 (H)  227 (H) 214 (H)   Results for HAND, William Meline (MRN [id number]) as of 4/11/2019 17:02   Ref. Range 11/25/2018 05:35 1/3/2019 00:00 4/10/2019 01:04 4/10/2019 20:59 4/11/2019 08:10   BUN Latest Ref Range: 7 - 22 mg/dL 45 (H) 34 33 (H) 36 (H) 34 (H)   Results for HAND, William Meline (MRN [id number]) as of 4/11/2019 17:02   Ref.  Range 1/3/2019 00:00 4/10/2019 01:04 Impression:       1. The thyroid parenchyma demonstrates heterogeneous echogenicity. There is also increased color Doppler flow throughout the thyroid gland. The findings are suggestive of thyroiditis. 2. There are 2 hypoechoic nodules within the thyroid isthmus. These are of intermediate signal based on American thyroid Association guidelines. Follow-up ultrasound at 12-24 months can be considered for continued follow-up based on GENI guidelines. 3. Incidental note is made of hyperechogenicity within the bilateral carotid arteries. This likely corresponds to atherosclerotic disease. Further evaluation with a dedicated carotid ultrasound may be considered, if clinically warranted. TSH:    Lab Results   Component Value Date    .400 04/11/2019   Results for Denisha COOPER (MRN [id number]) as of 4/11/2019 17:02   Ref. Range 4/10/2019 01:04 4/11/2019 14:58   T3, Total Latest Ref Range: 72 - 181 ng/dL 51 (L)    TSH Latest Ref Range: 0.400 - 4.20 uIU/mL 150.600 (H) 114.400 (H)   T4 Free Latest Ref Range: 0.93 - 1.76 ng/dL 0.46 (L)    Results for Denisha COOPER (MRN [id number]) as of 4/11/2019 17:02   Ref. Range 4/10/2019 01:04 4/11/2019 14:58   T3, Total Latest Ref Range: 72 - 181 ng/dL 51 (L)    TSH Latest Ref Range: 0.400 - 4.20 uIU/mL 150.600 (H) 114.400 (H)   T4 Free Latest Ref Range: 0.93 - 1.76 ng/dL 0.46 (L)      No results for input(s): CKTOTAL, CKMB, CKMBINDEX, TROPONINI in the last 72 hours. FLP:  No results found for: TRIG, HDL, LDLCALC, LDLDIRECT, LABVLDL  DIET: DIET CARB CONTROL; Low Sodium (2 GM); Daily Fluid Restriction: 1500 ml   Assessment:     He has severe hypothyroidism contributing to heart decompensation. He has some percentage of renal failure is  from severe hypothyroidism  Diabetes mellitus type 1 with Nephrosclerosis , fibrosis and nodular glomerulosclerosis  There are two sub centimeter nodules in the isthmus, they need monitoring.   The texture of thyroid is heterogenous raising possibility of chronic thyroiditis     Plan:    will get dana aggressive with thyroid replacement and monitor free T4 after 3 days  He is on Levothroid 200 mcg daily for three days . Then we will do the Free T4  \"Thank you for asking us to see this complex patient. \"

## 2019-04-11 NOTE — FLOWSHEET NOTE
04/10/19 2109   Provider Notification   Reason for Communication Evaluate   Provider Name Malachi Maria   Provider Notification Physician Assistant   Method of Communication Secure Message   Response No new orders     RN messaged ROSALINDA Lindsay for cardiology regarding new patient consult. Reason for consult is CHF. Message was read with no response.  Added to care team.

## 2019-04-11 NOTE — PROGRESS NOTES
RN entered patient room to patient eating. RN asked patient if this RN could check patient blood sugar before patient began eating. Patient blood sugar was checked by this RN. Blood sugar resulted 263. Patient then began to eat. RN told patient that she would be back in to assess patient shortly and to administer patient nightly medications, including insulin, when pharmacy sent his medications up. Patient said \"Well since it was 263 and I'm eating carbs I'll get 1 unit\". RN explained to patient that he is on a sliding scale and the amount of insulin depends upon patient blood sugar. Patient began to get agitated stating that \"I self dose my inuslin and I carb count. I don't care what my blood sugar is I give the amount based on my carb counting. I will warn you now if you give me more insulin that 1 unit I will get very sick tonight\". ROSALINDA Mccarthy was at nurses station and heard patient getting upset regarding insulin dosing. Sandra Mccarthy explained to patient that he cannot self dose at the hospital and he doesn't get to choose what dose of insulin he gets. She explained at the hospital we keep a tight control on blood sugar and ideally try to keep blood sugar below 140. Patient stated he keeps his below 200 at home and if its above 200 he gets 1 unit. Sandra Mccarthy also explained that patient blood sugar was 263 and he was eating a big lb from [employer] and a coke. Diet education was provided. Kidney transplant education provided. Patient non-responsive to education and stated \"I don't diet and i'm not starting now, I eat what I want\". RN will continue to monitor blood sugar.

## 2019-04-11 NOTE — PLAN OF CARE
Problem: Falls - Risk of:  Goal: Will remain free from falls  Description  Will remain free from falls  Outcome: Ongoing  Note:   Patient was placed on fall precautions. Fall sign posted. Bed rails maintained at a minimum of 2/4. Call light in reach, bed in lowest position, bed alarm activated. Educated on use of call light before ambulation and when in need of assistance. Patient expressed understanding. Patient is ambulating with 1-2 assist at this time. Hourly visual checks performed and charted. Toileting offered to patient. No falls during shift, at this time. Arm band & falling star in place. Continuing to monitor. Goal: Absence of physical injury  Description  Absence of physical injury  Outcome: Ongoing  Note:   Patient was placed on fall precautions. Fall sign posted. Bed rails maintained at a minimum of 2/4. Call light in reach, bed in lowest position, bed alarm activated. Educated on use of call light before ambulation and when in need of assistance. Patient expressed understanding. Patient is ambulating with 1-2 assist at this time. Hourly visual checks performed and charted. Toileting offered to patient. No falls during shift, at this time. Arm band & falling star in place. Continuing to monitor. Problem: Cardiac:  Goal: Ability to maintain vital signs within normal range will improve  Description  Ability to maintain vital signs within normal range will improve  Outcome: Ongoing  Note:   Vitals:    04/10/19 2136   BP: (!) 147/67   Pulse: 64   Resp: 16   Temp: 98 °F (36.7 °C)   SpO2: 98%     Patient has slightly elevated BP this shift. Patient states it was due to not being administered daily medications at [facility]. Problem: Serum Glucose Level - Abnormal:  Goal: Ability to maintain appropriate glucose levels has stabilized  Description  Ability to maintain appropriate glucose levels has stabilized  Outcome: Ongoing  Note:   Patient had elevated nightly chem check patient glucose 253. Patient administered 13 units of Lantus and 3 units of Humalog. Problem: Activity:  Goal: Risk for activity intolerance will decrease  Description  Risk for activity intolerance will decrease  Outcome: Ongoing  Note:   Patient is ambulating with one assist at this time and denies any light headedness or dizziness. Problem: Coping:  Goal: Ability to identify and develop effective coping behavior will improve  Description  Ability to identify and develop effective coping behavior will improve  Outcome: Ongoing  Note:   Patient does seem to fully cope with the patient's disease process. Patient stated they first left the hospital last night due to being told he had hypothyroidism and he could not cope with this disease. Problem: Nutritional:  Goal: Maintenance of adequate nutrition will be supported  Description  Maintenance of adequate nutrition will be supported  Outcome: Ongoing  Note:   Patient is on a carb control diet. Patient states he does not like to be on a diet and he eats whatever he wants. Diet education provided to patient. Problem: Physical Regulation:  Goal: Complications related to the disease process, condition or treatment will be avoided or minimized  Description  Complications related to the disease process, condition or treatment will be avoided or minimized  Outcome: Ongoing  Note:   Patient lab work is abnormal. Patient has elevated K+ and Creatinine. Problem: Discharge Planning:  Goal: Discharged to appropriate level of care  Description  Discharged to appropriate level of care  Outcome: Ongoing  Note:   Discharge planning in process and discussed with patient/family. Care manager aware of discharge needs. Patient is from home with wife and would like to return at discharge. Discharge plans remain in progress.       Problem: Skin Integrity:  Goal: Will show no infection signs and symptoms  Description  Will show no infection signs and symptoms  Outcome: Ongoing  Note:   No signs of skin breakdown. Skin clean, dry, intact. Mucous membranes pink, moist. Patient turns self. Assistance with turns/ambulation provided PRN. Continue to monitor. Problem: Pain:  Goal: Pain level will decrease  Description  Pain level will decrease  Outcome: Ongoing  Note:   Patient able to use 0-10 pain scale. Denies pain at this time. Patient has not complained of any pain this shift. Patient stated pain goal is \"keep me comfortable\". Care plan reviewed with patient and RN. Patient and RN verbalize understanding of the plan of care and contribute to goal setting.

## 2019-04-11 NOTE — CARE COORDINATION
4/11/19, 10:26 AM      Anselmo Deluna       Admitted from: ED 4/10/2019/ [facility] day: 1   Location: 4A-08/008-A Reason for admit: Encephalopathy [G93.40] Status: IP  Admit order signed?: yes  PMH:  has a past medical history of Broken ankle, CAD (coronary artery disease), Cerebral artery occlusion with cerebral infarction ([facility] [address] CHF (congestive heart failure) ([facility] [address] Chronic kidney disease, Diabetes mellitus ([facility] [address] Hyperlipidemia, and Hypertension. Procedure: none  Pertinent abnormal Imaging:none  Medications:  Scheduled Meds:   ferrous sulfate  325 mg Oral TID WC    bumetanide  1 mg Intravenous BID    aspirin  81 mg Oral Daily    atorvastatin  80 mg Oral Daily    clopidogrel  75 mg Oral Daily    hydrALAZINE  50 mg Oral TID    isosorbide mononitrate  30 mg Oral Daily    levothyroxine  25 mcg Oral Daily    metoprolol tartrate  50 mg Oral BID    pantoprazole  40 mg Oral BID AC    sodium chloride flush  10 mL Intravenous 2 times per day    heparin (porcine)  5,000 Units Subcutaneous 3 times per day    insulin lispro  0-12 Units Subcutaneous TID WC    insulin lispro  0-6 Units Subcutaneous Nightly     Continuous Infusions:   dextrose        Pertinent Info/Orders/Treatment Plan:  BUN 34, creatinine 3.0,BNP 04269.0, diabetes management, IV Bumex, Cardiology, Nephrology consults. Diet: DIET CARB CONTROL;   Smoking status:  reports that he quit smoking about 3 years ago. He has a 45.00 pack-year smoking history.  He has quit using smokeless tobacco.   PCP: PAOLA Delacruz CNP  Readmission: no  Readmission Risk Score: 18%    Discharge Planning  Current Residence:  Private Residence  Living Arrangements:  Spouse/Significant Other   Support Systems:  Spouse/Significant Other, Parent, Children  Current Services PTA:     Potential Assistance Needed:  N/A  Potential Assistance Purchasing Medications:  No  Does patient want to participate in local refill/ meds to beds program?  No  Type of Home Care Services:  None  Patient expects to be discharged to:  home with wife  Expected Discharge date:  04/13/19  Follow Up Appointment: Best Day/ Time: Monday PM(late mornings)    Discharge Plan: Met with Haim Thompson. He currently lives at home with his spouse. Plan at discharge is to return home. Denies need for DME or HH.      Evaluation: no

## 2019-04-11 NOTE — FLOWSHEET NOTE
04/10/19 1947   Provider Notification   Reason for Communication Evaluate   Provider Name Ana [place]   Provider Notification Physician Assistant   Method of Communication Secure Message   Response See orders   Notification Time 1947     [address] [place], [place] regarding patient. SHAWN Hsu had on dayshift had messaged Dr. Kristopher Jeffrey regarding patient arrival at 1905 with no response. RN messaged Mela Mederos to place orders on new patient. See orders.

## 2019-04-11 NOTE — PROGRESS NOTES
Confirmed with Shiva Worley at [facility] that patient has been receiving darbepoetin 100 mcg every 2 weeks without any signs of allergic reaction. His last dose was darbepoetin 100 mcg on 3/28/19.

## 2019-04-11 NOTE — ED PROVIDER NOTES
[facility]  eMERGENCY dEPARTMENT eNCOUnter     Pt Name: Claudia Worley  MRN: [id number]  [place] 1965  Date of evaluation: 4/11/19        Mid-level provider Note:    I have personally performed and/or participated in the history, exam and medical decision making and agree with all pertinent clinical information as noted by the previous provider. I have also reviewed and agree with the past medical, family and social history unless otherwise noted. I have personally performed a face to face diagnostic evaluation on this patient. I have reviewed the previous provider's findings and agree. Evaluation: This patient was handed off to me at the end of his shift by Dr. Igor Santos. At that time patient was awaiting laboratory results. I did discuss the results with the patient and I recommended admission at that time. However the patient declined. The patient was given a dose of Synthroid in the emergency Department and I gave him a prescription for Synthroid he is given strict instructions to follow-up with his PCP as well as Dr. Ezequiel Avilez and an endocrinologist.  The patient then signed out AMA. 1. Acute kidney injury superimposed on chronic kidney disease ([facility] [address]    2. Exertional dyspnea    3. Elevated troponin    4. Hypothyroidism, unspecified type    5.  Acute on chronic congestive heart failure, unspecified heart failure type [facility])          DISPOSITION/PLAN  PATIENT REFERRED TO:  PAOLA Day - CNP  [address]  [phone]    Today      Arthur Whitehead DO  [facility]  [address]  [facility] [place] [address]  [address] [facility] MD [place]  [address] [facility] [address]  [phone]    Today      DISCHARGE MEDICATIONS:  Discharge Medication List as of 4/10/2019  4:35 AM      START taking these medications    Details   Levothyroxine Sodium 25 MCG CAPS Take 25 mcg by mouth Daily, Disp-30 capsule, R-0Print PAOLA Marroquin - PAOLA Ocampo - SHAILESH  04/11/19 1930

## 2019-04-11 NOTE — CARE COORDINATION
Independent  Ability to maintain home (clean home, laundry): Independent  Ability to drive and/or has transportation:  Independent  Ability to do shopping:  Independent  Ability to manage finances:   Independent  Is patient able to live independently?:  Yes  Hearing and Vision  Visual Impairment:  Reading glasses  Hearing Impairment:  None  Care Transitions Interventions  No Identified Needs         Follow Up  Future Appointments   Date Time Provider Jalil Dobson   5/15/2019  9:20 AM Roverto Galindo,  N [facility]  Health Maintenance Due   Topic Date Due    A1C test (Diabetic or Prediabetic)  09/07/1974    Lipid screen  09/07/1974       Derek Ness RN

## 2019-04-11 NOTE — CONSULTS
Nephrology Consult Note    Patient - Monique Uriarte   MRN -  [id number]      - 1965   46 y.o. Date of Admission -  4/10/2019  7:27 PM        Date of evaluation -  2019 9:15 AM    Reason for Consult  Chronic Kidney Injury   Requesting Physician:  Juan C Landry MD  History Information Obtained From: Nursing staff, Electronic medical records, Patient,    Briana [place] / HPI:   The patient is a 46 y.o. male with past medical history of DM II, HTN, CAD, CKD Stage IV who presented with c/o of gradually increasing edema and abd fullness over last week. Associated with wt gain. C/O of generally poor appetite. Denies nausea, vomiting, diarrhea. C/O of generalized weakness and just not feeling good. Reports compliance with usual meds. BS are varied. Initial /90, now running 136/-147/. + void since admission  Pt follows with Dr Teddi Councilman, last appt was 19. Work up for Transplant at [facility]. Active Problems:    CHF (congestive heart failure) (HCC)    CKD (chronic kidney disease) stage 4, GFR 15-29 ml/min (HCC)    Coronary artery disease involving native coronary artery of native heart with angina pectoris (HCC)    Anemia associated with chronic renal failure    Type 1 diabetes mellitus with hyperglycemia (HCC)    Anemia in chronic kidney disease    Encephalopathy  Resolved Problems:    * No resolved hospital problems. *       Past Medical History:      Diagnosis Date    Broken ankle     Broke right ankle.     CAD (coronary artery disease) 2015    MI     Cerebral artery occlusion with cerebral infarction [facility])  2015    left side    CHF (congestive heart failure) (HCC)     Chronic kidney disease     Diabetes mellitus ([facility] [address]     Hyperlipidemia     Hypertension        Past Surgical History:        Procedure Laterality Date    APPENDECTOMY      CARDIAC SURGERY  Oct. 2015    2 stents placed       Family History:       Adopted: Yes   Problem Relation Age of Onset    Diabetes Mother     Cancer Sister        Allergies:  Aranesp (albumin free) [darbepoetin eri]    Social and Occupational History:    TOBACCO:   Social History     Tobacco Use   Smoking Status Former Smoker    Packs/day: 1.50    Years: 30.00    Pack years: 45.00    Last attempt to quit: 10/9/2015    Years since quitting: 3.5   Smokeless Tobacco Former User   Tobacco Comment    currently uses vap pens     ETOH:   reports that he drinks about 2.4 oz of alcohol per week. reports that he does not use drugs.     Home Medications:  Medications Prior to Admission: Insulin Degludec (TRESIBA) 100 UNIT/ML SOLN, Inject 13 Units into the skin nightly   insulin regular (HUMULIN R;NOVOLIN R) 100 UNIT/ML injection, Inject into the skin See Admin Instructions 1 unit for every 8 carbs consumed In addition:In the evening, If BS>200  200-250= 1unit 251-300= 2 units 301-350=3 units  hydrALAZINE (APRESOLINE) 50 MG tablet, Take 1.5 tablets by mouth 3 times daily (Patient taking differently: Take 50 mg by mouth 3 times daily )  isosorbide mononitrate (IMDUR) 30 MG CR tablet, Take 30 mg by mouth daily  atorvastatin (LIPITOR) 80 MG tablet, Take 80 mg by mouth daily  clopidogrel (PLAVIX) 75 MG tablet, Take 75 mg by mouth daily  pantoprazole sodium (PROTONIX) 40 MG PACK packet, Take 40 mg by mouth 2 times daily (before meals)  metoprolol (LOPRESSOR) 50 MG tablet, Take 50 mg by mouth 2 times daily  aspirin 81 MG tablet, Take 81 mg by mouth daily  levothyroxine (SYNTHROID) 25 MCG tablet, Take 25 mcg by mouth Daily  [DISCONTINUED] Levothyroxine Sodium 25 MCG CAPS, Take 25 mcg by mouth Daily  [DISCONTINUED] ferrous sulfate 325 (65 Fe) MG tablet, Take 1 tablet by mouth 3 times daily (with meals)  [DISCONTINUED] LANTUS SOLOSTAR 100 UNIT/ML injection pen, Inject 18 Units into the skin nightly   nitroGLYCERIN (NITROSTAT) 0.4 MG SL tablet, DISSOLVE 1 TABLET UNDER THE TONGUE EVERY 5 MIN AS NEEDED FOR CHEST PAIN  [DISCONTINUED] HUMALOG KWIKPEN 100 UNIT/ML pen, INJECT 1 UNIT UNDER THE SKIN PER EVERY 8 CARBS AT MEALTIME THREE TIMES A DAY  [DISCONTINUED] furosemide (LASIX) 20 MG tablet, TAKE 1 TABLET BY MOUTH EVERY DAY AS NEEDED FOR FLUID  FREESTYLE LANCETS MISC,   FREESTYLE INSULINX TEST strip,     Current Medications:     ferrous sulfate  325 mg Oral TID WC    bumetanide  1 mg Intravenous BID    aspirin  81 mg Oral Daily    atorvastatin  80 mg Oral Daily    clopidogrel  75 mg Oral Daily    hydrALAZINE  50 mg Oral TID    isosorbide mononitrate  30 mg Oral Daily    levothyroxine  25 mcg Oral Daily    metoprolol tartrate  50 mg Oral BID    pantoprazole  40 mg Oral BID AC    sodium chloride flush  10 mL Intravenous 2 times per day    heparin (porcine)  5,000 Units Subcutaneous 3 times per day    insulin lispro  0-12 Units Subcutaneous TID WC    insulin lispro  0-6 Units Subcutaneous Nightly     PRN Medications:  sodium chloride flush, magnesium hydroxide, ondansetron, acetaminophen, glucose, dextrose, glucagon (rDNA), dextrose  Continuous Infusions:   dextrose       Med and Labs reviewed  24HR INTAKE/OUTPUT:      Intake/Output Summary (Last 24 hours) at 4/11/2019 0915  Last data filed at 4/11/2019 0353  Gross per 24 hour   Intake 954.3 ml   Output 350 ml   Net 604.3 ml       I/O last 3 completed shifts: In: 954.3 [P.O.:500; I.V.:454.3]  Out: 350 [Urine:350]  Patient Vitals for the past 96 hrs (Last 3 readings):   Weight   04/11/19 0353 126 lb 14.4 oz (57.6 kg)   04/10/19 1900 129 lb 8 oz (58.7 kg)          Wt Readings from Last 3 Encounters:   04/11/19 126 lb 14.4 oz (57.6 kg)   04/10/19 135 lb (61.2 kg)   01/16/19 126 lb (57.2 kg)     BP Readings from Last 3 Encounters:   04/11/19 (!) 147/71   04/10/19 (!) 155/78   01/16/19 136/72       REVIEW OF SYSTEMS:     GENERAL: Pleasant, Usual state of health. Stable weight.  Denies fever  HEENT: Denies recent vision change, Denies Upper respiratory complaints  CARDIOVASCULAR: Denies chest pain/angina. RESPIRATORY:Denies sob, cough, wheezing  ABDOMEN: Denies nausea, vomiting, diarrhea, or abdominal pain  CNS:Denies headache, dizziness  MUSCULOSKELETAL: Reports joint arthralgia  SKIN: Denies rash, pruritis  HEMATOLOGIC: Denies bruising  ENDOCRINE:  Negative for heat or cold intolerance     EXAM:    VITALS:  BP (!) 147/71   Pulse 61   Temp 97.8 °F (36.6 °C) (Oral)   Resp 16   Ht 5' 6\" (1.676 m)   Wt 126 lb 14.4 oz (57.6 kg)   SpO2 96%   BMI 20.48 kg/m²    Body mass index is 20.48 kg/m². Vitals:  Patient Vitals for the past 6 hrs:   BP Temp Temp src Pulse Resp SpO2 Height Weight   04/11/19 0353 (!) 147/71 97.8 °F (36.6 °C) Oral 61 16 96 % 5' 6\" (1.676 m) 126 lb 14.4 oz (57.6 kg)       Constitutional:  No acute distress. Skin: No rash on exposed surfaces, No significant bruises  Heent:  Head is normocephalic, Extraocular movement intact, Mucus membranes moist. Voice is clear without hoarseness or slurred speech   Neck: no jugular venous distention noted, Neck is supple  Cardiovascular:  S1, S2  regular rate and rhythm. Respiratory: Clear to ausculation bilaterally. Equal breath sounds, No crackles, No wheezes. No shortness of breath. Abdomen: Soft, non tender  Ext: Distal lower extremity temp is warm, trace lower extremity edema. Musculoskeletal: Movement is coordinated. Moves all extremities. Psychiatric: mood and affect appropriate  Neurological: Patient is alert and oriented to person, place, and time. Recent and remote   memory intact, Thought is coherant. Diagnostic Data:  Recent Labs     04/10/19  0104 04/10/19  2059 04/11/19  0810   * 134* 137   K 4.9 6.1* 5.3*    103 105   CO2 18* 22* 22*   BUN 33* 36* 34*   CREATININE 3.1* 3.0* 3.0*   LABGLOM 21* 22* 22*   GLUCOSE 101 223* 227*   CALCIUM 8.8 8.9 8.0*     Recent Labs     04/10/19  0104 04/10/19  2059   WBC 5.8 5.6   RBC 4.03* 3.83*   HGB 8.4* 8.0*   HCT 30.2* 28.7*    246       Ref.  Range 4/10/2019 01:04   T3, Total Latest Ref Range: 72 - 181 ng/dL 51 (L)   TSH Latest Ref Range: 0.400 - 4.20 uIU/mL 150.600 (H)   T4 Free Latest Ref Range: 0.93 - 1.76 ng/dL 0.46 (L)       Summary 11/24/18   Left ventricle size is normal.   Moderately increased left ventricle wall thickness.   Moderate concentric left ventricular hypertrophy.   Systolic function was normal.   There were no regional wall motion abnormalities.   Ejection fraction is visually estimated in the range of 55% to 60%.   The left atrium is Moderately dilated.     ASSESSMENT  1. Chronic Kidney Disease Stage IV 2nd to DM and HTN  2. Diabetes Mellitus Type II with nephrosclerosis with long term use of insulin, A1C 7.3%. 3. Essential Hypertension with nephrosclerosis   4. Hyperkalemia 2nd to decreased renal excretion due to Chronic Kidney Disease S/P Kayexalate, Expect improvement with Bumex. 5. Metabolic acidosis 2nd to CKD   6. Hypothyroidism, Discussed with Dr Nirav Harding  7. Fluid overload likely multifactorial including hypothyroidism. Ok with Bumex 1 mg IV twice daily. Stop IV fluids. Noted EF 55-60%, per echo, 11/24/18.    8. Anemia of chronic disease, check Iron stores. Will likely need TRAM. Meds and labs reviewed  Active Problems:    CHF (congestive heart failure) (HCC)    CKD (chronic kidney disease) stage 4, GFR 15-29 ml/min (HCC)    Coronary artery disease involving native coronary artery of native heart with angina pectoris (HCC)    Anemia associated with chronic renal failure    Type 1 diabetes mellitus with hyperglycemia (HCC)    Anemia in chronic kidney disease    Encephalopathy  Resolved Problems:    * No resolved hospital problems. *    Discussed with Dr. Sarah Jewell. Patient was advised about impression and plan. Patient verbalizes understanding and agrees with plan of care.    Thank you Joaquim Hernández MD  for allowing us to participate in care of [place] [place] PAOLA Vance - CNP 4/11/2019 9:15 AM    CC: PAOLA Day - CNP

## 2019-04-11 NOTE — PROGRESS NOTES
Hospitalist Progress Note    Patient:  Bradford Rapp Hand      Unit/Bed:4A-08/008-A    YOB: 1965    MRN: [id number]       Acct: [de-identified]     PCP: PAOLA Dunlap CNP    Date of Admission: 4/10/2019    Active Hospital Problems    Diagnosis Date Noted    Encephalopathy [G93.40] 04/10/2019    Anemia in chronic kidney disease [N18.9, D63.1] 11/24/2018    CKD (chronic kidney disease) stage 4, GFR 15-29 ml/min (HCC) [N18.4] 04/09/2016    CHF (congestive heart failure) ([facility] [address] [I50.9] 04/09/2016    Anemia associated with chronic renal failure [D63.1] 04/09/2016    Coronary artery disease involving native coronary artery of native heart with angina pectoris ([facility] [address] [I25.119] 04/09/2016    Type 1 diabetes mellitus with hyperglycemia ([facility] [address] [E10.65] 03/29/2016       Assessment/Plan:    - Acute decompensated CHF: likely diastolic; last 2D echo from 11/2018 showed preserved LVEF/sstolic function. . ProBNP [id number]. D/c'ed IVF at 75 cc/hr that pt started on overnight. Started on bumex IV 1 mg BID. Also placed on fluid/salt restriction, daily Wt and I/O monitoring. Noted cardio consulted by overnight team.    - CKD stage IV: Cr 3.0 close to baseline    - Hypothyroidism: .6 will repeat first. If accurate, severe hypothyroidism could also lead to cardiomyopathy and myxedema. Pt on Levothyroxine 25 mcg which we increased (slowly) to 50 now. Also would start on Dexamethasone 4 mg q8h for now until associated adrenal insufficiency is ruled out. (hypoK and hypoNa Although pt's not hypotensive, there is unlikely associated Addisonian crisis)    - Encephaopathy? Likely metabolic.    - HyperK: likely d/t decreased GFR. K down to 5.3 from 6.1 after Insulin R/D50W. Also given Kayexalate overnight. Will monitor BMP  - Metabolic acidosis 2nd to CKD     - Diabetes Mellitus Type I: A1C 7.3%. Noted BS 37 from morning. Pt is peculiar about manging his own insulin and refuses treatment.  Explained in length risks associated with hypoglycemia including seizure, cardiac arrest and potentially death. Discussed plan to D/c standing nightly glargine for now and continuing w/ SSI for now. - Essential HTN: uncontrolled. back on home meds. Will reassess response     - Chronic microcytic anemia: likely KIRSTY plus anemia of chronic disease, noted iron 24. Started on Iron TID. Noted pt started on EPP by nephro which would be ineffective unless KIRSTY treated properly first.    - malnutrition: dietician to see      Expected discharge date:  TBD         Disposition:      ? Home                             ? TCU                             ? Rehab                             ? Psych                             ? SNF                             ? [place]                             ? Other-    Chief Complaint: No chief complaint on file. Hospital Course: Patient was seen, examined and the medical chart was reviewed thoroughly today. In summary, 46 y.o.male admitted overnight for likely acute decompensated CHF. Subjective (past 24 hours):   feels nauseous, poor appetite. ++ leg swelling. Denies CP.        Medications:  Reviewed    Infusion Medications    dextrose       Scheduled Medications    ferrous sulfate  325 mg Oral TID WC    bumetanide  1 mg Intravenous BID    iron sucrose  300 mg Intravenous Q48H    [START ON 4/12/2019] darbepoetin eri-polysorbate  100 mcg Subcutaneous Once    aspirin  81 mg Oral Daily    atorvastatin  80 mg Oral Daily    clopidogrel  75 mg Oral Daily    hydrALAZINE  50 mg Oral TID    isosorbide mononitrate  30 mg Oral Daily    levothyroxine  25 mcg Oral Daily    metoprolol tartrate  50 mg Oral BID    pantoprazole  40 mg Oral BID AC    sodium chloride flush  10 mL Intravenous 2 times per day    heparin (porcine)  5,000 Units Subcutaneous 3 times per day    insulin lispro  0-12 Units Subcutaneous TID WC    insulin lispro  0-6 Units Subcutaneous Nightly     PRN Meds: sodium chloride flush, magnesium hydroxide, ondansetron, acetaminophen, glucose, dextrose, glucagon (rDNA), dextrose      Intake/Output Summary (Last 24 hours) at 2019 1332  Last data filed at 2019 0353  Gross per 24 hour   Intake 954.3 ml   Output 350 ml   Net 604.3 ml       Diet:  DIET CARB CONTROL; Exam:  BP (!) 154/68   Pulse 58   Temp 97.5 °F (36.4 °C) (Oral)   Resp 18   Ht 5' 6\" (1.676 m)   Wt 126 lb 14.4 oz (57.6 kg)   SpO2 91%   BMI 20.48 kg/m²     General appearance: muscle wasting, A&O x3, Not ill or toxic, in no apparent distress  HEENT:  KARLA  EOM intact. Neck: Supple, with full range of motion. No jugular venous distention. Trachea midline. Respiratory:    A/E bilat at bases  Cardiovascular:  normal S1/S2 with no murmurs/gallops  Abdomen: Soft, non-tender, non-distended, no rigidity or peritoneal signs  Musculoskeletal: NL symmetrical A/PROM bilat U/L extremities   Skin: No rashes. ++ bilat. Edema. Neurologic:  CN II-XII intact. NL symmetrical reflexes. NL gait and stance. NL Cerebellar exam. Power 5/5 all muscle groups U/L extremities. Toes downgoing  Capillary Refill: Brisk,< 3 seconds   Peripheral Pulses: +2 palpable, equal bilaterally         Labs:   Recent Labs     04/10/19  0104 04/10/19  2059   WBC 5.8 5.6   HGB 8.4* 8.0*   HCT 30.2* 28.7*    246     Recent Labs     04/10/19  0104 04/10/19  2059 04/11/19  0810   * 134* 137   K 4.9 6.1* 5.3*    103 105   CO2 18* 22* 22*   BUN 33* 36* 34*   CREATININE 3.1* 3.0* 3.0*   CALCIUM 8.8 8.9 8.0*     Recent Labs     04/10/19  0104 04/10/19  2059   AST 75* 62*   ALT 47 38   BILIDIR  --  0.3   BILITOT 0.7 0.6   ALKPHOS 138* 129*     Recent Labs     04/10/19  0104   INR 1.01     No results for input(s): Bharat Hess in the last 72 hours.     Urinalysis:      Lab Results   Component Value Date    NITRU NEGATIVE 04/10/2019    WBCUA NONE SEEN 04/10/2019    BACTERIA NONE 04/10/2019    RBCUA 3-5 04/10/2019 BLOODU SMALL 04/10/2019    GLUCOSEU NEGATIVE 04/10/2019       Radiology:  Ct Abdomen Pelvis Wo Contrast Additional Contrast? None    Result Date: 4/10/2019  PROCEDURE: CT ABDOMEN PELVIS WO CONTRAST CLINICAL INFORMATION: abdominal distension with history of CKD . COMPARISON: April 9, 2016 TECHNIQUE: Axial 5 mm CT images were obtained through the abdomen and pelvis. No contrast was given. Coronal reconstructions were obtained. All CT scans at this facility use dose modulation, iterative reconstruction, and/or weight-based dosing when appropriate to reduce radiation dose to as low as reasonably achievable. FINDINGS:  The heart appears enlarged. There is a moderate right effusion with compressive atelectasis of the right lower lobe. Changes of mild COPD are present. The noncontrast appearance of liver, gallbladder, pancreas and spleen are negative for active appearing pathology. There is mild bilateral perinephric stranding. There is distention of the urinary bladder. Correlate with urinalysis to exclude cystitis and superimposed nephritis. Punctate bilateral nonobstructing renal calculi are present. Small bowel is nondistended. Mild luminal stasis is present correlate with mild enteritis. There is abundant retention of stool throughout colon compatible with moderate severe constipation. There is mild mucosal thickening of the anorectal region. Changes of proctitis are not excluded. There is no acute appearing pathology of the skeleton. There are degenerative disc changes at L5-S1 with minimal retrolisthesis of L5 on S1. There is scattered atherosclerosis of the aorta without aneurysm. 1. Abundant retention of stool throughout colon compatible with constipation. 2. Bilateral nonobstructing renal calculi with nonspecific perinephric stranding. Correlate with urinalysis to exclude changes of nephritis. 3. Moderate right effusion with lower lobe atelectasis. Underlying infiltrate is not excluded.  **This report has been addressed.         Electronically signed by Dodie Crowell MD on 4/11/2019 at 1:32 PM

## 2019-04-11 NOTE — CONSULTS
[facility]'s  Consult    Patient's Name/Date of Birth: Mariola Flores / 1965 (66 y.o.)    Date: April 11, 2019     Referring Provider: Jordy Langford MD    CHIEF COMPLAINT: AMS, CHF      HPI: This is a pleasant 46 y.o. male presents with CKD, needing transplant, was at OSH with CHF and hypothyroidism. He signed out AMA and then was found to have hypoglycemia into the 20s, low temperature, and wife thought he was unarousable. Hx of CVA, MI, DM II, HTN.  11/2018 EF 55-60% with moderate concentric LVH. CXR with coarse pulmonary markings. ECG shows NSR, ST-T wave changes consistent with inferolateral ischemia. Cr 3.0. BNP ~50031. Hx of CAD, with PCI in the past.  No chest pain, angina, KAUR, orthopnea, PND, sob at rest, palpitations, LE edema, or syncope. Echo:   Results for orders placed during the hospital encounter of 11/24/18   ECHO Complete 2D W Doppler W Color    Narrative Transthoracic Echocardiography Report (TTE)     Demographics      Patient Name    ALLISON Valdovinos Gender               Male      MR #            [id number]      Race                                                      Ethnicity      Account #       [de-identified]      Room Number          0022      Accession       [id number]      Date of Study        11/24/2018   Number      Date of Birth   1965     Referring Physician  Vashti Malin MD      Age             46 year(s)     Sonographer          Lety Gonzalez RDCS                                     Interpreting         Echo reader of the                                  Physician            brittney Duran MD     Procedure    Type of Study      TTE procedure:ECHOCARDIOGRAM COMPLETE 2D W DOPPLER W COLOR.      Procedure Date  Date: 11/24/2018 Start: 11:53 AM    Study Location: Bedside  Technical Quality: Adequate visualization    Indications:Congestive heart failure. Additional Medical History:Ex Smoker, Congestive heart failure, Diabetic,  Hypertension, Coronary artery disease, Anemia, CVA, MI, Hyperlipidemia. Patient Status: Routine    Height: 62 inches Weight: 123 pounds BSA: 1.55 m^2 BMI: 22.5 kg/m^2    BP: 174/84 mmHg     Conclusions      Summary   Left ventricle size is normal.   Moderately increased left ventricle wall thickness. Moderate concentric left ventricular hypertrophy. Systolic function was normal.   There were no regional wall motion abnormalities. Ejection fraction is visually estimated in the range of 55% to 60%. The left atrium is Moderately dilated. Signature      ----------------------------------------------------------------   Electronically signed by Dolores Vega MD (Interpreting   physician) on 11/24/2018 at 06:44 PM   ----------------------------------------------------------------      Findings      Mitral Valve   The mitral valve structure was normal with normal leaflet separation. DOPPLER: The transmitral velocity was within the normal range with no   evidence for mitral stenosis. There was no evidence of mitral   regurgitation. Aortic Valve   The aortic valve was trileaflet with normal thickness and cuspal   separation. DOPPLER: Transaortic velocity was within the normal range with   no evidence of aortic stenosis. There was no evidence of aortic   regurgitation. Tricuspid Valve   The tricuspid valve structure was normal with normal leaflet separation. DOPPLER: There was no evidence of tricuspid stenosis. Mild tricuspid regurgitation visualized. Right ventricular systolic pressure measures 50 mmhg. Pulmonic Valve   The pulmonic valve leaflets exhibited normal thickness, no calcification,   and normal cuspal separation. DOPPLER: The transpulmonic velocity was   within the normal range with no evidence for regurgitation.       Left Atrium   The left atrium is Moderately dilated. Left Ventricle   Left ventricle size is normal.   Moderately increased left ventricle wall thickness. Moderate concentric left ventricular hypertrophy. Systolic function was normal.   There were no regional wall motion abnormalities. Ejection fraction is visually estimated in the range of 55% to 60%. Right Atrium   Right atrial size was normal.      Right Ventricle   The right ventricular size was normal with normal systolic function and   wall thickness. Pericardial Effusion   The pericardium was normal in appearance with no evidence of a pericardial   effusion. Pleural Effusion   No evidence of pleural effusion. Aorta / Great Vessels   -Aortic root dimension within normal limits.   -The Pulmonary artery is within normal limits. -IVC size is within normal limits with normal respiratory phasic changes.      M-Mode/2D Measurements & Calculations      LV Diastolic   LV Systolic Dimension:    AV Cusp Separation: 1.8 cmLA   Dimension: 3.9 2.6 cm                    Dimension: 3.6 cmAO Root   cm             LV Volume Diastolic: 44.0 Dimension: 3 cmLA Area: 22.5 cm^2   LV FS:33.3 %   ml   LV PW          LV Volume Systolic: 40.6   Diastolic: 1.4 ml   cm             LV EDV/LV EDV Index: 29.8 RV Diastolic Dimension: 2.9 cm   Septum         ml/43 m^2LV ESV/LV ESV   Diastolic: 1.9 Index: 66.8 ml/16 m^2     LA/Aorta: 1.2   cm             EF Calculated: 62.7 %     Ascending Aorta: 3.4 cm                                            LA volume/Index: 65.5 ml /42m^2     Doppler Measurements & Calculations      MV Peak E-Wave: 100 cm/s   AV Peak Velocity: 119  LVOT Peak Velocity: 108   MV Peak A-Wave: 59.7 cm/s  cm/s                   cm/s   MV E/A Ratio: 1.68         AV Peak Gradient: 5.66 LVOT Peak Gradient: 5   MV Peak Gradient: 4 mmHg   mmHg                   mmHg      MV Deceleration Time: 173                         TV Peak E-Wave: 65 cm/s   msec TV Peak A-Wave: 49.5                                                     cm/s                              IVRT: 77 msec   MV E' Septal Velocity: 5.2                        TV Peak Gradient: 1.69   cm/s                                              mmHg   MV A' Septal Velocity: 5.3 AV DVI (Vmax):0.91     TR Velocity:315 cm/s   cm/s                                              TR Gradient:39.69 mmHg   MV E' Lateral Velocity:                           PV Peak Velocity: 58.7   8.1 cm/s                                          cm/s   MV A' Lateral Velocity:                           PV Peak Gradient: 1.38   6.5 cm/s                                          mmHg   E/E' septal: 19.23   E/E' lateral: 12.35   MR Velocity: 378 cm/s                             [place] ED Velocity: 148 cm/s     [id number]        All labs, EKG's, diagnostic testing and images as well as cardiac cath, stress testing were reviewed during this encounter    Past Medical History:   Diagnosis Date    Broken ankle 2016    Broke right ankle.     CAD (coronary artery disease) October 2015    MI     Cerebral artery occlusion with cerebral infarction [facility])  march 2015    left side    CHF (congestive heart failure) (HCC)     Chronic kidney disease     Diabetes mellitus ([facility] [address]     Hyperlipidemia     Hypertension      Past Surgical History:   Procedure Laterality Date    APPENDECTOMY      CARDIAC SURGERY  Oct. 2015    2 stents placed     Current Facility-Administered Medications   Medication Dose Route Frequency Provider Last Rate Last Dose    ferrous sulfate tablet 325 mg  325 mg Oral TID  Jesus Garcia MD        aspirin chewable tablet 81 mg  81 mg Oral Daily [employer], PA-C   81 mg at 04/10/19 2145    atorvastatin (LIPITOR) tablet 80 mg  80 mg Oral Daily [employer], PA-C   80 mg at 04/10/19 Daily    clopidogrel  75 mg Oral Daily    hydrALAZINE  50 mg Oral TID    insulin glargine  13 Units Subcutaneous Nightly    isosorbide mononitrate  30 mg Oral Daily    levothyroxine  25 mcg Oral Daily    metoprolol tartrate  50 mg Oral BID    pantoprazole  40 mg Oral BID AC    sodium chloride flush  10 mL Intravenous 2 times per day    heparin (porcine)  5,000 Units Subcutaneous 3 times per day    insulin lispro  0-12 Units Subcutaneous TID WC    insulin lispro  0-6 Units Subcutaneous Nightly     Continuous Infusions:   sodium chloride 75 mL/hr at 04/10/19 2146    dextrose       PRN Meds:.sodium chloride flush, magnesium hydroxide, ondansetron, acetaminophen, glucose, dextrose, glucagon (rDNA), dextrose    Allergies   Allergen Reactions    Aranesp (Albumin Free) [Darbepoetin Damon] Hives     Family History   Adopted: Yes   Problem Relation Age of Onset    Diabetes Mother     Cancer Sister      Social History     Socioeconomic History    Marital status:      Spouse name: Valentina Green    Number of children: Not on file    Years of education: Not on file    Highest education level: Not on file   Occupational History    Not on file   Social Needs    Financial resource strain: Not on file    Food insecurity:     Worry: Not on file     Inability: Not on file    Transportation needs:     Medical: Not on file     Non-medical: Not on file   Tobacco Use    Smoking status: Former Smoker     Packs/day: 1.50     Years: 30.00     Pack years: 45.00     Last attempt to quit: 10/9/2015     Years since quitting: 3.5    Smokeless tobacco: Former User    Tobacco comment: currently uses vap pens   Substance and Sexual Activity    Alcohol use:  Yes     Alcohol/week: 2.4 oz     Types: 4 Cans of beer per week     Comment: occasional, once month    Drug use: No    Sexual activity: Yes     Partners: Female   Lifestyle    Physical activity:     Days per week: Not on file     Minutes per session: Not on file    Stress: Not on file   Relationships    Social connections:     Talks on phone: Not on file     Gets together: Not on file     Attends Congregational service: Not on file     Active member of club or organization: Not on file     Attends meetings of clubs or organizations: Not on file     Relationship status: Not on file    Intimate partner violence:     Fear of current or ex partner: Not on file     Emotionally abused: Not on file     Physically abused: Not on file     Forced sexual activity: Not on file   Other Topics Concern    Not on file   Social History Narrative    Not on file     ROS:   Constitutional: Denies any recent wt change. Eyes:  Denies any blurring or double vision, no glaucoma  Ears/Nose/Mouth/Throat:  Denies any chronic sinus/rhinitis, bleeding gums  Cardiovascular:  As described above. Respiratory:  Denies any frequent cough, wheezing or coughing up blood  Genitourinary:  Denies difficulty with urination and kidney stones  Gastrointestinal:  Denies any chronic problems with abdominal pain, nausea, vomiting or diarrhea  Musculoskeletal:  Denies any joint pain, back pain, or difficulty walking  Integumentary:  Denies any rash  Neurological:  No numbness or tingling  Endocrine:  Denies any polydipsia. Hematologic/Lymphatic:  Denies any hemorrhage or lymphatic drainage problems. Labs:  CBC:   Recent Labs     04/10/19  0104 04/10/19  2059   WBC 5.8 5.6   HGB 8.4* 8.0*   HCT 30.2* 28.7*   MCV 74.9* 74.9*    246     BMP:   Recent Labs     04/10/19  0104 04/10/19  2059   * 134*   K 4.9 6.1*    103   CO2 18* 22*   BUN 33* 36*   CREATININE 3.1* 3.0*     Accucheck Glucoses:   Recent Labs     04/10/19  2032 04/10/19  2230 04/11/19  0523 04/11/19  0542 04/11/19  0556   POCGLU 261* 253* 37* 47* 248*     Cardiac Enzymes: No results for input(s): CKTOTAL, CKMB, CKMBINDEX, TROPONINI in the last 72 hours.   PT/INR:   Recent Labs     04/10/19  0104   INR 1.01     APTT:   Recent Labs 04/10/19  0104   APTT 44.9*     Liver Profile:  Lab Results   Component Value Date    AST 62 04/10/2019    ALT 38 04/10/2019    BILIDIR 0.3 04/10/2019    BILITOT 0.6 04/10/2019    ALKPHOS 129 04/10/2019   No results found for: CHOL, HDL, TRIG  TSH:   Lab Results   Component Value Date    .600 04/10/2019     UA:   Lab Results   Component Value Date    COLORU YELLOW 04/10/2019    PHUR 7.0 04/10/2019    WBCUA NONE SEEN 04/10/2019    RBCUA 3-5 04/10/2019    YEAST NONE SEEN 04/10/2019    BACTERIA NONE 04/10/2019    LEUKOCYTESUR NEGATIVE 04/10/2019    UROBILINOGEN 1.0 04/10/2019    BILIRUBINUR NEGATIVE 04/10/2019    BLOODU SMALL 04/10/2019    GLUCOSEU NEGATIVE 04/10/2019         Physical Exam:  Vitals:    04/11/19 0353   BP: (!) 147/71   Pulse: 61   Resp: 16   Temp: 97.8 °F (36.6 °C)   SpO2: 96%        Intake/Output Summary (Last 24 hours) at 4/11/2019 8437  Last data filed at 4/11/2019 0353  Gross per 24 hour   Intake 954.3 ml   Output 350 ml   Net 604.3 ml      General:  No acute distress  Neck: Supple, no JVD, no carotid bruits  Heart: Regular rhythm normal S1 and S2, no rubs, murmurs or gallops  Lungs: Very faint inspiratory crackles  Abdomen: positive bowel sounds, soft, non-tender, non-distended, no bruits, no masses  Extremities:no clubbing, cyanosis or edema  Neurologic: alert and oriented x 3, cranial nerves 2-12 grossly intact, motor and sensory intact, moving all extremities  Skin: No rashes    Assessment:  Acute on Chronic CHF, likely diastolic, NYHA III  DM I  HTN  Inferolateral TWI - likely had cath prior to transplant, need to review  Hx of CAD/PCI - unknown anatomy  CKD -- needs transplant, needs to be placed on the list    Plan:  1. CPAP/BiPAP as needed  2. Nephrology for volume management, but would use Lasix 40  mg PO q day  3. He does need cath prior to his transplant, especially with the  ECG changes, however, will need guidance from  Nephrology regarding timing  4.  Continue home medications. 5. Bedrest  6. Wrap legs, salt restrict, fluid restrict, raise legs, daily  weights. Follow labs. 7. CHF CARLIN 1-2 weeks post discharge  8. Further recommendations based on results and clinical  course    Thank you for allowing us to participate in the care of this patient. Please do not hesitate to call us with questions.     Electronically signed by Jaycee Ford MD on 4/11/2019 at 8:11 AM    Interventional Cardiology - [facility]

## 2019-04-12 ENCOUNTER — APPOINTMENT (OUTPATIENT)
Dept: ULTRASOUND IMAGING | Age: 54
DRG: 291 | End: 2019-04-12
Attending: FAMILY MEDICINE
Payer: COMMERCIAL

## 2019-04-12 PROBLEM — E43 SEVERE MALNUTRITION (HCC): Status: ACTIVE | Noted: 2019-04-12

## 2019-04-12 LAB
ANION GAP SERPL CALCULATED.3IONS-SCNC: 15 MEQ/L (ref 8–16)
BUN BLDV-MCNC: 39 MG/DL (ref 7–22)
CALCIUM SERPL-MCNC: 8.8 MG/DL (ref 8.5–10.5)
CHLORIDE BLD-SCNC: 101 MEQ/L (ref 98–111)
CO2: 19 MEQ/L (ref 23–33)
CREAT SERPL-MCNC: 3.6 MG/DL (ref 0.4–1.2)
GFR SERPL CREATININE-BSD FRML MDRD: 18 ML/MIN/1.73M2
GLUCOSE BLD-MCNC: 303 MG/DL (ref 70–108)
GLUCOSE BLD-MCNC: 304 MG/DL (ref 70–108)
GLUCOSE BLD-MCNC: 328 MG/DL (ref 70–108)
GLUCOSE BLD-MCNC: 330 MG/DL (ref 70–108)
GLUCOSE BLD-MCNC: 343 MG/DL (ref 70–108)
GLUCOSE BLD-MCNC: 348 MG/DL (ref 70–108)
GLUCOSE BLD-MCNC: 382 MG/DL (ref 70–108)
POTASSIUM SERPL-SCNC: 4.9 MEQ/L (ref 3.5–5.2)
SODIUM BLD-SCNC: 135 MEQ/L (ref 135–145)

## 2019-04-12 PROCEDURE — 2580000003 HC RX 258: Performed by: PHYSICIAN ASSISTANT

## 2019-04-12 PROCEDURE — 6370000000 HC RX 637 (ALT 250 FOR IP): Performed by: PHYSICIAN ASSISTANT

## 2019-04-12 PROCEDURE — 36415 COLL VENOUS BLD VENIPUNCTURE: CPT

## 2019-04-12 PROCEDURE — 6370000000 HC RX 637 (ALT 250 FOR IP): Performed by: INTERNAL MEDICINE

## 2019-04-12 PROCEDURE — 99232 SBSQ HOSP IP/OBS MODERATE 35: CPT | Performed by: NURSE PRACTITIONER

## 2019-04-12 PROCEDURE — 6360000002 HC RX W HCPCS: Performed by: PHYSICIAN ASSISTANT

## 2019-04-12 PROCEDURE — 99233 SBSQ HOSP IP/OBS HIGH 50: CPT | Performed by: HOSPITALIST

## 2019-04-12 PROCEDURE — 80048 BASIC METABOLIC PNL TOTAL CA: CPT

## 2019-04-12 PROCEDURE — 2060000000 HC ICU INTERMEDIATE R&B

## 2019-04-12 PROCEDURE — 82948 REAGENT STRIP/BLOOD GLUCOSE: CPT

## 2019-04-12 PROCEDURE — 2700000000 HC OXYGEN THERAPY PER DAY

## 2019-04-12 PROCEDURE — 6360000002 HC RX W HCPCS: Performed by: HOSPITALIST

## 2019-04-12 PROCEDURE — 6370000000 HC RX 637 (ALT 250 FOR IP): Performed by: HOSPITALIST

## 2019-04-12 PROCEDURE — 2709999900 HC NON-CHARGEABLE SUPPLY

## 2019-04-12 PROCEDURE — 76536 US EXAM OF HEAD AND NECK: CPT

## 2019-04-12 RX ORDER — BUMETANIDE 1 MG/1
1 TABLET ORAL 2 TIMES DAILY
Status: DISCONTINUED | OUTPATIENT
Start: 2019-04-12 | End: 2019-04-13

## 2019-04-12 RX ORDER — ISOSORBIDE MONONITRATE 60 MG/1
60 TABLET, EXTENDED RELEASE ORAL DAILY
Status: DISCONTINUED | OUTPATIENT
Start: 2019-04-12 | End: 2019-04-18 | Stop reason: HOSPADM

## 2019-04-12 RX ORDER — HYDRALAZINE HYDROCHLORIDE 50 MG/1
100 TABLET, FILM COATED ORAL 3 TIMES DAILY
Status: DISCONTINUED | OUTPATIENT
Start: 2019-04-12 | End: 2019-04-15

## 2019-04-12 RX ADMIN — METOPROLOL TARTRATE 50 MG: 50 TABLET, FILM COATED ORAL at 20:58

## 2019-04-12 RX ADMIN — HEPARIN SODIUM 5000 UNITS: 5000 INJECTION INTRAVENOUS; SUBCUTANEOUS at 06:42

## 2019-04-12 RX ADMIN — DEXAMETHASONE SODIUM PHOSPHATE 4 MG: 4 INJECTION, SOLUTION INTRAMUSCULAR; INTRAVENOUS at 13:18

## 2019-04-12 RX ADMIN — DEXAMETHASONE SODIUM PHOSPHATE 4 MG: 4 INJECTION, SOLUTION INTRAMUSCULAR; INTRAVENOUS at 18:28

## 2019-04-12 RX ADMIN — INSULIN LISPRO 3 UNITS: 100 INJECTION, SOLUTION INTRAVENOUS; SUBCUTANEOUS at 22:15

## 2019-04-12 RX ADMIN — METOPROLOL TARTRATE 50 MG: 50 TABLET, FILM COATED ORAL at 09:30

## 2019-04-12 RX ADMIN — FERROUS SULFATE TAB 325 MG (65 MG ELEMENTAL FE) 325 MG: 325 (65 FE) TAB at 18:26

## 2019-04-12 RX ADMIN — INSULIN GLARGINE 13 UNITS: 100 INJECTION, SOLUTION SUBCUTANEOUS at 20:59

## 2019-04-12 RX ADMIN — HYDRALAZINE HYDROCHLORIDE 50 MG: 50 TABLET, FILM COATED ORAL at 15:42

## 2019-04-12 RX ADMIN — INSULIN LISPRO 8 UNITS: 100 INJECTION, SOLUTION INTRAVENOUS; SUBCUTANEOUS at 13:20

## 2019-04-12 RX ADMIN — INSULIN LISPRO 5 UNITS: 100 INJECTION, SOLUTION INTRAVENOUS; SUBCUTANEOUS at 09:33

## 2019-04-12 RX ADMIN — HYDRALAZINE HYDROCHLORIDE 50 MG: 50 TABLET, FILM COATED ORAL at 09:31

## 2019-04-12 RX ADMIN — CLOPIDOGREL BISULFATE 75 MG: 75 TABLET, FILM COATED ORAL at 20:58

## 2019-04-12 RX ADMIN — FERROUS SULFATE TAB 325 MG (65 MG ELEMENTAL FE) 325 MG: 325 (65 FE) TAB at 13:24

## 2019-04-12 RX ADMIN — INSULIN LISPRO 8 UNITS: 100 INJECTION, SOLUTION INTRAVENOUS; SUBCUTANEOUS at 18:27

## 2019-04-12 RX ADMIN — LEVOTHYROXINE SODIUM 200 MCG: 100 TABLET ORAL at 06:10

## 2019-04-12 RX ADMIN — HYDRALAZINE HYDROCHLORIDE 100 MG: 50 TABLET, FILM COATED ORAL at 20:58

## 2019-04-12 RX ADMIN — DEXAMETHASONE SODIUM PHOSPHATE 4 MG: 4 INJECTION, SOLUTION INTRAMUSCULAR; INTRAVENOUS at 03:32

## 2019-04-12 RX ADMIN — FERROUS SULFATE TAB 325 MG (65 MG ELEMENTAL FE) 325 MG: 325 (65 FE) TAB at 09:31

## 2019-04-12 RX ADMIN — HEPARIN SODIUM 5000 UNITS: 5000 INJECTION INTRAVENOUS; SUBCUTANEOUS at 15:43

## 2019-04-12 RX ADMIN — ISOSORBIDE MONONITRATE 60 MG: 60 TABLET ORAL at 20:58

## 2019-04-12 RX ADMIN — ASPIRIN 81 MG 81 MG: 81 TABLET ORAL at 20:58

## 2019-04-12 RX ADMIN — Medication 10 ML: at 09:37

## 2019-04-12 RX ADMIN — PANTOPRAZOLE SODIUM 40 MG: 40 TABLET, DELAYED RELEASE ORAL at 09:31

## 2019-04-12 RX ADMIN — ATORVASTATIN CALCIUM 80 MG: 80 TABLET, FILM COATED ORAL at 20:58

## 2019-04-12 RX ADMIN — PANTOPRAZOLE SODIUM 40 MG: 40 TABLET, DELAYED RELEASE ORAL at 17:29

## 2019-04-12 RX ADMIN — BUMETANIDE 1 MG: 1 TABLET ORAL at 20:58

## 2019-04-12 RX ADMIN — BUMETANIDE 1 MG: 1 TABLET ORAL at 11:21

## 2019-04-12 ASSESSMENT — PAIN SCALES - GENERAL
PAINLEVEL_OUTOF10: 0
PAINLEVEL_OUTOF10: 0

## 2019-04-12 NOTE — PROGRESS NOTES
Department of Internal Medicine  Section of Endocrinology   [address]    [address] , [address], [address]  Office Phone Isabella Die[address]  ([address])  ([address])  [address] Pradip Wayne MD, Fellow of [address] [place] [address] Endocrinology   Progress Note    Admit Date: 4/10/2019    Reviewed the chart of the last 24 hours:   SUBJECTIVE:   No chief complaint on file.     Encephalopathy [G93.40]   Patient Active Problem List   Diagnosis Code    CHF (congestive heart failure) ([facility]) I50.9    DM type 2 causing CKD stage 5 ([facility]) E11.22, N18.5    Essential hypertension I10    CKD (chronic kidney disease) stage 4, GFR 15-29 ml/min ([facility]) N18.4    Coronary artery disease involving native coronary artery of native heart with angina pectoris ([facility]) I25.119    Anemia associated with chronic renal failure D63.1    Anemia of chronic kidney failure N18.9, D63.1    Coronary atherosclerosis I25.10    Diabetes mellitus ([address] E11.9    History of CVA (cerebrovascular accident) Z80.78    MI (myocardial infarction) ([address] I21.9    Cerebrovascular accident ([address] I63.9    Type 1 diabetes mellitus with hyperglycemia ([facility]) E10.65    Diabetes mellitus with hyperglycemia ([facility]) E11.65    Anemia in chronic kidney disease N18.9, D63.1    Symptomatic anemia D64.9    Bacterial pneumonia J15.9    Iron deficiency anemia D50.9    Encephalopathy G93.40    Severe malnutrition ([facility]) E43     <principal problem not specified>  4/10/2019  7:27 PM  Admission weight: 129 lb 8 oz (58.7 kg)  [id number]  [id number]  4A-08/008-A  He has been referred by Dr Martín Lira for evaluation of    Feels more energy, breathing better and eating better  Review of Systems  Review of Systems - General ROS: negative   Psychological ROS: negative   Hematological and Lymphatic ROS: No history of blood clots or bleeding disorder. Respiratory ROS: no cough, shortness of breath, or wheezing   Cardiovascular ROS: no chest pain or dyspnea on exertion   Gastrointestinal ROS: no abdominal pain, change in bowel habits, or black or bloody stools   Genito-Urinary ROS: no dysuria, trouble voiding, or hematuria   Musculoskeletal ROS: negative   Neurological ROS: no TIA or stroke symptoms   Dermatological ROS: negative    Past Medical, Surgical, Family, Social and Allergy Histories:  I have reviewed the patient's medical history in detail and updated the computerized patient record. has a past medical history of Broken ankle, CAD (coronary artery disease), Cerebral artery occlusion with cerebral infarction [facility]), CHF (congestive heart failure) ([facility] Chronic kidney disease, Diabetes mellitus ([facility] Hyperlipidemia, and Hypertension. has a past surgical history that includes Appendectomy and Cardiac surgery (Oct. 2015). reports that he quit smoking about 3 years ago. He has a 45.00 pack-year smoking history. He has quit using smokeless tobacco. He reports that he drinks about 2.4 oz of alcohol per week. He reports that he does not use drugs. family history includes Cancer in his sister; Diabetes in his mother. He was adopted.   Allergies   Allergen Reactions    Aranesp (Albumin Free) [Darbepoetin Eri] Hives     Current Facility-Administered Medications   Medication Dose Route Frequency Provider Last Rate Last Dose    bumetanide (BUMEX) tablet 1 mg  1 mg Oral BID Baldomero Hdez MD   1 mg at 04/12/19 1121    isosorbide mononitrate (IMDUR) extended release tablet 60 mg  60 mg Oral Daily Baldomero Hdez MD        [START ON 4/13/2019] darbepoetin eri-polysorbate (ARANESP) injection 150 mcg  150 mcg Subcutaneous Once PAOLA Ramirez CNP        ferrous sulfate tablet 325 mg  325 mg Oral TID WC Baldomero Hdez MD   325 mg at 04/12/19 1324    iron sucrose (VENOFER) 300 mg in sodium chloride 0.9 % 250 mL IVPB  300 mg Intravenous [facility], APRN - CNP   Stopped at 04/11/19 1731    Dexamethasone Sodium Phosphate injection 4 mg  4 mg Intravenous Q8H [place] MD   4 mg at 04/12/19 1318    levothyroxine (SYNTHROID) tablet 200 mcg  200 mcg Oral Daily Paul Dewey MD   200 mcg at 04/12/19 0610    insulin lispro (HUMALOG) injection vial 0-6 Units  0-6 Units Subcutaneous TID WC ROSALINDA Murillo-C   8 Units at 04/12/19 1320    insulin lispro (HUMALOG) injection vial 0-3 Units  0-3 Units Subcutaneous Nightly Mela Paige Gomez PA-C        insulin glargine (LANTUS) injection vial 13 Units  13 Units Subcutaneous Nightly ROSALINDA Murillo-C   13 Units at 04/11/19 2119    aspirin chewable tablet 81 mg  81 mg Oral Daily Carmina Ferguson PA-C   81 mg at 04/11/19 2056    atorvastatin (LIPITOR) tablet 80 mg  80 mg Oral Daily Carmina Ferguson PA-C   80 mg at 04/11/19 2056    clopidogrel (PLAVIX) tablet 75 mg  75 mg Oral Daily Carmina Ferguson PA-C   75 mg at 04/11/19 2056    hydrALAZINE (APRESOLINE) tablet 50 mg  50 mg Oral TID Carmina Ferguson PA-C   50 mg at 04/12/19 0931    metoprolol tartrate (LOPRESSOR) tablet 50 mg  50 mg Oral BID Carmina Ferguson PA-C   50 mg at 04/12/19 0930    pantoprazole (PROTONIX) tablet 40 mg  40 mg Oral BID AC Mela Gomez PA-C   40 mg at 04/12/19 0931    sodium chloride flush 0.9 % injection 10 mL  10 mL Intravenous 2 times per day Carmina Ferguson PA-C   10 mL at 04/12/19 7417    sodium chloride flush 0.9 % injection 10 mL  10 mL Intravenous PRN Carmina Ferguson PA-C        magnesium hydroxide (MILK OF MAGNESIA) 400 MG/5ML suspension 30 mL  30 mL Oral Daily PRN Carmina Ferguson, PA-C        ondansetron (ZOFRAN) injection 4 mg  4 mg Intravenous Q6H PRN Carmina Ferguson PA-C        heparin (porcine) injection 5,000 Units  5,000 Units Subcutaneous 3 times per day Carmina Ferguson PA-C   5,000 Units at 04/12/19 8474    acetaminophen (TYLENOL) tablet 650 mg  650 mg Oral Q4H PRN Carmina Ferguson PA-C        glucose (GLUTOSE) 40 % oral gel 15 g  15 g Oral PRN Jevon Mota PA-C   15 g at 04/11/19 0526    dextrose 50 % solution 12.5 g  12.5 g Intravenous PRN Jevon Mota PA-C   12.5 g at 04/11/19 0544    glucagon (rDNA) injection 1 mg  1 mg Intramuscular PRN Mela Mederos PA-C        dextrose 5 % solution  100 mL/hr Intravenous PRN Jevon Mota PA-C         Home Meds:   Prior to Admission medications    Medication Sig Start Date End Date Taking?  Authorizing Provider   Insulin Degludec (TRESIBA) 100 UNIT/ML SOLN Inject 13 Units into the skin nightly    Yes Historical Provider, MD   insulin regular (HUMULIN R;NOVOLIN R) 100 UNIT/ML injection Inject into the skin See Admin Instructions 1 unit for every 8 carbs consumed  In addition:In the evening, If BS>200   200-250= 1unit  251-300= 2 units  301-350=3 units   Yes Historical Provider, MD   hydrALAZINE (APRESOLINE) 50 MG tablet Take 1.5 tablets by mouth 3 times daily  Patient taking differently: Take 50 mg by mouth 3 times daily  11/25/18  Yes Jennie Melton MD   isosorbide mononitrate (IMDUR) 30 MG CR tablet Take 30 mg by mouth daily   Yes Historical Provider, MD   atorvastatin (LIPITOR) 80 MG tablet Take 80 mg by mouth daily   Yes Historical Provider, MD   clopidogrel (PLAVIX) 75 MG tablet Take 75 mg by mouth daily   Yes Historical Provider, MD   pantoprazole sodium (PROTONIX) 40 MG PACK packet Take 40 mg by mouth 2 times daily (before meals)   Yes Historical Provider, MD   metoprolol (LOPRESSOR) 50 MG tablet Take 50 mg by mouth 2 times daily   Yes Historical Provider, MD   aspirin 81 MG tablet Take 81 mg by mouth daily   Yes Historical Provider, MD   levothyroxine (SYNTHROID) 25 MCG tablet Take 25 mcg by mouth Daily    Historical Provider, MD   nitroGLYCERIN (NITROSTAT) 0.4 MG SL tablet DISSOLVE 1 TABLET UNDER THE TONGUE EVERY 5 MIN AS NEEDED FOR CHEST PAIN 1/25/18   Historical Provider, MD   FREESTYLE LANCETS MISC  3/31/16   Historical Provider, MD   FREESTYLE INSULINX TEST strip  3/31/16   Historical Provider, MD     Scheduled Meds:   bumetanide  1 mg Oral BID    isosorbide mononitrate  60 mg Oral Daily    [START ON 4/13/2019] darbepoetin eri-polysorbate  150 mcg Subcutaneous Once    ferrous sulfate  325 mg Oral TID     iron sucrose  300 mg Intravenous Q48H    dexamethasone  4 mg Intravenous Q8H    levothyroxine  200 mcg Oral Daily    insulin lispro  0-6 Units Subcutaneous TID WC    insulin lispro  0-3 Units Subcutaneous Nightly    insulin glargine  13 Units Subcutaneous Nightly    aspirin  81 mg Oral Daily    atorvastatin  80 mg Oral Daily    clopidogrel  75 mg Oral Daily    hydrALAZINE  50 mg Oral TID    metoprolol tartrate  50 mg Oral BID    pantoprazole  40 mg Oral BID AC    sodium chloride flush  10 mL Intravenous 2 times per day    heparin (porcine)  5,000 Units Subcutaneous 3 times per day     Continuous Infusions:   dextrose       PRN Meds:sodium chloride flush, magnesium hydroxide, ondansetron, acetaminophen, glucose, dextrose, glucagon (rDNA), dextrose    OBJECTIVE        Physical    VITALS:  BP (!) 165/77   Pulse 67   Temp 97.8 °F (36.6 °C) (Oral)   Resp 18   Ht 5' 6\" (1.676 m)   Wt 127 lb 4.8 oz (57.7 kg)   SpO2 96%   BMI 20.55 kg/m²   CONSTITUTIONAL:  awake, alert, cooperative, no apparent distress, and appears stated age  BACK:  Symmetric, no curvature, spinous processes are non-tender on palpation, paraspinous muscles are non-tender on palpation, no costal vertebral tenderness  LUNGS:  No increased work of breathing, good air exchange, clear to auscultation bilaterally, no crackles or wheezing  CARDIOVASCULAR:  Normal apical impulse, regular rate and rhythm, normal S1 and S2, no S3 or S4, and no murmur noted    DATA  TSH:    Lab Results   Component Value Date    .400 04/11/2019   No components found for: FREE T4No components found for: FREET3  RADIOLOGYREPORTS:    Lab Review  @[email or web address]@  [unfilled]  Lab Results Component Value Date    .400 (H) 04/11/2019    I8AJXOM 51 (L) 04/10/2019    T4FREE 0.46 (L) 04/10/2019     No results found for: FREET4  No results found for: TESTOSTERONE  CBC:  Lab Results   Component Value Date    WBC 5.6 04/10/2019    RBC 3.83 04/10/2019    HGB 8.0 04/10/2019    HCT 28.7 04/10/2019    MCV 74.9 04/10/2019    MCH 20.9 04/10/2019    MCHC 27.9 04/10/2019    RDW 16.2 04/09/2016     04/10/2019    MPV 10.7 04/10/2019     CMP:    Lab Results   Component Value Date     04/12/2019    K 4.9 04/12/2019    K 6.1 04/10/2019     04/12/2019    CO2 19 04/12/2019    BUN 39 04/12/2019    CREATININE 3.6 04/12/2019    LABGLOM 18 04/12/2019    GLUCOSE 343 04/12/2019    PROT 6.6 04/10/2019    LABALBU 3.6 04/10/2019    CALCIUM 8.8 04/12/2019    BILITOT 0.6 04/10/2019    ALKPHOS 129 04/10/2019    AST 62 04/10/2019    ALT 38 04/10/2019     Hepatic Function Panel:    Lab Results   Component Value Date    ALKPHOS 129 04/10/2019    ALT 38 04/10/2019    AST 62 04/10/2019    PROT 6.6 04/10/2019    BILITOT 0.6 04/10/2019    BILIDIR 0.3 04/10/2019    LABALBU 3.6 04/10/2019     Magnesium:    Lab Results   Component Value Date    MG 1.9 11/25/2018     PT/INR:    Lab Results   Component Value Date    PROTIME 12.5 08/27/2018    INR 1.01 04/10/2019     HgBA1c:    Lab Results   Component Value Date    LABA1C 7.3 04/10/2019      No results for input(s): CKTOTAL, CKMB, CKMBINDEX, TROPONINI in the last 72 hours. FLP:  No results found for: TRIG, HDL, LDLCALC, LDLDIRECT, LABVLDL  DIET: DIET CARB CONTROL; Carb Control: 4 carb choices (60 gms)/meal; Low Sodium (2 GM);  Daily Fluid Restriction: 1500 ml  Dietary Nutrition Supplements: Renal Oral Supplement  Impression:    Diabetes Mellitus Type 1 uncontrolled  Hypothyroidism severe    ASSESSMENT AND PLAN

## 2019-04-12 NOTE — PROGRESS NOTES
Nutrition Assessment    Type and Reason for Visit: Initial, Consult(malnutrition)    Nutrition Recommendations:   Continue current diet. ONS initiated, Nepro BID, continue. Consider renal multivitamin, folbee plus. Nutrition Assessment:   Pt. severely malnourished AEB pt report of very poor oral intake in the last 1.5 weeks, moderate subcutaneous fat loss and moderate muscle loss. At risk for further nutritional compromise r/t continued poor appetite, history of CKD, CHF, DB, pt non-compliance, and need for nutrition support. Will provide Nepro BID. Encouraged oral intake. Reviewed current diet and reason for restrictions as pt very upset he has any diet restrictions. States he carb counts at home and doses his fast acting insulin based on this 1 unit for every 8 carbs and currently not ordered, note pt non-compliant with a lot of his care. He voiced understanding of sodium and fluid restriction, he has had low potassium diet education in past.  Denied need for written diet material.  Will recommend consider renal multivitamin, folbee plus . Malnutrition Assessment:  · Malnutrition Status: Meets the criteria for severe malnutrition  · Context: Acute illness or injury  · Findings of the 6 clinical characteristics of malnutrition (Minimum of 2 out of 6 clinical characteristics is required to make the diagnosis of moderate or severe Protein Calorie Malnutrition based on AND/ASPEN Guidelines):  1. Energy Intake-Less than or equal to 50% of estimated energy requirement, Greater than or equal to 5 days    2. Fat Loss-Moderate subcutaneous fat loss, Orbital  3.  Muscle Loss-Moderate muscle mass loss, Clavicles (pectoralis and deltoids), Thigh (quadriceps), Calf (gastrocnemius)    Nutrition Risk Level: High    Nutrient Needs:  · Estimated Daily Total Kcal: 4149-8047 kcals (30-35 kcals/kg/day based on 58 kg)  · Estimated Daily Protein (g): 70 grams or more (1.2 grams/kg/day based on 58 kg)    Nutrition Diagnosis:   · Problem: Severe malnutrition, In context of acute illness or injury  · Etiology: related to Insufficient energy/nutrient consumption     Signs and symptoms:  as evidenced by Diet history of poor intake, Moderate muscle loss, Moderate loss of subcutaneous fat    Objective Information:  · Nutrition-Focused Physical Findings: Thin. Reports poor oral intake in the last couple weeks, \"I have kidney disease\". BUN 39, Creatinine 3.9, Glucose 343. HgbA1c 7.3%. Lantus, humalog, dexamethasone, bumex, zofran. · Wound Type: None  · Current Nutrition Therapies:  · Oral Diet Orders: Carb Control 4 Carbs/Meal, 2gm Sodium, Fluid Restriction(1500 ml fluid restriction)   · Oral Diet intake: 51-75%, 1-25%  · Oral Nutrition Supplement (ONS) Orders: Renal Oral Supplement(Nepro BID)  · ONS intake: Unable to assess  · Anthropometric Measures:  · Ht: 5' 6\" (167.6 cm)   · Current Body Wt: 127 lb 4.8 oz (57.7 kg)(4/12/19: +2 LE edema)  · Admission Body Wt: 129 lb 8 oz (58.7 kg)(4/10/19 trace LE edema)  · Usual Body Wt: (126-127# the last year at least per pt. Per EMR: 4/18/18: 128#, 11/24/18: 120#1. 6oz, 1/16/19: 126#)  · % Weight Change:  ,  Hard to assess true weight loss with currrent edema, suspect masking in part true weight loss  · Ideal Body Wt: 142 lb (64.4 kg),   · BMI Classification: BMI 18.5 - 24.9 Normal Weight    Nutrition Interventions:   Continue current diet, Start ONS  Continued Inpatient Monitoring, Education Initiated, Coordination of Care(Reinforced current diet restrictions and reason ordered, pt voiced understanding, declined need for written diet material.  Encouraged oral intake and ONS use.)    Nutrition Evaluation:   · Evaluation: Goals set   · Goals: Pt will consume 75% or more of meals during LOS.     · Monitoring: Meal Intake, Supplement Intake, Diet Tolerance, Weight, Monitor Bowel Function, Pertinent Labs      Electronically signed by Reema Do RD, LD on 4/12/19 at 10:12 AM    Contact Number: [phone]

## 2019-04-12 NOTE — PLAN OF CARE
stabilized  Outcome: Ongoing  Note:   Patient had elevated nightly chem check patient glucose 263. Patient administered 13 units of Lantus and patient refused his nightly dose of Humalog. Problem: Activity:  Goal: Risk for activity intolerance will decrease  Description  Risk for activity intolerance will decrease  Outcome: Ongoing  Note:   Patient is ambulating with one assist at this time and denies any light headedness or dizziness. Problem: Coping:  Goal: Ability to identify and develop effective coping behavior will improve  Description  Ability to identify and develop effective coping behavior will improve  Outcome: Ongoing  Note:   Patient does seem to fully cope with the patient's disease process. Patient stated they control their diseases themselves and takes medicine how he feels appropriate. Patient is non-compliant with medication dosing. Problem: Nutritional:  Goal: Maintenance of adequate nutrition will be supported  Description  Maintenance of adequate nutrition will be supported  Outcome: Ongoing  Note:   Patient is on a carb control diet. Patient states he does not like to be on a diet and he eats whatever he wants. Diet education provided to patient. Problem: Physical Regulation:  Goal: Complications related to the disease process, condition or treatment will be avoided or minimized  Description  Complications related to the disease process, condition or treatment will be avoided or minimized  Outcome: Ongoing  Note:   Patient needs more education of provided on disease processes. Patient non-compliant with plan of care. Problem: Discharge Planning:  Goal: Discharged to appropriate level of care  Description  Discharged to appropriate level of care  Outcome: Ongoing  Note:   Discharge planning in process and discussed with patient/family. Care manager aware of discharge needs. Patient is from home with wife and would like to return at discharge.  Discharge plans remain in progress. Problem: Skin Integrity:  Goal: Will show no infection signs and symptoms  Description  Will show no infection signs and symptoms  Outcome: Ongoing  Note:   No signs of skin breakdown. Skin clean, dry, intact. Mucous membranes pink, moist. Patient turns self. Assistance with turns/ambulation provided PRN. Continue to monitor. Problem: Pain:  Goal: Pain level will decrease  Description  Pain level will decrease  Outcome: Ongoing  Note:   Patient able to use 0-10 pain scale. Denies pain at this time. Patient has not complained of any pain this shift. Patient stated pain goal is \"keep me comfortable\". Care plan reviewed with patient and RN. Patient and RN verbalize understanding of the plan of care and contribute to goal setting.

## 2019-04-12 NOTE — PLAN OF CARE
Problem: Nutrition  Goal: Optimal nutrition therapy  Outcome: Ongoing   Nutrition Problem: Severe malnutrition, In context of acute illness or injury  Intervention: Food and/or Nutrient Delivery: Continue current diet, Start ONS  Nutritional Goals: Pt will consume 75% or more of meals during LOS.

## 2019-04-12 NOTE — PROGRESS NOTES
speech, no focal findings or movement disorder noted    Medications:    bumetanide  1 mg Oral BID    isosorbide mononitrate  60 mg Oral Daily    [START ON 4/13/2019] darbepoetin eri-polysorbate  150 mcg Subcutaneous Once    ferrous sulfate  325 mg Oral TID WC    iron sucrose  300 mg Intravenous Q48H    dexamethasone  4 mg Intravenous Q8H    levothyroxine  200 mcg Oral Daily    insulin lispro  0-6 Units Subcutaneous TID WC    insulin lispro  0-3 Units Subcutaneous Nightly    insulin glargine  13 Units Subcutaneous Nightly    aspirin  81 mg Oral Daily    atorvastatin  80 mg Oral Daily    clopidogrel  75 mg Oral Daily    hydrALAZINE  50 mg Oral TID    metoprolol tartrate  50 mg Oral BID    pantoprazole  40 mg Oral BID AC    sodium chloride flush  10 mL Intravenous 2 times per day    heparin (porcine)  5,000 Units Subcutaneous 3 times per day      dextrose         sodium chloride flush 10 mL PRN   magnesium hydroxide 30 mL Daily PRN   ondansetron 4 mg Q6H PRN   acetaminophen 650 mg Q4H PRN   glucose 15 g PRN   dextrose 12.5 g PRN   glucagon (rDNA) 1 mg PRN   dextrose 100 mL/hr PRN     Lab Data:    Cardiac Enzymes:  No results for input(s): CKTOTAL, CKMB, CKMBINDEX, TROPONINI in the last 72 hours.     CBC:   Lab Results   Component Value Date    WBC 5.6 04/10/2019    RBC 3.83 04/10/2019    HGB 8.0 04/10/2019    HCT 28.7 04/10/2019     04/10/2019       CMP:  Lab Results   Component Value Date     04/12/2019    K 4.9 04/12/2019    K 6.1 04/10/2019     04/12/2019    CO2 19 04/12/2019    BUN 39 04/12/2019    CREATININE 3.6 04/12/2019    LABGLOM 18 04/12/2019    GLUCOSE 343 04/12/2019    CALCIUM 8.8 04/12/2019       Hepatic Function Panel:  Lab Results   Component Value Date    ALKPHOS 129 04/10/2019    ALT 38 04/10/2019    AST 62 04/10/2019    PROT 6.6 04/10/2019    BILITOT 0.6 04/10/2019    BILIDIR 0.3 04/10/2019    LABALBU 3.6 04/10/2019       Magnesium:    Lab Results   Component Value Date    MG 1.9 11/25/2018       PT/INR:    Lab Results   Component Value Date    PROTIME 12.5 08/27/2018    INR 1.01 04/10/2019       HgBA1c:    Lab Results   Component Value Date    LABA1C 7.3 04/10/2019       FLP:  No results found for: TRIG, HDL, LDLCALC, LDLDIRECT, LABVLDL    TSH:    Lab Results   Component Value Date    .400 04/11/2019         Assessment:    Acute on chronic diastolic CHF, NYHA III/fluid overload  Hx CAD and PCI  Inferolateral TWI  Ef 55-60 per echo 11/24/18  HTN  DM  PETRA/CKD - needs transplant  Hx CVA    Plan:  · Abn tsh - primary to follow  · Diuresis per renal  · Cont asa/plavix/statin/BB/imdur/hydralazine  · Needs cath prior to transplant - timing per renal  · Will see prn  · F/up [facility] renal transplant team upon d/c and f/up primary cardiology 2-4 weeks         Electronically signed by Luisa Monique PA-C on 4/12/2019 at 1:12 PM

## 2019-04-12 NOTE — CARE COORDINATION
4/12/19, 12:49 PM  [facility] 75 day: 2  Location: -08/008-A Reason for admit: Encephalopathy [G93.40]     Clinical update: Pt continues on 4a for NANCY, DM type II with SSI, hypothyroidism (), anemia, CHF (BNP [id number]). On room air, SQ aranesp, IV bumex changed to PO, INT. Cr 3.6, blood sugars have been 303-330 today. Discharge plan: Spoke with patient today, IMM form updated. Pt hopes to go home this weekend sometime, denies discharge needs. Discharge plan discussed by  and . Discharge plan reviewed with patient/ family. Patient/ family verbalize understanding of discharge plan and are in agreement with plan. Understanding was demonstrated using the teach back method.        IMM Letter  IMM Letter given to Patient/Family/Significant other/Guardian/POA/by[de-identified] staff  IMM Letter date given[de-identified] 04/12/19  IMM Letter time given[de-identified] [id number]

## 2019-04-12 NOTE — PROGRESS NOTES
Hospitalist Progress Note    Patient:  Eden Land Hand      Unit/Bed:4A-08/008-A    YOB: 1965    MRN: [id number]       Acct: [de-identified]     PCP: PAOLA Campa CNP    Date of Admission: 4/10/2019    Active Hospital Problems    Diagnosis Date Noted    Encephalopathy [G93.40] 04/10/2019    Anemia in chronic kidney disease [N18.9, D63.1] 11/24/2018    CKD (chronic kidney disease) stage 4, GFR 15-29 ml/min (HCC) [N18.4] 04/09/2016    CHF (congestive heart failure) ([facility] [I50.9] 04/09/2016    Anemia associated with chronic renal failure [D63.1] 04/09/2016    Coronary artery disease involving native coronary artery of native heart with angina pectoris ([facility] [I25.119] 04/09/2016    Type 1 diabetes mellitus with hyperglycemia ([facility] [E10.65] 03/29/2016       Assessment/Plan:    - Acute decompensated CHF: likely diastolic; last 2D echo from 11/2018 showed preserved LVEF/sstolic function. . ProBNP [id number]. D/c'ed IVF at 75 cc/hr that pt started on overnight. Started on bumex IV 1 mg BID. Also placed on fluid/salt restriction, daily Wt and I/O monitoring. Noted cardio consulted by overnight team.    4/12: SOB improved. +++ diuresis. Switched to Po bumex 1 mg BID now. - CKD stage IV: Cr 3.0 close to baseline  4/12: Cr up to 3.6 likely d/t aggressive diuresis. - Hypothyroidism: .6 will repeat first. If accurate, severe hypothyroidism could also lead to cardiomyopathy and myxedema. Pt on Levothyroxine 25 mcg which we increased (slowly) to 50 now. Also would start on Dexamethasone 4 mg q8h for now until associated adrenal insufficiency is ruled out. (hypoK and hypoNa Although pt's not hypotensive, there is unlikely associated Addisonian crisis)    4/12: consulted endocrine last night. Pt now on 200 mcg QD levothyroxine. Will repeat free T4 and likely reduce dose accordingly. Will continue with Dexa for now. - Encephaopathy? Likely metabolic.   4/12: resolved    - HyperK: likely d/t decreased GFR. K down to 5.3 from 6.1 after Insulin R/D50W. Also given Kayexalate overnight. Will monitor BMP  4/12: resolved K 4.9 today  - Metabolic acidosis 2nd to CKD     4/12: improving    - Diabetes Mellitus Type I: A1C 7.3%. 4/12: BS uncontrolled. Pt refusing treatment and low-carb diabetic diet. With RN in room explained risks of both hypo- and hyperglycemia. Reemphasized that hypoglycemia can be potentially fatal. Pt wishes to manage his own insulin intake. He is now back on his standing home Glargine (13 U) plus SSI    - Essential HTN: uncontrolled. back on home meds. Will reassess response  4/12: BP still uncontrolled. Increased imdur to 60 followed by Hydralazine to 100 from 50 TID. Will reassess     - Chronic microcytic anemia: likely KIRSTY plus anemia of chronic disease, noted iron 24. Started on Iron TID. Noted pt started on EPP by nephro which would be ineffective unless KIRSTY treated properly first.  4/12: IV iron for 3 doses with plan to start EPO after    - malnutrition: dietician to see      Expected discharge date:  TBD         Disposition:      ? Home                             ? TCU                             ? Rehab                             ? Psych                             ? SNF                             ? [place]                             ? Other-    Chief Complaint: No chief complaint on file. Hospital Course: Patient was seen, examined and the medical chart was reviewed thoroughly today. In summary, 46 y.o.male admitted overnight for likely acute decompensated CHF. Subjective (past 24 hours):   feels nauseous, poor appetite. ++ leg swelling. Denies CP.     4/12: feeling more energy, SOB imrpoved.     Medications:  Reviewed    Infusion Medications    dextrose       Scheduled Medications    bumetanide  1 mg Oral BID    isosorbide mononitrate  60 mg Oral Daily    ferrous sulfate  325 mg Oral TID WC    iron sucrose  300 mg Intravenous Q48H    darbepoetin eri-polysorbate  100 mcg Subcutaneous Once    dexamethasone  4 mg Intravenous Q8H    levothyroxine  200 mcg Oral Daily    insulin lispro  0-6 Units Subcutaneous TID WC    insulin lispro  0-3 Units Subcutaneous Nightly    insulin glargine  13 Units Subcutaneous Nightly    aspirin  81 mg Oral Daily    atorvastatin  80 mg Oral Daily    clopidogrel  75 mg Oral Daily    hydrALAZINE  50 mg Oral TID    metoprolol tartrate  50 mg Oral BID    pantoprazole  40 mg Oral BID AC    sodium chloride flush  10 mL Intravenous 2 times per day    heparin (porcine)  5,000 Units Subcutaneous 3 times per day     PRN Meds: sodium chloride flush, magnesium hydroxide, ondansetron, acetaminophen, glucose, dextrose, glucagon (rDNA), dextrose      Intake/Output Summary (Last 24 hours) at 2019 0832  Last data filed at 2019 0325  Gross per 24 hour   Intake 1520 ml   Output 1675 ml   Net -155 ml       Diet:  DIET CARB CONTROL; Carb Control: 4 carb choices (60 gms)/meal; Low Sodium (2 GM); Daily Fluid Restriction: 1500 ml    Exam:  BP (!) 160/72   Pulse 72   Temp 97.6 °F (36.4 °C) (Oral)   Resp 18   Ht 5' 6\" (1.676 m)   Wt 127 lb 4.8 oz (57.7 kg)   SpO2 94%   BMI 20.55 kg/m²     General appearance: muscle wasting, A&O x3, Not ill or toxic, in no apparent distress  HEENT:  KARLA  EOM intact. Neck: Supple, with full range of motion. No jugular venous distention. Trachea midline. Respiratory:    A/E bilat at bases  Cardiovascular:  normal S1/S2 with no murmurs/gallops  Abdomen: Soft, non-tender, non-distended, no rigidity or peritoneal signs  Musculoskeletal: NL symmetrical A/PROM bilat U/L extremities   Skin: No rashes. ++ bilat. Edema. Neurologic:  CN II-XII intact. NL symmetrical reflexes. NL gait and stance. NL Cerebellar exam. Power 5/5 all muscle groups U/L extremities.  Toes downgoing  Capillary Refill: Brisk,< 3 seconds   Peripheral Pulses: +2 palpable, equal bilaterally         Labs: Recent Labs     04/10/19  0104 04/10/19  2059   WBC 5.8 5.6   HGB 8.4* 8.0*   HCT 30.2* 28.7*    246     Recent Labs     04/10/19  2059 04/11/19  0810 04/12/19  0357   * 137 135   K 6.1* 5.3* 4.9    105 101   CO2 22* 22* 19*   BUN 36* 34* 39*   CREATININE 3.0* 3.0* 3.6*   CALCIUM 8.9 8.0* 8.8     Recent Labs     04/10/19  0104 04/10/19  2059   AST 75* 62*   ALT 47 38   BILIDIR  --  0.3   BILITOT 0.7 0.6   ALKPHOS 138* 129*     Recent Labs     04/10/19  0104   INR 1.01     No results for input(s): Bradford Specking in the last 72 hours. Urinalysis:      Lab Results   Component Value Date    NITRU NEGATIVE 04/10/2019    WBCUA NONE SEEN 04/10/2019    BACTERIA NONE 04/10/2019    RBCUA 3-5 04/10/2019    BLOODU SMALL 04/10/2019    GLUCOSEU NEGATIVE 04/10/2019       Radiology:  Ct Abdomen Pelvis Wo Contrast Additional Contrast? None    Result Date: 4/10/2019  PROCEDURE: CT ABDOMEN PELVIS WO CONTRAST CLINICAL INFORMATION: abdominal distension with history of CKD . COMPARISON: April 9, 2016 TECHNIQUE: Axial 5 mm CT images were obtained through the abdomen and pelvis. No contrast was given. Coronal reconstructions were obtained. All CT scans at this facility use dose modulation, iterative reconstruction, and/or weight-based dosing when appropriate to reduce radiation dose to as low as reasonably achievable. FINDINGS:  The heart appears enlarged. There is a moderate right effusion with compressive atelectasis of the right lower lobe. Changes of mild COPD are present. The noncontrast appearance of liver, gallbladder, pancreas and spleen are negative for active appearing pathology. There is mild bilateral perinephric stranding. There is distention of the urinary bladder. Correlate with urinalysis to exclude cystitis and superimposed nephritis. Punctate bilateral nonobstructing renal calculi are present. Small bowel is nondistended. Mild luminal stasis is present correlate with mild enteritis.  There is abundant retention of stool throughout colon compatible with moderate severe constipation. There is mild mucosal thickening of the anorectal region. Changes of proctitis are not excluded. There is no acute appearing pathology of the skeleton. There are degenerative disc changes at L5-S1 with minimal retrolisthesis of L5 on S1. There is scattered atherosclerosis of the aorta without aneurysm. 1. Abundant retention of stool throughout colon compatible with constipation. 2. Bilateral nonobstructing renal calculi with nonspecific perinephric stranding. Correlate with urinalysis to exclude changes of nephritis. 3. Moderate right effusion with lower lobe atelectasis. Underlying infiltrate is not excluded. **This report has been created using voice recognition software. It may contain minor errors which are inherent in voice recognition technology. ** Final report electronically signed by Dr. Edd Malcolm on 4/10/2019 2:21 AM    Xr Chest Standard (2 Vw)    Result Date: 4/10/2019  PROCEDURE: XR CHEST (2 VW) CLINICAL INFORMATION: leg edema with shortness of breath. COMPARISON: No prior study. TECHNIQUE: PA and lateral views the chest. FINDINGS: There is stable mild cardiac enlargement with coarse lung markings. There is no focal infiltrate. Trace right effusion cannot be excluded. There is no evidence of congestive failure. 1. Stable cardiac enlargement with diffuse coarse markings. 2. Persistent minimal blunting of the right costophrenic angle. Small effusion versus chronic subpleural changes are present. **This report has been created using voice recognition software. It may contain minor errors which are inherent in voice recognition technology. ** Final report electronically signed by Dr. Edd Malcolm on 4/10/2019 2:48 AM      Diet: DIET CARB CONTROL; Carb Control: 4 carb choices (60 gms)/meal; Low Sodium (2 GM); Daily Fluid Restriction: 1500 ml    DVT prophylaxis: ? Lovenox                                 ?

## 2019-04-12 NOTE — FLOWSHEET NOTE
04/11/19 1923   Provider Notification   Reason for Communication Evaluate   Provider Name Faby Rowe Randyjaky   Provider Notification Physician Assistant   Method of Communication Secure Message   Response In department   Notification Time [address]   [address] Faby Gu Yaneth Mays regarding patient getting agitated. During the day shift, patient was apparently able to self-dose his insulin based off of what he believed he should be taking. RN explained to patient that he cannot just \"administer how much insulin he believes he should get\". He is on an insulin sliding scale. His Lantus 13 units was discontinued and patient was extremely agitated about this. RN messaged Faby Rowe Yaneth Padillajaky to see how she wanted this RN to administer is insulin. Rajni RoweHasmukh Yaneth Mays came to floor and explained to patient that he cannot self dose insulin at the hospital as this was explained upon admission. Patient insisted that his Lantus needed restarted but he is refusing to take Humalog. Educated was provided that his sugar will be very high and patient refused. Rajni RoweHasmukh Yaneth Mays also provided extensive education. Patient remains Non-compliant.

## 2019-04-13 LAB
ANION GAP SERPL CALCULATED.3IONS-SCNC: 12 MEQ/L (ref 8–16)
BUN BLDV-MCNC: 52 MG/DL (ref 7–22)
CALCIUM SERPL-MCNC: 8.9 MG/DL (ref 8.5–10.5)
CHLORIDE BLD-SCNC: 96 MEQ/L (ref 98–111)
CO2: 21 MEQ/L (ref 23–33)
CREAT SERPL-MCNC: 4 MG/DL (ref 0.4–1.2)
GFR SERPL CREATININE-BSD FRML MDRD: 16 ML/MIN/1.73M2
GLUCOSE BLD-MCNC: 321 MG/DL (ref 70–108)
GLUCOSE BLD-MCNC: 357 MG/DL (ref 70–108)
GLUCOSE BLD-MCNC: 363 MG/DL (ref 70–108)
GLUCOSE BLD-MCNC: 417 MG/DL (ref 70–108)
GLUCOSE BLD-MCNC: 426 MG/DL (ref 70–108)
GLUCOSE BLD-MCNC: 448 MG/DL (ref 70–108)
GLUCOSE BLD-MCNC: 454 MG/DL (ref 70–108)
GLUCOSE BLD-MCNC: 455 MG/DL (ref 70–108)
GLUCOSE BLD-MCNC: 503 MG/DL (ref 70–108)
POTASSIUM SERPL-SCNC: 5 MEQ/L (ref 3.5–5.2)
SODIUM BLD-SCNC: 129 MEQ/L (ref 135–145)
T4 FREE: 0.68 NG/DL (ref 0.93–1.76)

## 2019-04-13 PROCEDURE — 99232 SBSQ HOSP IP/OBS MODERATE 35: CPT | Performed by: INTERNAL MEDICINE

## 2019-04-13 PROCEDURE — 36415 COLL VENOUS BLD VENIPUNCTURE: CPT

## 2019-04-13 PROCEDURE — 80048 BASIC METABOLIC PNL TOTAL CA: CPT

## 2019-04-13 PROCEDURE — 6370000000 HC RX 637 (ALT 250 FOR IP): Performed by: PHYSICIAN ASSISTANT

## 2019-04-13 PROCEDURE — 2580000003 HC RX 258: Performed by: PHYSICIAN ASSISTANT

## 2019-04-13 PROCEDURE — 84439 ASSAY OF FREE THYROXINE: CPT

## 2019-04-13 PROCEDURE — 82948 REAGENT STRIP/BLOOD GLUCOSE: CPT

## 2019-04-13 PROCEDURE — 2060000000 HC ICU INTERMEDIATE R&B

## 2019-04-13 PROCEDURE — 6360000002 HC RX W HCPCS: Performed by: NURSE PRACTITIONER

## 2019-04-13 PROCEDURE — 6360000002 HC RX W HCPCS: Performed by: HOSPITALIST

## 2019-04-13 PROCEDURE — 2580000003 HC RX 258: Performed by: NURSE PRACTITIONER

## 2019-04-13 PROCEDURE — 99233 SBSQ HOSP IP/OBS HIGH 50: CPT | Performed by: HOSPITALIST

## 2019-04-13 PROCEDURE — 6370000000 HC RX 637 (ALT 250 FOR IP): Performed by: HOSPITALIST

## 2019-04-13 PROCEDURE — 6370000000 HC RX 637 (ALT 250 FOR IP): Performed by: INTERNAL MEDICINE

## 2019-04-13 RX ORDER — DOCUSATE SODIUM 100 MG/1
100 CAPSULE, LIQUID FILLED ORAL DAILY
Status: DISCONTINUED | OUTPATIENT
Start: 2019-04-13 | End: 2019-04-18 | Stop reason: HOSPADM

## 2019-04-13 RX ORDER — BUMETANIDE 1 MG/1
1 TABLET ORAL DAILY
Status: DISCONTINUED | OUTPATIENT
Start: 2019-04-13 | End: 2019-04-18 | Stop reason: HOSPADM

## 2019-04-13 RX ORDER — CLONIDINE HYDROCHLORIDE 0.1 MG/1
0.1 TABLET ORAL 2 TIMES DAILY
Status: DISCONTINUED | OUTPATIENT
Start: 2019-04-13 | End: 2019-04-15

## 2019-04-13 RX ORDER — INSULIN GLARGINE 100 [IU]/ML
20 INJECTION, SOLUTION SUBCUTANEOUS DAILY
Status: DISCONTINUED | OUTPATIENT
Start: 2019-04-14 | End: 2019-04-17

## 2019-04-13 RX ADMIN — DEXAMETHASONE SODIUM PHOSPHATE 4 MG: 4 INJECTION, SOLUTION INTRAMUSCULAR; INTRAVENOUS at 04:30

## 2019-04-13 RX ADMIN — METOPROLOL TARTRATE 50 MG: 50 TABLET, FILM COATED ORAL at 21:24

## 2019-04-13 RX ADMIN — INSULIN LISPRO 6 UNITS: 100 INJECTION, SOLUTION INTRAVENOUS; SUBCUTANEOUS at 18:14

## 2019-04-13 RX ADMIN — DOCUSATE SODIUM 100 MG: 100 CAPSULE, LIQUID FILLED ORAL at 13:27

## 2019-04-13 RX ADMIN — PANTOPRAZOLE SODIUM 40 MG: 40 TABLET, DELAYED RELEASE ORAL at 05:59

## 2019-04-13 RX ADMIN — DARBEPOETIN ALFA 150 MCG: 150 INJECTION, SOLUTION INTRAVENOUS; SUBCUTANEOUS at 14:40

## 2019-04-13 RX ADMIN — CLOPIDOGREL BISULFATE 75 MG: 75 TABLET, FILM COATED ORAL at 21:25

## 2019-04-13 RX ADMIN — HYDRALAZINE HYDROCHLORIDE 100 MG: 50 TABLET, FILM COATED ORAL at 13:59

## 2019-04-13 RX ADMIN — CLONIDINE HYDROCHLORIDE 0.1 MG: 0.1 TABLET ORAL at 21:24

## 2019-04-13 RX ADMIN — FERROUS SULFATE TAB 325 MG (65 MG ELEMENTAL FE) 325 MG: 325 (65 FE) TAB at 17:18

## 2019-04-13 RX ADMIN — INSULIN LISPRO 3 UNITS: 100 INJECTION, SOLUTION INTRAVENOUS; SUBCUTANEOUS at 09:37

## 2019-04-13 RX ADMIN — PANTOPRAZOLE SODIUM 40 MG: 40 TABLET, DELAYED RELEASE ORAL at 17:18

## 2019-04-13 RX ADMIN — ATORVASTATIN CALCIUM 80 MG: 80 TABLET, FILM COATED ORAL at 21:25

## 2019-04-13 RX ADMIN — ASPIRIN 81 MG 81 MG: 81 TABLET ORAL at 21:25

## 2019-04-13 RX ADMIN — HYDRALAZINE HYDROCHLORIDE 100 MG: 50 TABLET, FILM COATED ORAL at 21:24

## 2019-04-13 RX ADMIN — INSULIN LISPRO 7 UNITS: 100 INJECTION, SOLUTION INTRAVENOUS; SUBCUTANEOUS at 14:45

## 2019-04-13 RX ADMIN — INSULIN LISPRO 3 UNITS: 100 INJECTION, SOLUTION INTRAVENOUS; SUBCUTANEOUS at 21:26

## 2019-04-13 RX ADMIN — FERROUS SULFATE TAB 325 MG (65 MG ELEMENTAL FE) 325 MG: 325 (65 FE) TAB at 08:35

## 2019-04-13 RX ADMIN — ISOSORBIDE MONONITRATE 60 MG: 60 TABLET ORAL at 21:24

## 2019-04-13 RX ADMIN — LEVOTHYROXINE SODIUM 200 MCG: 100 TABLET ORAL at 05:59

## 2019-04-13 RX ADMIN — INSULIN LISPRO 5 UNITS: 100 INJECTION, SOLUTION INTRAVENOUS; SUBCUTANEOUS at 05:59

## 2019-04-13 RX ADMIN — Medication 10 ML: at 08:37

## 2019-04-13 RX ADMIN — INSULIN LISPRO 6 UNITS: 100 INJECTION, SOLUTION INTRAVENOUS; SUBCUTANEOUS at 14:00

## 2019-04-13 RX ADMIN — FERROUS SULFATE TAB 325 MG (65 MG ELEMENTAL FE) 325 MG: 325 (65 FE) TAB at 13:27

## 2019-04-13 RX ADMIN — METOPROLOL TARTRATE 50 MG: 50 TABLET, FILM COATED ORAL at 08:35

## 2019-04-13 RX ADMIN — INSULIN LISPRO 5 UNITS: 100 INJECTION, SOLUTION INTRAVENOUS; SUBCUTANEOUS at 18:12

## 2019-04-13 RX ADMIN — HYDRALAZINE HYDROCHLORIDE 100 MG: 50 TABLET, FILM COATED ORAL at 08:35

## 2019-04-13 RX ADMIN — IRON SUCROSE 300 MG: 20 INJECTION, SOLUTION INTRAVENOUS at 12:37

## 2019-04-13 RX ADMIN — BUMETANIDE 1 MG: 1 TABLET ORAL at 08:35

## 2019-04-13 ASSESSMENT — PAIN SCALES - GENERAL
PAINLEVEL_OUTOF10: 0
PAINLEVEL_OUTOF10: 0

## 2019-04-13 NOTE — PROGRESS NOTES
Hospitalist Progress Note    Patient:  Nora Stewart Hand      Unit/Bed:4A-08/008-A    YOB: 1965    MRN: [id number]       Acct: [de-identified]     PCP: PAOLA Enriquez CNP    Date of Admission: 4/10/2019    Active Hospital Problems    Diagnosis Date Noted    Severe malnutrition ([facility] [E43] 04/12/2019     Class: Acute    Encephalopathy [G93.40] 04/10/2019    Anemia in chronic kidney disease [N18.9, D63.1] 11/24/2018    CKD (chronic kidney disease) stage 4, GFR 15-29 ml/min ([facility]) [N18.4] 04/09/2016    CHF (congestive heart failure) ([facility] [I50.9] 04/09/2016    Anemia associated with chronic renal failure [D63.1] 04/09/2016    Coronary artery disease involving native coronary artery of native heart with angina pectoris ([facility] [I25.119] 04/09/2016    Type 1 diabetes mellitus with hyperglycemia ([facility] [E10.65] 03/29/2016       Assessment/Plan:    - Acute decompensated CHF: likely diastolic; last 2D echo from 11/2018 showed preserved LVEF/sstolic function. . ProBNP [id number]. D/c'ed IVF at 75 cc/hr that pt started on overnight. Started on bumex IV 1 mg BID. Also placed on fluid/salt restriction, daily Wt and I/O monitoring. Noted cardio consulted by overnight team.    4/12: SOB improved. +++ diuresis. Switched to Po bumex 1 mg BID now.  4/13: decreased bumex to 1 mg QD now. Will reassess      - CKD stage IV: Cr 3.0 close to baseline  4/12: Cr up to 3.6 likely d/t aggressive diuresis. 4/13: Cr further up to 4.0 today. Decreased bumex dose. No indication for urgent RRT. Will monitor lytes    - Hypothyroidism: .6 will repeat first. If accurate, severe hypothyroidism could also lead to cardiomyopathy and myxedema. Pt on Levothyroxine 25 mcg which we increased (slowly) to 50 now. Also would start on Dexamethasone 4 mg q8h for now until associated adrenal insufficiency is ruled out.  (hypoK and hypoNa Although pt's not hypotensive, there is unlikely associated Addisonian crisis)      4/12: consulted endocrine last night. Pt now on 200 mcg QD levothyroxine. Will repeat free T4 and likely reduce dose accordingly. Will continue with Dexa for now. 4/13; free T4 up to 0.68 now. CCM. Will monitor free T4 levels to monitor response to treatment. D/c'ed Dex today as pt clearly not in adrenal insufficiency. - Encephaopathy? Likely metabolic. 4/12: resolved    - HyperK: likely d/t decreased GFR. K down to 5.3 from 6.1 after Insulin R/D50W. Also given Kayexalate overnight. Will monitor BMP  4/12: resolved K 4.9 today  - Metabolic acidosis 2nd to CKD     4/12: improving  4/13: resolved. K 5.0 will monitor    - Diabetes Mellitus Type I: A1C 7.3%. 4/12: BS uncontrolled. Pt refusing treatment and low-carb diabetic diet. With RN in room explained risks of both hypo- and hyperglycemia. Reemphasized that hypoglycemia can be potentially fatal. Pt wishes to manage his own insulin intake. He is now back on his standing home Glargine (13 U) plus SSI    4/13: BS in 400s range. Once again with RN in room went over issue of poorly controlled hyperglycemia. Pt adamant to allow me manage BS. He eats and snacks on what he wants and wishes to direct RN to give him whatever dose he is asking for. Also explained concurrent steroids also attributing to hyperglycemia. Steroids d/c'ed now. - HypoNa: Na 129. When corrected for concomitant hyperglycemia , Na 135. Will  monitor    - Essential HTN: uncontrolled. back on home meds. Will reassess response  4/12: BP still uncontrolled. Increased imdur to 60 followed by Hydralazine to 100 from 50 TID. Will reassess    4/13: BP still uncontrolled, added clonidine 0.1 BID now. Will reassess     - Chronic microcytic anemia: likely KIRSTY plus anemia of chronic disease, noted iron 24. Started on Iron TID.  Noted pt started on EPP by nephro which would be ineffective unless KIRSTY treated properly first.  4/12: IV iron for 3 doses with plan to start EPO after    - Severe malnutrition: seen by were obtained through the abdomen and pelvis. No contrast was given. Coronal reconstructions were obtained. All CT scans at this facility use dose modulation, iterative reconstruction, and/or weight-based dosing when appropriate to reduce radiation dose to as low as reasonably achievable. FINDINGS:  The heart appears enlarged. There is a moderate right effusion with compressive atelectasis of the right lower lobe. Changes of mild COPD are present. The noncontrast appearance of liver, gallbladder, pancreas and spleen are negative for active appearing pathology. There is mild bilateral perinephric stranding. There is distention of the urinary bladder. Correlate with urinalysis to exclude cystitis and superimposed nephritis. Punctate bilateral nonobstructing renal calculi are present. Small bowel is nondistended. Mild luminal stasis is present correlate with mild enteritis. There is abundant retention of stool throughout colon compatible with moderate severe constipation. There is mild mucosal thickening of the anorectal region. Changes of proctitis are not excluded. There is no acute appearing pathology of the skeleton. There are degenerative disc changes at L5-S1 with minimal retrolisthesis of L5 on S1. There is scattered atherosclerosis of the aorta without aneurysm. 1. Abundant retention of stool throughout colon compatible with constipation. 2. Bilateral nonobstructing renal calculi with nonspecific perinephric stranding. Correlate with urinalysis to exclude changes of nephritis. 3. Moderate right effusion with lower lobe atelectasis. Underlying infiltrate is not excluded. **This report has been created using voice recognition software. It may contain minor errors which are inherent in voice recognition technology. ** Final report electronically signed by Dr. Edd Malcolm on 4/10/2019 2:21 AM    Xr Chest Standard (2 Vw)    Result Date: 4/10/2019  PROCEDURE: XR CHEST (2 VW) CLINICAL INFORMATION: leg edema with shortness of breath. COMPARISON: No prior study. TECHNIQUE: PA and lateral views the chest. FINDINGS: There is stable mild cardiac enlargement with coarse lung markings. There is no focal infiltrate. Trace right effusion cannot be excluded. There is no evidence of congestive failure. 1. Stable cardiac enlargement with diffuse coarse markings. 2. Persistent minimal blunting of the right costophrenic angle. Small effusion versus chronic subpleural changes are present. **This report has been created using voice recognition software. It may contain minor errors which are inherent in voice recognition technology. ** Final report electronically signed by Dr. Jeni Scruggs on 4/10/2019 2:48 AM      Diet: DIET CARB CONTROL; Carb Control: 4 carb choices (60 gms)/meal; Low Sodium (2 GM); Daily Fluid Restriction: 1500 ml  Dietary Nutrition Supplements: Renal Oral Supplement    DVT prophylaxis: ? Lovenox                                 ? SCDs                                 ? SQ Heparin                                 ? Encourage ambulation           ? Already on Anticoagulation       Code Status: Full Code      Active Hospital Problems    Diagnosis Date Noted    Severe malnutrition ([facility] [E43] 04/12/2019     Class: Acute    Encephalopathy [G93.40] 04/10/2019    Anemia in chronic kidney disease [N18.9, D63.1] 11/24/2018    CKD (chronic kidney disease) stage 4, GFR 15-29 ml/min (HCC) [N18.4] 04/09/2016    CHF (congestive heart failure) ([address] [I50.9] 04/09/2016    Anemia associated with chronic renal failure [D63.1] 04/09/2016    Coronary artery disease involving native coronary artery of native heart with angina pectoris ([facility] [I25.119] 04/09/2016    Type 1 diabetes mellitus with hyperglycemia ([address] [E10.65] 03/29/2016           Patient was updated about the treatment plan, all the questions and concerns were addressed.         Electronically signed by Cesar Jensen MD on 4/13/2019 at 2:58 PM

## 2019-04-13 NOTE — PROGRESS NOTES
Discontinue heparin due to hematoma on abdominal wall.   SCD order placed    [employer], PA-C

## 2019-04-13 NOTE — PLAN OF CARE
Problem: Falls - Risk of:  Goal: Will remain free from falls  Description  Will remain free from falls  4/13/2019 1227 by Lila Kunz RN  Outcome: Ongoing  Note:   Call light in reach, bed in lowest position, and bed alarm activated. Education given on use of call light before ambulation and when in need of assistance. Patient expressed understanding. Hourly visual checks performed and charted. Toileting offered to patient. No falls this shift, at any time. Arm band and falling star in place. Will continue to monitor. Problem: Falls - Risk of:  Goal: Absence of physical injury  Description  Absence of physical injury  4/13/2019 1227 by Lila Kunz RN  Outcome: Met This Shift     Problem: Cardiac:  Goal: Ability to maintain vital signs within normal range will improve  Description  Ability to maintain vital signs within normal range will improve  4/13/2019 1227 by Lila Kunz RN  Outcome: Ongoing  Note:   Patient SRon telemetry monitor. No changes in cardiac rhythm noted with each tele strip reading. Vital signs 160/73 with heart rate 63. No evidence of DVT this shift. Patient refusing DVT protection. Patient denies chest pain or shortness of breath with assessments. Problem: Serum Glucose Level - Abnormal:  Goal: Ability to maintain appropriate glucose levels has stabilized  Description  Ability to maintain appropriate glucose levels has stabilized  4/13/2019 1227 by Lila Kunz RN  Outcome: Ongoing  Note:   Patient refusing to take appropriate Insulin coverage for elevated blood sugars. Problem: Activity:  Goal: Risk for activity intolerance will decrease  Description  Risk for activity intolerance will decrease  4/13/2019 1227 by Lila Kunz RN  Outcome: Ongoing  Note:   Scattered Bruising. No new signs of skin breakdown. Skin warm, dry, and intact. Mucous membranes pink and moist.  Assistance with turns/ambulation provided PRN.   Will continue to monitor. Problem: Coping:  Goal: Ability to identify and develop effective coping behavior will improve  Description  Ability to identify and develop effective coping behavior will improve  4/13/2019 1227 by Lila Kunz RN  Outcome: Met This Shift     Problem: Nutritional:  Goal: Maintenance of adequate nutrition will be supported  Description  Maintenance of adequate nutrition will be supported  4/13/2019 1227 by Lila Kunz RN  Outcome: Ongoing  Note:   Patient refuses to follow carb control diet. Blood sugars elevated. Problem: Physical Regulation:  Goal: Complications related to the disease process, condition or treatment will be avoided or minimized  Description  Complications related to the disease process, condition or treatment will be avoided or minimized  4/13/2019 1227 by Lila Kunz RN  Outcome: Met This Shift     Problem: Discharge Planning:  Goal: Discharged to appropriate level of care  Description  Discharged to appropriate level of care  4/13/2019 1227 by Lila Kunz RN  Outcome: Ongoing  Note:   Discharge planning in process and discussed with patient/family. Social work consulted for any additional needs. Care manager aware of discharge needs. Problem: Pain:  Goal: Pain level will decrease  Description  Pain level will decrease  4/12/2019 2336 by Regina Pelletier RN  Outcome: Ongoing  Note:   Patient able to use 0-10 pain scale. Denies pain at this time. Agreeable to take PRN pain medications.       Problem: Nutrition  Goal: Optimal nutrition therapy  4/13/2019 1227 by Lila Kunz RN  Outcome: Ongoing

## 2019-04-13 NOTE — FLOWSHEET NOTE
Pt is a 47 yo  and father. Pt shared that he is an [employer].  Pt's wife is a [address] and they have accepted each other for where they are at spiritually speaking. Pt is trying to get on a waiting list to receive a kidney and pancreas transplant. Spiritual care to continue to allow pt to share his concerns and \"beliefs\". 04/12/19 1930   Encounter Summary   Services provided to: Patient   [facility]   Support System Spouse; Children   Continue Visiting Yes  (4/12)   Complexity of Encounter Low   Length of Encounter 15 minutes   Routine   Type Initial   Assessment Calm; Approachable   Intervention Active listening;Explored coping resources   Outcome Acceptance;Expressed feelings/needs/concerns;Engaged in conversation

## 2019-04-13 NOTE — PROGRESS NOTES
[facility]         Renal Inpatient Follow-Up note         4/13/2019 10:14 AM    Pt Name:   Sandy Cobb  YOB: 1965  Attending:   Swetha Kim MD    Chief Complaint : Sandy Cobb is a 46 y.o. male being followed by nephrology for acute kidney injury/chronic kidney disease and fluid overload    Interval History :   Patient seen and examined by me. No distress  Feels well and denies any complaints no shortness of breath. Edema much better      Scheduled Medications :    bumetanide  1 mg Oral Daily    isosorbide mononitrate  60 mg Oral Daily    darbepoetin eri-polysorbate  150 mcg Subcutaneous Once    hydrALAZINE  100 mg Oral TID    ferrous sulfate  325 mg Oral TID WC    iron sucrose  300 mg Intravenous Q48H    levothyroxine  200 mcg Oral Daily    insulin lispro  0-6 Units Subcutaneous TID WC    insulin lispro  0-3 Units Subcutaneous Nightly    insulin glargine  13 Units Subcutaneous Nightly    aspirin  81 mg Oral Daily    atorvastatin  80 mg Oral Daily    clopidogrel  75 mg Oral Daily    metoprolol tartrate  50 mg Oral BID    pantoprazole  40 mg Oral BID AC    sodium chloride flush  10 mL Intravenous 2 times per day      dextrose         Vitals :  BP (!) 163/79   Pulse 70   Temp 97.6 °F (36.4 °C) (Oral)   Resp 18   Ht 5' 6\" (1.676 m)   Wt 124 lb (56.2 kg)   SpO2 96%   BMI 20.01 kg/m²     24HR INTAKE/OUTPUT:      Intake/Output Summary (Last 24 hours) at 4/13/2019 1014  Last data filed at 4/13/2019 0426  Gross per 24 hour   Intake 745 ml   Output 1200 ml   Net -455 ml     Last 3 weights  Wt Readings from Last 3 Encounters:   04/13/19 124 lb (56.2 kg)   04/10/19 135 lb (61.2 kg)   01/16/19 126 lb (57.2 kg)           Physical Exam :  General Appearance:  Well developed.  No distress  Mouth/Throat:  Oral mucosa moist  Neck:  Supple, no JVD  Lungs:  Breath sounds: clear  Heart[de-identified]  S1,S2 heard  Abdomen:  Soft, non - tender  Musculoskeletal:  Edema - no significant edema noted          Last 3 CBC   Recent Labs     04/10/19  2059   WBC 5.6   RBC 3.83*   HGB 8.0*   HCT 28.7*        Last 3 CMP  Recent Labs     04/10/19  2059 04/11/19  0810 04/12/19  0357 04/13/19  0427   * 137 135 129*   K 6.1* 5.3* 4.9 5.0    105 101 96*   CO2 22* 22* 19* 21*   BUN 36* 34* 39* 52*   CREATININE 3.0* 3.0* 3.6* 4.0*   CALCIUM 8.9 8.0* 8.8 8.9   LABALBU 3.6  --   --   --    BILITOT 0.6  --   --   --              Assessment / Plan   Renal - acute kidney injury on chronic kidney disease stage IV mostly due to diuresis  - At this time we will hold the diuretics and monitor the renal function  - BMP in the morning    Mild hyponatremia secondary to renal dysfunction and inability to excrete free water  Mild acidosis  Anemia of chronic kidney disease and iron deficiency status post one of her  Fluid overload clinically looking much better  Significant hypothyroidism  D/W patient, nurse. meds reviewed    MALICK Cartagena D.  [facility].

## 2019-04-13 NOTE — PROGRESS NOTES
Department of Internal Medicine  Section of Endocrinology   [address]    [address] , [address] [place], [address]  Office Phone [address]  ([phone])  ([address])  [address] Marea Halsted MD, Fellow of [employer] of Endocrinology   Progress Note     Admit Date: 4/10/2019    Reviewed the chart of the last 24 hours:   SUBJECTIVE:   No chief complaint on file.     Encephalopathy [G93.40]   Patient Active Problem List   Diagnosis Code    CHF (congestive heart failure) ([facility]) I50.9    DM type 2 causing CKD stage 5 ([facility]) E11.22, N18.5    Essential hypertension I10    CKD (chronic kidney disease) stage 4, GFR 15-29 ml/min ([facility]) N18.4    Coronary artery disease involving native coronary artery of native heart with angina pectoris ([facility]) I25.119    Anemia associated with chronic renal failure D63.1    Anemia of chronic kidney failure N18.9, D63.1    Coronary atherosclerosis I25.10    Diabetes mellitus ([address] E11.9    History of CVA (cerebrovascular accident) Z80.78    MI (myocardial infarction) ([address] I21.9    Cerebrovascular accident ([address] I63.9    Type 1 diabetes mellitus with hyperglycemia ([facility]) E10.65    Diabetes mellitus with hyperglycemia ([facility]) E11.65    Anemia in chronic kidney disease N18.9, D63.1    Symptomatic anemia D64.9    Bacterial pneumonia J15.9    Iron deficiency anemia D50.9    Encephalopathy G93.40    Severe malnutrition ([facility]) E43     <principal problem not specified>  4/10/2019  7:27 PM  Admission weight: 129 lb 8 oz (58.7 kg)  [id number]  [id number]  [id number]/[id number]  He has been referred by Dr [unfilled] for evaluation of    Patient has no complaints  Medication side effects: none  Patient is eating 100%    Review of Systems  Review of Systems - General ROS: negative   Psychological ROS: negative   Hematological and Lymphatic ROS: No history of blood clots or bleeding disorder. Respiratory ROS: no cough, shortness of breath, or wheezing   Cardiovascular ROS: no chest pain or dyspnea on exertion   Gastrointestinal ROS: no abdominal pain, change in bowel habits, or black or bloody stools   Genito-Urinary ROS: no dysuria, trouble voiding, or hematuria   Musculoskeletal ROS: negative   Neurological ROS: no TIA or stroke symptoms   Dermatological ROS: negative    Past Medical, Surgical, Family, Social and Allergy Histories:  I have reviewed the patient's medical history in detail and updated the computerized patient record. has a past medical history of Broken ankle, CAD (coronary artery disease), Cerebral artery occlusion with cerebral infarction [facility]), CHF (congestive heart failure) ([facility] Chronic kidney disease, Diabetes mellitus [facility] Hyperlipidemia, and Hypertension. has a past surgical history that includes Appendectomy and Cardiac surgery (Oct. 2015). reports that he quit smoking about 3 years ago. He has a 45.00 pack-year smoking history. He has quit using smokeless tobacco. He reports that he drinks about 2.4 oz of alcohol per week. He reports that he does not use drugs. family history includes Cancer in his sister; Diabetes in his mother. He was adopted.   Allergies   Allergen Reactions    Aranesp (Albumin Free) [Darbepoetin Eri] Hives     Current Facility-Administered Medications   Medication Dose Route Frequency Provider Last Rate Last Dose    [Held by provider] bumetanide (BUMEX) tablet 1 mg  1 mg Oral Daily Norma Garcia MD   1 mg at 04/13/19 0835    docusate sodium (COLACE) capsule 100 mg  100 mg Oral Daily Norma Garcia MD   100 mg at 04/13/19 1327    isosorbide mononitrate (IMDUR) extended release tablet 60 mg  60 mg Oral Daily Norma Garcia MD   60 mg at 04/12/19 2058    darbepoetin eri-polysorbate (ARANESP) injection 150 mcg  150 mcg Subcutaneous Once PAOLA Wood - SHAILESH        hydrALAZINE (APRESOLINE) tablet 100 mg  100 mg Oral TID Baldomero Hdez MD   100 mg at 04/13/19 0835    ferrous sulfate tablet 325 mg  325 mg Oral TID  Baldomero Hdez MD   325 mg at 04/13/19 1327    iron sucrose (VENOFER) 300 mg in sodium chloride 0.9 % 250 mL IVPB  300 mg Intravenous Q48H Joshua PAOLA Barraza - .7 mL/hr at 04/13/19 1237 300 mg at 04/13/19 1237    levothyroxine (SYNTHROID) tablet 200 mcg  200 mcg Oral Daily Yessenia Haq MD   200 mcg at 04/13/19 0559    insulin lispro (HUMALOG) injection vial 0-6 Units  0-6 Units Subcutaneous TID  Hudson James PA-C   3 Units at 04/13/19 9015    insulin lispro (HUMALOG) injection vial 0-3 Units  0-3 Units Subcutaneous Nightly Hudson James PA-C   3 Units at 04/12/19 2215    insulin glargine (LANTUS) injection vial 13 Units  13 Units Subcutaneous Nightly Hudson James PA-C   13 Units at 04/12/19 2059    aspirin chewable tablet 81 mg  81 mg Oral Daily Hudson James PA-C   81 mg at 04/12/19 2058    atorvastatin (LIPITOR) tablet 80 mg  80 mg Oral Daily Hudson James PA-C   80 mg at 04/12/19 2058    clopidogrel (PLAVIX) tablet 75 mg  75 mg Oral Daily Hudson James PA-C   75 mg at 04/12/19 2058    metoprolol tartrate (LOPRESSOR) tablet 50 mg  50 mg Oral BID Hudson James PA-C   50 mg at 04/13/19 0835    pantoprazole (PROTONIX) tablet 40 mg  40 mg Oral BID SUMMER Zaldivar PA-C   40 mg at 04/13/19 0559    sodium chloride flush 0.9 % injection 10 mL  10 mL Intravenous 2 times per day Hudson James PA-C   10 mL at 04/13/19 9557    sodium chloride flush 0.9 % injection 10 mL  10 mL Intravenous PRN Hudson James PA-C        magnesium hydroxide (MILK OF MAGNESIA) 400 MG/5ML suspension 30 mL  30 mL Oral Daily PRN Hudson James PA-C        ondansetron (ZOFRAN) injection 4 mg  4 mg Intravenous Q6H PRN Hudson James PA-C        acetaminophen (TYLENOL) tablet 650 mg  650 mg Oral Q4H PRN Mela Mederos PA-C        glucose (GLUTOSE) 40 % oral gel 15 Historical Provider, MD     Scheduled Meds:   [Held by provider] bumetanide  1 mg Oral Daily    docusate sodium  100 mg Oral Daily    isosorbide mononitrate  60 mg Oral Daily    darbepoetin eri-polysorbate  150 mcg Subcutaneous Once    hydrALAZINE  100 mg Oral TID    ferrous sulfate  325 mg Oral TID WC    iron sucrose  300 mg Intravenous Q48H    levothyroxine  200 mcg Oral Daily    insulin lispro  0-6 Units Subcutaneous TID WC    insulin lispro  0-3 Units Subcutaneous Nightly    insulin glargine  13 Units Subcutaneous Nightly    aspirin  81 mg Oral Daily    atorvastatin  80 mg Oral Daily    clopidogrel  75 mg Oral Daily    metoprolol tartrate  50 mg Oral BID    pantoprazole  40 mg Oral BID AC    sodium chloride flush  10 mL Intravenous 2 times per day     Continuous Infusions:   dextrose       PRN Meds:sodium chloride flush, magnesium hydroxide, ondansetron, acetaminophen, glucose, dextrose, glucagon (rDNA), dextrose    OBJECTIVE        Physical    VITALS:  BP (!) 177/84   Pulse 68   Temp 98 °F (36.7 °C) (Oral)   Resp 16   Ht 5' 6\" (1.676 m)   Wt 124 lb (56.2 kg)   SpO2 95%   BMI 20.01 kg/m²   CONSTITUTIONAL:  awake, alert, cooperative, no apparent distress, and appears stated age  LUNGS:  No increased work of breathing, good air exchange, clear to auscultation bilaterally, no crackles or wheezing  CARDIOVASCULAR:  Normal apical impulse, regular rate and rhythm, normal S1 and S2, no S3 or S4, and no murmur noted  ABDOMEN:  No scars, normal bowel sounds, soft, non-distended, non-tender, no masses palpated, no hepatosplenomegally    DATA  TSH:    Lab Results   Component Value Date    .400 04/11/2019   No components found for: FREE T4No components found for: FREET3  RADIOLOGYREPORTS:    Lab Review  @[email or web address]@  [unfilled]  Lab Results   Component Value Date    .400 (H) 04/11/2019    R1YYQFK 51 (L) 04/10/2019    T4FREE 0.68 (L) 04/13/2019     No results found for: FREET4  No results found for: TESTOSTERONE  CBC:  Lab Results   Component Value Date    WBC 5.6 04/10/2019    RBC 3.83 04/10/2019    HGB 8.0 04/10/2019    HCT 28.7 04/10/2019    MCV 74.9 04/10/2019    MCH 20.9 04/10/2019    MCHC 27.9 04/10/2019    RDW 16.2 04/09/2016     04/10/2019    MPV 10.7 04/10/2019     CMP:    Lab Results   Component Value Date     04/13/2019    K 5.0 04/13/2019    K 6.1 04/10/2019    CL 96 04/13/2019    CO2 21 04/13/2019    BUN 52 04/13/2019    CREATININE 4.0 04/13/2019    LABGLOM 16 04/13/2019    GLUCOSE 503 04/13/2019    PROT 6.6 04/10/2019    LABALBU 3.6 04/10/2019    CALCIUM 8.9 04/13/2019    BILITOT 0.6 04/10/2019    ALKPHOS 129 04/10/2019    AST 62 04/10/2019    ALT 38 04/10/2019     Hepatic Function Panel:    Lab Results   Component Value Date    ALKPHOS 129 04/10/2019    ALT 38 04/10/2019    AST 62 04/10/2019    PROT 6.6 04/10/2019    BILITOT 0.6 04/10/2019    BILIDIR 0.3 04/10/2019    LABALBU 3.6 04/10/2019     Magnesium:    Lab Results   Component Value Date    MG 1.9 11/25/2018     PT/INR:    Lab Results   Component Value Date    PROTIME 12.5 08/27/2018    INR 1.01 04/10/2019     HgBA1c:    Lab Results   Component Value Date    LABA1C 7.3 04/10/2019      No results for input(s): CKTOTAL, CKMB, CKMBINDEX, TROPONINI in the last 72 hours. FLP:  No results found for: TRIG, HDL, LDLCALC, LDLDIRECT, LABVLDL  DIET: DIET CARB CONTROL; Carb Control: 4 carb choices (60 gms)/meal; Low Sodium (2 GM);  Daily Fluid Restriction: 1500 ml  Dietary Nutrition Supplements: Renal Oral Supplement  Impression:    Hypothyroid  Diabetes Mellitus TYPE 1 UNCONTROLLED    ASSESSMENT AND PLAN    Start on Intensive Insulin Plan

## 2019-04-13 NOTE — PLAN OF CARE
Problem: Falls - Risk of:  Goal: Will remain free from falls  Description  Will remain free from falls  Outcome: Ongoing  Note:   Call light in reach, bed in lowest position, and bed alarm activated. Education given on use of call light before ambulation and when in need of assistance. Patient expressed understanding. Hourly visual checks performed and charted. Toileting offered to patient. No falls this shift, at any time. Arm band and falling star in place. Will continue to monitor. Goal: Absence of physical injury  Description  Absence of physical injury  Outcome: Ongoing     Problem: Cardiac:  Goal: Ability to maintain vital signs within normal range will improve  Description  Ability to maintain vital signs within normal range will improve  Outcome: Ongoing  Note:   Patients blood pressure will remain stable, on the higher side at this time. Blood pressure medications given. Will continue to monitor BP and HR per order. Vitals:    04/12/19 2045   BP: (!) 188/85   Pulse: 74   Resp: 16   Temp: 98.3 °F (36.8 °C)   SpO2: 96%         Problem: Serum Glucose Level - Abnormal:  Goal: Ability to maintain appropriate glucose levels has stabilized  Description  Ability to maintain appropriate glucose levels has stabilized  Outcome: Ongoing  Note:   Patients blood sugar is checked before breakfast, lunch, dinner, and bedtime. Blood glucose has been running high due to patient taking steroids. Insulin coverage when needed. Problem: Activity:  Goal: Risk for activity intolerance will decrease  Description  Risk for activity intolerance will decrease  Outcome: Ongoing  Note:   Patient is up with one assist, tolerating well at this time. No use of assistive devices.      Problem: Coping:  Goal: Ability to identify and develop effective coping behavior will improve  Description  Ability to identify and develop effective coping behavior will improve  Outcome: Ongoing     Problem: Nutritional:  Goal: Maintenance of adequate nutrition will be supported  Description  Maintenance of adequate nutrition will be supported  Outcome: Ongoing     Problem: Physical Regulation:  Goal: Complications related to the disease process, condition or treatment will be avoided or minimized  Description  Complications related to the disease process, condition or treatment will be avoided or minimized  Outcome: Ongoing     Problem: Discharge Planning:  Goal: Discharged to appropriate level of care  Description  Discharged to appropriate level of care  Outcome: Ongoing  Note:   Discharge planning in process and discussed with patient/family. Social work consulted for any additional needs. Care manager aware of discharge needs. Problem: Skin Integrity:  Goal: Will show no infection signs and symptoms  Description  Will show no infection signs and symptoms  Outcome: Ongoing  Note:   No signs of skin breakdown. Skin warm, dry, and intact. Mucous membranes pink and moist.  Assistance with turns/ambulation provided PRN. Will continue to monitor. Problem: Pain:  Goal: Pain level will decrease  Description  Pain level will decrease  Outcome: Ongoing  Note:   Patient able to use 0-10 pain scale. Denies pain at this time. Agreeable to take PRN pain medications. Problem: Nutrition  Goal: Optimal nutrition therapy  4/12/2019 2336 by Mic Perez RN  Outcome: Ongoing  Note:   Patient is on a low sodium diet and a fluid restriction. Tolerating well at this time. 4/12/2019 1011 by John Milner RD, LD  Outcome: Ongoing     Care plan reviewed with patient. Patient verbalize understanding of the plan of care and contribute to goal setting.

## 2019-04-14 LAB
ANION GAP SERPL CALCULATED.3IONS-SCNC: 13 MEQ/L (ref 8–16)
BUN BLDV-MCNC: 57 MG/DL (ref 7–22)
CALCIUM SERPL-MCNC: 8.5 MG/DL (ref 8.5–10.5)
CHLORIDE BLD-SCNC: 95 MEQ/L (ref 98–111)
CO2: 23 MEQ/L (ref 23–33)
CREAT SERPL-MCNC: 4.2 MG/DL (ref 0.4–1.2)
GFR SERPL CREATININE-BSD FRML MDRD: 15 ML/MIN/1.73M2
GLUCOSE BLD-MCNC: 193 MG/DL (ref 70–108)
GLUCOSE BLD-MCNC: 198 MG/DL (ref 70–108)
GLUCOSE BLD-MCNC: 226 MG/DL (ref 70–108)
GLUCOSE BLD-MCNC: 280 MG/DL (ref 70–108)
GLUCOSE BLD-MCNC: 351 MG/DL (ref 70–108)
GLUCOSE BLD-MCNC: 374 MG/DL (ref 70–108)
POTASSIUM SERPL-SCNC: 4.1 MEQ/L (ref 3.5–5.2)
SODIUM BLD-SCNC: 131 MEQ/L (ref 135–145)
T4 FREE: 0.71 NG/DL (ref 0.93–1.76)
THYROGLOBULIN AB: < 0.9 IU/ML (ref 0–4)
THYROID PEROXIDASE ANTIBODY: 752 IU/ML (ref 0–9)

## 2019-04-14 PROCEDURE — 2580000003 HC RX 258: Performed by: INTERNAL MEDICINE

## 2019-04-14 PROCEDURE — 82948 REAGENT STRIP/BLOOD GLUCOSE: CPT

## 2019-04-14 PROCEDURE — 6370000000 HC RX 637 (ALT 250 FOR IP): Performed by: INTERNAL MEDICINE

## 2019-04-14 PROCEDURE — 84439 ASSAY OF FREE THYROXINE: CPT

## 2019-04-14 PROCEDURE — 2580000003 HC RX 258: Performed by: PHYSICIAN ASSISTANT

## 2019-04-14 PROCEDURE — 36415 COLL VENOUS BLD VENIPUNCTURE: CPT

## 2019-04-14 PROCEDURE — 2709999900 HC NON-CHARGEABLE SUPPLY

## 2019-04-14 PROCEDURE — 6360000002 HC RX W HCPCS: Performed by: PHYSICIAN ASSISTANT

## 2019-04-14 PROCEDURE — 6370000000 HC RX 637 (ALT 250 FOR IP): Performed by: PHYSICIAN ASSISTANT

## 2019-04-14 PROCEDURE — 6370000000 HC RX 637 (ALT 250 FOR IP): Performed by: HOSPITALIST

## 2019-04-14 PROCEDURE — 99232 SBSQ HOSP IP/OBS MODERATE 35: CPT | Performed by: INTERNAL MEDICINE

## 2019-04-14 PROCEDURE — 99233 SBSQ HOSP IP/OBS HIGH 50: CPT | Performed by: HOSPITALIST

## 2019-04-14 PROCEDURE — 1200000000 HC SEMI PRIVATE

## 2019-04-14 PROCEDURE — 80048 BASIC METABOLIC PNL TOTAL CA: CPT

## 2019-04-14 RX ORDER — SODIUM CHLORIDE 9 MG/ML
INJECTION, SOLUTION INTRAVENOUS CONTINUOUS
Status: DISCONTINUED | OUTPATIENT
Start: 2019-04-14 | End: 2019-04-15

## 2019-04-14 RX ADMIN — FERROUS SULFATE TAB 325 MG (65 MG ELEMENTAL FE) 325 MG: 325 (65 FE) TAB at 08:03

## 2019-04-14 RX ADMIN — FERROUS SULFATE TAB 325 MG (65 MG ELEMENTAL FE) 325 MG: 325 (65 FE) TAB at 18:31

## 2019-04-14 RX ADMIN — INSULIN LISPRO 8 UNITS: 100 INJECTION, SOLUTION INTRAVENOUS; SUBCUTANEOUS at 18:31

## 2019-04-14 RX ADMIN — HYDRALAZINE HYDROCHLORIDE 100 MG: 50 TABLET, FILM COATED ORAL at 14:30

## 2019-04-14 RX ADMIN — INSULIN LISPRO 6 UNITS: 100 INJECTION, SOLUTION INTRAVENOUS; SUBCUTANEOUS at 14:32

## 2019-04-14 RX ADMIN — ASPIRIN 81 MG 81 MG: 81 TABLET ORAL at 22:12

## 2019-04-14 RX ADMIN — INSULIN LISPRO 2 UNITS: 100 INJECTION, SOLUTION INTRAVENOUS; SUBCUTANEOUS at 18:31

## 2019-04-14 RX ADMIN — INSULIN LISPRO 1 UNITS: 100 INJECTION, SOLUTION INTRAVENOUS; SUBCUTANEOUS at 22:09

## 2019-04-14 RX ADMIN — DOCUSATE SODIUM 100 MG: 100 CAPSULE, LIQUID FILLED ORAL at 08:10

## 2019-04-14 RX ADMIN — ATORVASTATIN CALCIUM 80 MG: 80 TABLET, FILM COATED ORAL at 22:12

## 2019-04-14 RX ADMIN — METOPROLOL TARTRATE 50 MG: 50 TABLET, FILM COATED ORAL at 08:03

## 2019-04-14 RX ADMIN — SODIUM CHLORIDE: 9 INJECTION, SOLUTION INTRAVENOUS at 12:41

## 2019-04-14 RX ADMIN — INSULIN LISPRO 5 UNITS: 100 INJECTION, SOLUTION INTRAVENOUS; SUBCUTANEOUS at 09:40

## 2019-04-14 RX ADMIN — SODIUM CHLORIDE: 9 INJECTION, SOLUTION INTRAVENOUS at 17:34

## 2019-04-14 RX ADMIN — LEVOTHYROXINE SODIUM 200 MCG: 100 TABLET ORAL at 06:26

## 2019-04-14 RX ADMIN — CLONIDINE HYDROCHLORIDE 0.1 MG: 0.1 TABLET ORAL at 08:03

## 2019-04-14 RX ADMIN — HYDRALAZINE HYDROCHLORIDE 100 MG: 50 TABLET, FILM COATED ORAL at 08:03

## 2019-04-14 RX ADMIN — INSULIN LISPRO 2 UNITS: 100 INJECTION, SOLUTION INTRAVENOUS; SUBCUTANEOUS at 18:36

## 2019-04-14 RX ADMIN — CLOPIDOGREL BISULFATE 75 MG: 75 TABLET, FILM COATED ORAL at 22:12

## 2019-04-14 RX ADMIN — ACETAMINOPHEN 650 MG: 325 TABLET ORAL at 22:12

## 2019-04-14 RX ADMIN — Medication 10 ML: at 12:41

## 2019-04-14 RX ADMIN — INSULIN GLARGINE 20 UNITS: 100 INJECTION, SOLUTION SUBCUTANEOUS at 08:03

## 2019-04-14 RX ADMIN — ONDANSETRON 4 MG: 2 INJECTION INTRAMUSCULAR; INTRAVENOUS at 22:12

## 2019-04-14 RX ADMIN — PANTOPRAZOLE SODIUM 40 MG: 40 TABLET, DELAYED RELEASE ORAL at 06:27

## 2019-04-14 RX ADMIN — PANTOPRAZOLE SODIUM 40 MG: 40 TABLET, DELAYED RELEASE ORAL at 18:31

## 2019-04-14 RX ADMIN — FERROUS SULFATE TAB 325 MG (65 MG ELEMENTAL FE) 325 MG: 325 (65 FE) TAB at 14:29

## 2019-04-14 RX ADMIN — INSULIN LISPRO 3 UNITS: 100 INJECTION, SOLUTION INTRAVENOUS; SUBCUTANEOUS at 09:39

## 2019-04-14 ASSESSMENT — PAIN SCALES - GENERAL
PAINLEVEL_OUTOF10: 0
PAINLEVEL_OUTOF10: 0
PAINLEVEL_OUTOF10: 1
PAINLEVEL_OUTOF10: 0
PAINLEVEL_OUTOF10: 0

## 2019-04-14 NOTE — PROGRESS NOTES
[facility]         Renal Inpatient Follow-Up note         4/14/2019 10:39 AM    Pt Name:   Linda Kendall  YOB: 1965  Attending:   Rosalio Marion MD    Chief Complaint : Linda Kendall is a 46 y.o. male being followed by nephrology for acute kidney injury/chronic kidney disease and fluid overload    Interval History :   Patient seen and examined by me. No distress  Feels well and denies any complaints no shortness of breath. Edema much better . Sherl Daring to go home. Scheduled Medications :    [Held by provider] bumetanide  1 mg Oral Daily    docusate sodium  100 mg Oral Daily    insulin glargine  20 Units Subcutaneous Daily    insulin lispro  5-20 Units Subcutaneous TID WC    cloNIDine  0.1 mg Oral BID    isosorbide mononitrate  60 mg Oral Daily    hydrALAZINE  100 mg Oral TID    ferrous sulfate  325 mg Oral TID WC    iron sucrose  300 mg Intravenous Q48H    levothyroxine  200 mcg Oral Daily    insulin lispro  0-6 Units Subcutaneous TID WC    insulin lispro  0-3 Units Subcutaneous Nightly    aspirin  81 mg Oral Daily    atorvastatin  80 mg Oral Daily    clopidogrel  75 mg Oral Daily    metoprolol tartrate  50 mg Oral BID    pantoprazole  40 mg Oral BID AC    sodium chloride flush  10 mL Intravenous 2 times per day      dextrose         Vitals :  BP (!) 119/59   Pulse 57   Temp 97.7 °F (36.5 °C) (Oral)   Resp 16   Ht 5' 6\" (1.676 m)   Wt 122 lb 9.6 oz (55.6 kg)   SpO2 92%   BMI 19.79 kg/m²     24HR INTAKE/OUTPUT:      Intake/Output Summary (Last 24 hours) at 4/14/2019 1039  Last data filed at 4/14/2019 0342  Gross per 24 hour   Intake 964 ml   Output --   Net 964 ml     Last 3 weights  Wt Readings from Last 3 Encounters:   04/14/19 122 lb 9.6 oz (55.6 kg)   04/10/19 135 lb (61.2 kg)   01/16/19 126 lb (57.2 kg)           Physical Exam :  General Appearance:  Well developed.  No distress  Mouth/Throat:  Oral mucosa moist  Neck:  Supple, no JVD  Lungs:  Breath sounds: clear  Heart[de-identified]  S1,S2 heard  Abdomen:  Soft, non - tender  Musculoskeletal:  Edema - no significant edema noted          Last 3 CBC   No results for input(s): WBC, RBC, HGB, HCT, PLT in the last 72 hours. Last 3 CMP  Recent Labs     04/12/19  0357 04/13/19  0427 04/14/19  0410    129* 131*   K 4.9 5.0 4.1    96* 95*   CO2 19* 21* 23   BUN 39* 52* 57*   CREATININE 3.6* 4.0* 4.2*   CALCIUM 8.8 8.9 8.5             Assessment / Plan   Renal - acute kidney injury on chronic kidney disease stage IV mostly due to diuresis  - At this time we will hold the diuretics and monitor the renal function. Creat worsening  - [place] of IVf 1 L today and BMP in the morning    Mild hyponatremia secondary to renal dysfunction and inability to excrete free water, slightly better  Mild acidosis - better  Anemia of chronic kidney disease and iron deficiency status post venofer  Fluid overload clinically looking much better  Significant hypothyroidism  D/W patient,  meds reviewed    MALICK Darling D.  [facility].

## 2019-04-14 NOTE — PLAN OF CARE
Problem: Falls - Risk of:  Goal: Will remain free from falls  Description  Will remain free from falls  Outcome: Ongoing  Note:   Call light in reach, bed in lowest position, and bed alarm activated. Education given on use of call light before ambulation and when in need of assistance. Patient expressed understanding. Hourly visual checks performed and charted. Toileting offered to patient. No falls this shift, at any time. Arm band and falling star in place. Will continue to monitor. Goal: Absence of physical injury  Description  Absence of physical injury  Outcome: Ongoing     Problem: Cardiac:  Goal: Ability to maintain vital signs within normal range will improve  Description  Ability to maintain vital signs within normal range will improve  Outcome: Ongoing  Note:   Patients blood pressure will remain stable, on the higher side at this time. Blood pressure medications given. Will continue to monitor BP and HR per order.      Vitals:    04/13/19 2045   BP: (!) 197/95   Pulse: 67   Resp: 18   Temp: 97.8 °F (36.6 °C)   SpO2: 97%         Problem: Serum Glucose Level - Abnormal:  Goal: Ability to maintain appropriate glucose levels has stabilized  Description  Ability to maintain appropriate glucose levels has stabilized  Outcome: Ongoing  Note:   Patients blood sugar is checked before breakfast, lunch, dinner, and bedtime. Blood glucose has been running high due to patient taking steroids. Insulin coverage when needed. Lantus was increased from 13 units to 20 units. Will continue to monitor blood sugar closely. Problem: Activity:  Goal: Risk for activity intolerance will decrease  Description  Risk for activity intolerance will decrease  Outcome: Ongoing  Note:   Patient is up with one assist, tolerating well at this time. No use of assistive devices.      Problem: Coping:  Goal: Ability to identify and develop effective coping behavior will improve  Description  Ability to identify and develop effective coping behavior will improve  Outcome: Ongoing     Problem: Nutritional:  Goal: Maintenance of adequate nutrition will be supported  Description  Maintenance of adequate nutrition will be supported  Outcome: Ongoing     Problem: Physical Regulation:  Goal: Complications related to the disease process, condition or treatment will be avoided or minimized  Description  Complications related to the disease process, condition or treatment will be avoided or minimized  Outcome: Ongoing     Problem: Discharge Planning:  Goal: Discharged to appropriate level of care  Description  Discharged to appropriate level of care  Outcome: Ongoing  Note:   Discharge planning in process and discussed with patient/family. Social work consulted for any additional needs. Care manager aware of discharge needs. Problem: Skin Integrity:  Goal: Will show no infection signs and symptoms  Description  Will show no infection signs and symptoms  Outcome: Ongoing  Note:   No signs of skin breakdown. Skin warm, dry, and intact. Mucous membranes pink and moist.  Assistance with turns/ambulation provided PRN. Will continue to monitor. Problem: Pain:  Goal: Pain level will decrease  Description  Pain level will decrease  Outcome: Ongoing  Note:   Patient able to use 0-10 pain scale. Denies pain at this time. Agreeable to take PRN pain medications. Care plan reviewed with patient. Patient verbalize understanding of the plan of care and contribute to goal setting.

## 2019-04-14 NOTE — PROGRESS NOTES
Hospitalist Progress Note    Patient:  Anselmo Madrigal Hand      Unit/Bed:4A-08/008-A    YOB: 1965    MRN: [id number]       Acct: [de-identified]     PCP: PAOLA Delacruz CNP    Date of Admission: 4/10/2019    Active Hospital Problems    Diagnosis Date Noted    Severe malnutrition ([facility] [E43] 04/12/2019     Class: Acute    Encephalopathy [G93.40] 04/10/2019    Anemia in chronic kidney disease [N18.9, D63.1] 11/24/2018    CKD (chronic kidney disease) stage 4, GFR 15-29 ml/min ([facility]) [N18.4] 04/09/2016    CHF (congestive heart failure) ([facility] [I50.9] 04/09/2016    Anemia associated with chronic renal failure [D63.1] 04/09/2016    Coronary artery disease involving native coronary artery of native heart with angina pectoris ([facility] [I25.119] 04/09/2016    Type 1 diabetes mellitus with hyperglycemia ([facility] [E10.65] 03/29/2016       Assessment/Plan:    - Acute decompensated CHF: likely diastolic; last 2D echo from 11/2018 showed preserved LVEF/sstolic function. . ProBNP [id number]. D/c'ed IVF at 75 cc/hr that pt started on overnight. Started on bumex IV 1 mg BID. Also placed on fluid/salt restriction, daily Wt and I/O monitoring. Noted cardio consulted by overnight team.    4/12: SOB improved. +++ diuresis. Switched to Po bumex 1 mg BID now.  4/13: decreased bumex to 1 mg QD now. Will reassess      4/14: bumex held today d/t worsening A/CKD. Cr 4.2 today. Also trial of fluid challenge. Will reassess response. No indication for RRT. - CKD stage IV: Cr 3.0 close to baseline  4/12: Cr up to 3.6 likely d/t aggressive diuresis. 4/13: Cr further up to 4.0 today. Decreased bumex dose. No indication for urgent RRT. Will monitor lytes  4/14: as above    - Hypothyroidism: .6 will repeat first. If accurate, severe hypothyroidism could also lead to cardiomyopathy and myxedema. Pt on Levothyroxine 25 mcg which we increased (slowly) to 50 now.  Also would start on Dexamethasone 4 mg q8h for now until associated adrenal insufficiency is ruled out. (hypoK and hypoNa Although pt's not hypotensive, there is unlikely associated Addisonian crisis)      4/12: consulted endocrine last night. Pt now on 200 mcg QD levothyroxine. Will repeat free T4 and likely reduce dose accordingly. Will continue with Dexa for now. 4/13; free T4 up to 0.68 now. [facility]. Will monitor free T4 levels to monitor response to treatment. D/c'ed Dex today as pt clearly not in adrenal insufficiency. 4/14: CCM    - Encephaopathy? Likely metabolic. 4/12: resolved    - HyperK: likely d/t decreased GFR. K down to 5.3 from 6.1 after Insulin R/D50W. Also given Kayexalate overnight. Will monitor BMP  4/12: resolved K 4.9 today  4/14: K 4.1    - Metabolic acidosis 2nd to CKD   4/12: improving  4/13: resolved. K 5.0 will monitor  4/14: Resolved. - Diabetes Mellitus Type I: A1C 7.3%. 4/12: BS uncontrolled. Pt refusing treatment and low-carb diabetic diet. With RN in room explained risks of both hypo- and hyperglycemia. Reemphasized that hypoglycemia can be potentially fatal. Pt wishes to manage his own insulin intake. He is now back on his standing home Glargine (13 U) plus SSI    4/13: BS in 400s range. Once again with RN in room went over issue of poorly controlled hyperglycemia. Pt adamant to allow me manage BS. He eats and snacks on what he wants and wishes to direct RN to give him whatever dose he is asking for. Also explained concurrent steroids also attributing to hyperglycemia. Steroids d/c'ed now. 4/14: agreed to MDI insulin. BS slightly improved. Will allow endocrine manage this. - HypoNa: Na 129. When corrected for concomitant hyperglycemia , Na 135. Will  Monitor  4/14: Na 131 today with concomitant  with corrected Na 135. Will monitor.    - Essential HTN: uncontrolled. back on home meds. Will reassess response  4/12: BP still uncontrolled. Increased imdur to 60 followed by Hydralazine to 100 from 50 TID.  Will reassess    4/13: BP symmetrical reflexes. NL gait and stance. NL Cerebellar exam. Power 5/5 all muscle groups U/L extremities. Toes downgoing  Capillary Refill: Brisk,< 3 seconds   Peripheral Pulses: +2 palpable, equal bilaterally         Labs:   No results for input(s): WBC, HGB, HCT, PLT in the last 72 hours. Recent Labs     04/12/19  0357 04/13/19  0427 04/14/19  0410    129* 131*   K 4.9 5.0 4.1    96* 95*   CO2 19* 21* 23   BUN 39* 52* 57*   CREATININE 3.6* 4.0* 4.2*   CALCIUM 8.8 8.9 8.5     No results for input(s): AST, ALT, BILIDIR, BILITOT, ALKPHOS in the last 72 hours. No results for input(s): INR in the last 72 hours. No results for input(s): [place] Reasoner in the last 72 hours. Urinalysis:      Lab Results   Component Value Date    NITRU NEGATIVE 04/10/2019    WBCUA NONE SEEN 04/10/2019    BACTERIA NONE 04/10/2019    RBCUA 3-5 04/10/2019    BLOODU SMALL 04/10/2019    GLUCOSEU NEGATIVE 04/10/2019       Radiology:  Ct Abdomen Pelvis Wo Contrast Additional Contrast? None    Result Date: 4/10/2019  PROCEDURE: CT ABDOMEN PELVIS WO CONTRAST CLINICAL INFORMATION: abdominal distension with history of CKD . COMPARISON: April 9, 2016 TECHNIQUE: Axial 5 mm CT images were obtained through the abdomen and pelvis. No contrast was given. Coronal reconstructions were obtained. All CT scans at this facility use dose modulation, iterative reconstruction, and/or weight-based dosing when appropriate to reduce radiation dose to as low as reasonably achievable. FINDINGS:  The heart appears enlarged. There is a moderate right effusion with compressive atelectasis of the right lower lobe. Changes of mild COPD are present. The noncontrast appearance of liver, gallbladder, pancreas and spleen are negative for active appearing pathology. There is mild bilateral perinephric stranding. There is distention of the urinary bladder. Correlate with urinalysis to exclude cystitis and superimposed nephritis.  Punctate bilateral nonobstructing renal calculi are present. Small bowel is nondistended. Mild luminal stasis is present correlate with mild enteritis. There is abundant retention of stool throughout colon compatible with moderate severe constipation. There is mild mucosal thickening of the anorectal region. Changes of proctitis are not excluded. There is no acute appearing pathology of the skeleton. There are degenerative disc changes at L5-S1 with minimal retrolisthesis of L5 on S1. There is scattered atherosclerosis of the aorta without aneurysm. 1. Abundant retention of stool throughout colon compatible with constipation. 2. Bilateral nonobstructing renal calculi with nonspecific perinephric stranding. Correlate with urinalysis to exclude changes of nephritis. 3. Moderate right effusion with lower lobe atelectasis. Underlying infiltrate is not excluded. **This report has been created using voice recognition software. It may contain minor errors which are inherent in voice recognition technology. ** Final report electronically signed by Dr. Chip Troy on 4/10/2019 2:21 AM    Xr Chest Standard (2 Vw)    Result Date: 4/10/2019  PROCEDURE: XR CHEST (2 VW) CLINICAL INFORMATION: leg edema with shortness of breath. COMPARISON: No prior study. TECHNIQUE: PA and lateral views the chest. FINDINGS: There is stable mild cardiac enlargement with coarse lung markings. There is no focal infiltrate. Trace right effusion cannot be excluded. There is no evidence of congestive failure. 1. Stable cardiac enlargement with diffuse coarse markings. 2. Persistent minimal blunting of the right costophrenic angle. Small effusion versus chronic subpleural changes are present. **This report has been created using voice recognition software. It may contain minor errors which are inherent in voice recognition technology. ** Final report electronically signed by Dr. Chip Troy on 4/10/2019 2:48 AM      Diet: DIET CARB CONTROL; Carb Control: 4 carb choices (60 gms)/meal; Low Sodium (2 GM); Daily Fluid Restriction: 1500 ml  Dietary Nutrition Supplements: Renal Oral Supplement    DVT prophylaxis: ? Lovenox                                 ? SCDs                                 ? SQ Heparin                                 ? Encourage ambulation           ? Already on Anticoagulation       Code Status: Full Code      Active Hospital Problems    Diagnosis Date Noted    Severe malnutrition ([facility] [E43] 04/12/2019     Class: Acute    Encephalopathy [G93.40] 04/10/2019    Anemia in chronic kidney disease [N18.9, D63.1] 11/24/2018    CKD (chronic kidney disease) stage 4, GFR 15-29 ml/min ([facility]) [N18.4] 04/09/2016    CHF (congestive heart failure) ([facility] [I50.9] 04/09/2016    Anemia associated with chronic renal failure [D63.1] 04/09/2016    Coronary artery disease involving native coronary artery of native heart with angina pectoris ([facility] [I25.119] 04/09/2016    Type 1 diabetes mellitus with hyperglycemia ([facility] [E10.65] 03/29/2016           Patient was updated about the treatment plan, all the questions and concerns were addressed.         Electronically signed by Seth Cuello MD on 4/14/2019 at 1:06 PM

## 2019-04-14 NOTE — PROGRESS NOTES
Department of Internal Medicine  Section of Endocrinology   [address]    [address] , [address], [address]  Office Phone [address]  ([address])  ([address])  [address] Pradip Fuentes MD, Fellow of [employer] of Endocrinology   Progress Note    Admit Date: 4/10/2019    Reviewed the chart of the last 24 hours:   SUBJECTIVE:   No chief complaint on file. Encephalopathy [G93.40]   Patient Active Problem List   Diagnosis Code    CHF (congestive heart failure) ([facility]) I50.9    DM type 2 causing CKD stage 5 ([facility]) E11.22, N18.5    Essential hypertension I10    CKD (chronic kidney disease) stage 4, GFR 15-29 ml/min ([facility]) N18.4    Coronary artery disease involving native coronary artery of native heart with angina pectoris ([facility]) I25.119    Anemia associated with chronic renal failure D63.1    Anemia of chronic kidney failure N18.9, D63.1    Coronary atherosclerosis I25.10    Diabetes mellitus ([address] E11.9    History of CVA (cerebrovascular accident) Z80.78    MI (myocardial infarction) ([address] I21.9    Cerebrovascular accident ([address] I63.9    Type 1 diabetes mellitus with hyperglycemia ([facility]) E10.65    Diabetes mellitus with hyperglycemia ([facility]) E11.65    Anemia in chronic kidney disease N18.9, D63.1    Symptomatic anemia D64.9    Bacterial pneumonia J15.9    Iron deficiency anemia D50.9    Encephalopathy G93.40    Severe malnutrition ([address] E43     <principal problem not specified>  4/10/2019  7:27 PM  Admission weight: 129 lb 8 oz (58.7 kg)  [id number]  [id number]  4A-08/008-A    Patient has no complaints. Medication side effects: none  Patient is eating 100%    Review of Systems  Review of Systems - General ROS: negative   Psychological ROS: negative   Hematological and Lymphatic ROS: No history of blood clots or bleeding disorder.    Respiratory ROS: no cough, shortness of breath, or wheezing   Cardiovascular ROS: no chest pain or dyspnea on exertion   Gastrointestinal ROS: no abdominal pain, change in bowel habits, or black or bloody stools   Genito-Urinary ROS: no dysuria, trouble voiding, or hematuria   Musculoskeletal ROS: negative   Neurological ROS: no TIA or stroke symptoms   Dermatological ROS: negative    Past Medical, Surgical, Family, Social and Allergy Histories:  I have reviewed the patient's medical history in detail and updated the computerized patient record. has a past medical history of Broken ankle, CAD (coronary artery disease), Cerebral artery occlusion with cerebral infarction [facility]), CHF (congestive heart failure) ([facility] Chronic kidney disease, Diabetes mellitus ([facility] Hyperlipidemia, and Hypertension. has a past surgical history that includes Appendectomy and Cardiac surgery (Oct. 2015). reports that he quit smoking about 3 years ago. He has a 45.00 pack-year smoking history. He has quit using smokeless tobacco. He reports that he drinks about 2.4 oz of alcohol per week. He reports that he does not use drugs. family history includes Cancer in his sister; Diabetes in his mother. He was adopted.   Allergies   Allergen Reactions    Aranesp (Albumin Free) [Darbepoetin Damon] Hives     Current Facility-Administered Medications   Medication Dose Route Frequency Provider Last Rate Last Dose    0.9 % sodium chloride infusion   Intravenous Continuous Nicole Hinson MD 50 mL/hr at 04/14/19 1241      [Held by provider] bumetanide (BUMEX) tablet 1 mg  1 mg Oral Daily Reanna Merino MD   1 mg at 04/13/19 0835    docusate sodium (COLACE) capsule 100 mg  100 mg Oral Daily Reanna Merino MD   100 mg at 04/14/19 0810    insulin glargine (LANTUS) injection vial 20 Units  20 Units Subcutaneous Daily Kofi Tracey MD   20 Units at 04/14/19 0803    insulin lispro (HUMALOG) injection vial 5-20 Units  5-20 Units Subcutaneous TID  Kofi Morejon MD Alexy   6 Units at 04/14/19 1432    cloNIDine (CATAPRES) tablet 0.1 mg  0.1 mg Oral BID Jordy Langford MD   0.1 mg at 04/14/19 0803    isosorbide mononitrate (IMDUR) extended release tablet 60 mg  60 mg Oral Daily Jordy Langford MD   60 mg at 04/13/19 2124    hydrALAZINE (APRESOLINE) tablet 100 mg  100 mg Oral TID Jordy Langford MD   100 mg at 04/14/19 1430    ferrous sulfate tablet 325 mg  325 mg Oral TID  Jordy Langford MD   325 mg at 04/14/19 1429    iron sucrose (VENOFER) 300 mg in sodium chloride 0.9 % 250 mL IVPB  300 mg Intravenous Q48H PAOLA Bowman - CNP   Stopped at 04/13/19 1407    levothyroxine (SYNTHROID) tablet 200 mcg  200 mcg Oral Daily Adri Chi MD   200 mcg at 04/14/19 0626    insulin lispro (HUMALOG) injection vial 0-6 Units  0-6 Units Subcutaneous TID  Wally Upton PA-C   3 Units at 04/14/19 9406    insulin lispro (HUMALOG) injection vial 0-3 Units  0-3 Units Subcutaneous Nightly Wally Upton PA-C   3 Units at 04/13/19 2126    aspirin chewable tablet 81 mg  81 mg Oral Daily Wally Upton PA-C   81 mg at 04/13/19 2125    atorvastatin (LIPITOR) tablet 80 mg  80 mg Oral Daily Wally Upton PA-C   80 mg at 04/13/19 2125    clopidogrel (PLAVIX) tablet 75 mg  75 mg Oral Daily Wally Upton PA-C   75 mg at 04/13/19 2125    metoprolol tartrate (LOPRESSOR) tablet 50 mg  50 mg Oral BID Wally Upton PA-C   50 mg at 04/14/19 0803    pantoprazole (PROTONIX) tablet 40 mg  40 mg Oral BID SUMMER Francisco PA-C   40 mg at 04/14/19 9743    sodium chloride flush 0.9 % injection 10 mL  10 mL Intravenous 2 times per day Wally Upton PA-C   10 mL at 04/14/19 1241    sodium chloride flush 0.9 % injection 10 mL  10 mL Intravenous PRN Wally Upton PA-C        magnesium hydroxide (MILK OF MAGNESIA) 400 MG/5ML suspension 30 mL  30 mL Oral Daily PRN Wally Upton PA-C        ondansetron (ZOFRAN) injection 4 mg  4 mg Intravenous Q6H PRN Wally Upton PA-C hepatosplenomegally    DATA  TSH:    Lab Results   Component Value Date    .400 04/11/2019   No components found for: FREE T4No components found for: FREET3  RADIOLOGYREPORTS:    Lab Review  @[email or web address]@  [unfilled]  Lab Results   Component Value Date    .400 (H) 04/11/2019    Y6VVKTO 51 (L) 04/10/2019    T4FREE 0.71 (L) 04/14/2019     No results found for: FREET4  No results found for: TESTOSTERONE  CBC:  Lab Results   Component Value Date    WBC 5.6 04/10/2019    RBC 3.83 04/10/2019    HGB 8.0 04/10/2019    HCT 28.7 04/10/2019    MCV 74.9 04/10/2019    MCH 20.9 04/10/2019    MCHC 27.9 04/10/2019    RDW 16.2 04/09/2016     04/10/2019    MPV 10.7 04/10/2019     CMP:    Lab Results   Component Value Date     04/14/2019    K 4.1 04/14/2019    K 6.1 04/10/2019    CL 95 04/14/2019    CO2 23 04/14/2019    BUN 57 04/14/2019    CREATININE 4.2 04/14/2019    LABGLOM 15 04/14/2019    GLUCOSE 351 04/14/2019    PROT 6.6 04/10/2019    LABALBU 3.6 04/10/2019    CALCIUM 8.5 04/14/2019    BILITOT 0.6 04/10/2019    ALKPHOS 129 04/10/2019    AST 62 04/10/2019    ALT 38 04/10/2019     Hepatic Function Panel:    Lab Results   Component Value Date    ALKPHOS 129 04/10/2019    ALT 38 04/10/2019    AST 62 04/10/2019    PROT 6.6 04/10/2019    BILITOT 0.6 04/10/2019    BILIDIR 0.3 04/10/2019    LABALBU 3.6 04/10/2019     Magnesium:    Lab Results   Component Value Date    MG 1.9 11/25/2018     PT/INR:    Lab Results   Component Value Date    PROTIME 12.5 08/27/2018    INR 1.01 04/10/2019     HgBA1c:    Lab Results   Component Value Date    LABA1C 7.3 04/10/2019    Results for HAND, Natasha Elver (MRN [id number]) as of 4/14/2019 16:43   Ref.  Range 4/13/2019 23:29 4/14/2019 03:42 4/14/2019 04:10 4/14/2019 08:26 4/14/2019 12:53   POC Glucose Latest Ref Range: 70 - 108 mg/dl 321 (H) 374 (H)  280 (H) 198 (H)   T4 Free Latest Ref Range: 0.93 - 1.76 ng/dL   0.71 (L)       No results for input(s): CKTOTAL, CKMB, Marshall Valencia in the last 72 hours. FLP:  No results found for: TRIG, HDL, LDLCALC, LDLDIRECT, LABVLDL  DIET: DIET CARB CONTROL; Carb Control: 4 carb choices (60 gms)/meal; Low Sodium (2 GM);  Daily Fluid Restriction: 1500 ml  Dietary Nutrition Supplements: Renal Oral Supplement  Impression:    Blood sugar < 200 mg  His maintenance dose of thyroid is Levothyroid 100 mcg daily  His Creatinine is getting worst. He is getting hydrated    ASSESSMENT AND PLAN

## 2019-04-14 NOTE — PLAN OF CARE
treatment will be avoided or minimized  Description  Complications related to the disease process, condition or treatment will be avoided or minimized  Outcome: Ongoing  Note:   Will stay here another day to try to prevent further damage to kidneys. Problem: Discharge Planning:  Goal: Discharged to appropriate level of care  Description  Discharged to appropriate level of care  Outcome: Ongoing  Note:   Planning home at discharge with family     Problem: Skin Integrity:  Goal: Will show no infection signs and symptoms  Description  Will show no infection signs and symptoms  Outcome: Ongoing  Note:   Vs wnl. Afebrile. No wbc drawn today   Care plan reviewed with patient who verbalizes understanding of the plan of care and contribute to goal setting.

## 2019-04-15 ENCOUNTER — APPOINTMENT (OUTPATIENT)
Dept: GENERAL RADIOLOGY | Age: 54
DRG: 291 | End: 2019-04-15
Attending: FAMILY MEDICINE
Payer: COMMERCIAL

## 2019-04-15 LAB
ANION GAP SERPL CALCULATED.3IONS-SCNC: 14 MEQ/L (ref 8–16)
BUN BLDV-MCNC: 61 MG/DL (ref 7–22)
CALCIUM SERPL-MCNC: 8.5 MG/DL (ref 8.5–10.5)
CHLORIDE BLD-SCNC: 100 MEQ/L (ref 98–111)
CO2: 23 MEQ/L (ref 23–33)
CREAT SERPL-MCNC: 4.5 MG/DL (ref 0.4–1.2)
GFR SERPL CREATININE-BSD FRML MDRD: 14 ML/MIN/1.73M2
GLUCOSE BLD-MCNC: 134 MG/DL (ref 70–108)
GLUCOSE BLD-MCNC: 159 MG/DL (ref 70–108)
GLUCOSE BLD-MCNC: 180 MG/DL (ref 70–108)
GLUCOSE BLD-MCNC: 44 MG/DL (ref 70–108)
GLUCOSE BLD-MCNC: 52 MG/DL (ref 70–108)
GLUCOSE BLD-MCNC: 62 MG/DL (ref 70–108)
GLUCOSE BLD-MCNC: 96 MG/DL (ref 70–108)
GLUCOSE BLD-MCNC: 99 MG/DL (ref 70–108)
GLUCOSE BLD-MCNC: 99 MG/DL (ref 70–108)
POTASSIUM SERPL-SCNC: 4.9 MEQ/L (ref 3.5–5.2)
PRO-BNP: ABNORMAL PG/ML (ref 0–900)
SODIUM BLD-SCNC: 137 MEQ/L (ref 135–145)

## 2019-04-15 PROCEDURE — 6370000000 HC RX 637 (ALT 250 FOR IP): Performed by: HOSPITALIST

## 2019-04-15 PROCEDURE — 6370000000 HC RX 637 (ALT 250 FOR IP): Performed by: PHYSICIAN ASSISTANT

## 2019-04-15 PROCEDURE — 99233 SBSQ HOSP IP/OBS HIGH 50: CPT | Performed by: HOSPITALIST

## 2019-04-15 PROCEDURE — 6360000002 HC RX W HCPCS: Performed by: NURSE PRACTITIONER

## 2019-04-15 PROCEDURE — 99232 SBSQ HOSP IP/OBS MODERATE 35: CPT | Performed by: NURSE PRACTITIONER

## 2019-04-15 PROCEDURE — 36415 COLL VENOUS BLD VENIPUNCTURE: CPT

## 2019-04-15 PROCEDURE — 2709999900 HC NON-CHARGEABLE SUPPLY

## 2019-04-15 PROCEDURE — 80048 BASIC METABOLIC PNL TOTAL CA: CPT

## 2019-04-15 PROCEDURE — 1200000000 HC SEMI PRIVATE

## 2019-04-15 PROCEDURE — 82948 REAGENT STRIP/BLOOD GLUCOSE: CPT

## 2019-04-15 PROCEDURE — 6370000000 HC RX 637 (ALT 250 FOR IP): Performed by: NURSE PRACTITIONER

## 2019-04-15 PROCEDURE — 71045 X-RAY EXAM CHEST 1 VIEW: CPT

## 2019-04-15 PROCEDURE — 2580000003 HC RX 258: Performed by: NURSE PRACTITIONER

## 2019-04-15 PROCEDURE — 6370000000 HC RX 637 (ALT 250 FOR IP): Performed by: INTERNAL MEDICINE

## 2019-04-15 PROCEDURE — 2580000003 HC RX 258: Performed by: PHYSICIAN ASSISTANT

## 2019-04-15 PROCEDURE — 83880 ASSAY OF NATRIURETIC PEPTIDE: CPT

## 2019-04-15 RX ORDER — LEVOTHYROXINE SODIUM 0.1 MG/1
100 TABLET ORAL DAILY
Status: DISCONTINUED | OUTPATIENT
Start: 2019-04-16 | End: 2019-04-18 | Stop reason: HOSPADM

## 2019-04-15 RX ORDER — HYDRALAZINE HYDROCHLORIDE 50 MG/1
50 TABLET, FILM COATED ORAL 3 TIMES DAILY
Status: DISCONTINUED | OUTPATIENT
Start: 2019-04-15 | End: 2019-04-18 | Stop reason: HOSPADM

## 2019-04-15 RX ORDER — FAMOTIDINE 20 MG/1
20 TABLET, FILM COATED ORAL DAILY
Status: DISCONTINUED | OUTPATIENT
Start: 2019-04-16 | End: 2019-04-18 | Stop reason: HOSPADM

## 2019-04-15 RX ADMIN — IRON SUCROSE 300 MG: 20 INJECTION, SOLUTION INTRAVENOUS at 13:13

## 2019-04-15 RX ADMIN — INSULIN GLARGINE 20 UNITS: 100 INJECTION, SOLUTION SUBCUTANEOUS at 10:10

## 2019-04-15 RX ADMIN — METOPROLOL TARTRATE 50 MG: 50 TABLET, FILM COATED ORAL at 08:33

## 2019-04-15 RX ADMIN — METOPROLOL TARTRATE 50 MG: 50 TABLET, FILM COATED ORAL at 21:52

## 2019-04-15 RX ADMIN — LEVOTHYROXINE SODIUM 200 MCG: 100 TABLET ORAL at 06:27

## 2019-04-15 RX ADMIN — Medication 10 ML: at 21:53

## 2019-04-15 RX ADMIN — DEXTROSE 15 G: 15 GEL ORAL at 06:31

## 2019-04-15 RX ADMIN — PANTOPRAZOLE SODIUM 40 MG: 40 TABLET, DELAYED RELEASE ORAL at 06:27

## 2019-04-15 RX ADMIN — FERROUS SULFATE TAB 325 MG (65 MG ELEMENTAL FE) 325 MG: 325 (65 FE) TAB at 17:34

## 2019-04-15 RX ADMIN — DOCUSATE SODIUM 100 MG: 100 CAPSULE, LIQUID FILLED ORAL at 08:33

## 2019-04-15 RX ADMIN — HYDRALAZINE HYDROCHLORIDE 50 MG: 50 TABLET, FILM COATED ORAL at 21:52

## 2019-04-15 RX ADMIN — HYDRALAZINE HYDROCHLORIDE 100 MG: 50 TABLET, FILM COATED ORAL at 08:33

## 2019-04-15 RX ADMIN — FERROUS SULFATE TAB 325 MG (65 MG ELEMENTAL FE) 325 MG: 325 (65 FE) TAB at 13:12

## 2019-04-15 RX ADMIN — CLOPIDOGREL BISULFATE 75 MG: 75 TABLET, FILM COATED ORAL at 21:53

## 2019-04-15 RX ADMIN — FERROUS SULFATE TAB 325 MG (65 MG ELEMENTAL FE) 325 MG: 325 (65 FE) TAB at 08:32

## 2019-04-15 RX ADMIN — ASPIRIN 81 MG 81 MG: 81 TABLET ORAL at 21:53

## 2019-04-15 RX ADMIN — ATORVASTATIN CALCIUM 80 MG: 80 TABLET, FILM COATED ORAL at 21:52

## 2019-04-15 RX ADMIN — CLONIDINE HYDROCHLORIDE 0.1 MG: 0.1 TABLET ORAL at 08:32

## 2019-04-15 RX ADMIN — ISOSORBIDE MONONITRATE 60 MG: 60 TABLET ORAL at 21:52

## 2019-04-15 ASSESSMENT — PAIN SCALES - GENERAL
PAINLEVEL_OUTOF10: 0
PAINLEVEL_OUTOF10: 1

## 2019-04-15 NOTE — PROGRESS NOTES
Report of care received from [facility], pt seen, assistd to side of the bed to eat his lunch, c/o \"not much appetite\", \"belly bloated\" , no family at bedside at this time     1500  Finished with lunch tray, only fruit eaten, removed from room,     1610  Pt asleep in bed RR easy non labored,     1830  Evening tray delivered pt pushes it aside, doesn't want to try to eat anything at this time     1930  Call light on pt wants bs checked will try to eat     1940  bs 96 encouraged pt to try to eat something, \"not hungary\"  Will make sure night RN rechecks bs     1945  Report of care given to [facility]

## 2019-04-15 NOTE — CARE COORDINATION
4/15/19, 1:43 PM      Jeanette Quigley day: 5  Location: [facility]08/008- Reason for admit: Encephalopathy [G93.40]   Procedure: CXR    Impression:       1. Mild stable cardiomegaly with pulmonary vascular congestion suspicious for congestive heart failure. 2. Slightly worsening small bilateral pleural effusion with adjacent atelectasis/infiltrate. 3. Probable pulmonary edema. Treatment Plan of Care: BUN 61, creatinine 4.5 today. IV fluids and IV Bumex held. BMP in AM, IV Venofer,   PCP: PAOLA Forte CNP  Readmission Risk Score: 16%  Discharge Plan: Plan is home with spouse at discharge.

## 2019-04-15 NOTE — PLAN OF CARE
Problem: Falls - Risk of:  Goal: Will remain free from falls  Description  Will remain free from falls  4/15/2019 0229 by Missael Beckwith RN  Outcome: Ongoing  Note:   Patient using call light appropriately to call for assistance with ambulation to the bathroom. Patient is compliant with use of non-skid slippers. Patient reports understanding of fall prevention when discussed. Patient refuses to allow nurse to utilize bed alarm. Education provided. Call light remains in reach. Problem: Falls - Risk of:  Goal: Absence of physical injury  Description  Absence of physical injury  Outcome: Ongoing  Note:   Patient is free from physical injury at this time. Problem: Cardiac:  Goal: Ability to maintain vital signs within normal range will improve  Description  Ability to maintain vital signs within normal range will improve  4/15/2019 0229 by Missael Beckwith RN  Outcome: Ongoing  Note:   See flowsheet for vitals. Problem: Serum Glucose Level - Abnormal:  Goal: Ability to maintain appropriate glucose levels has stabilized  Description  Ability to maintain appropriate glucose levels has stabilized  4/15/2019 0229 by Missael Beckwith RN  Outcome: Ongoing  Note:    at bedtime. Patient covered with sliding scale. Problem: Activity:  Goal: Risk for activity intolerance will decrease  Description  Risk for activity intolerance will decrease  4/15/2019 0229 by Missael Beckwith RN  Outcome: Ongoing  Note:   Patient is up to the bathroom with stand by assist. Patient continues to complain generally not feeling well. Problem: Nutritional:  Goal: Maintenance of adequate nutrition will be supported  Description  Maintenance of adequate nutrition will be supported  4/15/2019 0229 by Missael Beckwith RN  Outcome: Ongoing  Note:   Patient complains of decreased appetite due to tenderness. States tylenol helped with tenderness.      Problem: Discharge Planning:  Goal: Discharged to appropriate level of care  Description  Discharged to appropriate level of care  4/15/2019 0229 by Janee Simmons RN  Outcome: Ongoing  Note:   Patient plans to return home at discharge.  assisting with discharge planning. Problem: Skin Integrity:  Goal: Will show no infection signs and symptoms  Description  Will show no infection signs and symptoms  4/15/2019 0229 by Janee Simmons RN  Outcome: Ongoing  Note:   Scattered bruising noted in various stages of healing. No other skin impairments noted. Patient understands and demonstrates the importance of frequent repositioning in order to prevent any skin breakdown. Problem: Pain:  Goal: Pain level will decrease  Description  Pain level will decrease  4/15/2019 0229 by Janee Simmons RN  Outcome: Ongoing  Note:   Patient complains of pain at 1 on the scale. Pain goal remains no pain this shift. Problem: Nutrition  Goal: Optimal nutrition therapy  Outcome: Ongoing  Note:   Patient complains of decreased appetite due to tenderness. States tylenol helped with tenderness. Care plan reviewed with patient. Patient verbalizes understanding of the plan of care and contributes to goal setting.

## 2019-04-15 NOTE — FLOWSHEET NOTE
04/15/19 0740   Encounter Summary   Services provided to: Patient   Referral/Consult From: Rounding;Nurse   Continue Visiting Yes  (4/15 AD info)   Complexity of Encounter Low   Length of Encounter 15 minutes   Spiritual/[employer]   Type Spiritual support   Advance Directives (For Healthcare)   Healthcare Directive No, patient does not have an advance directive for healthcare treatment   Information on [place] Requested No   Patient Requests Assistance Yes, referral made to    Advance Directives Documents given;Documents explained;Pt. not interested at this time   Advance Directive Consult: Advance Directive materials were provided to patient and explained. Patient is not ready to complete at this time, gave information for Spiritual Care to be contacted if further assistance is needed.

## 2019-04-15 NOTE — PLAN OF CARE
Care plan reviewed with patient. Patient verbalizes understanding of the plan of care and contributed to goal setting.

## 2019-04-15 NOTE — PROGRESS NOTES
? SNF                             ? [place]                             ? Other-    Chief Complaint: No chief complaint on file. Hospital Course: Patient was seen, examined and the medical chart was reviewed thoroughly today. In summary, 46 y.o.male admitted overnight for likely acute decompensated CHF. Subjective (past 24 hours):   feels nauseous, poor appetite. ++ leg swelling. Denies CP.     4/13: feeling more energy, SOB imrpoved. 4/14: feeling much better and wishes to be discharged soon. 4/15: pt feeling worse today. Poor appetite. No energy.  Denies CP/SOB    Medications:  Reviewed    Infusion Medications    dextrose       Scheduled Medications    [START ON 4/16/2019] famotidine  20 mg Oral Daily    [Held by provider] bumetanide  1 mg Oral Daily    docusate sodium  100 mg Oral Daily    insulin glargine  20 Units Subcutaneous Daily    insulin lispro  5-20 Units Subcutaneous TID WC    isosorbide mononitrate  60 mg Oral Daily    hydrALAZINE  100 mg Oral TID    ferrous sulfate  325 mg Oral TID WC    iron sucrose  300 mg Intravenous Q48H    levothyroxine  200 mcg Oral Daily    insulin lispro  0-6 Units Subcutaneous TID WC    insulin lispro  0-3 Units Subcutaneous Nightly    aspirin  81 mg Oral Daily    atorvastatin  80 mg Oral Daily    clopidogrel  75 mg Oral Daily    metoprolol tartrate  50 mg Oral BID    sodium chloride flush  10 mL Intravenous 2 times per day     PRN Meds: sodium chloride flush, magnesium hydroxide, ondansetron, acetaminophen, glucose, dextrose, glucagon (rDNA), dextrose      Intake/Output Summary (Last 24 hours) at 4/15/2019 1315  Last data filed at 4/15/2019 9944  Gross per 24 hour   Intake 1657.45 ml   Output 0 ml   Net 1657.45 ml       Diet:  Dietary Nutrition Supplements: Renal Oral Supplement  DIET CARB CONTROL; Low Sodium (2 GM)    Exam:  BP (!) 111/56   Pulse 60   Temp 98.6 °F (37 °C) (Oral)   Resp 12   Ht 5' 6\" (1.676 m) Wt 131 lb (59.4 kg)   SpO2 93%   BMI 21.14 kg/m²     General appearance: muscle wasting, A&O x3, Not ill or toxic, in no apparent distress  HEENT:  KARLA  EOM intact. Neck: Supple, with full range of motion. No jugular venous distention. Trachea midline. Respiratory:  Wet crackles lower 1/3 lung fields bilat  Cardiovascular:  normal S1/S2 with no murmurs/gallops  Abdomen: Soft, non-tender, non-distended, no rigidity or peritoneal signs  Musculoskeletal: NL symmetrical A/PROM bilat U/L extremities   Skin: No rashes. ++ bilat. Edema. Neurologic:  CN II-XII intact. NL symmetrical reflexes. NL gait and stance. NL Cerebellar exam. Power 5/5 all muscle groups U/L extremities. Toes downgoing  Capillary Refill: Brisk,< 3 seconds   Peripheral Pulses: +2 palpable, equal bilaterally         Labs:   No results for input(s): WBC, HGB, HCT, PLT in the last 72 hours. Recent Labs     04/13/19  0427 04/14/19  0410 04/15/19  0428   * 131* 137   K 5.0 4.1 4.9   CL 96* 95* 100   CO2 21* 23 23   BUN 52* 57* 61*   CREATININE 4.0* 4.2* 4.5*   CALCIUM 8.9 8.5 8.5     No results for input(s): AST, ALT, BILIDIR, BILITOT, ALKPHOS in the last 72 hours. No results for input(s): INR in the last 72 hours. No results for input(s): Terryann Haste in the last 72 hours. Urinalysis:      Lab Results   Component Value Date    NITRU NEGATIVE 04/10/2019    WBCUA NONE SEEN 04/10/2019    BACTERIA NONE 04/10/2019    RBCUA 3-5 04/10/2019    BLOODU SMALL 04/10/2019    GLUCOSEU NEGATIVE 04/10/2019       Radiology:  Ct Abdomen Pelvis Wo Contrast Additional Contrast? None    Result Date: 4/10/2019  PROCEDURE: CT ABDOMEN PELVIS WO CONTRAST CLINICAL INFORMATION: abdominal distension with history of CKD . COMPARISON: April 9, 2016 TECHNIQUE: Axial 5 mm CT images were obtained through the abdomen and pelvis. No contrast was given. Coronal reconstructions were obtained.  All CT scans at this facility use dose modulation, iterative reconstruction, and/or weight-based dosing when appropriate to reduce radiation dose to as low as reasonably achievable. FINDINGS:  The heart appears enlarged. There is a moderate right effusion with compressive atelectasis of the right lower lobe. Changes of mild COPD are present. The noncontrast appearance of liver, gallbladder, pancreas and spleen are negative for active appearing pathology. There is mild bilateral perinephric stranding. There is distention of the urinary bladder. Correlate with urinalysis to exclude cystitis and superimposed nephritis. Punctate bilateral nonobstructing renal calculi are present. Small bowel is nondistended. Mild luminal stasis is present correlate with mild enteritis. There is abundant retention of stool throughout colon compatible with moderate severe constipation. There is mild mucosal thickening of the anorectal region. Changes of proctitis are not excluded. There is no acute appearing pathology of the skeleton. There are degenerative disc changes at L5-S1 with minimal retrolisthesis of L5 on S1. There is scattered atherosclerosis of the aorta without aneurysm. 1. Abundant retention of stool throughout colon compatible with constipation. 2. Bilateral nonobstructing renal calculi with nonspecific perinephric stranding. Correlate with urinalysis to exclude changes of nephritis. 3. Moderate right effusion with lower lobe atelectasis. Underlying infiltrate is not excluded. **This report has been created using voice recognition software. It may contain minor errors which are inherent in voice recognition technology. ** Final report electronically signed by Dr. Digna Durant on 4/10/2019 2:21 AM    Xr Chest Standard (2 Vw)    Result Date: 4/10/2019  PROCEDURE: XR CHEST (2 VW) CLINICAL INFORMATION: leg edema with shortness of breath. COMPARISON: No prior study.  TECHNIQUE: PA and lateral views the chest. FINDINGS: There is stable mild cardiac enlargement with coarse lung markings. There is no focal infiltrate. Trace right effusion cannot be excluded. There is no evidence of congestive failure. 1. Stable cardiac enlargement with diffuse coarse markings. 2. Persistent minimal blunting of the right costophrenic angle. Small effusion versus chronic subpleural changes are present. **This report has been created using voice recognition software. It may contain minor errors which are inherent in voice recognition technology. ** Final report electronically signed by Dr. Mino Etienne on 4/10/2019 2:48 AM      Diet: Dietary Nutrition Supplements: Renal Oral Supplement  DIET CARB CONTROL; Low Sodium (2 GM)    DVT prophylaxis: ? Lovenox                                 ? SCDs                                 ? SQ Heparin                                 ? Encourage ambulation           ? Already on Anticoagulation       Code Status: Full Code      Active Hospital Problems    Diagnosis Date Noted    Severe malnutrition ([facility] [address] [E43] 04/12/2019     Class: Acute    Encephalopathy [G93.40] 04/10/2019    Anemia in chronic kidney disease [N18.9, D63.1] 11/24/2018    CKD (chronic kidney disease) stage 4, GFR 15-29 ml/min (HCC) [N18.4] 04/09/2016    CHF (congestive heart failure) ([facility] [address] [I50.9] 04/09/2016    Anemia associated with chronic renal failure [D63.1] 04/09/2016    Coronary artery disease involving native coronary artery of native heart with angina pectoris ([address] [I25.119] 04/09/2016    Type 1 diabetes mellitus with hyperglycemia ([address] [E10.65] 03/29/2016           Patient was updated about the treatment plan, all the questions and concerns were addressed.         Electronically signed by Baldomero Hdez MD on 4/15/2019 at 1:15 PM

## 2019-04-15 NOTE — PROGRESS NOTES
Nephrology Progress Note    Patient Aleks Keating   MRN -  [id number]   Acct # - [de-identified]      - 1965    46 y.o. Admit Date: 4/10/2019  Hospital Day: 5  [facility]  Date of evaluation -  4/15/2019    Subjective:   CC: did not feel good  Denies sob. Room air   Tired  Wt up  Does not feel good today  Not much appetite for breakfast  BP Range: Systolic (26UMY), TZE:373 , Min:98 , IMI:980      Diastolic (13BBT), WIP:00, Min:52, Max:64    Objective:   VITALS:  BP (!) 143/64   Pulse 73   Temp 98.6 °F (37 °C) (Oral)   Resp 18   Ht 5' 6\" (1.676 m)   Wt 131 lb (59.4 kg)   SpO2 95%   BMI 21.14 kg/m²    Patient Vitals for the past 24 hrs:   BP Temp Temp src Pulse Resp SpO2 Weight   04/15/19 0815 (!) 143/64 98.6 °F (37 °C) Oral 73 18 95 % --   04/15/19 0426 133/60 98.4 °F (36.9 °C) Oral 66 18 94 % 131 lb (59.4 kg)   19 2212 (!) 117/58 97.6 °F (36.4 °C) Oral 62 16 97 % --   19 1950 (!) 116/58 98.2 °F (36.8 °C) Oral 63 16 95 % --   19 1730 104/64 98 °F (36.7 °C) Oral 58 16 95 % --   19 1221 (!) 98/52 97.9 °F (36.6 °C) Oral 56 16 96 % --     2 L/min    Intake/Output Summary (Last 24 hours) at 4/15/2019 09  Last data filed at 4/15/2019 4277  Gross per 24 hour   Intake 1657.45 ml   Output 0 ml   Net 1657.45 ml       Admission weight: 129 lb 8 oz (58.7 kg)  Patient Vitals for the past 96 hrs (Last 3 readings):   Weight   04/15/19 0426 131 lb (59.4 kg)   19 0342 122 lb 9.6 oz (55.6 kg)   19 0415 124 lb (56.2 kg)     Wt Readings from Last 3 Encounters:   04/15/19 131 lb (59.4 kg)   04/10/19 135 lb (61.2 kg)   19 126 lb (57.2 kg)     Body mass index is 21.14 kg/m². EXAM:  CONSTITUTIONAL:  No acute distress. Lying comfortable in bed. Pleasant  HEENT:  Head is normocephalic, Extraocular movement intact. Neck is supple. Voice is clear. CARDIOVASCULAR:  S1, S2  regular rate and rhythm. RESPIRATORY: Crackles. Equal breath sounds. No wheezes.  No shortness of breath noted at rest.  ABDOMEN: soft, non tender  NEUROLOGICAL: Patient is alert and oriented to person, place, and time. Recent and remote memory intact. Thought is coherant. SKIN: no rash, No significant bruises on exposed surfaces  MUSCULOSKELETAL: Movement is coordinated. Moves all extremities   EXTREMITIES: Distal lower extremity temp is warm, No lower extremity edema. PSYCHIATRIC: mood and affect appropriate. Medications:   Med reviewed  Scheduled Meds:   [Held by provider] bumetanide  1 mg Oral Daily    docusate sodium  100 mg Oral Daily    insulin glargine  20 Units Subcutaneous Daily    insulin lispro  5-20 Units Subcutaneous TID WC    cloNIDine  0.1 mg Oral BID    isosorbide mononitrate  60 mg Oral Daily    hydrALAZINE  100 mg Oral TID    ferrous sulfate  325 mg Oral TID WC    iron sucrose  300 mg Intravenous Q48H    levothyroxine  200 mcg Oral Daily    insulin lispro  0-6 Units Subcutaneous TID WC    insulin lispro  0-3 Units Subcutaneous Nightly    aspirin  81 mg Oral Daily    atorvastatin  80 mg Oral Daily    clopidogrel  75 mg Oral Daily    metoprolol tartrate  50 mg Oral BID    pantoprazole  40 mg Oral BID AC    sodium chloride flush  10 mL Intravenous 2 times per day       Labs:   Labs reviewed  Recent Labs     04/13/19  0427 04/14/19  0410 04/15/19  0428   * 131* 137   K 5.0 4.1 4.9   CL 96* 95* 100   CO2 21* 23 23   BUN 52* 57* 61*   CREATININE 4.0* 4.2* 4.5*   LABGLOM 16* 15* 14*   GLUCOSE 503* 351* 52*   CALCIUM 8.9 8.5 8.5     No results for input(s): WBC, RBC, HGB, HCT, PLT in the last 72 hours. CXR 4/15  1. Mild stable cardiomegaly with pulmonary vascular congestion suspicious for congestive heart failure. 2. Slightly worsening small bilateral pleural effusion with adjacent atelectasis/infiltrate. 3. Probable pulmonary edema. ASSESSMENT:  1. Acute Kidney Injury 2nd to diuresis. Likely 2nd to diuresis and hypotension.  Consider Acute Interstitial Nephritis from Protonix, Change to pepcid. Noted crackles. CXR shows pulm edema. Image reviewed. Stop IV fluids. Hold diuretic for now as resp status is ok. Need accurate UOP. 2. Chronic Kidney Disease Stage IV 2nd to DM and HTN  3. Diabetes Mellitus Type II with nephrosclerosis with long term use of insulin, A1C 7.3%. 4. Essential Hypertension with nephrosclerosis , over corrected, Stop clonidine  5. Hypothyroidism, . Repeat . PO levothyroxine per Endo  6. Fluid overload likely multifactorial including hypothyroidism. Noted EF 55-60%, per echo, 11/24/18. Resolved  7. Anemia of chronic disease, and iron deficiency. Last dose of Venofer 300 mg today. S/P  Aranesp 150 mcg 4/13. BMP in AM  Active Problems:    CHF (congestive heart failure) (HCC)    CKD (chronic kidney disease) stage 4, GFR 15-29 ml/min (HCC)    Coronary artery disease involving native coronary artery of native heart with angina pectoris (HCC)    Anemia associated with chronic renal failure    Type 1 diabetes mellitus with hyperglycemia (HCC)    Anemia in chronic kidney disease    Encephalopathy    Severe malnutrition ([facility] [address]  Resolved Problems:    * No resolved hospital problems.  PAOLA Davenport - CNP 9:23 AM 4/15/2019

## 2019-04-15 NOTE — PROGRESS NOTES
Department of Internal Medicine  Section of Endocrinology   [address]    [address] , [address] [place] Romano, [address]  Office Phone [address]  ([address])  ([address])  [address] Pradip Taveras MD, Fellow of [employer] of Endocrinology   Progress Note    Admit Date: 4/10/2019    Reviewed the chart of the last 24 hours:   SUBJECTIVE:   No chief complaint on file. Encephalopathy [G93.40]   Patient Active Problem List   Diagnosis Code    CHF (congestive heart failure) ([facility]) I50.9    DM type 2 causing CKD stage 5 ([facility]) E11.22, N18.5    Essential hypertension I10    CKD (chronic kidney disease) stage 4, GFR 15-29 ml/min ([facility]) N18.4    Coronary artery disease involving native coronary artery of native heart with angina pectoris ([facility]) I25.119    Anemia associated with chronic renal failure D63.1    Anemia of chronic kidney failure N18.9, D63.1    Coronary atherosclerosis I25.10    Diabetes mellitus ([address] E11.9    History of CVA (cerebrovascular accident) Z80.78    MI (myocardial infarction) ([facility] [address] I21.9    Cerebrovascular accident ([facility] [address] I63.9    Type 1 diabetes mellitus with hyperglycemia ([facility]) E10.65    Diabetes mellitus with hyperglycemia ([facility]) E11.65    Anemia in chronic kidney disease N18.9, D63.1    Symptomatic anemia D64.9    Bacterial pneumonia J15.9    Iron deficiency anemia D50.9    Encephalopathy G93.40    Severe malnutrition ([facility]) E43     <principal problem not specified>  4/10/2019  7:27 PM  Admission weight: 129 lb 8 oz (58.7 kg)  [id number]  [id number]  [id number]  Createnine rising  Anorexia  Review of Systems  Review of Systems - General ROS: negative   Psychological ROS: negative   Hematological and Lymphatic ROS: No history of blood clots or bleeding disorder.    Respiratory ROS: no cough, shortness of breath, or wheezing   Cardiovascular ROS: no chest pain or dyspnea on exertion   Gastrointestinal ROS: no abdominal pain, change in bowel habits, or black or bloody stools   Genito-Urinary ROS: no dysuria, trouble voiding, or hematuria   Musculoskeletal ROS: negative   Neurological ROS: no TIA or stroke symptoms   Dermatological ROS: negative    Past Medical, Surgical, Family, Social and Allergy Histories:  I have reviewed the patient's medical history in detail and updated the computerized patient record. has a past medical history of Broken ankle, CAD (coronary artery disease), Cerebral artery occlusion with cerebral infarction [facility]), CHF (congestive heart failure) ([facility] [address] Chronic kidney disease, Diabetes mellitus [facility] Hyperlipidemia, and Hypertension. has a past surgical history that includes Appendectomy and Cardiac surgery (Oct. 2015). reports that he quit smoking about 3 years ago. He has a 45.00 pack-year smoking history. He has quit using smokeless tobacco. He reports that he drinks about 2.4 oz of alcohol per week. He reports that he does not use drugs. family history includes Cancer in his sister; Diabetes in his mother. He was adopted.   Allergies   Allergen Reactions    Aranesp (Albumin Free) [Darbepoetin Damon] Hives     Current Facility-Administered Medications   Medication Dose Route Frequency Provider Last Rate Last Dose    [START ON 4/16/2019] famotidine (PEPCID) tablet 20 mg  20 mg Oral Daily Иван Fails, APRN - CNP        hydrALAZINE (APRESOLINE) tablet 50 mg  50 mg Oral TID Иван Fails, APRN - CNP        [Held by provider] bumetanide (BUMEX) tablet 1 mg  1 mg Oral Daily Juan C Landry MD   1 mg at 04/13/19 0835    docusate sodium (COLACE) capsule 100 mg  100 mg Oral Daily Juan C Landry MD   100 mg at 04/15/19 0833    insulin glargine (LANTUS) injection vial 20 Units  20 Units Subcutaneous Daily Elvi Tracey MD   20 Units at 04/15/19 1010    insulin lispro (HUMALOG) injection vial 5-20 Units  5-20 Units Subcutaneous TID  Calvin Gómez MD   8 Units at 04/14/19 1831    isosorbide mononitrate (IMDUR) extended release tablet 60 mg  60 mg Oral Daily Mario Causey MD   60 mg at 04/13/19 2124    ferrous sulfate tablet 325 mg  325 mg Oral TID  Mario Causey MD   325 mg at 04/15/19 1312    levothyroxine (SYNTHROID) tablet 200 mcg  200 mcg Oral Daily Calvin Gómez MD   200 mcg at 04/15/19 0282    insulin lispro (HUMALOG) injection vial 0-6 Units  0-6 Units Subcutaneous TID  Marilyn Littlejohn PA-C   2 Units at 04/14/19 1836    insulin lispro (HUMALOG) injection vial 0-3 Units  0-3 Units Subcutaneous Nightly Marilyn Littlejohn PA-C   1 Units at 04/14/19 2209    aspirin chewable tablet 81 mg  81 mg Oral Daily TAQUERIA TorrezC   81 mg at 04/14/19 2212    atorvastatin (LIPITOR) tablet 80 mg  80 mg Oral Daily ROSALINDA Torrez-C   80 mg at 04/14/19 2212    clopidogrel (PLAVIX) tablet 75 mg  75 mg Oral Daily TAQUERIA TorrezC   75 mg at 04/14/19 2212    metoprolol tartrate (LOPRESSOR) tablet 50 mg  50 mg Oral BID TAQUERIA TorrezC   50 mg at 04/15/19 2703    sodium chloride flush 0.9 % injection 10 mL  10 mL Intravenous 2 times per day TAQUERIA TorrezC   10 mL at 04/14/19 1241    sodium chloride flush 0.9 % injection 10 mL  10 mL Intravenous PRN Marilyn Littlejohn PA-C        magnesium hydroxide (MILK OF MAGNESIA) 400 MG/5ML suspension 30 mL  30 mL Oral Daily PRN TAQUERIA TorrezC        ondansetron (ZOFRAN) injection 4 mg  4 mg Intravenous Q6H PRN TAQUERIA TorrezC   4 mg at 04/14/19 2212    acetaminophen (TYLENOL) tablet 650 mg  650 mg Oral Q4H PRN ROSALINDA Torrez-C   650 mg at 04/14/19 2212    glucose (GLUTOSE) 40 % oral gel 15 g  15 g Oral PRN Marilyn Littlejohn PA-C   15 g at 04/15/19 0631    dextrose 50 % solution 12.5 g  12.5 g Intravenous PRN Marilyn Littlejohn PA-C   12.5 g at 04/11/19 0544    glucagon (rDNA) injection 1 mg  1 mg Intramuscular PRN Mela Mederos PA-C        dextrose 5 % solution  100 mL/hr Intravenous PRN Oneyda Diamond PA-C         Home Meds:   Prior to Admission medications    Medication Sig Start Date End Date Taking?  Authorizing Provider   Insulin Degludec (TRESIBA) 100 UNIT/ML SOLN Inject 13 Units into the skin nightly    Yes Historical Provider, MD   insulin regular (HUMULIN R;NOVOLIN R) 100 UNIT/ML injection Inject into the skin See Admin Instructions 1 unit for every 8 carbs consumed  In addition:In the evening, If BS>200   200-250= 1unit  251-300= 2 units  301-350=3 units   Yes Historical Provider, MD   hydrALAZINE (APRESOLINE) 50 MG tablet Take 1.5 tablets by mouth 3 times daily  Patient taking differently: Take 50 mg by mouth 3 times daily  11/25/18  Yes Jose Elias Green MD   isosorbide mononitrate (IMDUR) 30 MG CR tablet Take 30 mg by mouth daily   Yes Historical Provider, MD   atorvastatin (LIPITOR) 80 MG tablet Take 80 mg by mouth daily   Yes Historical Provider, MD   clopidogrel (PLAVIX) 75 MG tablet Take 75 mg by mouth daily   Yes Historical Provider, MD   pantoprazole sodium (PROTONIX) 40 MG PACK packet Take 40 mg by mouth 2 times daily (before meals)   Yes Historical Provider, MD   metoprolol (LOPRESSOR) 50 MG tablet Take 50 mg by mouth 2 times daily   Yes Historical Provider, MD   aspirin 81 MG tablet Take 81 mg by mouth daily   Yes Historical Provider, MD   levothyroxine (SYNTHROID) 25 MCG tablet Take 25 mcg by mouth Daily    Historical Provider, MD   nitroGLYCERIN (NITROSTAT) 0.4 MG SL tablet DISSOLVE 1 TABLET UNDER THE TONGUE EVERY 5 MIN AS NEEDED FOR CHEST PAIN 1/25/18   Historical Provider, MD   FREESTYLE LANCETS MISC  3/31/16   Historical Provider, MD   FREESTYLE INSULINX TEST strip  3/31/16   Historical Provider, MD     Scheduled Meds:   [START ON 4/16/2019] famotidine  20 mg Oral Daily    hydrALAZINE  50 mg Oral TID    [Held by provider] bumetanide  1 mg Oral Daily    docusate sodium  100 mg Oral Daily    insulin glargine  20 Units Subcutaneous Daily    insulin lispro  5-20 Units Subcutaneous TID     isosorbide mononitrate  60 mg Oral Daily    ferrous sulfate  325 mg Oral TID WC    levothyroxine  200 mcg Oral Daily    insulin lispro  0-6 Units Subcutaneous TID WC    insulin lispro  0-3 Units Subcutaneous Nightly    aspirin  81 mg Oral Daily    atorvastatin  80 mg Oral Daily    clopidogrel  75 mg Oral Daily    metoprolol tartrate  50 mg Oral BID    sodium chloride flush  10 mL Intravenous 2 times per day     Continuous Infusions:   dextrose       PRN Meds:sodium chloride flush, magnesium hydroxide, ondansetron, acetaminophen, glucose, dextrose, glucagon (rDNA), dextrose    OBJECTIVE        Physical    VITALS:  BP (!) 140/72   Pulse 70   Temp 98 °F (36.7 °C) (Oral)   Resp 14   Ht 5' 6\" (1.676 m)   Wt 131 lb (59.4 kg)   SpO2 92%   BMI 21.14 kg/m²   24HR INTAKE/OUTPUT:    Intake/Output Summary (Last 24 hours) at 4/15/2019 1712  Last data filed at 4/15/2019 0620  Gross per 24 hour   Intake 1077.55 ml   Output 0 ml   Net 1077.55 ml     CONSTITUTIONAL:  awake, alert, cooperative, no apparent distress, and appears stated age  LUNGS:  No increased work of breathing, good air exchange, clear to auscultation bilaterally, no crackles or wheezing  CARDIOVASCULAR:  Normal apical impulse, regular rate and rhythm, normal S1 and S2, no S3 or S4, and no murmur noted  ABDOMEN:  No scars, normal bowel sounds, soft, non-distended, non-tender, no masses palpated, no hepatosplenomegally    DATA  TSH:    Lab Results   Component Value Date    .400 04/11/2019   No components found for: FREE T4No components found for: Krystian Layer   Results for Celestine Martinez (MRN [id number]) as of 4/15/2019 17:09   Ref.  Range 4/10/2019 01:04 4/11/2019 14:58 4/11/2019 18:35 4/13/2019 04:27 4/14/2019 04:10   T4 Free Latest Ref Range: 0.93 - 1.76 ng/dL 0.46 (L)   0.68 (L) 0.71 (L)   Thyroglobulin Ab Latest Ref Range: 0.0 - 4.0 IU/mL   < 0.9       RADIOLOGYREPORTS:  . Mild stable cardiomegaly with pulmonary vascular congestion suspicious for congestive heart failure. 2. Slightly worsening small bilateral pleural effusion with adjacent atelectasis/infiltrate. 3. Probable pulmonary edema.           Lab Review  @[email or web address]@  [unfilled]  Lab Results   Component Value Date    .400 (H) 04/11/2019    B0BULHO 51 (L) 04/10/2019    T4FREE 0.71 (L) 04/14/2019     Results for HAND, Silverio Andrade (MRN [id number]) as of 4/15/2019 17:09   Ref. Range 4/11/2019 08:10 4/12/2019 03:57 4/13/2019 04:27 4/14/2019 04:10 4/15/2019 04:28   Creatinine Latest Ref Range: 0.4 - 1.2 mg/dL 3.0 (HH) 3.6 (HH) 4.0 (HH) 4.2 (HH) 4.5 (HH)   CBC:  Lab Results   Component Value Date    WBC 5.6 04/10/2019    RBC 3.83 04/10/2019    HGB 8.0 04/10/2019    HCT 28.7 04/10/2019    MCV 74.9 04/10/2019    MCH 20.9 04/10/2019    MCHC 27.9 04/10/2019    RDW 16.2 04/09/2016     04/10/2019    MPV 10.7 04/10/2019     CMP:    Lab Results   Component Value Date     04/15/2019    K 4.9 04/15/2019    K 6.1 04/10/2019     04/15/2019    CO2 23 04/15/2019    BUN 61 04/15/2019    CREATININE 4.5 04/15/2019    LABGLOM 14 04/15/2019    GLUCOSE 52 04/15/2019    PROT 6.6 04/10/2019    LABALBU 3.6 04/10/2019    CALCIUM 8.5 04/15/2019    BILITOT 0.6 04/10/2019    ALKPHOS 129 04/10/2019    AST 62 04/10/2019    ALT 38 04/10/2019     Hepatic Function Panel:    Lab Results   Component Value Date    ALKPHOS 129 04/10/2019    ALT 38 04/10/2019    AST 62 04/10/2019    PROT 6.6 04/10/2019    BILITOT 0.6 04/10/2019    BILIDIR 0.3 04/10/2019    LABALBU 3.6 04/10/2019     Magnesium:    Lab Results   Component Value Date    MG 1.9 11/25/2018     PT/INR:    Lab Results   Component Value Date    PROTIME 12.5 08/27/2018    INR 1.01 04/10/2019     HgBA1c:    Lab Results   Component Value Date    LABA1C 7.3 04/10/2019      No results for input(s): CKTOTAL, CKMB, CKMBINDEX, TROPONINI in the last 72 hours.   FLP:  No results found for: TRIG, HDL, LDLCALC, LDLDIRECT, LABVLDL  DIET: Dietary Nutrition Supplements: Renal Oral Supplement  DIET CARB CONTROL; Low Sodium (2 GM)  Impression:    Will reduce Levothroid to 100 mcg daily, the maintenance dose    ASSESSMENT AND PLAN

## 2019-04-16 ENCOUNTER — APPOINTMENT (OUTPATIENT)
Dept: GENERAL RADIOLOGY | Age: 54
DRG: 291 | End: 2019-04-16
Attending: FAMILY MEDICINE
Payer: COMMERCIAL

## 2019-04-16 ENCOUNTER — APPOINTMENT (OUTPATIENT)
Dept: INTERVENTIONAL RADIOLOGY/VASCULAR | Age: 54
DRG: 291 | End: 2019-04-16
Attending: FAMILY MEDICINE
Payer: COMMERCIAL

## 2019-04-16 LAB
ANION GAP SERPL CALCULATED.3IONS-SCNC: 14 MEQ/L (ref 8–16)
BUN BLDV-MCNC: 60 MG/DL (ref 7–22)
CALCIUM SERPL-MCNC: 8.3 MG/DL (ref 8.5–10.5)
CHLORIDE BLD-SCNC: 98 MEQ/L (ref 98–111)
CO2: 20 MEQ/L (ref 23–33)
CREAT SERPL-MCNC: 4.4 MG/DL (ref 0.4–1.2)
GFR SERPL CREATININE-BSD FRML MDRD: 14 ML/MIN/1.73M2
GLUCOSE BLD-MCNC: 112 MG/DL (ref 70–108)
GLUCOSE BLD-MCNC: 129 MG/DL (ref 70–108)
GLUCOSE BLD-MCNC: 164 MG/DL (ref 70–108)
GLUCOSE BLD-MCNC: 168 MG/DL (ref 70–108)
GLUCOSE BLD-MCNC: 189 MG/DL (ref 70–108)
GLUCOSE BLD-MCNC: 210 MG/DL (ref 70–108)
GLUCOSE BLD-MCNC: 245 MG/DL (ref 70–108)
GLUCOSE BLD-MCNC: 49 MG/DL (ref 70–108)
HBV SURFACE AB TITR SER: NEGATIVE {TITER}
HEPATITIS B CORE IGM ANTIBODY: NEGATIVE
HEPATITIS B SURFACE ANTIGEN: NEGATIVE
POTASSIUM SERPL-SCNC: 5 MEQ/L (ref 3.5–5.2)
SODIUM BLD-SCNC: 132 MEQ/L (ref 135–145)

## 2019-04-16 PROCEDURE — 36556 INSERT NON-TUNNEL CV CATH: CPT | Performed by: RADIOLOGY

## 2019-04-16 PROCEDURE — B5181ZA FLUOROSCOPY OF SUPERIOR VENA CAVA USING LOW OSMOLAR CONTRAST, GUIDANCE: ICD-10-PCS | Performed by: INTERNAL MEDICINE

## 2019-04-16 PROCEDURE — 80048 BASIC METABOLIC PNL TOTAL CA: CPT

## 2019-04-16 PROCEDURE — C1894 INTRO/SHEATH, NON-LASER: HCPCS

## 2019-04-16 PROCEDURE — 87340 HEPATITIS B SURFACE AG IA: CPT

## 2019-04-16 PROCEDURE — 97161 PT EVAL LOW COMPLEX 20 MIN: CPT

## 2019-04-16 PROCEDURE — 6360000002 HC RX W HCPCS

## 2019-04-16 PROCEDURE — 77001 FLUOROGUIDE FOR VEIN DEVICE: CPT | Performed by: RADIOLOGY

## 2019-04-16 PROCEDURE — 02H633Z INSERTION OF INFUSION DEVICE INTO RIGHT ATRIUM, PERCUTANEOUS APPROACH: ICD-10-PCS | Performed by: INTERNAL MEDICINE

## 2019-04-16 PROCEDURE — 82948 REAGENT STRIP/BLOOD GLUCOSE: CPT

## 2019-04-16 PROCEDURE — 5A1D70Z PERFORMANCE OF URINARY FILTRATION, INTERMITTENT, LESS THAN 6 HOURS PER DAY: ICD-10-PCS | Performed by: INTERNAL MEDICINE

## 2019-04-16 PROCEDURE — 6370000000 HC RX 637 (ALT 250 FOR IP): Performed by: PHYSICIAN ASSISTANT

## 2019-04-16 PROCEDURE — 86705 HEP B CORE ANTIBODY IGM: CPT

## 2019-04-16 PROCEDURE — 90935 HEMODIALYSIS ONE EVALUATION: CPT

## 2019-04-16 PROCEDURE — 90935 HEMODIALYSIS ONE EVALUATION: CPT | Performed by: NURSE PRACTITIONER

## 2019-04-16 PROCEDURE — C1769 GUIDE WIRE: HCPCS

## 2019-04-16 PROCEDURE — 71045 X-RAY EXAM CHEST 1 VIEW: CPT

## 2019-04-16 PROCEDURE — 2500000003 HC RX 250 WO HCPCS

## 2019-04-16 PROCEDURE — C1752 CATH,HEMODIALYSIS,SHORT-TERM: HCPCS

## 2019-04-16 PROCEDURE — 99233 SBSQ HOSP IP/OBS HIGH 50: CPT | Performed by: HOSPITALIST

## 2019-04-16 PROCEDURE — 86706 HEP B SURFACE ANTIBODY: CPT

## 2019-04-16 PROCEDURE — 2709999900 HC NON-CHARGEABLE SUPPLY

## 2019-04-16 PROCEDURE — 36415 COLL VENOUS BLD VENIPUNCTURE: CPT

## 2019-04-16 PROCEDURE — 6370000000 HC RX 637 (ALT 250 FOR IP): Performed by: HOSPITALIST

## 2019-04-16 PROCEDURE — 99999 PR OFFICE/OUTPT VISIT,PROCEDURE ONLY: CPT | Performed by: INTERNAL MEDICINE

## 2019-04-16 PROCEDURE — 6370000000 HC RX 637 (ALT 250 FOR IP): Performed by: NURSE PRACTITIONER

## 2019-04-16 PROCEDURE — 76937 US GUIDE VASCULAR ACCESS: CPT | Performed by: RADIOLOGY

## 2019-04-16 PROCEDURE — 97530 THERAPEUTIC ACTIVITIES: CPT

## 2019-04-16 PROCEDURE — 1200000000 HC SEMI PRIVATE

## 2019-04-16 PROCEDURE — 2580000003 HC RX 258: Performed by: PHYSICIAN ASSISTANT

## 2019-04-16 PROCEDURE — 6370000000 HC RX 637 (ALT 250 FOR IP): Performed by: INTERNAL MEDICINE

## 2019-04-16 RX ADMIN — HYDRALAZINE HYDROCHLORIDE 50 MG: 50 TABLET, FILM COATED ORAL at 20:01

## 2019-04-16 RX ADMIN — FERROUS SULFATE TAB 325 MG (65 MG ELEMENTAL FE) 325 MG: 325 (65 FE) TAB at 09:29

## 2019-04-16 RX ADMIN — INSULIN GLARGINE 20 UNITS: 100 INJECTION, SOLUTION SUBCUTANEOUS at 09:23

## 2019-04-16 RX ADMIN — LEVOTHYROXINE SODIUM 100 MCG: 100 TABLET ORAL at 06:22

## 2019-04-16 RX ADMIN — METOPROLOL TARTRATE 50 MG: 50 TABLET, FILM COATED ORAL at 09:28

## 2019-04-16 RX ADMIN — ATORVASTATIN CALCIUM 80 MG: 80 TABLET, FILM COATED ORAL at 20:01

## 2019-04-16 RX ADMIN — HYDRALAZINE HYDROCHLORIDE 50 MG: 50 TABLET, FILM COATED ORAL at 15:51

## 2019-04-16 RX ADMIN — DOCUSATE SODIUM 100 MG: 100 CAPSULE, LIQUID FILLED ORAL at 09:27

## 2019-04-16 RX ADMIN — FERROUS SULFATE TAB 325 MG (65 MG ELEMENTAL FE) 325 MG: 325 (65 FE) TAB at 18:58

## 2019-04-16 RX ADMIN — METOPROLOL TARTRATE 50 MG: 50 TABLET, FILM COATED ORAL at 20:01

## 2019-04-16 RX ADMIN — Medication 10 ML: at 09:21

## 2019-04-16 RX ADMIN — FAMOTIDINE 20 MG: 20 TABLET ORAL at 09:28

## 2019-04-16 RX ADMIN — Medication 10 ML: at 20:01

## 2019-04-16 RX ADMIN — HYDRALAZINE HYDROCHLORIDE 50 MG: 50 TABLET, FILM COATED ORAL at 09:28

## 2019-04-16 RX ADMIN — INSULIN LISPRO 1 UNITS: 100 INJECTION, SOLUTION INTRAVENOUS; SUBCUTANEOUS at 20:03

## 2019-04-16 RX ADMIN — ISOSORBIDE MONONITRATE 60 MG: 60 TABLET ORAL at 20:01

## 2019-04-16 ASSESSMENT — PAIN DESCRIPTION - LOCATION: LOCATION: FOOT

## 2019-04-16 ASSESSMENT — PAIN DESCRIPTION - PAIN TYPE: TYPE: ACUTE PAIN

## 2019-04-16 ASSESSMENT — PAIN SCALES - GENERAL
PAINLEVEL_OUTOF10: 0
PAINLEVEL_OUTOF10: 1
PAINLEVEL_OUTOF10: 0

## 2019-04-16 ASSESSMENT — PAIN DESCRIPTION - ORIENTATION: ORIENTATION: RIGHT;LEFT

## 2019-04-16 NOTE — H&P
Formulation and discussion of sedation / procedure plans, risks, benefits, side effects and alternatives with patient and/or responsible adult completed.     Electronically signed by Kristian Murcia MD on 4/16/2019 at 11:45 AM

## 2019-04-16 NOTE — FLOWSHEET NOTE
04/16/19 1206 04/16/19 1521   Vital Signs   /73 (!) 142/73   Temp 98.4 °F (36.9 °C) 98.4 °F (36.9 °C)   Pulse 72 73   Weight 128 lb 8.5 oz (58.3 kg) 125 lb 10.6 oz (57 kg)   Weight Method Bed scale Bed scale   Post-Hemodialysis Assessment   Post-Treatment Procedures  --  Blood returned;Catheter Capped, clamped with Saline x2 ports   Machine Disinfection Process  --  Acid/Vinegar Clean;Heat Disinfect; Exterior Machine Disinfection   Total Liters Processed (l/min)  --  51 l/min   Dialyzer Clearance  --  Lightly streaked   Duration of Treatment (minutes)  --  180 minutes   Heparin amount administered during treatment (units)  --  0 units   Hemodialysis Intake (ml)  --  400 ml   Hemodialysis Output (ml)  --  2400 ml   NET Removed (ml)  --  2000 ml   Tolerated Treatment  --  Good   Stable 3.0 hr tx. removed 2.0 L of fluid as ordered. pt tolerated fluid removal well. Bilateral cath ports flushed with normal saline and clamped. tx record printed for scanning into EMR. Report called to primary RN.

## 2019-04-16 NOTE — PROGRESS NOTES
Nutrition Assessment    Type and Reason for Visit: Reassess    Nutrition Recommendations:   Continue current diet. He declined all oral nutritional supplements. Consider renal multivitamin, folbee plus and an appetite stimulant. Nutrition Assessment:   Pt. minimal improvement from a nutritional standpoint AEB pt report he was eating better but appetite declined again yesterday and dislikes Nepro shakes. Remains at risk for further nutritional compromise r/t poor appetite, history of diabetes, CKD, and non-compliance. Will stop Nepro shakes as pt dislikes them and declines all other ONS offered. Encouraged oral intake. Will recommend consider renal multivitamin, folbee plus and an appetite stimulant. Malnutrition Assessment:  · Malnutrition Status: Meets the criteria for severe malnutrition  · Context: Acute illness or injury  · Findings of the 6 clinical characteristics of malnutrition (Minimum of 2 out of 6 clinical characteristics is required to make the diagnosis of moderate or severe Protein Calorie Malnutrition based on AND/ASPEN Guidelines):  1. Energy Intake-Less than or equal to 50% of estimated energy requirement, Greater than or equal to 5 days    2. Fat Loss-Moderate subcutaneous fat loss, Orbital  3.  Muscle Loss-Moderate muscle mass loss, Clavicles (pectoralis and deltoids), Thigh (quadriceps), Calf (gastrocnemius)    Nutrition Risk Level: High    Nutrient Needs:  · Estimated Daily Total Kcal: 8790-8802 kcals (30-35 kcals/kg/day based on 58 kg)  · Estimated Daily Protein (g): 70 grams or more (1.2 grams/kg/day based on 58 kg)    Nutrition Diagnosis:   · Problem: Severe malnutrition, In context of acute illness or injury  · Etiology: related to Insufficient energy/nutrient consumption     Signs and symptoms:  as evidenced by Diet history of poor intake, Moderate muscle loss, Moderate loss of subcutaneous fat    Objective Information:  · Nutrition-Focused Physical Findings: Pt reports his appetite was better until yesterday. Denies nausea, some stomach bloatinig. Last bowel movement 4/14/19. Pt has been diuresing. 4/16/19: Sodium 132, BUN 60, Creatinine 4.4, Glucose 189. On bumex, humalog 1 unit per 7 grams carbohydrate plus low dose SS at meals, and lantus. · Wound Type: None  · Current Nutrition Therapies:  · Oral Diet Orders: Carb Control 4 Carbs/Meal, 2gm Sodium   · Oral Diet intake: 0%, %  · Oral Nutrition Supplement (ONS) Orders: None(Disliked Nepro and declined all others)  · Anthropometric Measures:  · Ht: 5' 6\" (167.6 cm)   · Current Body Wt: 123 lb 4.8 oz (55.9 kg)(4/16/19 trace LE edema)  · Admission Body Wt: 129 lb 8 oz (58.7 kg)(4/10/19 trace LE edema)  · Usual Body Wt: (126-127# the last year at least per pt. Per EMR: 4/18/18: 128#, 11/24/18: 120#1. 6oz, 1/16/19: 126#)  · % Weight Change:  ,  Hard to assess true weight loss with currrent edema, suspect masking in part true weight loss  · Ideal Body Wt: 142 lb (64.4 kg),   · BMI Classification: BMI 18.5 - 24.9 Normal Weight    Nutrition Interventions:   Continue current diet  Continued Inpatient Monitoring, Education Initiated, Coordination of Care(Reinforced current diet restrictions and reason ordered, pt voiced understanding, declined need for written diet material.  Encouraged oral intake and ONS use.)    Nutrition Evaluation:   · Evaluation: Progressing toward goals(Some meals)   · Goals: Pt will consume 75% or more of meals during LOS.     · Monitoring: Meal Intake, Diet Tolerance, Weight, Monitor Bowel Function, Pertinent Labs      Electronically signed by Dean Mir RD, LD on 4/16/19 at 9:40 AM    Contact Number: [phone]

## 2019-04-16 NOTE — PROGRESS NOTES
Nephrology Progress Note    Patient Jim Go   MRN -  [id number]   Acct # - [de-identified]      - 1965    46 y.o. Admit Date: 4/10/2019  Hospital Day: 6  [facility]  Date of evaluation -  2019    Subjective:   CC: did not feel good  C/O of sob. UOP not recorded  Does not feel good today  Nausea  BP Range: Systolic (15FQR), BELEM:864 , Min:111 , WUQ:546      Diastolic (02NPL), NNM:38, Min:56, Max:73    Objective:   VITALS:  BP (!) 169/73   Pulse 82   Temp 98.4 °F (36.9 °C) (Oral)   Resp 16   Ht 5' 6\" (1.676 m)   Wt 123 lb 4.8 oz (55.9 kg)   SpO2 92%   BMI 19.90 kg/m²    Patient Vitals for the past 24 hrs:   BP Temp Temp src Pulse Resp SpO2 Weight   19 0900 (!) 169/73 98.4 °F (36.9 °C) Oral 82 16 92 % --   19 0347 119/60 99.3 °F (37.4 °C) Oral 72 16 90 % 123 lb 4.8 oz (55.9 kg)   19 0347 -- -- -- 72 -- -- --   04/15/19 2357 (!) 119/59 -- Oral 61 -- 93 % --   04/15/19 2357 -- -- -- 61 -- -- --   04/15/19 2140 (!) 152/70 97.4 °F (36.3 °C) Oral 75 16 92 % --   04/15/19 1618 (!) 140/72 98 °F (36.7 °C) Oral 70 14 92 % --   04/15/19 1307 (!) 111/56 -- -- 60 12 93 % --     2 L/min    Intake/Output Summary (Last 24 hours) at 2019 1058  Last data filed at 2019 0920  Gross per 24 hour   Intake 400 ml   Output --   Net 400 ml       Admission weight: 129 lb 8 oz (58.7 kg)  Patient Vitals for the past 96 hrs (Last 3 readings):   Weight   19 0347 123 lb 4.8 oz (55.9 kg)   04/15/19 0426 131 lb (59.4 kg)   19 0342 122 lb 9.6 oz (55.6 kg)     Wt Readings from Last 3 Encounters:   19 123 lb 4.8 oz (55.9 kg)   04/10/19 135 lb (61.2 kg)   19 126 lb (57.2 kg)     Body mass index is 19.9 kg/m². EXAM:  CONSTITUTIONAL:  No acute distress. Lying comfortable in bed. Pleasant  HEENT:  Head is normocephalic, Extraocular movement intact. Neck is supple. Voice is clear. CARDIOVASCULAR:  S1, S2  regular rate and rhythm. RESPIRATORY: Crackles bilaterally. Equal breath sounds. No wheezes. No shortness of breath noted at rest.  ABDOMEN: soft, non tender  NEUROLOGICAL: Patient is alert and oriented to person, place, and time. Recent and remote memory intact. Thought is coherant. SKIN: no rash, No significant bruises on exposed surfaces  MUSCULOSKELETAL: Movement is coordinated. Moves all extremities   EXTREMITIES: Distal lower extremity temp is warm,  trace lower extremity edema. PSYCHIATRIC: mood and affect appropriate. Medications:   Med reviewed  Scheduled Meds:   famotidine  20 mg Oral Daily    hydrALAZINE  50 mg Oral TID    levothyroxine  100 mcg Oral Daily    [Held by provider] bumetanide  1 mg Oral Daily    docusate sodium  100 mg Oral Daily    insulin glargine  20 Units Subcutaneous Daily    insulin lispro  5-20 Units Subcutaneous TID WC    isosorbide mononitrate  60 mg Oral Daily    ferrous sulfate  325 mg Oral TID WC    insulin lispro  0-6 Units Subcutaneous TID WC    insulin lispro  0-3 Units Subcutaneous Nightly    aspirin  81 mg Oral Daily    atorvastatin  80 mg Oral Daily    clopidogrel  75 mg Oral Daily    metoprolol tartrate  50 mg Oral BID    sodium chloride flush  10 mL Intravenous 2 times per day       Labs:   Labs reviewed  Recent Labs     04/14/19  0410 04/15/19  0428 04/16/19  0405   * 137 132*   K 4.1 4.9 5.0   CL 95* 100 98   CO2 23 23 20*   BUN 57* 61* 60*   CREATININE 4.2* 4.5* 4.4*   LABGLOM 15* 14* 14*   GLUCOSE 351* 52* 189*   CALCIUM 8.5 8.5 8.3*     No results for input(s): WBC, RBC, HGB, HCT, PLT in the last 72 hours. CXR 4/15  1. Mild stable cardiomegaly with pulmonary vascular congestion suspicious for congestive heart failure. 2. Slightly worsening small bilateral pleural effusion with adjacent atelectasis/infiltrate. 3. Probable pulmonary edema. ASSESSMENT:  1. Acute Kidney Injury. Likely 2nd to diuresis and hypotension. Now with symptoms of uremia with shortness of breath and crackles.  Will place Hemodialysis catheter for Hemodialysis today with goal Ultrafiltration 2000 ml. . Discussed with Dr Woody Pan and with Hemodialysis staff  2. Chronic Kidney Disease Stage IV 2nd to DM and HTN  3. Metabolic acidosis 2nd to NANCY and hypotension  4. Hyponatremia 2nd to inability of kidneys to excrete free water   5. Diabetes Mellitus Type II with nephrosclerosis with long term use of insulin, A1C 7.3%. 6. Essential Hypertension with nephrosclerosis , BP varied  7. Hypothyroidism, . Repeat . PO levothyroxine per Endo  8. Anemia of chronic disease, and iron deficiency. S/P Venofer 300 mg x 3 doses. Aranesp 150 mcg 4/13. BMP in AM  Active Problems:    CHF (congestive heart failure) (HCC)    CKD (chronic kidney disease) stage 4, GFR 15-29 ml/min (HCC)    Coronary artery disease involving native coronary artery of native heart with angina pectoris (HCC)    Anemia associated with chronic renal failure    Type 1 diabetes mellitus with hyperglycemia (HCC)    Anemia in chronic kidney disease    Encephalopathy    Severe malnutrition ([facility]  Resolved Problems:    * No resolved hospital problems.  PAOLA Ruelas - CNP 10:58 AM 4/16/2019

## 2019-04-16 NOTE — PROGRESS NOTES
1120 Pt arrives to IR room 2 for temporary dialysis catheter placement. 1125 Pt identified, procedure explained, consent signed  1130  Pt transferred to procedure table. Attached to monitor  1137 Dr Marla Fuller in  1139 Procedure started  1140  Right jugular vein accessed with ultrasound guidance. 1142 14 FR x 20 cm Dialysis catheter inserted  1145 Procedure completed  1146 Dressing to site  1147 Pt transferred back into bed  1149 Pt transported to inpatient dialysis.  Right neck dressing dry and intact

## 2019-04-16 NOTE — PROGRESS NOTES
for urgent RRT. Will monitor lytes  4/14: as above  4/15: as above    4/16; Cr 4.4 today with uremia (urea 60), feels tired. Clinically remains euvolemic on ambient air. U/O has not been recorded. Pt has not using urinal. Nephro following. Pt will have dialysis port inseretd by IR today with plan to start dialysis today. - Hypothyroidism: .6 will repeat first. If accurate, severe hypothyroidism could also lead to cardiomyopathy and myxedema. Pt on Levothyroxine 25 mcg which we increased (slowly) to 50 now. Also would start on Dexamethasone 4 mg q8h for now until associated adrenal insufficiency is ruled out. (hypoK and hypoNa Although pt's not hypotensive, there is unlikely associated Addisonian crisis)      4/12: consulted endocrine last night. Pt now on 200 mcg QD levothyroxine. Will repeat free T4 and likely reduce dose accordingly. Will continue with Dexa for now. 4/13; free T4 up to 0.68 now. CCM. Will monitor free T4 levels to monitor response to treatment. D/c'ed Dex today as pt clearly not in adrenal insufficiency. 4/14: CCM  4/15: T4 up to 0.71. CCM. will recheck T4 levels in few days. Will switch to 100 mcg in few days. 4/16: now back on maintenance dose of 100 mcg QD. Will have repeat free T4 tomorrow. If WNLs then will need repeat TSH in 6 wks time to assess response    - Encephaopathy? Likely metabolic. 4/12: resolved    - HyperK: likely d/t decreased GFR. K down to 5.3 from 6.1 after Insulin R/D50W. Also given Kayexalate overnight. Will monitor BMP  4/12: resolved K 4.9 today  4/14: K 4.1  4/15: K 4.9    - Metabolic acidosis 2nd to CKD   4/12: improving  4/13: resolved. K 5.0 will monitor  4/14: Resolved. - Diabetes Mellitus Type I: A1C 7.3%. 4/12: BS uncontrolled. Pt refusing treatment and low-carb diabetic diet. With RN in room explained risks of both hypo- and hyperglycemia. Reemphasized that hypoglycemia can be potentially fatal. Pt wishes to manage his own insulin intake.  He is now back on his standing home Glargine (13 U) plus SSI    4/13: BS in 400s range. Once again with RN in room went over issue of poorly controlled hyperglycemia. Pt adamant to allow me manage BS. He eats and snacks on what he wants and wishes to direct RN to give him whatever dose he is asking for. Also explained concurrent steroids also attributing to hyperglycemia. Steroids d/c'ed now. 4/14: agreed to MDI insulin. BS slightly improved. Will allow endocrine manage this. 4/15: BS improving in high 100s and low 200s for most part. Had BS 44 at 6 a.m. Likely d/t poor PO intake. Endocrine managing    4/16: improved w/ current MDI. CCM. OP F/U w/ endocrine    - HypoNa: Na 129. When corrected for concomitant hyperglycemia , Na 135. Will  Monitor  4/14: Na 131 today with concomitant  with corrected Na 135. Will monitor. 4/15: Na 137  4/16: Na 132. Will have IHD today. Diasylate will be adjusted accordingly    - Essential HTN: uncontrolled. back on home meds. Will reassess response  4/12: BP still uncontrolled. Increased imdur to 60 followed by Hydralazine to 100 from 50 TID. Will reassess    4/13: BP still uncontrolled, added clonidine 0.1 BID now. Will reassess  4/15: BP improved. CCM  4/1: CCM     - Chronic microcytic anemia: likely KIRSTY plus anemia of chronic disease, noted iron 24. Started on Iron TID. Noted pt started on EPP by nephro which would be ineffective unless KIRSTY treated properly first.  4/12: IV iron for 3 doses with plan to start EPO after    4/15: Hb stable. Iron and EPO    - Severe malnutrition: seen by dietician on supplementaion    - Addendum: later pt voices wishes to go home this evening, explained we prefer keeping him in hospital to check his free T4, he is on excessively larger dose of Levothyroxine now that needs to be optimized. Also, he just agreed to a new MDI regimen. Finally his Cr is steadily trending up.  Diuretic was held today and he was given a trial of fluid challenge contain minor errors which are inherent in voice recognition technology. ** Final report electronically signed by Dr. Prabha Fonseca on 4/10/2019 2:21 AM    Xr Chest Standard (2 Vw)    Result Date: 4/10/2019  PROCEDURE: XR CHEST (2 VW) CLINICAL INFORMATION: leg edema with shortness of breath. COMPARISON: No prior study. TECHNIQUE: PA and lateral views the chest. FINDINGS: There is stable mild cardiac enlargement with coarse lung markings. There is no focal infiltrate. Trace right effusion cannot be excluded. There is no evidence of congestive failure. 1. Stable cardiac enlargement with diffuse coarse markings. 2. Persistent minimal blunting of the right costophrenic angle. Small effusion versus chronic subpleural changes are present. **This report has been created using voice recognition software. It may contain minor errors which are inherent in voice recognition technology. ** Final report electronically signed by Dr. Prabha Fonseca on 4/10/2019 2:48 AM      Diet: DIET CARB CONTROL; Low Sodium (2 GM)    DVT prophylaxis: ? Lovenox                                 ? SCDs                                 ? SQ Heparin                                 ? Encourage ambulation           ?  Already on Anticoagulation       Code Status: Full Code      Active Hospital Problems    Diagnosis Date Noted    Severe malnutrition ([facility] [address] [E43] 04/12/2019     Class: Acute    Encephalopathy [G93.40] 04/10/2019    Anemia in chronic kidney disease [N18.9, D63.1] 11/24/2018    CKD (chronic kidney disease) stage 4, GFR 15-29 ml/min ([facility]) [N18.4] 04/09/2016    CHF (congestive heart failure) ([address] [I50.9] 04/09/2016    Anemia associated with chronic renal failure [D63.1] 04/09/2016    Coronary artery disease involving native coronary artery of native heart with angina pectoris ([address] [I25.119] 04/09/2016    Type 1 diabetes mellitus with hyperglycemia ([address] [E10.65] 03/29/2016           Patient was updated about the treatment plan, all

## 2019-04-16 NOTE — PLAN OF CARE
Problem: Falls - Risk of:  Goal: Will remain free from falls  Description  Will remain free from falls  Outcome: Ongoing  Note:   Call light in reach, bed in lowest position, bed alarm activated. Educated on use of call light before ambulation and when in need of assistance. Patient expressed understanding. Hourly visual checks performed and charted. Toileting offered to patient. No falls during shift, at this time. Arm band & falling star in place. Pt up with standby and tolerating well at this time. Continuing to monitor. Problem: Cardiac:  Goal: Ability to maintain vital signs within normal range will improve  Description  Ability to maintain vital signs within normal range will improve  Outcome: Ongoing  Note:      04/16/19 1206   Vital Signs   Temp 98.4 °F (36.9 °C)   Pulse 72   /73   Height and Weight   Weight 128 lb 8.5 oz (58.3 kg)   Weight Method Bed scale   BMI (Calculated) 20.8   Pt's VS stable at this time. Continuing to monitor. Problem: Serum Glucose Level - Abnormal:  Goal: Ability to maintain appropriate glucose levels has stabilized  Description  Ability to maintain appropriate glucose levels has stabilized  Outcome: Ongoing  Note:   Pt on sliding scale and received lantus in am. Continuing to monitor. Problem: Activity:  Goal: Risk for activity intolerance will decrease  Description  Risk for activity intolerance will decrease  Outcome: Ongoing  Note:   Pt up with one assist. Pt has active movement in all extremities at this time. Problem: Coping:  Goal: Ability to identify and develop effective coping behavior will improve  Description  Ability to identify and develop effective coping behavior will improve  Outcome: Ongoing  Note:   Pt educated on dialysis and verbalized understanding.       Problem: Nutritional:  Goal: Maintenance of adequate nutrition will be supported  Description  Maintenance of adequate nutrition will be supported  Outcome: Ongoing  Note:   Pt on carb control diet. Pt's appetite inadequate at this time. Continuing to encourage increased nutritional intake. Problem: Physical Regulation:  Goal: Complications related to the disease process, condition or treatment will be avoided or minimized  Description  Complications related to the disease process, condition or treatment will be avoided or minimized  Outcome: Ongoing  Note:   Continuing to monitor pt's creatinine level. Pt had dialysis today. Continuing to monitor. Problem: Discharge Planning:  Goal: Discharged to appropriate level of care  Description  Discharged to appropriate level of care  Outcome: Ongoing  Note:   Pt to be discharged back to private residence. Discharge plans remain in process at this time. Problem: Skin Integrity:  Goal: Will show no infection signs and symptoms  Description  Will show no infection signs and symptoms  Outcome: Ongoing  Note:   Pt has scattered bruising and redness on bilateral heels that blanches. Problem: Pain:  Goal: Pain level will decrease  Description  Pain level will decrease  Outcome: Ongoing  Note:   Pt has denied any pain so far this shift. Pt's stated pain goal is \"keep me comfortable\"     Problem: Nutrition  Goal: Optimal nutrition therapy  4/16/2019 1528 by Eddie Gonzalez RN  Outcome: Ongoing  Note:   Continuing to encourage increased nutritional intake     Care plan reviewed with patient. Patient verbalizes understanding of the plan of care and contributes to goal setting at this time.

## 2019-04-16 NOTE — PROGRESS NOTES
Pt seen during hemodialysis, Hemodynamically stable, BP ok, Goal Ultrafiltration 2 L. Non tunneled HD cath in place. \"I feel better already. \"    Will hold ASA and Plavix for possible tunneled Hemodialysis catheter placement.

## 2019-04-16 NOTE — PROGRESS NOTES
Normal Limits    Vision: Within Functional Limits    Hearing: Within functional limits         Pain:  Yes. Pain Assessment  Pain Assessment: 0-10  Pain Level: 1  Pain Type: Acute pain  Pain Location: Foot  Pain Orientation: Right;Left       Social/Functional History:    Lives With: Spouse  Type of Home: House  Home Layout: One level  Home Access: Stairs to enter with rails  Entrance Stairs - Number of Steps: 3 small steps with 1 rail         Ambulation Assistance: Independent  Transfer Assistance: Independent    Active : Yes  Mode of Transportation: SUV     Additional Comments: recently started getting too fatigued to do much. Objective:     RLE AROM: WNL     LLE AROM : WNL       Strength RLE: WFL  Comment: grossly 4-/5    Strength LLE: WFL  Comment: grossly 4-/5       Sensation  Overall Sensation Status: WNL       Supine to Sit: Supervision  Sit to Supine: Supervision    Transfers  Sit to Stand: Stand by assistance  Stand to sit: Supervision       Ambulation 1  Surface: level tile  Device: No Device  Assistance: Stand by assistance(to supervision)  Quality of Gait: slow, stuttered steps, mild unsteadiness, no LOB, this short distance did mildly fatigue pt  Distance: 15 ft        Exercises:  Comments: Pt was guided through completion of 5 reps each of heelslides, quad sets with 3 sec holds, long arc quads, and ankle pumps. Strengthening to improve functional mobility and endurance       Activity Tolerance:  Activity Tolerance: Patient Tolerated treatment well;Patient limited by fatigue;Patient limited by endurance    Treatment Initiated: See exercises above and noted mobility      Assessment: Body structures, Functions, Activity limitations: Decreased functional mobility , Decreased endurance, Decreased strength, Decreased balance  Assessment: Pt is a 46 y.o. male that is deconditioned and fatigues with minimal activity.  Pt is grossly at SBA to Supervision with mobility and would benefit from continued skilled PT to address strengthening, balance, and gait trng to improve endurance and functional mobility. Prognosis: Good    Clinical Presentation: Low - Stable and Uncomplicated:      Decision Making: Moderate Complexity based on patient assessment and decision making process of determining plan of care and establishing reasonable expectations for measurable functional outcomes    REQUIRES PT FOLLOW UP: Yes    Discharge Recommendations:  Discharge Recommendations: Continue to assess pending progress, Outpatient PT    Patient Education:  Patient Education: plan of care and LE exercises    Equipment Recommendations:  Equipment Needed: No    Safety:  Type of devices: All fall risk precautions in place, Call light within reach, Gait belt, Patient at risk for falls, Left in bed, Nurse notified    Plan:  Times per week: 5x GM  Times per day: Daily  Current Treatment Recommendations: Strengthening, Gait Training, Stair training, Balance Training, Functional Mobility Training, Endurance Training, Transfer Training, Safety Education & Training, Home Exercise Program, Patient/Caregiver Education & Training    Goals:  Patient goals : get stronger, be able to do things without getting fatigued  Short term goals  Time Frame for Short term goals: 1 week  Short term goal 1: Pt to be Mod I for supine <> sit to get in/out of bed  Short term goal 2: Pt to be Mod I for sit <> stand to get up to ambulate  Short term goal 3: Pt to ambulate > 100 ft with no device with Mod I without shortness of breath for household and community distances  Short term goal 4: Pt to negotiate 3 steps with 1 rail with Supervision for home access  Long term goals  Time Frame for Long term goals : not set due to short ELOS    Evaluation Complexity: Based on the findings of patient history, examination, clinical presentation, and decision making during this evaluation, the evaluation of Jean Deluna is of low complexity.         AM-PAC Inpatient Mobility without Stair Climbing Raw Score : 16  AM-PAC Inpatient without Stair Climbing T-Scale Score : 45.54  Mobility Inpatient CMS 0-100% Score: 40.64  Mobility Inpatient without Stair CMS G-Code Modifier : KAT Duran, Justina [place] [facility]

## 2019-04-17 LAB
ANION GAP SERPL CALCULATED.3IONS-SCNC: 14 MEQ/L (ref 8–16)
ANISOCYTOSIS: PRESENT
BASOPHILS # BLD: 0.2 %
BASOPHILS ABSOLUTE: 0 THOU/MM3 (ref 0–0.1)
BUN BLDV-MCNC: 31 MG/DL (ref 7–22)
CALCIUM SERPL-MCNC: 8.6 MG/DL (ref 8.5–10.5)
CHLORIDE BLD-SCNC: 97 MEQ/L (ref 98–111)
CO2: 25 MEQ/L (ref 23–33)
CREAT SERPL-MCNC: 3.1 MG/DL (ref 0.4–1.2)
EOSINOPHIL # BLD: 0.2 %
EOSINOPHILS ABSOLUTE: 0 THOU/MM3 (ref 0–0.4)
ERYTHROCYTE [DISTWIDTH] IN BLOOD BY AUTOMATED COUNT: 25.6 % (ref 11.5–14.5)
ERYTHROCYTE [DISTWIDTH] IN BLOOD BY AUTOMATED COUNT: 59.4 FL (ref 35–45)
GFR SERPL CREATININE-BSD FRML MDRD: 21 ML/MIN/1.73M2
GLUCOSE BLD-MCNC: 110 MG/DL (ref 70–108)
GLUCOSE BLD-MCNC: 209 MG/DL (ref 70–108)
GLUCOSE BLD-MCNC: 319 MG/DL (ref 70–108)
GLUCOSE BLD-MCNC: 327 MG/DL (ref 70–108)
GLUCOSE BLD-MCNC: 67 MG/DL (ref 70–108)
GLUCOSE BLD-MCNC: 70 MG/DL (ref 70–108)
GLUCOSE BLD-MCNC: 77 MG/DL (ref 70–108)
GLUCOSE BLD-MCNC: 90 MG/DL (ref 70–108)
HCT VFR BLD CALC: 27.8 % (ref 42–52)
HEMOGLOBIN: 8 GM/DL (ref 14–18)
HYPOCHROMIA: PRESENT
IMMATURE GRANS (ABS): 0.02 THOU/MM3 (ref 0–0.07)
IMMATURE GRANULOCYTES: 0.3 %
LYMPHOCYTES # BLD: 12.3 %
LYMPHOCYTES ABSOLUTE: 0.8 THOU/MM3 (ref 1–4.8)
MCH RBC QN AUTO: 22 PG (ref 26–33)
MCHC RBC AUTO-ENTMCNC: 28.8 GM/DL (ref 32.2–35.5)
MCV RBC AUTO: 76.6 FL (ref 80–94)
MONOCYTES # BLD: 11.7 %
MONOCYTES ABSOLUTE: 0.8 THOU/MM3 (ref 0.4–1.3)
NUCLEATED RED BLOOD CELLS: 0 /100 WBC
PLATELET # BLD: 194 THOU/MM3 (ref 130–400)
PMV BLD AUTO: 11 FL (ref 9.4–12.4)
POTASSIUM SERPL-SCNC: 4.2 MEQ/L (ref 3.5–5.2)
RBC # BLD: 3.63 MILL/MM3 (ref 4.7–6.1)
SEG NEUTROPHILS: 75.3 %
SEGMENTED NEUTROPHILS ABSOLUTE COUNT: 5 THOU/MM3 (ref 1.8–7.7)
SODIUM BLD-SCNC: 136 MEQ/L (ref 135–145)
T4 FREE: 1.03 NG/DL (ref 0.93–1.76)
WBC # BLD: 6.7 THOU/MM3 (ref 4.8–10.8)

## 2019-04-17 PROCEDURE — 90935 HEMODIALYSIS ONE EVALUATION: CPT

## 2019-04-17 PROCEDURE — 36415 COLL VENOUS BLD VENIPUNCTURE: CPT

## 2019-04-17 PROCEDURE — 90935 HEMODIALYSIS ONE EVALUATION: CPT | Performed by: NURSE PRACTITIONER

## 2019-04-17 PROCEDURE — 99231 SBSQ HOSP IP/OBS SF/LOW 25: CPT | Performed by: INTERNAL MEDICINE

## 2019-04-17 PROCEDURE — 80048 BASIC METABOLIC PNL TOTAL CA: CPT

## 2019-04-17 PROCEDURE — 6370000000 HC RX 637 (ALT 250 FOR IP): Performed by: INTERNAL MEDICINE

## 2019-04-17 PROCEDURE — 6370000000 HC RX 637 (ALT 250 FOR IP): Performed by: NURSE PRACTITIONER

## 2019-04-17 PROCEDURE — 1200000000 HC SEMI PRIVATE

## 2019-04-17 PROCEDURE — 84439 ASSAY OF FREE THYROXINE: CPT

## 2019-04-17 PROCEDURE — 82948 REAGENT STRIP/BLOOD GLUCOSE: CPT

## 2019-04-17 PROCEDURE — 6370000000 HC RX 637 (ALT 250 FOR IP): Performed by: PHYSICIAN ASSISTANT

## 2019-04-17 PROCEDURE — 2580000003 HC RX 258: Performed by: PHYSICIAN ASSISTANT

## 2019-04-17 PROCEDURE — 6370000000 HC RX 637 (ALT 250 FOR IP): Performed by: HOSPITALIST

## 2019-04-17 PROCEDURE — 97116 GAIT TRAINING THERAPY: CPT

## 2019-04-17 PROCEDURE — 2709999900 HC NON-CHARGEABLE SUPPLY

## 2019-04-17 PROCEDURE — 85025 COMPLETE CBC W/AUTO DIFF WBC: CPT

## 2019-04-17 RX ORDER — INSULIN GLARGINE 100 [IU]/ML
18 INJECTION, SOLUTION SUBCUTANEOUS DAILY
Status: DISCONTINUED | OUTPATIENT
Start: 2019-04-18 | End: 2019-04-18 | Stop reason: HOSPADM

## 2019-04-17 RX ADMIN — ISOSORBIDE MONONITRATE 60 MG: 60 TABLET ORAL at 21:33

## 2019-04-17 RX ADMIN — ATORVASTATIN CALCIUM 80 MG: 80 TABLET, FILM COATED ORAL at 21:33

## 2019-04-17 RX ADMIN — HYDRALAZINE HYDROCHLORIDE 50 MG: 50 TABLET, FILM COATED ORAL at 21:33

## 2019-04-17 RX ADMIN — LEVOTHYROXINE SODIUM 100 MCG: 100 TABLET ORAL at 05:18

## 2019-04-17 RX ADMIN — CLOPIDOGREL BISULFATE 75 MG: 75 TABLET, FILM COATED ORAL at 21:33

## 2019-04-17 RX ADMIN — INSULIN LISPRO 2 UNITS: 100 INJECTION, SOLUTION INTRAVENOUS; SUBCUTANEOUS at 21:38

## 2019-04-17 RX ADMIN — FERROUS SULFATE TAB 325 MG (65 MG ELEMENTAL FE) 325 MG: 325 (65 FE) TAB at 18:14

## 2019-04-17 RX ADMIN — METOPROLOL TARTRATE 50 MG: 50 TABLET, FILM COATED ORAL at 21:33

## 2019-04-17 RX ADMIN — ASPIRIN 81 MG 81 MG: 81 TABLET ORAL at 21:37

## 2019-04-17 RX ADMIN — Medication 10 ML: at 11:53

## 2019-04-17 ASSESSMENT — PAIN SCALES - GENERAL
PAINLEVEL_OUTOF10: 0

## 2019-04-17 NOTE — PROGRESS NOTES
[address] 4A - [id number]    Time In: 1100  Time Out: 3344  Timed Code Treatment Minutes: 15 Minutes  Minutes: 15          Date: 2019  Patient Name: Starr Hernandez,  Gender:  male        MRN: [id number]  : 1965  (46 y.o.)     Referring Practitioner: Chata Lane MD  Diagnosis: Encephalopathy  Additional Pertinent Hx: Per ED note, pt is a 46 y.o. male who presents  to the emergency Department with complaints of not feeling well feeling fatigued tired swelling of the legs and subjective abdominal distention. Patient said that the symptoms have been going on for a while but over the last 1 week it has gotten  worse. He has history of chronic kidney disease and he is scheduled for [facility] list [facility]. He is being prepped for dialysis but in the meantime if  he can get to transplant before dialysis that would be a better option. Patient said that anytime he tries to do anything at all he gets very short of breath for example just putting on his own socks makes him  feel short of breath. He has no cough or congestion ,while resting he is not short of breath. He has no associated fevers. Past Medical History:   Diagnosis Date    Broken ankle     Broke right ankle.     CAD (coronary artery disease) 2015    MI     Cerebral artery occlusion with cerebral infarction [facility])  2015    left side    CHF (congestive heart failure) (HCC)     Chronic kidney disease     Diabetes mellitus ([facility] [address]     Hyperlipidemia     Hypertension      Past Surgical History:   Procedure Laterality Date    APPENDECTOMY      CARDIAC SURGERY  Oct. 2015    2 stents placed       Restrictions/Precautions:  General Precautions, Fall Risk                            Prior Level of Function:  Ambulation Assistance: Independent  Transfer Assistance: Independent  Additional Comments: recently started getting too fatigued to do much.    Subjective:     Subjective: Pt very pleasant and cooeprative. Motivated. Pt reporting feeling much better after dialysis yesterday. Pt is to be picked up for dialysis soon per nurse. Pt reporitng much less SOB this session than recently , pt rpeorts this is the most he has been able to walk in \"some time\"     Pain:   .      denies    Social/Functional:  Lives With: Spouse  Type of Home: House  Home Layout: One level  Home Access: Stairs to enter with rails  Entrance Stairs - Number of Steps: 3 small steps with 1 rail     Objective:       Transfers  Sit to Stand: Supervision  Stand to sit: Supervision       Ambulation 1  Surface: level tile  Device: No Device  Assistance: Stand by assistance  Quality of Gait: no LOB , one short standing RB with one handhold on wall rail ; pt ambulated slower second half of distance   Distance: approx 550 feet x 1   Comments: pt reported \"this is enough\" after ambulation                                                                 Activity Tolerance:  Activity Tolerance: Patient Tolerated treatment well;Patient limited by endurance    Assessment: Body structures, Functions, Activity limitations: Decreased functional mobility , Decreased endurance, Decreased strength, Decreased balance  Assessment: rec cont skilled therapy for improved functional mobility strength and endurance   Prognosis: Good     REQUIRES PT FOLLOW UP: Yes    Discharge Recommendations:  Discharge Recommendations: Continue to assess pending progress, Outpatient PT    Patient Education:  Patient Education: posture     Equipment Recommendations:  Equipment Needed: No    Safety:  Type of devices:  All fall risk precautions in place, Call light within reach, Left in bed, Bed alarm in place    Plan:  Times per week: 5x GM  Times per day: Daily  Current Treatment Recommendations: Strengthening, Gait Training, Stair training, Balance Training, Functional Mobility Training, Endurance Training, Transfer Training, Safety Education & Training, Home Exercise Program, Patient/Caregiver Education & Training    Goals:  Patient goals : get stronger, be able to do things without getting fatigued    Short term goals  Time Frame for Short term goals: 1 week  Short term goal 1: Pt to be Mod I for supine <> sit to get in/out of bed  Short term goal 2: Pt to be Mod I for sit <> stand to get up to ambulate  Short term goal 3: Pt to ambulate > 100 ft with no device with Mod I without shortness of breath for household and community distances  Short term goal 4: Pt to negotiate 3 steps with 1 rail with Supervision for home access    Long term goals  Time Frame for Long term goals : not set due to short ELOS  If patient is discharged prior to progress note completion, this note is to serve as the discharge note with all goals being unmet unless indicated otherwise.

## 2019-04-17 NOTE — PLAN OF CARE
Problem: Falls - Risk of:  Goal: Will remain free from falls  Description  Will remain free from falls  4/17/2019 0030 by Miranda Dyer RN  Outcome: Ongoing  Note:   Call light in reach, bed in lowest position, and bed alarm activated. Education given on use of call light before ambulation and when in need of assistance. Patient expressed understanding. Hourly visual checks performed and charted. Toileting offered to patient. No falls this shift, at any time. Arm band and falling star in place. Will continue to monitor. Problem: Cardiac:  Goal: Ability to maintain vital signs within normal range will improve  Description  Ability to maintain vital signs within normal range will improve  4/17/2019 0030 by Miranda Dyer RN  Outcome: Ongoing  Note:   Patients blood pressure will remain stable, stable at this time. Will continue to monitor BP and HR per order. Vitals:    04/16/19 2245   BP: 130/60   Pulse: 71   Resp:    Temp:    SpO2: 95%         Problem: Serum Glucose Level - Abnormal:  Goal: Ability to maintain appropriate glucose levels has stabilized  Description  Ability to maintain appropriate glucose levels has stabilized  4/17/2019 0030 by Miranda Dyer RN  Outcome: Ongoing  Note:   Patients blood sugar is being closely monitored. Blood glucose is being checked before breakfast, lunch, dinner, and bedtime. Insulin coverage when needed. Patient is now taking Lantus in the morning vs home dose was at night. Problem: Nutritional:  Goal: Maintenance of adequate nutrition will be supported  Description  Maintenance of adequate nutrition will be supported  4/17/2019 0030 by Miranda Dyer RN  Outcome: Ongoing  Note:   Patient is on a fluid restriction and on a low sodium diet. Tolerating well at this time. Patient does not have a good appetite at this moment.       Problem: Discharge Planning:  Goal: Discharged to appropriate level of care  Description  Discharged to appropriate level [facility]  4/17/2019 0030 by Nuria Louis RN  Outcome: Ongoing  Note:   Discharge planning in process and discussed with patient/family. Social work consulted for any additional needs. Care manager aware of discharge needs. Care plan reviewed with patient. Patient verbalize understanding of the plan of care and contribute to goal setting.

## 2019-04-17 NOTE — PROGRESS NOTES
diuresis. 4/13: Cr further up to 4.0 today. Decreased bumex dose. No indication for urgent RRT. Will monitor lytes  4/14: as above  4/15: as above    4/16; Cr 4.4 today with uremia (urea 60), feels tired. Clinically remains euvolemic on ambient air. U/O has not been recorded. Pt has not using urinal. Nephro following. Pt will have dialysis port inseretd by IR today with plan to start dialysis today    4/17 non-tunneled Catheter placed, hemodialysis initiated- had dialysis yesterday and today. - Hypothyroidism: .6 will repeat first. If accurate, severe hypothyroidism could also lead to cardiomyopathy and myxedema. Pt on Levothyroxine 25 mcg which we increased (slowly) to 50 now. Also would start on Dexamethasone 4 mg q8h for now until associated adrenal insufficiency is ruled out. (hypoK and hypoNa Although pt's not hypotensive, there is unlikely associated Addisonian crisis)      4/12: consulted endocrine last night. Pt now on 200 mcg QD levothyroxine. Will repeat free T4 and likely reduce dose accordingly. Will continue with Dexa for now. 4/13; free T4 up to 0.68 now. CCM. Will monitor free T4 levels to monitor response to treatment. D/c'ed Dex today as pt clearly not in adrenal insufficiency. 4/14: CCM  4/15: T4 up to 0.71. CCM. will recheck T4 levels in few days. Will switch to 100 mcg in few days. 4/16: now back on maintenance dose of 100 mcg QD. Will have repeat free T4 tomorrow. If WNLs then will need repeat TSH in 6 wks time to assess response    - Encephaopathy? Likely metabolic. 4/12: resolved    - HyperK: likely d/t decreased GFR. K down to 5.3 from 6.1 after Insulin R/D50W. Also given Kayexalate overnight. Will monitor BMP  4/12: resolved K 4.9 today  4/14: K 4.1  4/15: K 4.9    - Metabolic acidosis 2nd to CKD   4/12: improving  4/13: resolved. K 5.0 will monitor  4/14: Resolved. - Diabetes Mellitus Type I: A1C 7.3%. 4/12: BS uncontrolled.  Pt refusing treatment and low-carb diabetic diet. With RN in room explained risks of both hypo- and hyperglycemia. Reemphasized that hypoglycemia can be potentially fatal. Pt wishes to manage his own insulin intake. He is now back on his standing home Glargine (13 U) plus SSI    4/13: BS in 400s range. Once again with RN in room went over issue of poorly controlled hyperglycemia. Pt adamant to allow me manage BS. He eats and snacks on what he wants and wishes to direct RN to give him whatever dose he is asking for. Also explained concurrent steroids also attributing to hyperglycemia. Steroids d/c'ed now. 4/14: agreed to MDI insulin. BS slightly improved. Will allow endocrine manage this. 4/15: BS improving in high 100s and low 200s for most part. Had BS 44 at 6 a.m. Likely d/t poor PO intake. Endocrine managing    4/16: improved w/ current MDI. CCM. OP F/U w/ endocrine    - HypoNa: Na 129. When corrected for concomitant hyperglycemia , Na 135. Will  Monitor  4/14: Na 131 today with concomitant  with corrected Na 135. Will monitor. 4/15: Na 137  4/16: Na 132. Will have IHD today. Diasylate will be adjusted accordingly    - Essential HTN: uncontrolled. back on home meds. Will reassess response  4/12: BP still uncontrolled. Increased imdur to 60 followed by Hydralazine to 100 from 50 TID. Will reassess    4/13: BP still uncontrolled, added clonidine 0.1 BID now. Will reassess  4/15: BP improved. CCM  4/1: CCM     - Chronic microcytic anemia: likely KIRSTY plus anemia of chronic disease, noted iron 24. Started on Iron TID. Noted pt started on EPP by nephro which would be ineffective unless KIRSTY treated properly first.  4/12: IV iron for 3 doses with plan to start EPO after    4/15: Hb stable. Iron and EPO    - Severe malnutrition: seen by dietician on supplementaion          Expected discharge date:  TBD         Disposition:      ? Home                             ? TCU                             ? Rehab                             ?  Psych ? SNF                             ? [place]                             ? Other-    Chief Complaint: No chief complaint on file. Hospital Course: Patient was seen, examined and the medical chart was reviewed thoroughly today. In summary, 46 y.o.male admitted overnight for likely acute decompensated CHF. Subjective (past 24 hours):   currently in dialysis    Medications:  Reviewed    Infusion Medications    dextrose       Scheduled Medications    [START ON 4/18/2019] insulin glargine  18 Units Subcutaneous Daily    famotidine  20 mg Oral Daily    hydrALAZINE  50 mg Oral TID    levothyroxine  100 mcg Oral Daily    [Held by provider] bumetanide  1 mg Oral Daily    docusate sodium  100 mg Oral Daily    insulin lispro  5-20 Units Subcutaneous TID WC    isosorbide mononitrate  60 mg Oral Daily    ferrous sulfate  325 mg Oral TID WC    insulin lispro  0-6 Units Subcutaneous TID WC    insulin lispro  0-3 Units Subcutaneous Nightly    aspirin  81 mg Oral Daily    atorvastatin  80 mg Oral Daily    clopidogrel  75 mg Oral Daily    metoprolol tartrate  50 mg Oral BID    sodium chloride flush  10 mL Intravenous 2 times per day     PRN Meds: sodium chloride flush, magnesium hydroxide, ondansetron, acetaminophen, glucose, dextrose, glucagon (rDNA), dextrose      Intake/Output Summary (Last 24 hours) at 4/17/2019 1836  Last data filed at 4/17/2019 1531  Gross per 24 hour   Intake 1380 ml   Output 3500 ml   Net -2120 ml       Diet:  DIET CARB CONTROL; Low Sodium (2 GM)            Labs:   Recent Labs     04/17/19  0345   WBC 6.7   HGB 8.0*   HCT 27.8*        Recent Labs     04/15/19  0428 04/16/19  0405 04/17/19  0345    132* 136   K 4.9 5.0 4.2    98 97*   CO2 23 20* 25   BUN 61* 60* 31*   CREATININE 4.5* 4.4* 3.1*   CALCIUM 8.5 8.3* 8.6     No results for input(s): AST, ALT, BILIDIR, BILITOT, ALKPHOS in the last 72 hours.   No results for input(s): INR in the last 72 hours. No results for input(s): Pearl Fonseca in the last 72 hours. Urinalysis:      Lab Results   Component Value Date    NITRU NEGATIVE 04/10/2019    WBCUA NONE SEEN 04/10/2019    BACTERIA NONE 04/10/2019    RBCUA 3-5 04/10/2019    BLOODU SMALL 04/10/2019    GLUCOSEU NEGATIVE 04/10/2019       Radiology:  Ct Abdomen Pelvis Wo Contrast Additional Contrast? None    Result Date: 4/10/2019  PROCEDURE: CT ABDOMEN PELVIS WO CONTRAST CLINICAL INFORMATION: abdominal distension with history of CKD . COMPARISON: April 9, 2016 TECHNIQUE: Axial 5 mm CT images were obtained through the abdomen and pelvis. No contrast was given. Coronal reconstructions were obtained. All CT scans at this facility use dose modulation, iterative reconstruction, and/or weight-based dosing when appropriate to reduce radiation dose to as low as reasonably achievable. FINDINGS:  The heart appears enlarged. There is a moderate right effusion with compressive atelectasis of the right lower lobe. Changes of mild COPD are present. The noncontrast appearance of liver, gallbladder, pancreas and spleen are negative for active appearing pathology. There is mild bilateral perinephric stranding. There is distention of the urinary bladder. Correlate with urinalysis to exclude cystitis and superimposed nephritis. Punctate bilateral nonobstructing renal calculi are present. Small bowel is nondistended. Mild luminal stasis is present correlate with mild enteritis. There is abundant retention of stool throughout colon compatible with moderate severe constipation. There is mild mucosal thickening of the anorectal region. Changes of proctitis are not excluded. There is no acute appearing pathology of the skeleton. There are degenerative disc changes at L5-S1 with minimal retrolisthesis of L5 on S1. There is scattered atherosclerosis of the aorta without aneurysm. 1. Abundant retention of stool throughout colon compatible with constipation. 2. Bilateral nonobstructing renal calculi with nonspecific perinephric stranding. Correlate with urinalysis to exclude changes of nephritis. 3. Moderate right effusion with lower lobe atelectasis. Underlying infiltrate is not excluded. **This report has been created using voice recognition software. It may contain minor errors which are inherent in voice recognition technology. ** Final report electronically signed by Dr. Chip Troy on 4/10/2019 2:21 AM    Xr Chest Standard (2 Vw)    Result Date: 4/10/2019  PROCEDURE: XR CHEST (2 VW) CLINICAL INFORMATION: leg edema with shortness of breath. COMPARISON: No prior study. TECHNIQUE: PA and lateral views the chest. FINDINGS: There is stable mild cardiac enlargement with coarse lung markings. There is no focal infiltrate. Trace right effusion cannot be excluded. There is no evidence of congestive failure. 1. Stable cardiac enlargement with diffuse coarse markings. 2. Persistent minimal blunting of the right costophrenic angle. Small effusion versus chronic subpleural changes are present. **This report has been created using voice recognition software. It may contain minor errors which are inherent in voice recognition technology. ** Final report electronically signed by Dr. Chip Troy on 4/10/2019 2:48 AM      Diet: DIET CARB CONTROL; Low Sodium (2 GM)    DVT prophylaxis: ? Lovenox                                 ? SCDs                                 ? SQ Heparin                                 ? Encourage ambulation           ?  Already on Anticoagulation       Code Status: Full Code      Active Hospital Problems    Diagnosis Date Noted    NANCY (acute kidney injury) ([facility] [address] [N17.9]     Hypervolemia [E87.70]     Severe malnutrition ([address] [E43] 04/12/2019     Class: Acute    Encephalopathy [G93.40] 04/10/2019    Anemia in chronic kidney disease [N18.9, D63.1] 11/24/2018    CKD (chronic kidney disease) stage 4, GFR 15-29 ml/min ([address] [N18.4] 04/09/2016    CHF (congestive heart failure) ([facility] [I50.9] 04/09/2016    Anemia associated with chronic renal failure [D63.1] 04/09/2016    Coronary artery disease involving native coronary artery of native heart with angina pectoris [facility] [I25.119] 04/09/2016    Type 1 diabetes mellitus with hyperglycemia ([facility] [E10.65] 03/29/2016           Patient was updated about the treatment plan, all the questions and concerns were addressed.         Electronically signed by Nelida Hernandez MD on 4/17/2019 at 6:36 PM

## 2019-04-17 NOTE — PROGRESS NOTES
Nephrology Progress Note    Patient Angela Nix   MRN -  [id number]   Acct # - [de-identified]      - 1965    46 y.o. Admit Date: 4/10/2019  Hospital Day: 7  Location: -[facility]  Date of evaluation -  2019    Subjective:   CC: did not feel good  C/O of sob. Room air   UOP not recorded. Pt states he is voiding \"good\"  Wt not accurate  Hemodialysis yesterday with Ultrafiltration 2000 ml  \"I feel good\"  BP Range: Systolic (38QXS), IYI:905 , Min:123 , CKP:954      Diastolic (46GEU), IJU:57, Min:60, Max:73    Objective:   VITALS:  BP (!) 142/65   Pulse 66   Temp 98.3 °F (36.8 °C) (Oral)   Resp 18   Ht 5' 6\" (1.676 m)   Wt 105 lb 2.6 oz (47.7 kg)   SpO2 96%   BMI 16.97 kg/m²    Patient Vitals for the past 24 hrs:   BP Temp Temp src Pulse Resp SpO2 Weight   19 0815 (!) 142/65 98.3 °F (36.8 °C) Oral 66 18 96 % --   19 0330 123/60 99.1 °F (37.3 °C) Oral 68 16 96 % 105 lb 2.6 oz (47.7 kg)   19 2245 130/60 -- -- 71 -- 95 % --   19 1945 (!) 144/60 98.7 °F (37.1 °C) Oral 81 18 96 % --   19 1545 (!) 164/69 98.5 °F (36.9 °C) Oral 78 16 -- --   19 1521 (!) 142/73 98.4 °F (36.9 °C) -- 73 -- -- 125 lb 10.6 oz (57 kg)   19 1206 137/73 98.4 °F (36.9 °C) -- 72 -- -- 128 lb 8.5 oz (58.3 kg)   19 1145 (!) 148/66 -- -- 70 16 90 % --   19 1140 (!) 165/69 -- -- 75 16 94 % --   19 1134 (!) 145/72 -- -- 72 16 90 % --     2 L/min    Intake/Output Summary (Last 24 hours) at 2019 1044  Last data filed at 2019 0340  Gross per 24 hour   Intake 1740 ml   Output 2500 ml   Net -760 ml     Admission weight: 129 lb 8 oz (58.7 kg)  Patient Vitals for the past 96 hrs (Last 3 readings):   Weight   19 0330 105 lb 2.6 oz (47.7 kg)   19 1521 125 lb 10.6 oz (57 kg)   19 1206 128 lb 8.5 oz (58.3 kg)     Body mass index is 16.97 kg/m². EXAM:  CONSTITUTIONAL:  No acute distress. Lying comfortable in bed.  Pleasant  HEENT:  Head is normocephalic, AM, then ok to remove catheter and discharge. FU with Dr Alondra Gilliland early next week with BMP. Resume ASA and Plavix. Pt seen during hemodialysis, Hemodynamically stable this aftn. 3 K bath, Goal Ultrafiltration 3000 ml. Pt agrees with plan  2. Chronic Kidney Disease Stage IV 2nd to DM and HTN  3. Metabolic acidosis 2nd to NANCY and hypotension, resolved with Hemodialysis   4. Hyponatremia 2nd to inability of kidneys to excrete free water, resolved with Hemodialysis   5. Diabetes Mellitus Type II with nephrosclerosis with long term use of insulin, A1C 7.3%. 6. Essential Hypertension with nephrosclerosis , BP varied but overall improving  7. Hypothyroidism, . Repeat . PO levothyroxine per Endo  8. Anemia of chronic disease, and iron deficiency. S/P Venofer 300 mg x 3 doses. Aranesp 150 mcg 4/13. BMP in AM  Active Problems:    CHF (congestive heart failure) (HCC)    CKD (chronic kidney disease) stage 4, GFR 15-29 ml/min (HCC)    Coronary artery disease involving native coronary artery of native heart with angina pectoris (HCC)    Anemia associated with chronic renal failure    Type 1 diabetes mellitus with hyperglycemia (HCC)    Anemia in chronic kidney disease    Encephalopathy    Severe malnutrition (HCC)    NANCY (acute kidney injury) ([facility]    Hypervolemia  Resolved Problems:    * No resolved hospital problems.  PAOLA Perez CNP 10:44 AM 4/17/2019

## 2019-04-17 NOTE — CARE COORDINATION
4/17/19, 10:03 AM      Stewart [place] day: 7  Location: [facility] Reason for admit: Encephalopathy [G93.40]   Procedure:  Dialysis Catheter insertion per IR      Treatment Plan of Care: Hemodialysis, creatinine on 4/16/2019 was 4.4, 3.1 today post dialysis. Nephrology following. Diabetes management continues. PCP: PAOLA Solis CNP  Readmission Risk Score: 20%  Discharge Plan: Levon Abdul plans on returning home with spouse at discharge.

## 2019-04-17 NOTE — PROGRESS NOTES
Respiratory ROS: no cough, shortness of breath, or wheezing   Cardiovascular ROS: no chest pain or dyspnea on exertion   Gastrointestinal ROS: no abdominal pain, change in bowel habits, or black or bloody stools   Genito-Urinary ROS: no dysuria, trouble voiding, or hematuria   Musculoskeletal ROS: negative   Neurological ROS: no TIA or stroke symptoms   Dermatological ROS: negative    Past Medical, Surgical, Family, Social and Allergy Histories:  I have reviewed the patient's medical history in detail and updated the computerized patient record. has a past medical history of Broken ankle, CAD (coronary artery disease), Cerebral artery occlusion with cerebral infarction [facility]), CHF (congestive heart failure) ([facility] Chronic kidney disease, Diabetes mellitus [facility] Hyperlipidemia, and Hypertension. has a past surgical history that includes Appendectomy and Cardiac surgery (Oct. 2015). reports that he quit smoking about 3 years ago. He has a 45.00 pack-year smoking history. He has quit using smokeless tobacco. He reports that he drinks about 2.4 oz of alcohol per week. He reports that he does not use drugs. family history includes Cancer in his sister; Diabetes in his mother. He was adopted. Allergies   Allergen Reactions    Aranesp (Albumin Free) [Darbepoetin Damon] Hives     Patient tolerated 4/13/19.       Current Facility-Administered Medications   Medication Dose Route Frequency Provider Last Rate Last Dose    famotidine (PEPCID) tablet 20 mg  20 mg Oral Daily Pamalee Ort, APRN - CNP   20 mg at 04/16/19 7564    hydrALAZINE (APRESOLINE) tablet 50 mg  50 mg Oral TID Pamalee Ort, APRN - CNP   50 mg at 04/16/19 2001    levothyroxine (SYNTHROID) tablet 100 mcg  100 mcg Oral Daily Radha Jonas MD   100 mcg at 04/17/19 0518    [Held by provider] bumetanide (BUMEX) tablet 1 mg  1 mg Oral Daily Vaishnavi Nation MD   1 mg at 04/13/19 0828    docusate sodium (COLACE) capsule 100 mg  100 mg Oral Daily Racquel Brennan MD   100 mg at 04/16/19 4612    insulin glargine (LANTUS) injection vial 20 Units  20 Units Subcutaneous Daily Amanda Lozada MD   20 Units at 04/16/19 0923    insulin lispro (HUMALOG) injection vial 5-20 Units  5-20 Units Subcutaneous TID  Amanda Lozada MD   8 Units at 04/14/19 1831    isosorbide mononitrate (IMDUR) extended release tablet 60 mg  60 mg Oral Daily Racquel Brennan MD   60 mg at 04/16/19 2001    ferrous sulfate tablet 325 mg  325 mg Oral TID  Racquel Brennan MD   325 mg at 04/16/19 1858    insulin lispro (HUMALOG) injection vial 0-6 Units  0-6 Units Subcutaneous TID  Shira , PA-C   2 Units at 04/14/19 1836    insulin lispro (HUMALOG) injection vial 0-3 Units  0-3 Units Subcutaneous Nightly Shira Pilot, PA-C   1 Units at 04/16/19 2003    aspirin chewable tablet 81 mg  81 mg Oral Daily Shira , PA-C   81 mg at 04/15/19 2153    atorvastatin (LIPITOR) tablet 80 mg  80 mg Oral Daily Shira , PA-C   80 mg at 04/16/19 2001    clopidogrel (PLAVIX) tablet 75 mg  75 mg Oral Daily Shira , PA-C   75 mg at 04/15/19 2153    metoprolol tartrate (LOPRESSOR) tablet 50 mg  50 mg Oral BID Shira , PA-C   50 mg at 04/16/19 2001    sodium chloride flush 0.9 % injection 10 mL  10 mL Intravenous 2 times per day Shira , PA-C   10 mL at 04/17/19 1153    sodium chloride flush 0.9 % injection 10 mL  10 mL Intravenous PRN Shira , PA-C        magnesium hydroxide (MILK OF MAGNESIA) 400 MG/5ML suspension 30 mL  30 mL Oral Daily PRN Shira , PA-C        ondansetron [facility]) injection 4 mg  4 mg Intravenous Q6H PRN Shira , PA-C   4 mg at 04/14/19 2212    acetaminophen (TYLENOL) tablet 650 mg  650 mg Oral Q4H PRN Shira , PA-C   650 mg at 04/14/19 2212    glucose (GLUTOSE) 40 % oral gel 15 g  15 g Oral PRN Shira Martino PA-C   15 g at 04/15/19 0631    dextrose 50 % solution 12.5 g  12.5 g Intravenous PRN Mela CESAR RUBY Mederos   12.5 g at 04/11/19 0544    glucagon (rDNA) injection 1 mg  1 mg Intramuscular PRN Mela Mederos PA-C        dextrose 5 % solution  100 mL/hr Intravenous PRN Phoebe Aviles PA-C         Home Meds:   Prior to Admission medications    Medication Sig Start Date End Date Taking?  Authorizing Provider   Insulin Degludec (TRESIBA) 100 UNIT/ML SOLN Inject 13 Units into the skin nightly    Yes Historical Provider, MD   insulin regular (HUMULIN R;NOVOLIN R) 100 UNIT/ML injection Inject into the skin See Admin Instructions 1 unit for every 8 carbs consumed  In addition:In the evening, If BS>200   200-250= 1unit  251-300= 2 units  301-350=3 units   Yes Historical Provider, MD   hydrALAZINE (APRESOLINE) 50 MG tablet Take 1.5 tablets by mouth 3 times daily  Patient taking differently: Take 50 mg by mouth 3 times daily  11/25/18  Yes Adam Huerta MD   isosorbide mononitrate (IMDUR) 30 MG CR tablet Take 30 mg by mouth daily   Yes Historical Provider, MD   atorvastatin (LIPITOR) 80 MG tablet Take 80 mg by mouth daily   Yes Historical Provider, MD   clopidogrel (PLAVIX) 75 MG tablet Take 75 mg by mouth daily   Yes Historical Provider, MD   pantoprazole sodium (PROTONIX) 40 MG PACK packet Take 40 mg by mouth 2 times daily (before meals)   Yes Historical Provider, MD   metoprolol (LOPRESSOR) 50 MG tablet Take 50 mg by mouth 2 times daily   Yes Historical Provider, MD   aspirin 81 MG tablet Take 81 mg by mouth daily   Yes Historical Provider, MD   levothyroxine (SYNTHROID) 25 MCG tablet Take 25 mcg by mouth Daily    Historical Provider, MD   nitroGLYCERIN (NITROSTAT) 0.4 MG SL tablet DISSOLVE 1 TABLET UNDER THE TONGUE EVERY 5 MIN AS NEEDED FOR CHEST PAIN 1/25/18   Historical Provider, MD   FREESTYLE LANCETS MISC  3/31/16   Historical Provider, MD   FREESTYLE INSULINX TEST strip  3/31/16   Historical Provider, MD     Scheduled Meds:   famotidine  20 mg Oral Daily    hydrALAZINE  50 mg Oral TID    levothyroxine 100 mcg Oral Daily    [Held by provider] bumetanide  1 mg Oral Daily    docusate sodium  100 mg Oral Daily    insulin glargine  20 Units Subcutaneous Daily    insulin lispro  5-20 Units Subcutaneous TID     isosorbide mononitrate  60 mg Oral Daily    ferrous sulfate  325 mg Oral TID WC    insulin lispro  0-6 Units Subcutaneous TID     insulin lispro  0-3 Units Subcutaneous Nightly    aspirin  81 mg Oral Daily    atorvastatin  80 mg Oral Daily    clopidogrel  75 mg Oral Daily    metoprolol tartrate  50 mg Oral BID    sodium chloride flush  10 mL Intravenous 2 times per day     Continuous Infusions:   dextrose       PRN Meds:sodium chloride flush, magnesium hydroxide, ondansetron, acetaminophen, glucose, dextrose, glucagon (rDNA), dextrose    OBJECTIVE        Physical    VITALS:  BP (!) 145/66   Pulse 70   Temp 98.2 °F (36.8 °C) (Oral)   Resp 16   Ht 5' 6\" (1.676 m)   Wt 111 lb 1.8 oz (50.4 kg)   SpO2 96%   BMI 17.93 kg/m²   24HR INTAKE/OUTPUT:    Intake/Output Summary (Last 24 hours) at 4/17/2019 1706  Last data filed at 4/17/2019 1531  Gross per 24 hour   Intake 1740 ml   Output 3500 ml   Net -1760 ml     CONSTITUTIONAL:  awake, alert, cooperative, no apparent distress, and appears stated age  LUNGS:  No increased work of breathing, good air exchange, clear to auscultation bilaterally, no crackles or wheezing  CARDIOVASCULAR:  Normal apical impulse, regular rate and rhythm, normal S1 and S2, no S3 or S4, and no murmur noted  ABDOMEN:  No scars, normal bowel sounds, soft, non-distended, non-tender, no masses palpated, no hepatosplenomegally    DATA  TSH:    Lab Results   Component Value Date    .400 04/11/2019   No components found for: FREE T4No components found for: FREET3  RADIOLOGYREPORTS:    Lab Review  @[email or web address]@  [unfilled]  Lab Results   Component Value Date    .400 (H) 04/11/2019    F2NIIKO 51 (L) 04/10/2019    T4FREE 1.03 04/17/2019     No results found for: FREET4  No results found for: TESTOSTERONE  CBC:  Lab Results   Component Value Date    WBC 6.7 04/17/2019    RBC 3.63 04/17/2019    HGB 8.0 04/17/2019    HCT 27.8 04/17/2019    MCV 76.6 04/17/2019    MCH 22.0 04/17/2019    MCHC 28.8 04/17/2019    RDW 16.2 04/09/2016     04/17/2019    MPV 11.0 04/17/2019     CMP:    Lab Results   Component Value Date     04/17/2019    K 4.2 04/17/2019    K 6.1 04/10/2019    CL 97 04/17/2019    CO2 25 04/17/2019    BUN 31 04/17/2019    CREATININE 3.1 04/17/2019    LABGLOM 21 04/17/2019    GLUCOSE 70 04/17/2019    PROT 6.6 04/10/2019    LABALBU 3.6 04/10/2019    CALCIUM 8.6 04/17/2019    BILITOT 0.6 04/10/2019    ALKPHOS 129 04/10/2019    AST 62 04/10/2019    ALT 38 04/10/2019     Hepatic Function Panel:    Lab Results   Component Value Date    ALKPHOS 129 04/10/2019    ALT 38 04/10/2019    AST 62 04/10/2019    PROT 6.6 04/10/2019    BILITOT 0.6 04/10/2019    BILIDIR 0.3 04/10/2019    LABALBU 3.6 04/10/2019     Magnesium:    Lab Results   Component Value Date    MG 1.9 11/25/2018     PT/INR:    Lab Results   Component Value Date    PROTIME 12.5 08/27/2018    INR 1.01 04/10/2019     HgBA1c:    Lab Results   Component Value Date    LABA1C 7.3 04/10/2019      No results for input(s): CKTOTAL, CKMB, CKMBINDEX, TROPONINI in the last 72 hours. FLP:  No results found for: TRIG, HDL, LDLCALC, LDLDIRECT, LABVLDL  DIET: DIET CARB CONTROL; Low Sodium (2 GM)  Impression:    Free T4 is normal now.  He is on maintenance dose of 100 mcg levothyroxine daily PO    ASSESSMENT AND PLAN

## 2019-04-18 ENCOUNTER — APPOINTMENT (OUTPATIENT)
Dept: INTERVENTIONAL RADIOLOGY/VASCULAR | Age: 54
DRG: 291 | End: 2019-04-18
Attending: FAMILY MEDICINE
Payer: COMMERCIAL

## 2019-04-18 VITALS
HEART RATE: 62 BPM | BODY MASS INDEX: 15.94 KG/M2 | RESPIRATION RATE: 16 BRPM | SYSTOLIC BLOOD PRESSURE: 136 MMHG | TEMPERATURE: 98.8 F | WEIGHT: 99.21 LBS | DIASTOLIC BLOOD PRESSURE: 63 MMHG | HEIGHT: 66 IN | OXYGEN SATURATION: 94 %

## 2019-04-18 LAB
ANISOCYTOSIS: PRESENT
BASOPHILS # BLD: 0.3 %
BASOPHILS ABSOLUTE: 0 THOU/MM3 (ref 0–0.1)
EOSINOPHIL # BLD: 1.5 %
EOSINOPHILS ABSOLUTE: 0.1 THOU/MM3 (ref 0–0.4)
ERYTHROCYTE [DISTWIDTH] IN BLOOD BY AUTOMATED COUNT: 26.5 % (ref 11.5–14.5)
ERYTHROCYTE [DISTWIDTH] IN BLOOD BY AUTOMATED COUNT: 58.7 FL (ref 35–45)
GLUCOSE BLD-MCNC: 139 MG/DL (ref 70–108)
GLUCOSE BLD-MCNC: 223 MG/DL (ref 70–108)
GLUCOSE BLD-MCNC: 264 MG/DL (ref 70–108)
HCT VFR BLD CALC: 27.5 % (ref 42–52)
HEMOGLOBIN: 7.9 GM/DL (ref 14–18)
HYPOCHROMIA: PRESENT
IMMATURE GRANS (ABS): 0.01 THOU/MM3 (ref 0–0.07)
IMMATURE GRANULOCYTES: 0.2 %
LYMPHOCYTES # BLD: 14.6 %
LYMPHOCYTES ABSOLUTE: 0.9 THOU/MM3 (ref 1–4.8)
MCH RBC QN AUTO: 21.9 PG (ref 26–33)
MCHC RBC AUTO-ENTMCNC: 28.7 GM/DL (ref 32.2–35.5)
MCV RBC AUTO: 76.4 FL (ref 80–94)
MONOCYTES # BLD: 16.6 %
MONOCYTES ABSOLUTE: 1 THOU/MM3 (ref 0.4–1.3)
NUCLEATED RED BLOOD CELLS: 0 /100 WBC
PLATELET # BLD: 186 THOU/MM3 (ref 130–400)
PLATELET ESTIMATE: ADEQUATE
PMV BLD AUTO: 10.9 FL (ref 9.4–12.4)
POIKILOCYTES: ABNORMAL
RBC # BLD: 3.6 MILL/MM3 (ref 4.7–6.1)
SCAN OF BLOOD SMEAR: NORMAL
SEG NEUTROPHILS: 66.8 %
SEGMENTED NEUTROPHILS ABSOLUTE COUNT: 4 THOU/MM3 (ref 1.8–7.7)
WBC # BLD: 6 THOU/MM3 (ref 4.8–10.8)

## 2019-04-18 PROCEDURE — 6370000000 HC RX 637 (ALT 250 FOR IP): Performed by: INTERNAL MEDICINE

## 2019-04-18 PROCEDURE — 36415 COLL VENOUS BLD VENIPUNCTURE: CPT

## 2019-04-18 PROCEDURE — 6370000000 HC RX 637 (ALT 250 FOR IP): Performed by: PHYSICIAN ASSISTANT

## 2019-04-18 PROCEDURE — 85025 COMPLETE CBC W/AUTO DIFF WBC: CPT

## 2019-04-18 PROCEDURE — 6370000000 HC RX 637 (ALT 250 FOR IP): Performed by: HOSPITALIST

## 2019-04-18 PROCEDURE — 99239 HOSP IP/OBS DSCHRG MGMT >30: CPT | Performed by: INTERNAL MEDICINE

## 2019-04-18 PROCEDURE — 6370000000 HC RX 637 (ALT 250 FOR IP): Performed by: NURSE PRACTITIONER

## 2019-04-18 PROCEDURE — 90935 HEMODIALYSIS ONE EVALUATION: CPT | Performed by: INTERNAL MEDICINE

## 2019-04-18 PROCEDURE — 90935 HEMODIALYSIS ONE EVALUATION: CPT

## 2019-04-18 PROCEDURE — 2709999900 HC NON-CHARGEABLE SUPPLY

## 2019-04-18 PROCEDURE — 82948 REAGENT STRIP/BLOOD GLUCOSE: CPT

## 2019-04-18 RX ORDER — HYDRALAZINE HYDROCHLORIDE 50 MG/1
50 TABLET, FILM COATED ORAL 3 TIMES DAILY
Qty: 90 TABLET | Refills: 3 | Status: SHIPPED | OUTPATIENT
Start: 2019-04-18 | End: 2019-11-04 | Stop reason: SDUPTHER

## 2019-04-18 RX ORDER — FAMOTIDINE 20 MG/1
20 TABLET, FILM COATED ORAL DAILY
Qty: 60 TABLET | Refills: 3 | Status: SHIPPED | OUTPATIENT
Start: 2019-04-18 | End: 2019-06-19 | Stop reason: ALTCHOICE

## 2019-04-18 RX ORDER — ISOSORBIDE MONONITRATE 60 MG/1
60 TABLET, EXTENDED RELEASE ORAL DAILY
Qty: 30 TABLET | Refills: 3 | Status: ON HOLD | OUTPATIENT
Start: 2019-04-18 | End: 2020-11-05

## 2019-04-18 RX ORDER — PSEUDOEPHEDRINE HCL 30 MG
100 TABLET ORAL DAILY
Qty: 30 CAPSULE | Refills: 0 | Status: SHIPPED | OUTPATIENT
Start: 2019-04-18 | End: 2019-06-19 | Stop reason: ALTCHOICE

## 2019-04-18 RX ORDER — LEVOTHYROXINE SODIUM 0.1 MG/1
100 TABLET ORAL DAILY
Qty: 30 TABLET | Refills: 3 | Status: SHIPPED | OUTPATIENT
Start: 2019-04-19

## 2019-04-18 RX ADMIN — METOPROLOL TARTRATE 50 MG: 50 TABLET, FILM COATED ORAL at 07:11

## 2019-04-18 RX ADMIN — INSULIN GLARGINE 18 UNITS: 100 INJECTION, SOLUTION SUBCUTANEOUS at 07:26

## 2019-04-18 RX ADMIN — HYDRALAZINE HYDROCHLORIDE 50 MG: 50 TABLET, FILM COATED ORAL at 12:29

## 2019-04-18 RX ADMIN — DOCUSATE SODIUM 100 MG: 100 CAPSULE, LIQUID FILLED ORAL at 12:30

## 2019-04-18 RX ADMIN — FAMOTIDINE 20 MG: 20 TABLET ORAL at 12:30

## 2019-04-18 RX ADMIN — ACETAMINOPHEN 650 MG: 325 TABLET ORAL at 11:16

## 2019-04-18 RX ADMIN — LEVOTHYROXINE SODIUM 100 MCG: 100 TABLET ORAL at 05:30

## 2019-04-18 RX ADMIN — FERROUS SULFATE TAB 325 MG (65 MG ELEMENTAL FE) 325 MG: 325 (65 FE) TAB at 12:30

## 2019-04-18 ASSESSMENT — PAIN SCALES - GENERAL
PAINLEVEL_OUTOF10: 6
PAINLEVEL_OUTOF10: 0
PAINLEVEL_OUTOF10: 0

## 2019-04-18 NOTE — PROGRESS NOTES
[facility]  PHYSICAL THERAPY MISSED TREATMENT NOTE  ACUTE CARE  [facility]          Pt off floor at dialysis and then supposed to go home per      Missed Treatment  Ludy Stapleton PTA [id number]

## 2019-04-18 NOTE — FLOWSHEET NOTE
04/18/19 0740 04/18/19 1135   Vital Signs   /65 129/65   Temp 97.9 °F (36.6 °C) 97.9 °F (36.6 °C)   Pulse 73 73   Weight 105 lb 13.1 oz (48 kg) 99 lb 3.3 oz (45 kg)   Weight Method Bed scale Bed scale   Percent Weight Change -1.23 -6.25   Post-Hemodialysis Assessment   Post-Treatment Procedures  --  Blood returned;Catheter Capped, clamped with Saline x2 ports   Machine Disinfection Process  --  Acid/Vinegar Clean;Heat Disinfect; Exterior Machine Disinfection   Rinseback Volume (ml)  --  400 ml   Total Liters Processed (l/min)  --  59.3 l/min   Dialyzer Clearance  --  Clear   Duration of Treatment (minutes)  --  210 minutes   Heparin amount administered during treatment (units)  --  0 units   Hemodialysis Intake (ml)  --  400 ml   Hemodialysis Output (ml)  --  3350 ml   NET Removed (ml)  --  2950 ml   Tolerated Treatment  --  Good   stable 3.5 hour treatment. Last 3 minutes of treatment cramping and headache reported. He states that he has had it for an hour. Tylenol administered, 15 minutes post treatment patient reports cramping is better. Report called to primary nurse. Treatment report printed to be scanned to emr.

## 2019-04-18 NOTE — FLOWSHEET NOTE
[facility]  PHYSICAL THERAPY MISSED TREATMENT NOTE  ACUTE CARE  STR NEUROSCIENCES 4A              Missed Treatment  Pt being DC any minute per RN, pt denies further questions or concerns from PT

## 2019-04-18 NOTE — PROGRESS NOTES
Renal Progress Note    Assessment and Plan:    1. Acute kidney injury from diuretic  2. Volume overload much improved with  hemodialysis and ultrafiltration  3. Stage IV chronic kidney disease  4. Diabetes mellitus with renal manifestations  5. Deconditioning  6. Microcytic anemia of chronic disease  PLAN:   labs reviewed  Medications reviewed  Hemodialysis today  Patient was seen in hemodialysis unit. Procedure was in progress and he was tolerating the treatment very well  Hemodynamically stable  Remove non-tunneled dialysis catheter after treatment today  Okay to discharge after dialysis treatment today  Follow-up with Dr. Miguel Salgado next week with a BMP a day before  Discussed with the patient and staff    Patient Active Problem List:     CHF (congestive heart failure) ([address]     DM type 2 causing CKD stage 5 ([address]     Essential hypertension     CKD (chronic kidney disease) stage 4, GFR 15-29 ml/min ([address]     Coronary artery disease involving native coronary artery of native heart with angina pectoris (HCC)     Anemia associated with chronic renal failure     Anemia of chronic kidney failure     Coronary atherosclerosis     Diabetes mellitus ([address]     History of CVA (cerebrovascular accident)     MI (myocardial infarction) ([address]     Cerebrovascular accident ([address]     Type 1 diabetes mellitus with hyperglycemia ([address]     Diabetes mellitus with hyperglycemia ([address]     Anemia in chronic kidney disease     Symptomatic anemia     Bacterial pneumonia     Iron deficiency anemia     Encephalopathy     Severe malnutrition (HCC)     NANCY (acute kidney injury) ([address]     Hypervolemia      Subjective:   Admit Date: 4/10/2019    Interval History:  Seeing for acute kidney injury, chronic kidney disease and volume overload. Awake and alert when I saw him. Doing well with no complaints. Updated by the staff. No new issues. History is very good now after significant weight loss from ultrafiltration.   Blood pressure is stable. Medications:   Scheduled Meds:   insulin glargine  18 Units Subcutaneous Daily    famotidine  20 mg Oral Daily    hydrALAZINE  50 mg Oral TID    levothyroxine  100 mcg Oral Daily    [Held by provider] bumetanide  1 mg Oral Daily    docusate sodium  100 mg Oral Daily    insulin lispro  5-20 Units Subcutaneous TID WC    isosorbide mononitrate  60 mg Oral Daily    ferrous sulfate  325 mg Oral TID WC    insulin lispro  0-6 Units Subcutaneous TID WC    insulin lispro  0-3 Units Subcutaneous Nightly    aspirin  81 mg Oral Daily    atorvastatin  80 mg Oral Daily    clopidogrel  75 mg Oral Daily    metoprolol tartrate  50 mg Oral BID    sodium chloride flush  10 mL Intravenous 2 times per day     Continuous Infusions:   dextrose         CBC:   Recent Labs     04/17/19  0345 04/18/19  0401   WBC 6.7 6.0   HGB 8.0* 7.9*    186     CMP:    Recent Labs     04/16/19  0405 04/17/19  0345   * 136   K 5.0 4.2   CL 98 97*   CO2 20* 25   BUN 60* 31*   CREATININE 4.4* 3.1*   GLUCOSE 189* 70   CALCIUM 8.3* 8.6   LABGLOM 14* 21*     Troponin: No results for input(s): TROPONINI in the last 72 hours. BNP: No results for input(s): BNP in the last 72 hours. INR: No results for input(s): INR in the last 72 hours. Lipids: No results for input(s): CHOL, LDLDIRECT, TRIG, HDL, AMYLASE, LIPASE in the last 72 hours. Liver: No results for input(s): AST, ALT, ALKPHOS, PROT, LABALBU, BILITOT in the last 72 hours. Invalid input(s): BILDIR  Iron:  No results for input(s): IRONS, FERRITIN in the last 72 hours.     Invalid input(s): LABIRONS    Objective:   Vitals: /65   Pulse 73   Temp 97.9 °F (36.6 °C)   Resp 16   Ht 5' 6\" (1.676 m)   Wt 105 lb 13.1 oz (48 kg)   SpO2 95%   BMI 17.08 kg/m²    Wt Readings from Last 3 Encounters:   04/18/19 105 lb 13.1 oz (48 kg)   04/10/19 135 lb (61.2 kg)   01/16/19 126 lb (57.2 kg)      24HR INTAKE/OUTPUT:      Intake/Output Summary (Last 24 hours) at 4/18/2019 1132  Last data filed at 4/18/2019 0424  Gross per 24 hour   Intake 940 ml   Output 3775 ml   Net -2835 ml       Constitutional:  Alert, awake, no apparent distress   Skin:normal   HEENT:Pupils are reactive . Throat is clear   Neck:supple with no thyromegaly  Cardiovascular:  S1, S2 without murmur  Respiratory: Clear to auscultation  Abdomen: +bs, soft, nontender  Ext: No LE edema  Musculoskeletal:Intact  Neuro:Alert and oriented with no deficit      Electronically signed by Cheli Perry MD on 4/18/2019 at 11:32 AM

## 2019-04-18 NOTE — FLOWSHEET NOTE
04/18/19 1153   Vital Signs   Temp 98.8 °F (37.1 °C)   Temp Source Oral   Pulse 62   Heart Rate Source Monitor   Resp 16   /63   BP Location Left Arm   BP Upper/Lower Upper   MAP (mmHg) 91   Oxygen Therapy   SpO2 94 %   O2 Device None (Room air)   Pt back from dialysis.  VS obtained

## 2019-04-18 NOTE — PROGRESS NOTES
CLINICAL PHARMACY NOTE: MEDS TO [address] Select Patient?: No  Total # of Prescriptions Filled: 4   The following medications were delivered to the patient:    Total # of Interventions Completed: 2  Time Spent (min): 30    Additional Documentation:

## 2019-04-18 NOTE — CARE COORDINATION
4/18/19, 12:31 PM    Discharge plan discussed by  and . Discharge plan reviewed with patient/ family. Patient/ family verbalize understanding of discharge plan and are in agreement with plan. Understanding was demonstrated using the teach back method. IMM Letter  IMM Letter given to Patient/Family/Significant other/Guardian/POA/by[de-identified] CM  IMM Letter date given[de-identified] 04/18/19  IMM Letter time given[de-identified] 200     Pt to be discharged to home with wife. Denies needs or services.

## 2019-04-18 NOTE — PROGRESS NOTES
Interventional Radiology called and stated they would be up after lunch to remove pt's temporary dialysis catheter.

## 2019-04-18 NOTE — PLAN OF CARE
additional needs. Care manager aware of discharge needs. Care plan reviewed with patient. Patient verbalize understanding of the plan of care and contribute to goal setting.

## 2019-04-19 ENCOUNTER — CARE COORDINATION (OUTPATIENT)
Dept: CASE MANAGEMENT | Age: 54
End: 2019-04-19

## 2019-04-19 NOTE — DISCHARGE SUMMARY
Hospitalist Progress Note    Patient:  Kishore Staton Hand      Unit/Bed:4A-08/008-A    YOB: 1965    MRN: [id number]       Acct: [de-identified]     PCP: PAOLA Waldron CNP    Date of Admission: 4/10/2019    Active Hospital Problems    Diagnosis Date Noted    NANCY (acute [facility]) [facility] [N17.9]     Hypervolemia [E87.70]     Severe malnutrition ([facility] [E43] 04/12/2019     Class: Acute    Encephalopathy [G93.40] 04/10/2019    Anemia in chronic kidney disease [N18.9, D63.1] 11/24/2018    CKD (chronic kidney disease) stage 4, GFR 15-29 ml/min ([facility]) [N18.4] 04/09/2016    CHF (congestive heart failure) ([facility] [I50.9] 04/09/2016    Anemia associated with chronic renal failure [D63.1] 04/09/2016    Coronary artery disease involving native coronary artery of native heart with angina pectoris ([facility] [I25.119] 04/09/2016    Type 1 diabetes mellitus with hyperglycemia ([facility] [E10.65] 03/29/2016       Assessment/Plan:    - Acute decompensated CHF: likely diastolic; last 2D echo from 11/2018 showed preserved LVEF/sstolic function. . ProBNP [id number]. D/c'ed IVF at 75 cc/hr that pt started on overnight. Started on bumex IV 1 mg BID. Also placed on fluid/salt restriction, daily Wt and I/O monitoring. Noted cardio consulted by overnight team.    4/12: SOB improved. +++ diuresis. Switched to Po bumex 1 mg BID now.  4/13: decreased bumex to 1 mg QD now. Will reassess      4/14: bumex held today d/t worsening A/CKD. Cr 4.2 today. Also trial of fluid challenge. Will reassess response. No indication for RRT. 4/15: Cr further up to 4.5 today despite fluid challenge. BUN also elevated at 61. Minimal U/O. Stopped IVF now. Bumex held as well. D/w pt he might need dialysis in near future given progressively worsening kidney function. No urgent indication for RRT. Nephro following.  Otherwise, he still remains on room air    - CKD stage IV: Cr 3.0 close to baseline  4/12: Cr up to 3.6 likely d/t aggressive diuresis. 4/13: Cr further up to 4.0 today. Decreased bumex dose. No indication for urgent RRT. Will monitor lytes  4/14: as above  4/15: as above  4/17 - chemo dialysis catheter removed stopped hemodialysis at discharge      4/16; Cr 4.4 today with uremia (urea 60), feels tired. Clinically remains euvolemic on ambient air. U/O has not been recorded. Pt has not using urinal. Nephro following. Pt will have dialysis port inseretd by IR today with plan to start dialysis today    4/17 non-tunneled Catheter placed, hemodialysis initiated- had dialysis yesterday and today. 4/18- hemodialysis catheter removed patient okay to go home without hemodialysis catheter not hemodialysis per nephrology    - Hypothyroidism: .6 will repeat first. If accurate, severe hypothyroidism could also lead to cardiomyopathy and myxedema. Pt on Levothyroxine 25 mcg which we increased (slowly) to 50 now. Also would start on Dexamethasone 4 mg q8h for now until associated adrenal insufficiency is ruled out. (hypoK and hypoNa Although pt's not hypotensive, there is unlikely associated Addisonian crisis)      4/12: consulted endocrine last night. Pt now on 200 mcg QD levothyroxine. Will repeat free T4 and likely reduce dose accordingly. Will continue with Dexa for now. 4/13; free T4 up to 0.68 now. CCM. Will monitor free T4 levels to monitor response to treatment. D/c'ed Dex today as pt clearly not in adrenal insufficiency. 4/14: CCM  4/15: T4 up to 0.71. CCM. will recheck T4 levels in few days. Will switch to 100 mcg in few days. 4/16: now back on maintenance dose of 100 mcg QD. Will have repeat free T4 tomorrow. If WNLs then will need repeat TSH in 6 wks time to assess response        - Encephaopathy? Likely metabolic. 4/12: resolved    - HyperK: likely d/t decreased GFR. K down to 5.3 from 6.1 after Insulin R/D50W. Also given Kayexalate overnight.  Will monitor BMP  4/12: resolved K 4.9 today  4/14: K 4.1  4/15: K 4.9    - MG tablet     atorvastatin 80 MG tablet  Commonly known as:  LIPITOR     clopidogrel 75 MG tablet  Commonly known as:  PLAVIX     FREESTYLE INSULINX TEST strip  Generic drug:  blood glucose test strips     FREESTYLE LANCETS Misc     hydrALAZINE 50 MG tablet  Commonly known as:  APRESOLINE  Take 1 tablet by mouth 3 times daily     insulin regular 100 UNIT/ML injection  Commonly known as:  HUMULIN R;NOVOLIN R     metoprolol tartrate 50 MG tablet  Commonly known as:  LOPRESSOR     nitroGLYCERIN 0.4 MG SL tablet  Commonly known as:  NITROSTAT     TRESIBA 100 UNIT/ML Soln  Generic drug:  Insulin Degludec        STOP taking these medications    ferrous sulfate 325 (65 Fe) MG tablet     furosemide 20 MG tablet  Commonly known as:  LASIX     HUMALOG KWIKPEN 100 UNIT/ML pen  Generic drug:  insulin lispro     LANTUS SOLOSTAR 100 UNIT/ML injection pen  Generic drug:  insulin glargine     pantoprazole sodium 40 MG Pack packet  Commonly known as:  PROTONIX           Where to Get Your Medications      These medications were sent to [address] [place] [address] , [address], [address] [address]    Phone:  [phone]   · docusate 100 MG Caps  · famotidine 20 MG tablet  · hydrALAZINE 50 MG tablet  · isosorbide mononitrate 60 MG extended release tablet  · levothyroxine 100 MCG tablet       Diet:  No diet orders on file            Labs:   Recent Labs     04/17/19  0345 04/18/19  0401   WBC 6.7 6.0   HGB 8.0* 7.9*   HCT 27.8* 27.5*    186     Recent Labs     04/17/19  0345      K 4.2   CL 97*   CO2 25   BUN 31*   CREATININE 3.1*   CALCIUM 8.6     No results for input(s): AST, ALT, BILIDIR, BILITOT, ALKPHOS in the last 72 hours. No results for input(s): INR in the last 72 hours. No results for input(s): Evern [place] in the last 72 hours.     Urinalysis:      Lab Results   Component Value Date    NITRU NEGATIVE 04/10/2019 WBCUA NONE SEEN 04/10/2019    BACTERIA NONE 04/10/2019    RBCUA 3-5 04/10/2019    BLOODU SMALL 04/10/2019    GLUCOSEU NEGATIVE 04/10/2019       Radiology:  Ct Abdomen Pelvis Wo Contrast Additional Contrast? None    Result Date: 4/10/2019  PROCEDURE: CT ABDOMEN PELVIS WO CONTRAST CLINICAL INFORMATION: abdominal distension with history of CKD . COMPARISON: April 9, 2016 TECHNIQUE: Axial 5 mm CT images were obtained through the abdomen and pelvis. No contrast was given. Coronal reconstructions were obtained. All CT scans at this facility use dose modulation, iterative reconstruction, and/or weight-based dosing when appropriate to reduce radiation dose to as low as reasonably achievable. FINDINGS:  The heart appears enlarged. There is a moderate right effusion with compressive atelectasis of the right lower lobe. Changes of mild COPD are present. The noncontrast appearance of liver, gallbladder, pancreas and spleen are negative for active appearing pathology. There is mild bilateral perinephric stranding. There is distention of the urinary bladder. Correlate with urinalysis to exclude cystitis and superimposed nephritis. Punctate bilateral nonobstructing renal calculi are present. Small bowel is nondistended. Mild luminal stasis is present correlate with mild enteritis. There is abundant retention of stool throughout colon compatible with moderate severe constipation. There is mild mucosal thickening of the anorectal region. Changes of proctitis are not excluded. There is no acute appearing pathology of the skeleton. There are degenerative disc changes at L5-S1 with minimal retrolisthesis of L5 on S1. There is scattered atherosclerosis of the aorta without aneurysm. 1. Abundant retention of stool throughout colon compatible with constipation. 2. Bilateral nonobstructing renal calculi with nonspecific perinephric stranding. Correlate with urinalysis to exclude changes of nephritis.  3. Moderate right effusion with lower lobe atelectasis. Underlying infiltrate is not excluded. **This report has been created using voice recognition software. It may contain minor errors which are inherent in voice recognition technology. ** Final report electronically signed by Dr. Jeni Scruggs on 4/10/2019 2:21 AM    Xr Chest Standard (2 Vw)    Result Date: 4/10/2019  PROCEDURE: XR CHEST (2 VW) CLINICAL INFORMATION: leg edema with shortness of breath. COMPARISON: No prior study. TECHNIQUE: PA and lateral views the chest. FINDINGS: There is stable mild cardiac enlargement with coarse lung markings. There is no focal infiltrate. Trace right effusion cannot be excluded. There is no evidence of congestive failure. 1. Stable cardiac enlargement with diffuse coarse markings. 2. Persistent minimal blunting of the right costophrenic angle. Small effusion versus chronic subpleural changes are present. **This report has been created using voice recognition software. It may contain minor errors which are inherent in voice recognition technology. ** Final report electronically signed by Dr. Jeni Scruggs on 4/10/2019 2:48 AM      Diet: No diet orders on file    DVT prophylaxis: ? Lovenox                                 ? SCDs                                 ? SQ Heparin                                 ? Encourage ambulation           ?  Already on Anticoagulation       Code Status: Prior      Active Hospital Problems    Diagnosis Date Noted    [facility]ANC[facility]) [facility] [N17.9]     Hypervolemia [E87.70]     Severe malnutrition ([facility] [E43] 04/12/2019     Class: Acute    Encephalopathy [G93.40] 04/10/2019    Anemia in chronic kidney disease [N18.9, D63.1] 11/24/2018    CKD (chronic kidney disease) stage 4, GFR 15-29 ml/min ([facility]) [N18.4] 04/09/2016    CHF (congestive heart failure) ([facility] [I50.9] 04/09/2016    Anemia associated with chronic renal failure [D63.1] 04/09/2016    Coronary artery disease involving native coronary artery of native heart with angina pectoris [facility]) [I25.119] 04/09/2016    Type 1 diabetes mellitus with hyperglycemia [facility] [E10.65] 03/29/2016           Patient was updated about the treatment plan, all the questions and concerns were addressed.         Electronically signed by Clover Esqueda MD on 4/19/2019 at 12:20 PM

## 2019-04-19 NOTE — PROGRESS NOTES
Hospitalist Progress Note    Patient:  Jorge Ramirez Hand      Unit/Bed:4A-08/008-A    YOB: 1965    MRN: [id number]       Acct: [de-identified]     PCP: PAOLA Bradley CNP    Date of Admission: 4/10/2019    Active Hospital Problems    Diagnosis Date Noted    NANCY (acute kidney injury) ([facility] [N17.9]     Hypervolemia [E87.70]     Severe malnutrition ([facility] [E43] 04/12/2019     Class: Acute    Encephalopathy [G93.40] 04/10/2019    Anemia in chronic kidney disease [N18.9, D63.1] 11/24/2018    CKD (chronic kidney disease) stage 4, GFR 15-29 ml/min ([facility]) [N18.4] 04/09/2016    CHF (congestive heart failure) ([facility] [I50.9] 04/09/2016    Anemia associated with chronic renal failure [D63.1] 04/09/2016    Coronary artery disease involving native coronary artery of native heart with angina pectoris ([facility] [I25.119] 04/09/2016    Type 1 diabetes mellitus with hyperglycemia ([facility] [E10.65] 03/29/2016       Assessment/Plan:    - Acute decompensated CHF: likely diastolic; last 2D echo from 11/2018 showed preserved LVEF/sstolic function. . ProBNP [id number]. D/c'ed IVF at 75 cc/hr that pt started on overnight. Started on bumex IV 1 mg BID. Also placed on fluid/salt restriction, daily Wt and I/O monitoring. Noted cardio consulted by overnight team.    4/12: SOB improved. +++ diuresis. Switched to Po bumex 1 mg BID now.  4/13: decreased bumex to 1 mg QD now. Will reassess      4/14: bumex held today d/t worsening A/CKD. Cr 4.2 today. Also trial of fluid challenge. Will reassess response. No indication for RRT. 4/15: Cr further up to 4.5 today despite fluid challenge. BUN also elevated at 61. Minimal U/O. Stopped IVF now. Bumex held as well. D/w pt he might need dialysis in near future given progressively worsening kidney function. No urgent indication for RRT. Nephro following.  Otherwise, he still remains on room air    - CKD stage IV: Cr 3.0 close to baseline  4/12: Cr up to 3.6 likely d/t aggressive diuresis. 4/13: Cr further up to 4.0 today. Decreased bumex dose. No indication for urgent RRT. Will monitor lytes  4/14: as above  4/15: as above  4/17 - chemo dialysis catheter removed stopped hemodialysis at discharge      4/16; Cr 4.4 today with uremia (urea 60), feels tired. Clinically remains euvolemic on ambient air. U/O has not been recorded. Pt has not using urinal. Nephro following. Pt will have dialysis port inseretd by IR today with plan to start dialysis today    4/17 non-tunneled Catheter placed, hemodialysis initiated- had dialysis yesterday and today. 4/18- hemodialysis catheter removed patient okay to go home without hemodialysis catheter not hemodialysis per nephrology    - Hypothyroidism: .6 will repeat first. If accurate, severe hypothyroidism could also lead to cardiomyopathy and myxedema. Pt on Levothyroxine 25 mcg which we increased (slowly) to 50 now. Also would start on Dexamethasone 4 mg q8h for now until associated adrenal insufficiency is ruled out. (hypoK and hypoNa Although pt's not hypotensive, there is unlikely associated Addisonian crisis)      4/12: consulted endocrine last night. Pt now on 200 mcg QD levothyroxine. Will repeat free T4 and likely reduce dose accordingly. Will continue with Dexa for now. 4/13; free T4 up to 0.68 now. CCM. Will monitor free T4 levels to monitor response to treatment. D/c'ed Dex today as pt clearly not in adrenal insufficiency. 4/14: CCM  4/15: T4 up to 0.71. CCM. will recheck T4 levels in few days. Will switch to 100 mcg in few days. 4/16: now back on maintenance dose of 100 mcg QD. Will have repeat free T4 tomorrow. If WNLs then will need repeat TSH in 6 wks time to assess response        - Encephaopathy? Likely metabolic. 4/12: resolved    - HyperK: likely d/t decreased GFR. K down to 5.3 from 6.1 after Insulin R/D50W. Also given Kayexalate overnight.  Will monitor BMP  4/12: resolved K 4.9 today  4/14: K 4.1  4/15: K 4.9    - Metabolic acidosis 2nd to CKD   4/12: improving  4/13: resolved. K 5.0 will monitor  4/14: Resolved. - Diabetes Mellitus Type I: A1C 7.3%. 4/12: BS uncontrolled. Pt refusing treatment and low-carb diabetic diet. With RN in room explained risks of both hypo- and hyperglycemia. Reemphasized that hypoglycemia can be potentially fatal. Pt wishes to manage his own insulin intake. He is now back on his standing home Glargine (13 U) plus SSI    4/13: BS in 400s range. Once again with RN in room went over issue of poorly controlled hyperglycemia. Pt adamant to allow me manage BS. He eats and snacks on what he wants and wishes to direct RN to give him whatever dose he is asking for. Also explained concurrent steroids also attributing to hyperglycemia. Steroids d/c'ed now. 4/14: agreed to MDI insulin. BS slightly improved. Will allow endocrine manage this. 4/15: BS improving in high 100s and low 200s for most part. Had BS 44 at 6 a.m. Likely d/t poor PO intake. Endocrine managing    4/16: improved w/ current MDI. CCM. OP F/U w/ endocrine    - HypoNa: Na 129. When corrected for concomitant hyperglycemia , Na 135. Will  Monitor  4/14: Na 131 today with concomitant  with corrected Na 135. Will monitor. 4/15: Na 137  4/16: Na 132. Will have IHD today. Diasylate will be adjusted accordingly    - Essential HTN: uncontrolled. back on home meds. Will reassess response  4/12: BP still uncontrolled. Increased imdur to 60 followed by Hydralazine to 100 from 50 TID. Will reassess    4/13: BP still uncontrolled, added clonidine 0.1 BID now. Will reassess  4/15: BP improved. CCM  4/1: CCM     - Chronic microcytic anemia: likely KIRSTY plus anemia of chronic disease, noted iron 24. Started on Iron TID. Noted pt started on EPP by nephro which would be ineffective unless KIRSTY treated properly first.  4/12: IV iron for 3 doses with plan to start EPO after    4/15: Hb stable.  Iron and EPO    - Severe malnutrition: seen by dietician on supplementaion          Not hemodialysis after discharge from her dialysis catheter removed at the time of discharge  Stable for home per nephrology    Discharge time 35 minutes   Nephrology follow-up as scheduled  PCP in 1 week    Chief Complaint: No chief complaint on file. Hospital Course:   HISTORY OF PRESENT ILLNESS:       The patient is a 46 y.o. male patient of Malaika Lund APRN - CNP who was transferred from [facility].  The patient was in the ER the night before to have his kidneys evaluated. He is known to have significant kidney disease and wishes to be on the transplant list.  He states he \"felt as if his kidneys were shutting down\" but was instead diagnosed with CHF and hypothyroidism. He signed out AMA \"to process my new diagnosis\". Upon returning home he went to bed and when his wife attempted to wake him she was unable. EMS found his sugar in the low 20s and temp was low. He was evaluated at [facility] ED and later transferred to [facility]. [facility] to see his nephrologist.                Medication List      START taking these medications    docusate 100 MG Caps  Commonly known as:  COLACE, DULCOLAX  Take 100 mg by mouth daily  Notes to patient:  For constipation     famotidine 20 MG tablet  Commonly known as:  PEPCID  Take 1 tablet by mouth daily  Notes to patient:  Reduces stomach acid and decreases symptoms of heartburn        CHANGE how you take these medications    isosorbide mononitrate 60 MG extended release tablet  Commonly known as:  IMDUR  Take 1 tablet by mouth daily  What changed:    · medication strength  · how much to take     levothyroxine 100 MCG tablet  Commonly known as:  SYNTHROID  Take 1 tablet by mouth Daily  What changed:    · medication strength  · how much to take  · Another medication with the same name was removed. Continue taking this medication, and follow the directions you see here.         CONTINUE taking these medications    aspirin 81 MG tablet     atorvastatin 80 MG tablet  Commonly known as:  LIPITOR     clopidogrel 75 MG tablet  Commonly known as:  PLAVIX     FREESTYLE INSULINX TEST strip  Generic drug:  blood glucose test strips     FREESTYLE LANCETS Misc     hydrALAZINE 50 MG tablet  Commonly known as:  APRESOLINE  Take 1 tablet by mouth 3 times daily     insulin regular 100 UNIT/ML injection  Commonly known as:  HUMULIN R;NOVOLIN R     metoprolol tartrate 50 MG tablet  Commonly known as:  LOPRESSOR     nitroGLYCERIN 0.4 MG SL tablet  Commonly known as:  NITROSTAT     TRESIBA 100 UNIT/ML Soln  Generic drug:  Insulin Degludec        STOP taking these medications    ferrous sulfate 325 (65 Fe) MG tablet     furosemide 20 MG tablet  Commonly known as:  LASIX     HUMALOG KWIKPEN 100 UNIT/ML pen  Generic drug:  insulin lispro     LANTUS SOLOSTAR 100 UNIT/ML injection pen  Generic drug:  insulin glargine     pantoprazole sodium 40 MG Pack packet  Commonly known as:  PROTONIX           Where to Get Your Medications      These medications were sent to [address] [place] [address] , [address], [address] [place] [address] Road [facility]    Phone:  [phone]   · docusate 100 MG Caps  · famotidine 20 MG tablet  · hydrALAZINE 50 MG tablet  · isosorbide mononitrate 60 MG extended release tablet  · levothyroxine 100 MCG tablet       Diet:  No diet orders on file            Labs:   Recent Labs     04/17/19  0345 04/18/19  0401   WBC 6.7 6.0   HGB 8.0* 7.9*   HCT 27.8* 27.5*    186     Recent Labs     04/17/19  0345      K 4.2   CL 97*   CO2 25   BUN 31*   CREATININE 3.1*   CALCIUM 8.6     No results for input(s): AST, ALT, BILIDIR, BILITOT, ALKPHOS in the last 72 hours. No results for input(s): INR in the last 72 hours. No results for input(s): Jessica Platamitt in the last 72 hours.     Urinalysis:      Lab Results   Component Value Date    NITRU NEGATIVE 04/10/2019 lower lobe atelectasis. Underlying infiltrate is not excluded. **This report has been created using voice recognition software. It may contain minor errors which are inherent in voice recognition technology. ** Final report electronically signed by Dr. Digna Durant on 4/10/2019 2:21 AM    Xr Chest Standard (2 Vw)    Result Date: 4/10/2019  PROCEDURE: XR CHEST (2 VW) CLINICAL INFORMATION: leg edema with shortness of breath. COMPARISON: No prior study. TECHNIQUE: PA and lateral views the chest. FINDINGS: There is stable mild cardiac enlargement with coarse lung markings. There is no focal infiltrate. Trace right effusion cannot be excluded. There is no evidence of congestive failure. 1. Stable cardiac enlargement with diffuse coarse markings. 2. Persistent minimal blunting of the right costophrenic angle. Small effusion versus chronic subpleural changes are present. **This report has been created using voice recognition software. It may contain minor errors which are inherent in voice recognition technology. ** Final report electronically signed by Dr. Digna Durant on 4/10/2019 2:48 AM      Diet: No diet orders on file    DVT prophylaxis: ? Lovenox                                 ? SCDs                                 ? SQ Heparin                                 ? Encourage ambulation           ?  Already on Anticoagulation       Code Status: Prior      Active Hospital Problems    Diagnosis Date Noted    [facility]) [facility] [N17.9]     Hypervolemia [E87.70]     Severe malnutrition ([address] [E43] 04/12/2019     Class: Acute    Encephalopathy [G93.40] 04/10/2019    Anemia in chronic kidney disease [N18.9, D63.1] 11/24/2018    CKD (chronic kidney disease) stage 4, GFR 15-29 ml/min ([facility] [N18.4] 04/09/2016    CHF (congestive heart failure) ([facility] [I50.9] 04/09/2016    Anemia associated with chronic renal failure [D63.1] 04/09/2016    Coronary artery disease involving native coronary artery of native heart with angina pectoris [facility]) [I25.119] 04/09/2016    Type 1 diabetes mellitus with hyperglycemia ([facility] [E10.65] 03/29/2016           Patient was updated about the treatment plan, all the questions and concerns were addressed.         Electronically signed by Sapphire Garcia MD on 4/19/2019 at 12:18 PM

## 2019-04-19 NOTE — CARE COORDINATION
and are they filled?:  Yes  Have you been contacted by a [employer] [address]?:  No  Have you scheduled your follow up appointment?:  Yes  How are you going to get to your appointment?:  Car - drive self  Were you discharged with any Home Care or Post Acute Services:  No  Do you have support at home?:  Partner/Spouse/SO  Do you feel like you have everything you need to keep you well at home?:  No  Are you an active caregiver in your home?:  No  Care Transitions Interventions  No Identified Needs       Patient presented to the ED on 04/10/2019 with report of abdominal distension, KAUR, and leg swelling. Patient left AMA from [facility] and went to [facility] before being transferred back to [facility] for admission. PMH includes CAD, CKD, DM, HTN, and HLD. Patient currently follows with the [facility] renal transplant team. Patient admitted for NANCY, CKD, and newly diagnosed CHF. Patient returned home. Patient reports weakness and fatigue. Advised patient that it can take time to regain strength and energy following an admission, verbalized understanding. Patient states leg swelling is improved from admission. Patinet reports bruising and redness to previous IV site. Patient denies swelling and warmth to site. Advised bruising is normal. Encouraged patient to contact PCP for earlier appointment to monitor area, verbalized understanding. Patient denies chest pain, SOB, abdominal pain, fever, chills, and N/V/D. Patient reports no bowel movement since discharge, states this is normal for him. Patient states he is eating/drinking okay. Patient states glucose averaging mid-200's mg/dL. States he is working with his doctor to regulate glucose levels and medications. Patient denies bleeding due to anticoagulation therapy. Patient states he monitors blood pressure at times. Patient does not own a scale. Information given on how to obtain including cost. Stressed importance of daily weights with new CHF diagnosis, verbalized understanding. Patient declined to review medications, 1111F not completed. Reviewed pending OP BMP to be drawn by 04/24/2019, verbalized understanding. Reviewed upcoming appointments, verbalized understanding. Reviewed CHF zone tool, patient agreeable for tool to be mailed to residence. Patient denies additional needs or concerns at this time. Patient instructed to notify PCP or CTC for any new or worsening symptoms, contact information provided. Patient verbalized understanding. CTC will continue to follow.       Follow Up  Future Appointments   Date Time Provider Jalil Dobson   4/25/2019  3:00 PM DO ROSSY [facility] - [place]   5/6/2019  2:00 PM RUBY Emerson [facility] - Tommy Lux   5/15/2019  9:20 AM DO MICHELLE Chang [facility] - Daryle Papa, SHAWN  Care Transition Coordinator  (Z) [phone]  21

## 2019-04-23 LAB
BASOPHILS ABSOLUTE: ABNORMAL /ΜL
BASOPHILS ABSOLUTE: ABNORMAL /ΜL
BASOPHILS RELATIVE PERCENT: ABNORMAL %
BASOPHILS RELATIVE PERCENT: ABNORMAL %
BUN BLDV-MCNC: 49 MG/DL
BUN BLDV-MCNC: 49 MG/DL
CALCIUM SERPL-MCNC: 9.2 MG/DL
CALCIUM SERPL-MCNC: NORMAL MG/DL
CHLORIDE BLD-SCNC: 103 MMOL/L
CHLORIDE BLD-SCNC: 103 MMOL/L
CO2: 18 MMOL/L
CO2: 22 MMOL/L
CREAT SERPL-MCNC: 3.84 MG/DL
CREAT SERPL-MCNC: 3.84 MG/DL
EOSINOPHILS ABSOLUTE: ABNORMAL /ΜL
EOSINOPHILS ABSOLUTE: ABNORMAL /ΜL
EOSINOPHILS RELATIVE PERCENT: ABNORMAL %
EOSINOPHILS RELATIVE PERCENT: ABNORMAL %
GFR CALCULATED: 18
GFR CALCULATED: 18
GLUCOSE BLD-MCNC: 225 MG/DL
GLUCOSE BLD-MCNC: NORMAL MG/DL
HCT VFR BLD CALC: 30.4 % (ref 41–53)
HCT VFR BLD CALC: 30.4 % (ref 41–53)
HEMOGLOBIN: 9.6 G/DL (ref 13.5–17.5)
HEMOGLOBIN: 9.6 G/DL (ref 13.5–17.5)
LYMPHOCYTES ABSOLUTE: ABNORMAL /ΜL
LYMPHOCYTES ABSOLUTE: ABNORMAL /ΜL
LYMPHOCYTES RELATIVE PERCENT: ABNORMAL %
LYMPHOCYTES RELATIVE PERCENT: ABNORMAL %
MCH RBC QN AUTO: ABNORMAL PG
MCH RBC QN AUTO: ABNORMAL PG
MCHC RBC AUTO-ENTMCNC: ABNORMAL G/DL
MCHC RBC AUTO-ENTMCNC: ABNORMAL G/DL
MCV RBC AUTO: ABNORMAL FL
MCV RBC AUTO: ABNORMAL FL
MONOCYTES ABSOLUTE: ABNORMAL /ΜL
MONOCYTES ABSOLUTE: ABNORMAL /ΜL
MONOCYTES RELATIVE PERCENT: ABNORMAL %
MONOCYTES RELATIVE PERCENT: ABNORMAL %
NEUTROPHILS ABSOLUTE: ABNORMAL /ΜL
NEUTROPHILS ABSOLUTE: ABNORMAL /ΜL
NEUTROPHILS RELATIVE PERCENT: ABNORMAL %
NEUTROPHILS RELATIVE PERCENT: ABNORMAL %
PLATELET # BLD: 349 K/ΜL
PLATELET # BLD: 349 K/ΜL
PMV BLD AUTO: ABNORMAL FL
PMV BLD AUTO: ABNORMAL FL
POTASSIUM SERPL-SCNC: 5.5 MMOL/L
POTASSIUM SERPL-SCNC: 5.5 MMOL/L
RBC # BLD: 4.05 10^6/ΜL
RBC # BLD: 4.05 10^6/ΜL
SODIUM BLD-SCNC: 137 MMOL/L
SODIUM BLD-SCNC: 137 MMOL/L
WBC # BLD: 7.1 10^3/ML
WBC # BLD: 7.1 10^3/ML

## 2019-04-25 ENCOUNTER — OFFICE VISIT (OUTPATIENT)
Dept: NEPHROLOGY | Age: 54
End: 2019-04-25
Payer: COMMERCIAL

## 2019-04-25 VITALS
BODY MASS INDEX: 23 KG/M2 | SYSTOLIC BLOOD PRESSURE: 130 MMHG | HEART RATE: 64 BPM | OXYGEN SATURATION: 98 % | RESPIRATION RATE: 18 BRPM | HEIGHT: 62 IN | WEIGHT: 125 LBS | DIASTOLIC BLOOD PRESSURE: 65 MMHG

## 2019-04-25 DIAGNOSIS — N18.4 CKD (CHRONIC KIDNEY DISEASE), STAGE IV (HCC): Primary | ICD-10-CM

## 2019-04-25 PROCEDURE — G8598 ASA/ANTIPLAT THER USED: HCPCS | Performed by: INTERNAL MEDICINE

## 2019-04-25 PROCEDURE — 3017F COLORECTAL CA SCREEN DOC REV: CPT | Performed by: INTERNAL MEDICINE

## 2019-04-25 PROCEDURE — G8427 DOCREV CUR MEDS BY ELIG CLIN: HCPCS | Performed by: INTERNAL MEDICINE

## 2019-04-25 PROCEDURE — 1111F DSCHRG MED/CURRENT MED MERGE: CPT | Performed by: INTERNAL MEDICINE

## 2019-04-25 PROCEDURE — 99213 OFFICE O/P EST LOW 20 MIN: CPT | Performed by: INTERNAL MEDICINE

## 2019-04-25 PROCEDURE — G8420 CALC BMI NORM PARAMETERS: HCPCS | Performed by: INTERNAL MEDICINE

## 2019-04-25 PROCEDURE — 1036F TOBACCO NON-USER: CPT | Performed by: INTERNAL MEDICINE

## 2019-04-25 NOTE — PROGRESS NOTES
[facility]    [address] [facility]  [address], [address]  [phone]       Progress Note    4/25/2019 3:31 PM    Pt Name:    Monique Nash  YOB: 1965  Primary Care Physician:  PAOLA Young CNP       Chief Complaint:   Chief Complaint   Patient presents with    Follow-Up from [facility]    Diabetes    Hypertension        History of Chief Complaint: stage IV CKD from DM and HTN. Transplant kidney candidate. Subjective:  I last saw the patient in clinic 01/16/19. I follow the patient for Chronic Kidney disease stage IV. Since our last visit the patient has been hospitalized at [facility] for pneumonia, NANCY (requiring dialysis), fluid overload. The patient is sleeping well at night with 1-2 times per night nocturia. The patient has a good appetite and is remaining active. The patient denied N/V/C/D/SOB/CP. Just prior to hospitalization, he was not eating nor drinking well. His legs are weak. Last six eGFR readings:  Lab Results   Component Value Date    LABGLOM 18 04/23/2019    LABGLOM 18 04/23/2019    LABGLOM 21 04/17/2019    LABGLOM 14 04/16/2019    LABGLOM 14 04/15/2019    LABGLOM 15 04/14/2019    LABGLOM 16 04/13/2019    LABGLOM 18 04/12/2019          Objective:  VITALS:  /65 (Site: Left Upper Arm, Position: Sitting, Cuff Size: Small Adult)   Pulse 64   Resp 18   Ht 5' 2\" (1.575 m)   Wt 125 lb (56.7 kg)   SpO2 98%   BMI 22.86 kg/m²   Weight:   Wt Readings from Last 3 Encounters:   04/25/19 125 lb (56.7 kg)   04/18/19 99 lb 3.3 oz (45 kg)   04/10/19 135 lb (61.2 kg)     Body mass index is 22.86 kg/m². Physical examination    General:  Alert and cooperative with exam  HEENT:  Normocephalic. No lesions nor rashes. KARLA. EOMI  Neck:   No JVD and no bruits. Thyroid gland is normal  Lungs:  Breathing easily. No rales nor rhonchi. No cough nor sputum production. Heart[de-identified]            RRR. No murmurs nor rubs.  PMI is not enlarged nor displaced. Abdomen:  Soft and non tender. Bowel sounds are active in all four quadrants. Extremities:  1+ edema  Neurologic:  CN II-XII are intact. No deficits noted. Muscle strength and tone are equal throughout. Skin:                Warm and dry with no rashes. Muscles:         Hand  and leg strength are equal and strong bilaterally.      Lab Data      CBC:   Lab Results   Component Value Date    WBC 7.1 04/23/2019    WBC 7.1 04/23/2019    HGB 9.6 (A) 04/23/2019    HGB 9.6 (A) 04/23/2019    HCT 30.4 (A) 04/23/2019    HCT 30.4 (A) 04/23/2019    MCV 76.4 (L) 04/18/2019     04/23/2019     04/23/2019     BMP:    Lab Results   Component Value Date     04/23/2019     04/23/2019     04/17/2019    K 5.5 04/23/2019    K 5.5 04/23/2019    K 4.2 04/17/2019     04/23/2019     04/23/2019    CL 97 (L) 04/17/2019    CO2 18 04/23/2019    CO2 22 04/23/2019    CO2 25 04/17/2019    BUN 49 04/23/2019    BUN 49 04/23/2019    BUN 31 (H) 04/17/2019    CREATININE 3.84 04/23/2019    CREATININE 3.84 04/23/2019    CREATININE 3.1 (HH) 04/17/2019    GLUCOSE 225 04/23/2019    GLUCOSE 70 04/17/2019    GLUCOSE 189 (H) 04/16/2019      Hepatic:   Lab Results   Component Value Date    AST 62 (H) 04/10/2019    AST 75 (H) 04/10/2019    AST 40 11/24/2018    ALT 38 04/10/2019    ALT 47 04/10/2019    ALT 54 11/24/2018    BILITOT 0.6 04/10/2019    BILITOT 0.7 04/10/2019    BILITOT 0.5 11/24/2018    ALKPHOS 129 (H) 04/10/2019    ALKPHOS 138 (H) 04/10/2019    ALKPHOS 149 (H) 11/24/2018     BNP:   Lab Results   Component Value Date    BNP 2,800.0 11/12/2018     Lipids: No results found for: CHOL, HDL  INR:   Lab Results   Component Value Date    INR 1.01 04/10/2019    INR 1.05 11/24/2018    INR 1.1 08/27/2018     URINE: No results found for: Chencho Vargas  Lab Results   Component Value Date    NITRU NEGATIVE 04/10/2019    COLORU YELLOW 04/10/2019    PHUR 7.0 04/10/2019    [address] NONE SEEN 04/10/2019 RBCUA 3-5 04/10/2019    YEAST NONE SEEN 04/10/2019    BACTERIA NONE 04/10/2019    LEUKOCYTESUR NEGATIVE 04/10/2019    UROBILINOGEN 1.0 04/10/2019    BILIRUBINUR NEGATIVE 04/10/2019    BLOODU SMALL 04/10/2019    GLUCOSEU NEGATIVE 04/10/2019    KETUA NEGATIVE 04/10/2019      Microalbumen/Creatinine ratio:  No components found for: RUCREAT        Medications:    Current Outpatient Medications   Medication Sig Dispense Refill    hydrALAZINE (APRESOLINE) 50 MG tablet Take 1 tablet by mouth 3 times daily 90 tablet 3    isosorbide mononitrate (IMDUR) 60 MG extended release tablet Take 1 tablet by mouth daily 30 tablet 3    levothyroxine (SYNTHROID) 100 MCG tablet Take 1 tablet by mouth Daily 30 tablet 3    docusate sodium (COLACE, DULCOLAX) 100 MG CAPS Take 100 mg by mouth daily 30 capsule 0    famotidine (PEPCID) 20 MG tablet Take 1 tablet by mouth daily 60 tablet 3    Insulin Degludec (TRESIBA) 100 UNIT/ML SOLN Inject 13 Units into the skin nightly       insulin regular (HUMULIN R;NOVOLIN R) 100 UNIT/ML injection Inject into the skin See Admin Instructions 1 unit for every 8 carbs consumed  In addition:In the evening, If BS>200   200-250= 1unit  251-300= 2 units  301-350=3 units      nitroGLYCERIN (NITROSTAT) 0.4 MG SL tablet DISSOLVE 1 TABLET UNDER THE TONGUE EVERY 5 MIN AS NEEDED FOR CHEST PAIN  0    FREESTYLE LANCETS MISC   0    FREESTYLE INSULINX TEST strip   0    atorvastatin (LIPITOR) 80 MG tablet Take 80 mg by mouth daily      clopidogrel (PLAVIX) 75 MG tablet Take 75 mg by mouth daily      metoprolol (LOPRESSOR) 50 MG tablet Take 50 mg by mouth 2 times daily      aspirin 81 MG tablet Take 81 mg by mouth daily       No current facility-administered medications for this visit. IMPRESSIONS:      Kidney disease: CKD stage IV  Anemia: using aranesp  Bone and mineral metabolism:       PLAN:  1. We discussed the eGFR today. 2. We will continue all current medications without changes.   3. He is planning on peritoneal dialysis. He will speak with Peng Ibanez about education. He is also interested in kidney transplant  4. We will see the patient back in 1 months.               _________________________________  Jason Gardner.  Leslye Segovia,   [facility]      CC  Anastasiya Guevara, APRN - CNP

## 2019-05-14 LAB
BASOPHILS ABSOLUTE: ABNORMAL /ΜL
BASOPHILS ABSOLUTE: ABNORMAL /ΜL
BASOPHILS RELATIVE PERCENT: ABNORMAL %
BASOPHILS RELATIVE PERCENT: ABNORMAL %
BUN BLDV-MCNC: 30 MG/DL
BUN BLDV-MCNC: 30 MG/DL
CALCIUM SERPL-MCNC: 9.1 MG/DL
CALCIUM SERPL-MCNC: 9.1 MG/DL
CHLORIDE BLD-SCNC: 105 MMOL/L
CHLORIDE BLD-SCNC: 105 MMOL/L
CO2: 23 MMOL/L
CO2: 23 MMOL/L
CREAT SERPL-MCNC: 2.5 MG/DL
CREAT SERPL-MCNC: 2.5 MG/DL
EOSINOPHILS ABSOLUTE: ABNORMAL /ΜL
EOSINOPHILS ABSOLUTE: ABNORMAL /ΜL
EOSINOPHILS RELATIVE PERCENT: ABNORMAL %
EOSINOPHILS RELATIVE PERCENT: ABNORMAL %
GFR CALCULATED: 29
GFR CALCULATED: 29
GLUCOSE BLD-MCNC: 154 MG/DL
GLUCOSE BLD-MCNC: 154 MG/DL
HCT VFR BLD CALC: 35.6 % (ref 41–53)
HCT VFR BLD CALC: 4.39 % (ref 41–53)
HEMOGLOBIN: 11.5 G/DL (ref 13.5–17.5)
HEMOGLOBIN: 11.5 G/DL (ref 13.5–17.5)
LYMPHOCYTES ABSOLUTE: ABNORMAL /ΜL
LYMPHOCYTES ABSOLUTE: ABNORMAL /ΜL
LYMPHOCYTES RELATIVE PERCENT: ABNORMAL %
LYMPHOCYTES RELATIVE PERCENT: ABNORMAL %
MCH RBC QN AUTO: ABNORMAL PG
MCH RBC QN AUTO: ABNORMAL PG
MCHC RBC AUTO-ENTMCNC: ABNORMAL G/DL
MCHC RBC AUTO-ENTMCNC: ABNORMAL G/DL
MCV RBC AUTO: ABNORMAL FL
MCV RBC AUTO: ABNORMAL FL
MONOCYTES ABSOLUTE: ABNORMAL /ΜL
MONOCYTES ABSOLUTE: ABNORMAL /ΜL
MONOCYTES RELATIVE PERCENT: ABNORMAL %
MONOCYTES RELATIVE PERCENT: ABNORMAL %
NEUTROPHILS ABSOLUTE: ABNORMAL /ΜL
NEUTROPHILS ABSOLUTE: ABNORMAL /ΜL
NEUTROPHILS RELATIVE PERCENT: ABNORMAL %
NEUTROPHILS RELATIVE PERCENT: ABNORMAL %
PLATELET # BLD: 283 K/ΜL
PLATELET # BLD: 283 K/ΜL
PMV BLD AUTO: ABNORMAL FL
PMV BLD AUTO: ABNORMAL FL
POTASSIUM SERPL-SCNC: 5.5 MMOL/L
POTASSIUM SERPL-SCNC: 5.5 MMOL/L
RBC # BLD: 4.39 10^6/ΜL
RBC # BLD: 4.39 10^6/ΜL
SODIUM BLD-SCNC: 138 MMOL/L
SODIUM BLD-SCNC: 138 MMOL/L
WBC # BLD: 6.1 10^3/ML
WBC # BLD: 6.1 10^3/ML

## 2019-05-15 ENCOUNTER — OFFICE VISIT (OUTPATIENT)
Dept: NEPHROLOGY | Age: 54
End: 2019-05-15
Payer: COMMERCIAL

## 2019-05-15 VITALS
SYSTOLIC BLOOD PRESSURE: 118 MMHG | HEART RATE: 61 BPM | BODY MASS INDEX: 22.45 KG/M2 | HEIGHT: 62 IN | DIASTOLIC BLOOD PRESSURE: 68 MMHG | WEIGHT: 122 LBS | OXYGEN SATURATION: 98 % | RESPIRATION RATE: 18 BRPM

## 2019-05-15 DIAGNOSIS — N18.4 CKD (CHRONIC KIDNEY DISEASE), STAGE IV (HCC): Primary | ICD-10-CM

## 2019-05-15 PROCEDURE — 99213 OFFICE O/P EST LOW 20 MIN: CPT | Performed by: INTERNAL MEDICINE

## 2019-05-15 PROCEDURE — G8420 CALC BMI NORM PARAMETERS: HCPCS | Performed by: INTERNAL MEDICINE

## 2019-05-15 PROCEDURE — 1111F DSCHRG MED/CURRENT MED MERGE: CPT | Performed by: INTERNAL MEDICINE

## 2019-05-15 PROCEDURE — G8598 ASA/ANTIPLAT THER USED: HCPCS | Performed by: INTERNAL MEDICINE

## 2019-05-15 PROCEDURE — 3017F COLORECTAL CA SCREEN DOC REV: CPT | Performed by: INTERNAL MEDICINE

## 2019-05-15 PROCEDURE — 1036F TOBACCO NON-USER: CPT | Performed by: INTERNAL MEDICINE

## 2019-05-15 PROCEDURE — G8427 DOCREV CUR MEDS BY ELIG CLIN: HCPCS | Performed by: INTERNAL MEDICINE

## 2019-05-15 RX ORDER — BUMETANIDE 2 MG/1
2 TABLET ORAL DAILY
Qty: 90 TABLET | Refills: 3 | Status: SHIPPED | OUTPATIENT
Start: 2019-05-15

## 2019-05-21 ENCOUNTER — TELEPHONE (OUTPATIENT)
Dept: NEPHROLOGY | Age: 54
End: 2019-05-21

## 2019-05-21 NOTE — TELEPHONE ENCOUNTER
Please ask the patient where he would like to see cardiology.  He can go through [facility] or [address]

## 2019-05-21 NOTE — TELEPHONE ENCOUNTER
Lety Mims from [address] called concerning patient-the patient had an appointment in [place] today and his test came back \"grossly abnormal\"-he said that it looked like the the patient is having recurred CAD-this patient was referred from the transplant center-the patient needs to get in to a cardiologist fairly soon-the patient wants to see someone in his area that he lives-which is [place]-please advise-Jared Fisher NP phone is [phone]

## 2019-05-21 NOTE — TELEPHONE ENCOUNTER
You may do that. I do not know any cardiologists in [address].    Try calling [phone]  Gabino Andre

## 2019-06-03 LAB
ALBUMIN SERPL-MCNC: 4 G/DL
ALP BLD-CCNC: 185 U/L
ALT SERPL-CCNC: 66 U/L
ANION GAP SERPL CALCULATED.3IONS-SCNC: NORMAL MMOL/L
AST SERPL-CCNC: 64 U/L
BASOPHILS ABSOLUTE: ABNORMAL /ΜL
BASOPHILS RELATIVE PERCENT: ABNORMAL %
BILIRUB SERPL-MCNC: 0.5 MG/DL (ref 0.1–1.4)
BUN BLDV-MCNC: 72 MG/DL
CALCIUM SERPL-MCNC: 9.1 MG/DL
CHLORIDE BLD-SCNC: 91 MMOL/L
CO2: 21 MMOL/L
CREAT SERPL-MCNC: 3.51 MG/DL
EOSINOPHILS ABSOLUTE: ABNORMAL /ΜL
EOSINOPHILS RELATIVE PERCENT: ABNORMAL %
GFR CALCULATED: 19
GLUCOSE BLD-MCNC: 299 MG/DL
HCT VFR BLD CALC: 36.9 % (ref 41–53)
HEMOGLOBIN: 12.2 G/DL (ref 13.5–17.5)
LYMPHOCYTES ABSOLUTE: ABNORMAL /ΜL
LYMPHOCYTES RELATIVE PERCENT: ABNORMAL %
MCH RBC QN AUTO: ABNORMAL PG
MCHC RBC AUTO-ENTMCNC: ABNORMAL G/DL
MCV RBC AUTO: ABNORMAL FL
MONOCYTES ABSOLUTE: ABNORMAL /ΜL
MONOCYTES RELATIVE PERCENT: ABNORMAL %
NEUTROPHILS ABSOLUTE: ABNORMAL /ΜL
NEUTROPHILS RELATIVE PERCENT: ABNORMAL %
PLATELET # BLD: 245 K/ΜL
PMV BLD AUTO: ABNORMAL FL
POTASSIUM SERPL-SCNC: 4.5 MMOL/L
RBC # BLD: 4.45 10^6/ΜL
SODIUM BLD-SCNC: 132 MMOL/L
TOTAL PROTEIN: 7.3
WBC # BLD: 6.1 10^3/ML

## 2019-06-12 ENCOUNTER — TELEPHONE (OUTPATIENT)
Dept: NEPHROLOGY | Age: 54
End: 2019-06-12

## 2019-06-19 ENCOUNTER — OFFICE VISIT (OUTPATIENT)
Dept: NEPHROLOGY | Age: 54
End: 2019-06-19
Payer: COMMERCIAL

## 2019-06-19 VITALS
HEART RATE: 71 BPM | WEIGHT: 117 LBS | HEIGHT: 62 IN | DIASTOLIC BLOOD PRESSURE: 56 MMHG | OXYGEN SATURATION: 94 % | SYSTOLIC BLOOD PRESSURE: 140 MMHG | RESPIRATION RATE: 18 BRPM | BODY MASS INDEX: 21.53 KG/M2

## 2019-06-19 DIAGNOSIS — N18.4 CKD (CHRONIC KIDNEY DISEASE), STAGE IV (HCC): Primary | ICD-10-CM

## 2019-06-19 PROCEDURE — G8420 CALC BMI NORM PARAMETERS: HCPCS | Performed by: INTERNAL MEDICINE

## 2019-06-19 PROCEDURE — G8427 DOCREV CUR MEDS BY ELIG CLIN: HCPCS | Performed by: INTERNAL MEDICINE

## 2019-06-19 PROCEDURE — 3017F COLORECTAL CA SCREEN DOC REV: CPT | Performed by: INTERNAL MEDICINE

## 2019-06-19 PROCEDURE — 99213 OFFICE O/P EST LOW 20 MIN: CPT | Performed by: INTERNAL MEDICINE

## 2019-06-19 PROCEDURE — G8598 ASA/ANTIPLAT THER USED: HCPCS | Performed by: INTERNAL MEDICINE

## 2019-06-19 PROCEDURE — 1036F TOBACCO NON-USER: CPT | Performed by: INTERNAL MEDICINE

## 2019-06-19 RX ORDER — PANTOPRAZOLE SODIUM 40 MG/1
40 TABLET, DELAYED RELEASE ORAL 2 TIMES DAILY
COMMUNITY
End: 2020-08-28 | Stop reason: ALTCHOICE

## 2019-06-19 NOTE — PROGRESS NOTES
[facility]    [address] [facility]  [address], [address]  [phone]       Progress Note    6/19/2019 10:39 AM    Pt Name:    Nicole Deluna  YOB: 1965  Primary Care Physician:  Yvette De Santiago, PAOLA - CNP       Chief Complaint:   Chief Complaint   Patient presents with    Hypertension    Diabetes        History of Chief Complaint: stage IV CKD from DM and HTN. Transplant kidney candidate. Subjective:  I last saw the patient in clinic 05/15/19. I follow the patient for Chronic Kidney disease stage IV. Since our last visit the patient has not been hospitalized. He did break his right wrist. The patient is sleeping well at night with 1-2 times per night nocturia. The patient has a not appetite and is remaining active. The patient denied N/V/C/D/SOB/CP. Last six eGFR readings:  Lab Results   Component Value Date    LABGLOM 19 06/03/2019    LABGLOM 29 05/14/2019    LABGLOM 29 05/14/2019    LABGLOM 18 04/23/2019    LABGLOM 18 04/23/2019    LABGLOM 21 04/17/2019    LABGLOM 14 04/16/2019    LABGLOM 14 04/15/2019    LABGLOM 15 04/14/2019    LABGLOM 16 04/13/2019    LABGLOM 18 04/12/2019          Objective:  VITALS:  BP (!) 140/56 (Site: Left Upper Arm, Position: Sitting, Cuff Size: Small Adult)   Pulse 71   Resp 18   Ht 5' 2\" (1.575 m)   Wt 117 lb (53.1 kg)   SpO2 94%   BMI 21.40 kg/m²   Weight:   Wt Readings from Last 3 Encounters:   06/19/19 117 lb (53.1 kg)   05/15/19 122 lb (55.3 kg)   04/25/19 125 lb (56.7 kg)     Body mass index is 21.4 kg/m². Physical examination    General:  Alert and cooperative with exam  HEENT:  Normocephalic. No lesions nor rashes. KARLA. EOMI  Neck:   No JVD and no bruits. Thyroid gland is normal  Lungs:  Breathing easily. No rales nor rhonchi. No cough nor sputum production. Heart[de-identified]            RRR. No murmurs nor rubs. PMI is not enlarged nor displaced. Abdomen:  Soft and non tender.  Bowel sounds are active in all four quadrants. Extremities:  No edema  Neurologic:  CN II-XII are intact. No deficits noted. Muscle strength and tone are equal throughout. Skin:                Warm and dry with no rashes. Muscles:         Hand  and leg strength are equal and strong bilaterally.      Lab Data      CBC:   Lab Results   Component Value Date    WBC 6.1 06/03/2019    HGB 12.2 (A) 06/03/2019    HCT 36.9 (A) 06/03/2019    MCV 76.4 (L) 04/18/2019     06/03/2019     BMP:    Lab Results   Component Value Date     06/03/2019     05/14/2019     05/14/2019    K 4.5 06/03/2019    K 5.5 05/14/2019    K 5.5 05/14/2019    CL 91 06/03/2019     05/14/2019     05/14/2019    CO2 21 06/03/2019    CO2 23 05/14/2019    CO2 23 05/14/2019    BUN 72 06/03/2019    BUN 30 05/14/2019    BUN 30 05/14/2019    CREATININE 3.51 06/03/2019    CREATININE 2.50 05/14/2019    CREATININE 2.5 05/14/2019    GLUCOSE 299 06/03/2019    GLUCOSE 154 05/14/2019    GLUCOSE 154 05/14/2019      Hepatic:   Lab Results   Component Value Date    AST 64 06/03/2019    AST 62 (H) 04/10/2019    AST 75 (H) 04/10/2019    ALT 66 06/03/2019    ALT 38 04/10/2019    ALT 47 04/10/2019    BILITOT 0.5 06/03/2019    BILITOT 0.6 04/10/2019    BILITOT 0.7 04/10/2019    ALKPHOS 185 06/03/2019    ALKPHOS 129 (H) 04/10/2019    ALKPHOS 138 (H) 04/10/2019     BNP:   Lab Results   Component Value Date    BNP 2,800.0 11/12/2018     Lipids: No results found for: CHOL, HDL  INR:   Lab Results   Component Value Date    INR 1.01 04/10/2019    INR 1.05 11/24/2018    INR 1.1 08/27/2018     URINE: No results found for: NAUR, PROTUR  Lab Results   Component Value Date    NITRU NEGATIVE 04/10/2019    COLORU YELLOW 04/10/2019    PHUR 7.0 04/10/2019    WBCUA NONE SEEN 04/10/2019    RBCUA 3-5 04/10/2019    YEAST NONE SEEN 04/10/2019    BACTERIA NONE 04/10/2019    LEUKOCYTESUR NEGATIVE 04/10/2019    UROBILINOGEN 1.0 04/10/2019    BILIRUBINUR NEGATIVE 04/10/2019    BLOODU SMALL 04/10/2019    GLUCOSEU NEGATIVE 04/10/2019    KETUA NEGATIVE 04/10/2019      Microalbumen/Creatinine ratio:  No components found for: RUCREAT        Medications:    Current Outpatient Medications   Medication Sig Dispense Refill    pantoprazole (PROTONIX) 40 MG tablet Take 40 mg by mouth daily      bumetanide (BUMEX) 2 MG tablet Take 1 tablet by mouth daily 90 tablet 3    hydrALAZINE (APRESOLINE) 50 MG tablet Take 1 tablet by mouth 3 times daily 90 tablet 3    isosorbide mononitrate (IMDUR) 60 MG extended release tablet Take 1 tablet by mouth daily 30 tablet 3    levothyroxine (SYNTHROID) 100 MCG tablet Take 1 tablet by mouth Daily 30 tablet 3    Insulin Degludec (TRESIBA) 100 UNIT/ML SOLN Inject 13 Units into the skin nightly       insulin regular (HUMULIN R;NOVOLIN R) 100 UNIT/ML injection Inject into the skin See Admin Instructions 1 unit for every 8 carbs consumed  In addition:In the evening, If BS>200   200-250= 1unit  251-300= 2 units  301-350=3 units      nitroGLYCERIN (NITROSTAT) 0.4 MG SL tablet DISSOLVE 1 TABLET UNDER THE TONGUE EVERY 5 MIN AS NEEDED FOR CHEST PAIN  0    FREESTYLE LANCETS MISC   0    FREESTYLE INSULINX TEST strip   0    atorvastatin (LIPITOR) 80 MG tablet Take 80 mg by mouth daily      clopidogrel (PLAVIX) 75 MG tablet Take 75 mg by mouth daily      metoprolol (LOPRESSOR) 50 MG tablet Take 50 mg by mouth 2 times daily      aspirin 81 MG tablet Take 81 mg by mouth daily       No current facility-administered medications for this visit. IMPRESSIONS:        Kidney disease: CKD stage IV  NANCY from over diuresis  Anemia: stable. No need for TRAM yet. Bone and mineral metabolism:       PLAN:  1. We discussed the eGFR today. 2. We will continue all current medications without changes. 3. He is over diuresed. Will change bumex to 2 mg orally on MON,WED,FRI only  4. We will see the patient back in 1 months.               _________________________________  Shivani Reynolds.  Isamar Jaramillo

## 2019-07-16 LAB
BASOPHILS ABSOLUTE: ABNORMAL /ΜL
BASOPHILS RELATIVE PERCENT: ABNORMAL %
BUN BLDV-MCNC: 56 MG/DL
CALCIUM SERPL-MCNC: 9.2 MG/DL
CHLORIDE BLD-SCNC: 98 MMOL/L
CO2: 22 MMOL/L
CREAT SERPL-MCNC: 2.98 MG/DL
EOSINOPHILS ABSOLUTE: ABNORMAL /ΜL
EOSINOPHILS RELATIVE PERCENT: ABNORMAL %
GFR CALCULATED: 23
GLUCOSE BLD-MCNC: 332 MG/DL
HCT VFR BLD CALC: 38.1 % (ref 41–53)
HEMOGLOBIN: 12.9 G/DL (ref 13.5–17.5)
LYMPHOCYTES ABSOLUTE: ABNORMAL /ΜL
LYMPHOCYTES RELATIVE PERCENT: ABNORMAL %
MCH RBC QN AUTO: ABNORMAL PG
MCHC RBC AUTO-ENTMCNC: ABNORMAL G/DL
MCV RBC AUTO: ABNORMAL FL
MONOCYTES ABSOLUTE: ABNORMAL /ΜL
MONOCYTES RELATIVE PERCENT: ABNORMAL %
NEUTROPHILS ABSOLUTE: ABNORMAL /ΜL
NEUTROPHILS RELATIVE PERCENT: ABNORMAL %
PLATELET # BLD: 215 K/ΜL
PMV BLD AUTO: ABNORMAL FL
POTASSIUM SERPL-SCNC: 4.5 MMOL/L
RBC # BLD: 4.24 10^6/ΜL
SODIUM BLD-SCNC: 135 MMOL/L
WBC # BLD: 6 10^3/ML

## 2019-07-17 ENCOUNTER — OFFICE VISIT (OUTPATIENT)
Dept: NEPHROLOGY | Age: 54
End: 2019-07-17
Payer: COMMERCIAL

## 2019-07-17 VITALS
DIASTOLIC BLOOD PRESSURE: 78 MMHG | HEART RATE: 62 BPM | OXYGEN SATURATION: 98 % | WEIGHT: 118 LBS | BODY MASS INDEX: 21.71 KG/M2 | HEIGHT: 62 IN | RESPIRATION RATE: 18 BRPM | SYSTOLIC BLOOD PRESSURE: 148 MMHG

## 2019-07-17 DIAGNOSIS — N18.4 CKD (CHRONIC KIDNEY DISEASE), STAGE IV (HCC): Primary | ICD-10-CM

## 2019-07-17 PROCEDURE — G8427 DOCREV CUR MEDS BY ELIG CLIN: HCPCS | Performed by: INTERNAL MEDICINE

## 2019-07-17 PROCEDURE — 1036F TOBACCO NON-USER: CPT | Performed by: INTERNAL MEDICINE

## 2019-07-17 PROCEDURE — 99213 OFFICE O/P EST LOW 20 MIN: CPT | Performed by: INTERNAL MEDICINE

## 2019-07-17 PROCEDURE — G8598 ASA/ANTIPLAT THER USED: HCPCS | Performed by: INTERNAL MEDICINE

## 2019-07-17 PROCEDURE — 3017F COLORECTAL CA SCREEN DOC REV: CPT | Performed by: INTERNAL MEDICINE

## 2019-07-17 PROCEDURE — G8420 CALC BMI NORM PARAMETERS: HCPCS | Performed by: INTERNAL MEDICINE

## 2019-07-17 NOTE — PROGRESS NOTES
eGFR today. 2. We will continue all current medications without changes. 3. We will see the patient back in 2 months.               _________________________________  Rodolfo Bonilla.  John Herrera,   [facility]      CC  Frederick Maki, APRN - CNP

## 2019-09-17 LAB
BASOPHILS ABSOLUTE: ABNORMAL /ΜL
BASOPHILS RELATIVE PERCENT: ABNORMAL %
BUN BLDV-MCNC: 40 MG/DL
CALCIUM SERPL-MCNC: 9.1 MG/DL
CHLORIDE BLD-SCNC: 100 MMOL/L
CO2: 21 MMOL/L
CREAT SERPL-MCNC: 2.86 MG/DL
EOSINOPHILS ABSOLUTE: ABNORMAL /ΜL
EOSINOPHILS RELATIVE PERCENT: ABNORMAL %
GFR CALCULATED: 25
GLUCOSE BLD-MCNC: 284 MG/DL
HCT VFR BLD CALC: 37.4 % (ref 41–53)
HEMOGLOBIN: 12.5 G/DL (ref 13.5–17.5)
LYMPHOCYTES ABSOLUTE: ABNORMAL /ΜL
LYMPHOCYTES RELATIVE PERCENT: ABNORMAL %
MCH RBC QN AUTO: ABNORMAL PG
MCHC RBC AUTO-ENTMCNC: ABNORMAL G/DL
MCV RBC AUTO: ABNORMAL FL
MONOCYTES ABSOLUTE: ABNORMAL /ΜL
MONOCYTES RELATIVE PERCENT: ABNORMAL %
NEUTROPHILS ABSOLUTE: ABNORMAL /ΜL
NEUTROPHILS RELATIVE PERCENT: ABNORMAL %
PLATELET # BLD: 267 K/ΜL
PMV BLD AUTO: ABNORMAL FL
POTASSIUM SERPL-SCNC: 5.2 MMOL/L
RBC # BLD: 3.99 10^6/ΜL
SODIUM BLD-SCNC: 135 MMOL/L
WBC # BLD: 5.7 10^3/ML

## 2019-09-18 ENCOUNTER — TELEPHONE (OUTPATIENT)
Dept: CARDIOLOGY CLINIC | Age: 54
End: 2019-09-18

## 2019-09-18 ENCOUNTER — OFFICE VISIT (OUTPATIENT)
Dept: NEPHROLOGY | Age: 54
End: 2019-09-18
Payer: COMMERCIAL

## 2019-09-18 VITALS
BODY MASS INDEX: 22.08 KG/M2 | DIASTOLIC BLOOD PRESSURE: 64 MMHG | OXYGEN SATURATION: 98 % | HEIGHT: 62 IN | SYSTOLIC BLOOD PRESSURE: 110 MMHG | WEIGHT: 120 LBS | RESPIRATION RATE: 18 BRPM | HEART RATE: 62 BPM

## 2019-09-18 DIAGNOSIS — I25.10 ATHEROSCLEROSIS OF NATIVE CORONARY ARTERY OF NATIVE HEART WITHOUT ANGINA PECTORIS: ICD-10-CM

## 2019-09-18 DIAGNOSIS — N18.4 CKD (CHRONIC KIDNEY DISEASE), STAGE IV (HCC): Primary | ICD-10-CM

## 2019-09-18 PROCEDURE — G8598 ASA/ANTIPLAT THER USED: HCPCS | Performed by: INTERNAL MEDICINE

## 2019-09-18 PROCEDURE — 1036F TOBACCO NON-USER: CPT | Performed by: INTERNAL MEDICINE

## 2019-09-18 PROCEDURE — G8420 CALC BMI NORM PARAMETERS: HCPCS | Performed by: INTERNAL MEDICINE

## 2019-09-18 PROCEDURE — 99213 OFFICE O/P EST LOW 20 MIN: CPT | Performed by: INTERNAL MEDICINE

## 2019-09-18 PROCEDURE — G8427 DOCREV CUR MEDS BY ELIG CLIN: HCPCS | Performed by: INTERNAL MEDICINE

## 2019-09-18 PROCEDURE — 3017F COLORECTAL CA SCREEN DOC REV: CPT | Performed by: INTERNAL MEDICINE

## 2019-09-18 NOTE — PROGRESS NOTES
EOMI  Neck:   No JVD and no bruits. Thyroid gland is normal  Lungs:  Breathing easily. No rales nor rhonchi. No cough nor sputum production. Heart[de-identified]            RRR. No murmurs nor rubs. PMI is not enlarged nor displaced. Abdomen:  Soft and non tender. Bowel sounds are active in all four quadrants. Extremities:  No edema  Neurologic:  CN II-XII are intact. No deficits noted. Muscle strength and tone are equal throughout. Skin:                Warm and dry with no rashes. Muscles:         Hand  and leg strength are equal and strong bilaterally.      Lab Data      CBC:   Lab Results   Component Value Date    WBC 5.7 09/17/2019    HGB 12.5 (A) 09/17/2019    HCT 37.4 (A) 09/17/2019    MCV 76.4 (L) 04/18/2019     09/17/2019     BMP:    Lab Results   Component Value Date     09/17/2019     07/16/2019     06/03/2019    K 5.2 09/17/2019    K 4.5 07/16/2019    K 4.5 06/03/2019     09/17/2019    CL 98 07/16/2019    CL 91 06/03/2019    CO2 21 09/17/2019    CO2 22 07/16/2019    CO2 21 06/03/2019    BUN 40 09/17/2019    BUN 56 07/16/2019    BUN 72 06/03/2019    CREATININE 2.86 09/17/2019    CREATININE 2.98 07/16/2019    CREATININE 3.51 06/03/2019    GLUCOSE 284 09/17/2019    GLUCOSE 332 07/16/2019    GLUCOSE 299 06/03/2019      Hepatic:   Lab Results   Component Value Date    AST 64 06/03/2019    AST 62 (H) 04/10/2019    AST 75 (H) 04/10/2019    ALT 66 06/03/2019    ALT 38 04/10/2019    ALT 47 04/10/2019    BILITOT 0.5 06/03/2019    BILITOT 0.6 04/10/2019    BILITOT 0.7 04/10/2019    ALKPHOS 185 06/03/2019    ALKPHOS 129 (H) 04/10/2019    ALKPHOS 138 (H) 04/10/2019     BNP:   Lab Results   Component Value Date    BNP 2,800.0 11/12/2018     Lipids: No results found for: CHOL, HDL  INR:   Lab Results   Component Value Date    INR 1.01 04/10/2019    INR 1.05 11/24/2018    INR 1.1 08/27/2018     URINE: No results found for: NAUR, PROTUR  Lab Results   Component Value Date    NITRU NEGATIVE

## 2019-10-01 ENCOUNTER — OFFICE VISIT (OUTPATIENT)
Dept: CARDIOLOGY CLINIC | Age: 54
End: 2019-10-01
Payer: COMMERCIAL

## 2019-10-01 VITALS
HEIGHT: 62 IN | HEART RATE: 54 BPM | WEIGHT: 120.4 LBS | BODY MASS INDEX: 22.16 KG/M2 | DIASTOLIC BLOOD PRESSURE: 67 MMHG | SYSTOLIC BLOOD PRESSURE: 150 MMHG

## 2019-10-01 DIAGNOSIS — R07.9 CHEST PAIN, UNSPECIFIED TYPE: Primary | ICD-10-CM

## 2019-10-01 DIAGNOSIS — R94.39 ABNORMAL STRESS TEST: ICD-10-CM

## 2019-10-01 PROCEDURE — G8427 DOCREV CUR MEDS BY ELIG CLIN: HCPCS | Performed by: INTERNAL MEDICINE

## 2019-10-01 PROCEDURE — 93000 ELECTROCARDIOGRAM COMPLETE: CPT | Performed by: INTERNAL MEDICINE

## 2019-10-01 PROCEDURE — 3017F COLORECTAL CA SCREEN DOC REV: CPT | Performed by: INTERNAL MEDICINE

## 2019-10-01 PROCEDURE — 1036F TOBACCO NON-USER: CPT | Performed by: INTERNAL MEDICINE

## 2019-10-01 PROCEDURE — 99204 OFFICE O/P NEW MOD 45 MIN: CPT | Performed by: INTERNAL MEDICINE

## 2019-10-01 PROCEDURE — G8484 FLU IMMUNIZE NO ADMIN: HCPCS | Performed by: INTERNAL MEDICINE

## 2019-10-01 PROCEDURE — G8420 CALC BMI NORM PARAMETERS: HCPCS | Performed by: INTERNAL MEDICINE

## 2019-10-01 PROCEDURE — G8598 ASA/ANTIPLAT THER USED: HCPCS | Performed by: INTERNAL MEDICINE

## 2019-10-12 LAB
CHOLESTEROL, TOTAL: 148 MG/DL
CHOLESTEROL/HDL RATIO: NORMAL
HDLC SERPL-MCNC: 56 MG/DL (ref 35–70)
LDL CHOLESTEROL CALCULATED: 59 MG/DL (ref 0–160)
TRIGL SERPL-MCNC: 166 MG/DL
VLDLC SERPL CALC-MCNC: 33 MG/DL

## 2019-10-15 ENCOUNTER — HOSPITAL ENCOUNTER (OUTPATIENT)
Age: 54
Discharge: HOME | End: 2019-10-16
Attending: INTERNAL MEDICINE | Admitting: INTERNAL MEDICINE
Payer: COMMERCIAL

## 2019-10-15 ENCOUNTER — PREP FOR PROCEDURE (OUTPATIENT)
Dept: CARDIOLOGY | Age: 54
End: 2019-10-15

## 2019-10-15 PROBLEM — R94.39 ABNORMAL STRESS TEST: Status: ACTIVE | Noted: 2019-10-15

## 2019-10-15 PROCEDURE — 93005 ELECTROCARDIOGRAM TRACING: CPT | Performed by: PHYSICIAN ASSISTANT

## 2019-10-15 PROCEDURE — 2709999900 HC NON-CHARGEABLE SUPPLY

## 2019-10-15 PROCEDURE — 6370000000 HC RX 637 (ALT 250 FOR IP): Performed by: INTERNAL MEDICINE

## 2019-10-15 PROCEDURE — 86850 RBC ANTIBODY SCREEN: CPT

## 2019-10-15 PROCEDURE — 36415 COLL VENOUS BLD VENIPUNCTURE: CPT

## 2019-10-15 PROCEDURE — 86900 BLOOD TYPING SEROLOGIC ABO: CPT

## 2019-10-15 PROCEDURE — 2580000003 HC RX 258: Performed by: PHYSICIAN ASSISTANT

## 2019-10-15 PROCEDURE — 86901 BLOOD TYPING SEROLOGIC RH(D): CPT

## 2019-10-15 RX ORDER — SODIUM CHLORIDE 9 MG/ML
INJECTION, SOLUTION INTRAVENOUS CONTINUOUS
Status: CANCELLED | OUTPATIENT
Start: 2019-10-15

## 2019-10-15 RX ORDER — ASPIRIN 325 MG
325 TABLET ORAL ONCE
Status: DISCONTINUED | OUTPATIENT
Start: 2019-10-15 | End: 2019-10-16 | Stop reason: HOSPADM

## 2019-10-15 RX ORDER — ASPIRIN 81 MG/1
81 TABLET, CHEWABLE ORAL DAILY
Status: DISCONTINUED | OUTPATIENT
Start: 2019-10-16 | End: 2019-10-16 | Stop reason: HOSPADM

## 2019-10-15 RX ORDER — NITROGLYCERIN 0.4 MG/1
0.4 TABLET SUBLINGUAL EVERY 5 MIN PRN
Status: DISCONTINUED | OUTPATIENT
Start: 2019-10-15 | End: 2019-10-16 | Stop reason: HOSPADM

## 2019-10-15 RX ORDER — METOPROLOL TARTRATE 50 MG/1
50 TABLET, FILM COATED ORAL 2 TIMES DAILY
Status: DISCONTINUED | OUTPATIENT
Start: 2019-10-15 | End: 2019-10-16 | Stop reason: HOSPADM

## 2019-10-15 RX ORDER — HYDRALAZINE HYDROCHLORIDE 50 MG/1
50 TABLET, FILM COATED ORAL 3 TIMES DAILY
Status: DISCONTINUED | OUTPATIENT
Start: 2019-10-15 | End: 2019-10-16

## 2019-10-15 RX ORDER — BUMETANIDE 1 MG/1
2 TABLET ORAL DAILY
Status: DISCONTINUED | OUTPATIENT
Start: 2019-10-16 | End: 2019-10-16 | Stop reason: HOSPADM

## 2019-10-15 RX ORDER — SODIUM CHLORIDE 0.9 % (FLUSH) 0.9 %
10 SYRINGE (ML) INJECTION PRN
Status: DISCONTINUED | OUTPATIENT
Start: 2019-10-15 | End: 2019-10-16

## 2019-10-15 RX ORDER — NITROGLYCERIN 0.4 MG/1
0.4 TABLET SUBLINGUAL EVERY 5 MIN PRN
Status: CANCELLED | OUTPATIENT
Start: 2019-10-15

## 2019-10-15 RX ORDER — INSULIN GLARGINE 100 [IU]/ML
11 INJECTION, SOLUTION SUBCUTANEOUS DAILY
Status: DISCONTINUED | OUTPATIENT
Start: 2019-10-16 | End: 2019-10-16 | Stop reason: HOSPADM

## 2019-10-15 RX ORDER — ATORVASTATIN CALCIUM 80 MG/1
80 TABLET, FILM COATED ORAL DAILY
Status: DISCONTINUED | OUTPATIENT
Start: 2019-10-16 | End: 2019-10-16 | Stop reason: HOSPADM

## 2019-10-15 RX ORDER — SODIUM CHLORIDE 9 MG/ML
INJECTION, SOLUTION INTRAVENOUS CONTINUOUS
Status: DISCONTINUED | OUTPATIENT
Start: 2019-10-15 | End: 2019-10-16 | Stop reason: HOSPADM

## 2019-10-15 RX ORDER — SODIUM CHLORIDE 0.9 % (FLUSH) 0.9 %
10 SYRINGE (ML) INJECTION PRN
Status: CANCELLED | OUTPATIENT
Start: 2019-10-15

## 2019-10-15 RX ORDER — NITROGLYCERIN 0.4 MG/1
0.4 TABLET SUBLINGUAL EVERY 5 MIN PRN
Status: DISCONTINUED | OUTPATIENT
Start: 2019-10-15 | End: 2019-10-15 | Stop reason: SDUPTHER

## 2019-10-15 RX ORDER — CLOPIDOGREL BISULFATE 75 MG/1
75 TABLET ORAL DAILY
Status: DISCONTINUED | OUTPATIENT
Start: 2019-10-16 | End: 2019-10-16 | Stop reason: HOSPADM

## 2019-10-15 RX ORDER — SODIUM CHLORIDE 0.9 % (FLUSH) 0.9 %
10 SYRINGE (ML) INJECTION EVERY 12 HOURS SCHEDULED
Status: CANCELLED | OUTPATIENT
Start: 2019-10-15

## 2019-10-15 RX ORDER — SODIUM CHLORIDE 0.9 % (FLUSH) 0.9 %
10 SYRINGE (ML) INJECTION EVERY 12 HOURS SCHEDULED
Status: DISCONTINUED | OUTPATIENT
Start: 2019-10-15 | End: 2019-10-16

## 2019-10-15 RX ORDER — LEVOTHYROXINE SODIUM 0.1 MG/1
100 TABLET ORAL DAILY
Status: DISCONTINUED | OUTPATIENT
Start: 2019-10-16 | End: 2019-10-16 | Stop reason: HOSPADM

## 2019-10-15 RX ORDER — ASPIRIN 325 MG
325 TABLET ORAL ONCE
Status: CANCELLED | OUTPATIENT
Start: 2019-10-15 | End: 2019-10-15

## 2019-10-15 RX ORDER — PANTOPRAZOLE SODIUM 40 MG/1
40 TABLET, DELAYED RELEASE ORAL 2 TIMES DAILY
Status: DISCONTINUED | OUTPATIENT
Start: 2019-10-15 | End: 2019-10-16 | Stop reason: HOSPADM

## 2019-10-15 RX ORDER — ISOSORBIDE MONONITRATE 30 MG/1
30 TABLET, EXTENDED RELEASE ORAL NIGHTLY
Status: DISCONTINUED | OUTPATIENT
Start: 2019-10-15 | End: 2019-10-16 | Stop reason: HOSPADM

## 2019-10-15 RX ADMIN — SODIUM CHLORIDE: 9 INJECTION, SOLUTION INTRAVENOUS at 21:07

## 2019-10-15 RX ADMIN — METOPROLOL TARTRATE 50 MG: 50 TABLET, FILM COATED ORAL at 21:56

## 2019-10-15 RX ADMIN — HYDRALAZINE HYDROCHLORIDE 50 MG: 50 TABLET ORAL at 21:56

## 2019-10-15 RX ADMIN — PANTOPRAZOLE SODIUM 40 MG: 40 TABLET, DELAYED RELEASE ORAL at 21:56

## 2019-10-15 RX ADMIN — ISOSORBIDE MONONITRATE 30 MG: 30 TABLET, EXTENDED RELEASE ORAL at 21:56

## 2019-10-16 VITALS
BODY MASS INDEX: 22.18 KG/M2 | RESPIRATION RATE: 16 BRPM | SYSTOLIC BLOOD PRESSURE: 144 MMHG | TEMPERATURE: 97.9 F | WEIGHT: 120.5 LBS | DIASTOLIC BLOOD PRESSURE: 64 MMHG | HEART RATE: 58 BPM | OXYGEN SATURATION: 94 % | HEIGHT: 62 IN

## 2019-10-16 LAB
ABO: NORMAL
ACTIVATED CLOTTING TIME: 367 SECONDS (ref 1–150)
ANION GAP SERPL CALCULATED.3IONS-SCNC: 8 MEQ/L (ref 8–16)
ANTIBODY SCREEN: NORMAL
APTT: 45.7 SECONDS (ref 22–38)
BUN BLDV-MCNC: 38 MG/DL (ref 7–22)
CALCIUM SERPL-MCNC: 8.5 MG/DL (ref 8.5–10.5)
CHLORIDE BLD-SCNC: 111 MEQ/L (ref 98–111)
CO2: 20 MEQ/L (ref 23–33)
COLLECTED BY:: ABNORMAL
COLLECTED BY:: ABNORMAL
CREAT SERPL-MCNC: 2.9 MG/DL (ref 0.4–1.2)
ERYTHROCYTE [DISTWIDTH] IN BLOOD BY AUTOMATED COUNT: 13.2 % (ref 11.5–14.5)
ERYTHROCYTE [DISTWIDTH] IN BLOOD BY AUTOMATED COUNT: 47.8 FL (ref 35–45)
GFR SERPL CREATININE-BSD FRML MDRD: 23 ML/MIN/1.73M2
GLUCOSE BLD-MCNC: 262 MG/DL (ref 70–108)
HCT VFR BLD CALC: 35.2 % (ref 42–52)
HEMOGLOBIN: 11.3 GM/DL (ref 14–18)
INR BLD: 0.96 (ref 0.85–1.13)
MCH RBC QN AUTO: 31.4 PG (ref 26–33)
MCHC RBC AUTO-ENTMCNC: 32.1 GM/DL (ref 32.2–35.5)
MCV RBC AUTO: 97.8 FL (ref 80–94)
PLATELET # BLD: 215 THOU/MM3 (ref 130–400)
PMV BLD AUTO: 10.3 FL (ref 9.4–12.4)
POC O2 SATURATION: 67 % (ref 94–97)
POC O2 SATURATION: 98 % (ref 94–97)
POTASSIUM REFLEX MAGNESIUM: 4.8 MEQ/L (ref 3.5–5.2)
RBC # BLD: 3.6 MILL/MM3 (ref 4.7–6.1)
RH FACTOR: NORMAL
SODIUM BLD-SCNC: 139 MEQ/L (ref 135–145)
SOURCE, BLOOD GAS: ABNORMAL
SOURCE, BLOOD GAS: ABNORMAL
TROPONIN T: 0.13 NG/ML
WBC # BLD: 4.5 THOU/MM3 (ref 4.8–10.8)

## 2019-10-16 PROCEDURE — 84484 ASSAY OF TROPONIN QUANT: CPT

## 2019-10-16 PROCEDURE — 85027 COMPLETE CBC AUTOMATED: CPT

## 2019-10-16 PROCEDURE — 85610 PROTHROMBIN TIME: CPT

## 2019-10-16 PROCEDURE — 92978 ENDOLUMINL IVUS OCT C 1ST: CPT | Performed by: INTERNAL MEDICINE

## 2019-10-16 PROCEDURE — 82810 BLOOD GASES O2 SAT ONLY: CPT

## 2019-10-16 PROCEDURE — 92978 ENDOLUMINL IVUS OCT C 1ST: CPT

## 2019-10-16 PROCEDURE — 80048 BASIC METABOLIC PNL TOTAL CA: CPT

## 2019-10-16 PROCEDURE — 6370000000 HC RX 637 (ALT 250 FOR IP): Performed by: NURSE PRACTITIONER

## 2019-10-16 PROCEDURE — C1769 GUIDE WIRE: HCPCS

## 2019-10-16 PROCEDURE — C1753 CATH, INTRAVAS ULTRASOUND: HCPCS

## 2019-10-16 PROCEDURE — 93460 R&L HRT ART/VENTRICLE ANGIO: CPT | Performed by: INTERNAL MEDICINE

## 2019-10-16 PROCEDURE — 6360000002 HC RX W HCPCS

## 2019-10-16 PROCEDURE — 92928 PRQ TCAT PLMT NTRAC ST 1 LES: CPT | Performed by: INTERNAL MEDICINE

## 2019-10-16 PROCEDURE — 92928 PRQ TCAT PLMT NTRAC ST 1 LES: CPT

## 2019-10-16 PROCEDURE — 85730 THROMBOPLASTIN TIME PARTIAL: CPT

## 2019-10-16 PROCEDURE — 36415 COLL VENOUS BLD VENIPUNCTURE: CPT

## 2019-10-16 PROCEDURE — 94760 N-INVAS EAR/PLS OXIMETRY 1: CPT

## 2019-10-16 PROCEDURE — 6370000000 HC RX 637 (ALT 250 FOR IP)

## 2019-10-16 PROCEDURE — C1874 STENT, COATED/COV W/DEL SYS: HCPCS

## 2019-10-16 PROCEDURE — 2500000003 HC RX 250 WO HCPCS

## 2019-10-16 PROCEDURE — 85347 COAGULATION TIME ACTIVATED: CPT

## 2019-10-16 PROCEDURE — 6360000004 HC RX CONTRAST MEDICATION: Performed by: INTERNAL MEDICINE

## 2019-10-16 PROCEDURE — 93460 R&L HRT ART/VENTRICLE ANGIO: CPT

## 2019-10-16 PROCEDURE — 2709999900 HC NON-CHARGEABLE SUPPLY

## 2019-10-16 PROCEDURE — C1725 CATH, TRANSLUMIN NON-LASER: HCPCS

## 2019-10-16 PROCEDURE — C1894 INTRO/SHEATH, NON-LASER: HCPCS

## 2019-10-16 PROCEDURE — C1887 CATHETER, GUIDING: HCPCS

## 2019-10-16 PROCEDURE — 2580000003 HC RX 258: Performed by: PHYSICIAN ASSISTANT

## 2019-10-16 RX ORDER — SODIUM CHLORIDE 0.9 % (FLUSH) 0.9 %
10 SYRINGE (ML) INJECTION PRN
Status: DISCONTINUED | OUTPATIENT
Start: 2019-10-16 | End: 2019-10-16 | Stop reason: HOSPADM

## 2019-10-16 RX ORDER — HYDRALAZINE HYDROCHLORIDE 25 MG/1
75 TABLET, FILM COATED ORAL 3 TIMES DAILY
Qty: 90 TABLET | Refills: 3 | OUTPATIENT
Start: 2019-10-16 | End: 2019-11-04 | Stop reason: SDUPTHER

## 2019-10-16 RX ORDER — SODIUM CHLORIDE 0.9 % (FLUSH) 0.9 %
10 SYRINGE (ML) INJECTION EVERY 12 HOURS SCHEDULED
Status: DISCONTINUED | OUTPATIENT
Start: 2019-10-16 | End: 2019-10-16 | Stop reason: HOSPADM

## 2019-10-16 RX ORDER — HYDRALAZINE HYDROCHLORIDE 50 MG/1
75 TABLET, FILM COATED ORAL 3 TIMES DAILY
Qty: 90 TABLET | Refills: 3 | Status: ON HOLD | OUTPATIENT
Start: 2019-10-16 | End: 2020-11-05

## 2019-10-16 RX ORDER — ACETAMINOPHEN 325 MG/1
650 TABLET ORAL EVERY 4 HOURS PRN
Status: DISCONTINUED | OUTPATIENT
Start: 2019-10-16 | End: 2019-10-16 | Stop reason: HOSPADM

## 2019-10-16 RX ORDER — LEVOTHYROXINE SODIUM 0.05 MG/1
50 TABLET ORAL DAILY
Status: CANCELLED | OUTPATIENT
Start: 2019-10-16

## 2019-10-16 RX ORDER — HYDRALAZINE HYDROCHLORIDE 25 MG/1
25 TABLET, FILM COATED ORAL ONCE
Status: DISCONTINUED | OUTPATIENT
Start: 2019-10-16 | End: 2019-10-16 | Stop reason: HOSPADM

## 2019-10-16 RX ADMIN — LEVOTHYROXINE SODIUM 100 MCG: 0.1 TABLET ORAL at 12:31

## 2019-10-16 RX ADMIN — IOPAMIDOL 65 ML: 755 INJECTION, SOLUTION INTRAVENOUS at 10:37

## 2019-10-16 RX ADMIN — CLOPIDOGREL BISULFATE 75 MG: 75 TABLET ORAL at 05:56

## 2019-10-16 RX ADMIN — SODIUM CHLORIDE: 9 INJECTION, SOLUTION INTRAVENOUS at 05:54

## 2019-10-16 RX ADMIN — ASPIRIN 81 MG: 81 TABLET, CHEWABLE ORAL at 05:56

## 2019-10-16 RX ADMIN — PANTOPRAZOLE SODIUM 40 MG: 40 TABLET, DELAYED RELEASE ORAL at 05:55

## 2019-10-16 RX ADMIN — HYDRALAZINE HYDROCHLORIDE 50 MG: 50 TABLET ORAL at 12:31

## 2019-10-16 RX ADMIN — HYDRALAZINE HYDROCHLORIDE 75 MG: 50 TABLET, FILM COATED ORAL at 15:11

## 2019-10-16 RX ADMIN — METOPROLOL TARTRATE 50 MG: 50 TABLET, FILM COATED ORAL at 12:32

## 2019-10-17 LAB
EKG ATRIAL RATE: 73 BPM
EKG P AXIS: 41 DEGREES
EKG P-R INTERVAL: 164 MS
EKG Q-T INTERVAL: 426 MS
EKG QRS DURATION: 94 MS
EKG QTC CALCULATION (BAZETT): 469 MS
EKG R AXIS: 25 DEGREES
EKG T AXIS: -112 DEGREES
EKG VENTRICULAR RATE: 73 BPM

## 2019-10-21 ENCOUNTER — TELEPHONE (OUTPATIENT)
Dept: CARDIOLOGY CLINIC | Age: 54
End: 2019-10-21

## 2019-10-31 ENCOUNTER — TELEPHONE (OUTPATIENT)
Dept: NEPHROLOGY | Age: 54
End: 2019-10-31

## 2019-10-31 LAB
BASOPHILS ABSOLUTE: ABNORMAL /ΜL
BASOPHILS RELATIVE PERCENT: ABNORMAL %
BUN BLDV-MCNC: 52 MG/DL
CALCIUM SERPL-MCNC: 9.2 MG/DL
CHLORIDE BLD-SCNC: 99 MMOL/L
CO2: 22 MMOL/L
CREAT SERPL-MCNC: 2.81 MG/DL
EOSINOPHILS ABSOLUTE: ABNORMAL /ΜL
EOSINOPHILS RELATIVE PERCENT: ABNORMAL %
GFR CALCULATED: 25
GLUCOSE BLD-MCNC: 411 MG/DL
HCT VFR BLD CALC: 40 % (ref 41–53)
HEMOGLOBIN: 13 G/DL (ref 13.5–17.5)
LYMPHOCYTES ABSOLUTE: ABNORMAL /ΜL
LYMPHOCYTES RELATIVE PERCENT: ABNORMAL %
MCH RBC QN AUTO: ABNORMAL PG
MCHC RBC AUTO-ENTMCNC: ABNORMAL G/DL
MCV RBC AUTO: ABNORMAL FL
MONOCYTES ABSOLUTE: ABNORMAL /ΜL
MONOCYTES RELATIVE PERCENT: ABNORMAL %
NEUTROPHILS ABSOLUTE: ABNORMAL /ΜL
NEUTROPHILS RELATIVE PERCENT: ABNORMAL %
PLATELET # BLD: 253 K/ΜL
PMV BLD AUTO: ABNORMAL FL
POTASSIUM SERPL-SCNC: 5.2 MMOL/L
RBC # BLD: 4.16 10^6/ΜL
SODIUM BLD-SCNC: 134 MMOL/L
WBC # BLD: 5.3 10^3/ML

## 2019-11-04 ENCOUNTER — OFFICE VISIT (OUTPATIENT)
Dept: NEPHROLOGY | Age: 54
End: 2019-11-04
Payer: COMMERCIAL

## 2019-11-04 VITALS
BODY MASS INDEX: 22.08 KG/M2 | WEIGHT: 120 LBS | HEART RATE: 62 BPM | SYSTOLIC BLOOD PRESSURE: 122 MMHG | HEIGHT: 62 IN | OXYGEN SATURATION: 97 % | DIASTOLIC BLOOD PRESSURE: 68 MMHG

## 2019-11-04 DIAGNOSIS — N18.4 CKD (CHRONIC KIDNEY DISEASE), STAGE IV (HCC): Primary | ICD-10-CM

## 2019-11-04 PROCEDURE — G8427 DOCREV CUR MEDS BY ELIG CLIN: HCPCS | Performed by: INTERNAL MEDICINE

## 2019-11-04 PROCEDURE — G8484 FLU IMMUNIZE NO ADMIN: HCPCS | Performed by: INTERNAL MEDICINE

## 2019-11-04 PROCEDURE — 1036F TOBACCO NON-USER: CPT | Performed by: INTERNAL MEDICINE

## 2019-11-04 PROCEDURE — G8598 ASA/ANTIPLAT THER USED: HCPCS | Performed by: INTERNAL MEDICINE

## 2019-11-04 PROCEDURE — G8420 CALC BMI NORM PARAMETERS: HCPCS | Performed by: INTERNAL MEDICINE

## 2019-11-04 PROCEDURE — 99213 OFFICE O/P EST LOW 20 MIN: CPT | Performed by: INTERNAL MEDICINE

## 2019-11-04 PROCEDURE — 3017F COLORECTAL CA SCREEN DOC REV: CPT | Performed by: INTERNAL MEDICINE

## 2019-11-06 ENCOUNTER — OFFICE VISIT (OUTPATIENT)
Dept: CARDIOLOGY CLINIC | Age: 54
End: 2019-11-06
Payer: COMMERCIAL

## 2019-11-06 VITALS
HEIGHT: 62 IN | SYSTOLIC BLOOD PRESSURE: 105 MMHG | DIASTOLIC BLOOD PRESSURE: 56 MMHG | HEART RATE: 59 BPM | WEIGHT: 122 LBS | BODY MASS INDEX: 22.45 KG/M2

## 2019-11-06 DIAGNOSIS — Z95.820 S/P ANGIOPLASTY WITH STENT: ICD-10-CM

## 2019-11-06 DIAGNOSIS — I25.10 CORONARY ARTERY DISEASE INVOLVING NATIVE CORONARY ARTERY OF NATIVE HEART WITHOUT ANGINA PECTORIS: Primary | ICD-10-CM

## 2019-11-06 DIAGNOSIS — I10 ESSENTIAL HYPERTENSION: ICD-10-CM

## 2019-11-06 PROCEDURE — 1036F TOBACCO NON-USER: CPT | Performed by: PHYSICIAN ASSISTANT

## 2019-11-06 PROCEDURE — G8598 ASA/ANTIPLAT THER USED: HCPCS | Performed by: PHYSICIAN ASSISTANT

## 2019-11-06 PROCEDURE — G8420 CALC BMI NORM PARAMETERS: HCPCS | Performed by: PHYSICIAN ASSISTANT

## 2019-11-06 PROCEDURE — G8427 DOCREV CUR MEDS BY ELIG CLIN: HCPCS | Performed by: PHYSICIAN ASSISTANT

## 2019-11-06 PROCEDURE — 93000 ELECTROCARDIOGRAM COMPLETE: CPT | Performed by: PHYSICIAN ASSISTANT

## 2019-11-06 PROCEDURE — 3017F COLORECTAL CA SCREEN DOC REV: CPT | Performed by: PHYSICIAN ASSISTANT

## 2019-11-06 PROCEDURE — 99213 OFFICE O/P EST LOW 20 MIN: CPT | Performed by: PHYSICIAN ASSISTANT

## 2019-11-06 PROCEDURE — G8484 FLU IMMUNIZE NO ADMIN: HCPCS | Performed by: PHYSICIAN ASSISTANT

## 2019-12-17 LAB
BASOPHILS ABSOLUTE: NORMAL
BASOPHILS RELATIVE PERCENT: NORMAL
BUN BLDV-MCNC: 59 MG/DL
CALCIUM SERPL-MCNC: 9 MG/DL
CHLORIDE BLD-SCNC: 99 MMOL/L
CO2: 22 MMOL/L
CREAT SERPL-MCNC: 3.34 MG/DL
EOSINOPHILS ABSOLUTE: NORMAL
EOSINOPHILS RELATIVE PERCENT: NORMAL
GFR CALCULATED: 21
GLUCOSE BLD-MCNC: 299 MG/DL
HCT VFR BLD CALC: 41.9 % (ref 41–53)
HEMOGLOBIN: 13.8 G/DL (ref 13.5–17.5)
LYMPHOCYTES ABSOLUTE: NORMAL
LYMPHOCYTES RELATIVE PERCENT: NORMAL
MCH RBC QN AUTO: NORMAL PG
MCHC RBC AUTO-ENTMCNC: NORMAL G/DL
MCV RBC AUTO: NORMAL FL
MONOCYTES ABSOLUTE: NORMAL
MONOCYTES RELATIVE PERCENT: NORMAL
NEUTROPHILS ABSOLUTE: NORMAL
NEUTROPHILS RELATIVE PERCENT: NORMAL
PLATELET # BLD: 248 K/ΜL
PMV BLD AUTO: NORMAL FL
POTASSIUM SERPL-SCNC: 4.7 MMOL/L
RBC # BLD: 4.37 10^6/ΜL
SODIUM BLD-SCNC: 134 MMOL/L
WBC # BLD: 5 10^3/ML

## 2019-12-18 ENCOUNTER — OFFICE VISIT (OUTPATIENT)
Dept: NEPHROLOGY | Age: 54
End: 2019-12-18
Payer: COMMERCIAL

## 2019-12-18 VITALS
OXYGEN SATURATION: 98 % | RESPIRATION RATE: 18 BRPM | BODY MASS INDEX: 22.63 KG/M2 | WEIGHT: 123 LBS | DIASTOLIC BLOOD PRESSURE: 74 MMHG | HEART RATE: 62 BPM | HEIGHT: 62 IN | SYSTOLIC BLOOD PRESSURE: 112 MMHG

## 2019-12-18 DIAGNOSIS — N18.4 CKD (CHRONIC KIDNEY DISEASE), STAGE IV (HCC): Primary | ICD-10-CM

## 2019-12-18 PROCEDURE — 1036F TOBACCO NON-USER: CPT | Performed by: INTERNAL MEDICINE

## 2019-12-18 PROCEDURE — G8484 FLU IMMUNIZE NO ADMIN: HCPCS | Performed by: INTERNAL MEDICINE

## 2019-12-18 PROCEDURE — G8420 CALC BMI NORM PARAMETERS: HCPCS | Performed by: INTERNAL MEDICINE

## 2019-12-18 PROCEDURE — G8598 ASA/ANTIPLAT THER USED: HCPCS | Performed by: INTERNAL MEDICINE

## 2019-12-18 PROCEDURE — G8427 DOCREV CUR MEDS BY ELIG CLIN: HCPCS | Performed by: INTERNAL MEDICINE

## 2019-12-18 PROCEDURE — 99213 OFFICE O/P EST LOW 20 MIN: CPT | Performed by: INTERNAL MEDICINE

## 2019-12-18 PROCEDURE — 3017F COLORECTAL CA SCREEN DOC REV: CPT | Performed by: INTERNAL MEDICINE

## 2020-02-11 ENCOUNTER — OFFICE VISIT (OUTPATIENT)
Dept: CARDIOLOGY CLINIC | Age: 55
End: 2020-02-11
Payer: COMMERCIAL

## 2020-02-11 VITALS
WEIGHT: 125.2 LBS | BODY MASS INDEX: 23.04 KG/M2 | SYSTOLIC BLOOD PRESSURE: 118 MMHG | DIASTOLIC BLOOD PRESSURE: 62 MMHG | HEART RATE: 60 BPM | HEIGHT: 62 IN

## 2020-02-11 PROCEDURE — 3017F COLORECTAL CA SCREEN DOC REV: CPT | Performed by: INTERNAL MEDICINE

## 2020-02-11 PROCEDURE — 99213 OFFICE O/P EST LOW 20 MIN: CPT | Performed by: INTERNAL MEDICINE

## 2020-02-11 PROCEDURE — G8420 CALC BMI NORM PARAMETERS: HCPCS | Performed by: INTERNAL MEDICINE

## 2020-02-11 PROCEDURE — G8427 DOCREV CUR MEDS BY ELIG CLIN: HCPCS | Performed by: INTERNAL MEDICINE

## 2020-02-11 PROCEDURE — G8484 FLU IMMUNIZE NO ADMIN: HCPCS | Performed by: INTERNAL MEDICINE

## 2020-02-11 PROCEDURE — 1036F TOBACCO NON-USER: CPT | Performed by: INTERNAL MEDICINE

## 2020-02-11 PROCEDURE — 93000 ELECTROCARDIOGRAM COMPLETE: CPT | Performed by: INTERNAL MEDICINE

## 2020-02-11 NOTE — PROGRESS NOTES
Pt here for a 3 month f/u    EKG done today    Pt denies cp, dizziness and palpitations     Pt c/o sob and JINNY

## 2020-02-11 NOTE — PROGRESS NOTES
taking differently: Take 2 mg by mouth daily as needed Swelling in ankles), Disp: 90 tablet, Rfl: 3    isosorbide mononitrate (IMDUR) 60 MG extended release tablet, Take 1 tablet by mouth daily (Patient taking differently: Take 30 mg by mouth nightly ), Disp: 30 tablet, Rfl: 3    levothyroxine (SYNTHROID) 100 MCG tablet, Take 1 tablet by mouth Daily (Patient taking differently: Take 88 mcg by mouth See Admin Instructions PT ALTERNATES BETWEEN 88 MCG  MCG EVERYDAY), Disp: 30 tablet, Rfl: 3    nitroGLYCERIN (NITROSTAT) 0.4 MG SL tablet, DISSOLVE 1 TABLET UNDER THE TONGUE EVERY 5 MIN AS NEEDED FOR CHEST PAIN, Disp: , Rfl: 0    atorvastatin (LIPITOR) 80 MG tablet, Take 80 mg by mouth daily, Disp: , Rfl:     clopidogrel (PLAVIX) 75 MG tablet, Take 75 mg by mouth daily, Disp: , Rfl:     metoprolol (LOPRESSOR) 50 MG tablet, Take 50 mg by mouth 2 times daily, Disp: , Rfl:     aspirin 81 MG tablet, Take 81 mg by mouth daily, Disp: , Rfl:     Past Medical History  Sheng Trimble  has a past medical history of Broken ankle, CAD (coronary artery disease), Cerebral artery occlusion with cerebral infarction ([address] CHF (congestive heart failure) ([address] Chronic kidney disease, Diabetes mellitus [address] [address] GERD (gastroesophageal reflux disease), Hyperlipidemia, and Hypertension. Social History  Sheng Trimble  reports that he quit smoking about 4 years ago. He has a 45.00 pack-year smoking history. He has quit using smokeless tobacco. He reports current alcohol use of about 4.0 standard drinks of alcohol per week. He reports that he does not use drugs. Family History  Sheng Trimble family history includes Cancer in his sister; Diabetes in his mother. He was adopted.       Past Surgical History   Past Surgical History:   Procedure Laterality Date    APPENDECTOMY      CARDIAC SURGERY  Oct. 2015    2 stents placed    COLONOSCOPY         Review of Systems   Constitutional: Negative for chills and fever  HENT: Negative for congestion, sinus pressure, sneezing and sore throat. Eyes: Negative for pain, discharge, redness and itching. Respiratory: Negative for apnea, cough  Gastrointestinal: Negative for blood in stool, constipation, diarrhea   Endocrine: Negative for cold intolerance, heat intolerance, polydipsia. Genitourinary: Negative for dysuria, enuresis, flank pain and hematuria. Musculoskeletal: Negative for arthralgias, joint swelling and neck pain. Neurological: Negative for numbness and headaches. Psychiatric/Behavioral: Negative for agitation, confusion, decreased concentration and dysphoric mood. Objective:     /62   Pulse 60   Ht 5' 2\" (1.575 m)   Wt 125 lb 3.2 oz (56.8 kg)   BMI 22.90 kg/m²     Wt Readings from Last 3 Encounters:   02/11/20 125 lb 3.2 oz (56.8 kg)   12/18/19 123 lb (55.8 kg)   11/06/19 122 lb (55.3 kg)     BP Readings from Last 3 Encounters:   02/11/20 118/62   12/18/19 112/74   11/06/19 (!) 105/56       Nursing note and vitals reviewed. Physical Exam   Constitutional: Oriented to person, place, and time. Appears well-developed and well-nourished. ENT: Moist mucous membranes. No bleeding. Tongue is midline. Head: Normocephalic and atraumatic. Eyes: EOM are normal. Pupils are equal, round, and reactive to light. Neck: Normal range of motion. Neck supple. No JVD present. Cardiovascular: Normal rate, regular rhythm, systolic murmur, no rubs, and intact distal pulses. Pulmonary/Chest: Effort normal and breath sounds normal. No respiratory distress. No wheezes. No rales. Abdominal: Soft. Bowel sounds are normal. No distension. There is no tenderness. Musculoskeletal: Normal range of motion. no edema. Neurological: Alert and oriented to person, place, and time. No cranial nerve deficit. Coordination normal.   Skin: Skin is warm and dry. Psychiatric: Normal mood and affect.        Lab Results   Component Value Date    CKTOTAL 132 04/09/2016       Lab Results   Component Value Date    WBC 5.0 12/17/2019    RBC 4.37 12/17/2019    HGB 13.8 12/17/2019    HCT 41.9 12/17/2019    MCV 97.8 10/16/2019    MCH 31.4 10/16/2019    MCHC 32.1 10/16/2019    RDW 16.2 04/09/2016     12/17/2019    MPV 10.3 10/16/2019       Lab Results   Component Value Date     12/17/2019    K 4.7 12/17/2019    K 4.8 10/16/2019    CL 99 12/17/2019    CO2 22 12/17/2019    BUN 59 12/17/2019    LABALBU 4.0 06/03/2019    CREATININE 3.34 12/17/2019    CALCIUM 9.0 12/17/2019    LABGLOM 21 12/17/2019    LABGLOM 23 10/16/2019    GLUCOSE 299 12/17/2019       Lab Results   Component Value Date    ALKPHOS 185 06/03/2019    ALT 66 06/03/2019    AST 64 06/03/2019    PROT 6.6 04/10/2019    BILITOT 0.5 06/03/2019    BILIDIR 0.3 04/10/2019    LABALBU 4.0 06/03/2019       Lab Results   Component Value Date    MG 1.9 11/25/2018       Lab Results   Component Value Date    INR 0.96 10/16/2019    INR 1.01 04/10/2019    INR 1.05 11/24/2018    PROTIME 12.5 08/27/2018         Lab Results   Component Value Date    LABA1C 7.3 04/10/2019       Lab Results   Component Value Date    TRIG 166 10/12/2019    HDL 56 10/12/2019    LDLCALC 59 10/12/2019       Lab Results   Component Value Date    .400 04/11/2019         Testing Reviewed:      I have individually reviewed the cardiac test below:    ECHO:   Results for orders placed during the hospital encounter of 11/24/18   ECHO Complete 2D W Doppler W Color    Narrative Transthoracic Echocardiography Report (TTE)     Demographics      Patient Name    ALLISON Tejada Gender               Male      MR #            [id number]      Race                                                      Ethnicity      Account #       [de-identified]      Room Number          0022      Accession       [id number]      Date of Study        11/24/2018   Number      Date of Birth   1965     Referring Physician  Thierno Allen MD      Age 46 year(s)     Sonographer          Cesario Go [facility]                                     Interpreting         Echo reader of the                                  Physician            week                                                       Salvador Lara MD     Procedure    Type of Study      TTE procedure:ECHOCARDIOGRAM COMPLETE 2D W DOPPLER W COLOR. Procedure Date  Date: 11/24/2018 Start: 11:53 AM    Study Location: Bedside  Technical Quality: Adequate visualization    Indications:Congestive heart failure. Additional Medical History:Ex Smoker, Congestive heart failure, Diabetic,  Hypertension, Coronary artery disease, Anemia, CVA, MI, Hyperlipidemia. Patient Status: Routine    Height: 62 inches Weight: 123 pounds BSA: 1.55 m^2 BMI: 22.5 kg/m^2    BP: 174/84 mmHg     Conclusions      Summary   Left ventricle size is normal.   Moderately increased left ventricle wall thickness. Moderate concentric left ventricular hypertrophy. Systolic function was normal.   There were no regional wall motion abnormalities. Ejection fraction is visually estimated in the range of 55% to 60%. The left atrium is Moderately dilated. Signature      ----------------------------------------------------------------   Electronically signed by Salvador Lara MD (Interpreting   physician) on 11/24/2018 at 06:44 PM   ----------------------------------------------------------------      Findings      Mitral Valve   The mitral valve structure was normal with normal leaflet separation. DOPPLER: The transmitral velocity was within the normal range with no   evidence for mitral stenosis. There was no evidence of mitral   regurgitation. Aortic Valve   The aortic valve was trileaflet with normal thickness and cuspal   separation. DOPPLER: Transaortic velocity was within the normal range with   no evidence of aortic stenosis. There was no evidence of aortic   regurgitation.       Tricuspid Valve   The tricuspid valve structure was normal with normal leaflet separation. DOPPLER: There was no evidence of tricuspid stenosis. Mild tricuspid regurgitation visualized. Right ventricular systolic pressure measures 50 mmhg. Pulmonic Valve   The pulmonic valve leaflets exhibited normal thickness, no calcification,   and normal cuspal separation. DOPPLER: The transpulmonic velocity was   within the normal range with no evidence for regurgitation. Left Atrium   The left atrium is Moderately dilated. Left Ventricle   Left ventricle size is normal.   Moderately increased left ventricle wall thickness. Moderate concentric left ventricular hypertrophy. Systolic function was normal.   There were no regional wall motion abnormalities. Ejection fraction is visually estimated in the range of 55% to 60%. Right Atrium   Right atrial size was normal.      Right Ventricle   The right ventricular size was normal with normal systolic function and   wall thickness. Pericardial Effusion   The pericardium was normal in appearance with no evidence of a pericardial   effusion. Pleural Effusion   No evidence of pleural effusion. Aorta / Great Vessels   -Aortic root dimension within normal limits.   -The Pulmonary artery is within normal limits. -IVC size is within normal limits with normal respiratory phasic changes.      M-Mode/2D Measurements & Calculations      LV Diastolic   LV Systolic Dimension:    AV Cusp Separation: 1.8 cmLA   Dimension: 3.9 2.6 cm                    Dimension: 3.6 cmAO Root   cm             LV Volume Diastolic: 16.7 Dimension: 3 cmLA Area: 22.5 cm^2   LV FS:33.3 %   ml   LV PW          LV Volume Systolic: 63.7   Diastolic: 1.4 ml   cm             LV EDV/LV EDV Index: 01.6 RV Diastolic Dimension: 2.9 cm   Septum         ml/43 m^2LV ESV/LV ESV   Diastolic: 1.9 Index: 54.9 ml/16 m^2     LA/Aorta: 1.2   cm             EF Calculated: 62.7 %     Ascending Aorta: 3.4 cm LA volume/Index: 65.5 ml /42m^2     Doppler Measurements & Calculations      MV Peak E-Wave: 100 cm/s   AV Peak Velocity: 119  LVOT Peak Velocity: 108   MV Peak A-Wave: 59.7 cm/s  cm/s                   cm/s   MV E/A Ratio: 1.68         AV Peak Gradient: 5.66 LVOT Peak Gradient: 5   MV Peak Gradient: 4 mmHg   mmHg                   mmHg      MV Deceleration Time: 173                         TV Peak E-Wave: 65 cm/s   msec                                              TV Peak A-Wave: 49.5                                                     cm/s                              IVRT: 77 msec   MV E' Septal Velocity: 5.2                        TV Peak Gradient: 1.69   cm/s                                              mmHg   MV A' Septal Velocity: 5.3 AV DVI (Vmax):0.91     TR Velocity:315 cm/s   cm/s                                              TR Gradient:39.69 mmHg   MV E' Lateral Velocity:                           PV Peak Velocity: 58.7   8.1 cm/s                                          cm/s   MV A' Lateral Velocity:                           PV Peak Gradient: 1.38   6.5 cm/s                                          mmHg   E/E' septal: 19.23   E/E' lateral: 12.35   MR Velocity: 378 cm/s                             MT ED Velocity: 148 cm/s     [id number]  [id number]%[id number]        Ekg:   EKG Interpretation:  normal sinus rhythm, ST depression in LATERAL LEADS, unchanged from previous tracings. Cath 10/2019:  HEMODYNAMICS:  1.  RA 20 mmHg. 2.  RV 78/11 with a mean of 25.  3.  Pulmonary capillary wedge pressure, 21 mmHg. 4.  Pulmonary arterial pressure 80/33 with a mean of 49.  5. LVEDP 29 mmHg. 6.  Cardiac output 3.94 liters per minute. 7.  Cardiac index 2.5 liters/minute per cubic meter.     CORONARY ANGIOGRAM:  1. Right coronary artery, 70% in-stent restenosis in the proximal RCA. male patient who  has a past medical history of Broken ankle, CAD (coronary artery disease), Cerebral artery occlusion with cerebral infarction ([address] CHF (congestive heart failure) ([address] Chronic kidney disease, Diabetes mellitus ([address] GERD (gastroesophageal reflux disease), Hyperlipidemia, and Hypertension. The patient has known h/o CAD. He has had prior PCI of RCA. He was recently evaluated in office for chest pain, SOB. RHC/LHC revealed elevated filling pressures, severe ISR of mRCA stent. The patient underwent successful IVUS guided PCI/Stenting of RCA. He returns for follow up. The patient denies chest pain. He reports occasional SOB and worsening KAUR that improve when he takes Bumex (currently taking one pill every 5-7 days, on as needed basis). Patient denies chest pain, orthopnea, paroxysmal nocturnal dyspnea, palpitations, dizziness, syncope, weight gain or leg swelling. 1. HFpEF  2. NYHA Class III  3. CAD  4. S/P PCI for severe RCA ISR 10/2019  5. CKD (Briefly required HD in the past, follows with Nephrology, being considered for Renal Transplant)  6. Worsening KAUR     Cont plavix, ASA   Cont Imdur   Denies chest pain   Cont Lopressor    Follows with endocrinology (known thyroid disease)   Currently on Bumex 2 mg po PRN (takes it once every 5-7 days)   Weight up by 3 pounds   Change Bumex to once every 3 days, take one dose today   Monitor daily weight    Check BMP next week (already has it scheduled)   Follows with Nephrologist, has an appointment next week    Will need to investigate for underlying structural heart disease, will proceed with a transthoracic echocardiogram       Above findings and plan of care were discussed with patient in details, patient's questions were answered.      Disposition:  RTC in 12 months           Electronically signed by Shawn Cedillo MD, [facility]    2/11/2020 at 10:03 AM

## 2020-02-12 ENCOUNTER — TELEPHONE (OUTPATIENT)
Dept: CARDIOLOGY CLINIC | Age: 55
End: 2020-02-12

## 2020-03-19 ENCOUNTER — TELEPHONE (OUTPATIENT)
Dept: CARDIOLOGY CLINIC | Age: 55
End: 2020-03-19

## 2020-03-31 ENCOUNTER — TELEPHONE (OUTPATIENT)
Dept: NEPHROLOGY | Age: 55
End: 2020-03-31

## 2020-07-02 LAB
BASOPHILS ABSOLUTE: NORMAL
BASOPHILS RELATIVE PERCENT: NORMAL
BUN BLDV-MCNC: 51 MG/DL
CALCIUM SERPL-MCNC: 9.2 MG/DL
CHLORIDE BLD-SCNC: 97 MMOL/L
CO2: 23 MMOL/L
CREAT SERPL-MCNC: 4 MG/DL
EOSINOPHILS ABSOLUTE: NORMAL
EOSINOPHILS RELATIVE PERCENT: NORMAL
GFR CALCULATED: 16
GLUCOSE BLD-MCNC: 359 MG/DL
HCT VFR BLD CALC: 42.7 % (ref 41–53)
HEMOGLOBIN: 14.3 G/DL (ref 13.5–17.5)
LYMPHOCYTES ABSOLUTE: NORMAL
LYMPHOCYTES RELATIVE PERCENT: NORMAL
MCH RBC QN AUTO: NORMAL PG
MCHC RBC AUTO-ENTMCNC: NORMAL G/DL
MCV RBC AUTO: NORMAL FL
MONOCYTES ABSOLUTE: NORMAL
MONOCYTES RELATIVE PERCENT: NORMAL
NEUTROPHILS ABSOLUTE: NORMAL
NEUTROPHILS RELATIVE PERCENT: NORMAL
PLATELET # BLD: 251 K/ΜL
PMV BLD AUTO: NORMAL FL
POTASSIUM SERPL-SCNC: 4.9 MMOL/L
RBC # BLD: 4.5 10^6/ΜL
SODIUM BLD-SCNC: 132 MMOL/L
WBC # BLD: 6.5 10^3/ML

## 2020-07-06 ENCOUNTER — OFFICE VISIT (OUTPATIENT)
Dept: NEPHROLOGY | Age: 55
End: 2020-07-06
Payer: COMMERCIAL

## 2020-07-06 VITALS
WEIGHT: 126 LBS | DIASTOLIC BLOOD PRESSURE: 64 MMHG | OXYGEN SATURATION: 94 % | SYSTOLIC BLOOD PRESSURE: 120 MMHG | HEIGHT: 62 IN | TEMPERATURE: 97.9 F | BODY MASS INDEX: 23.19 KG/M2 | HEART RATE: 61 BPM

## 2020-07-06 PROCEDURE — 1036F TOBACCO NON-USER: CPT | Performed by: INTERNAL MEDICINE

## 2020-07-06 PROCEDURE — G8420 CALC BMI NORM PARAMETERS: HCPCS | Performed by: INTERNAL MEDICINE

## 2020-07-06 PROCEDURE — G8427 DOCREV CUR MEDS BY ELIG CLIN: HCPCS | Performed by: INTERNAL MEDICINE

## 2020-07-06 PROCEDURE — 99213 OFFICE O/P EST LOW 20 MIN: CPT | Performed by: INTERNAL MEDICINE

## 2020-07-06 PROCEDURE — 3017F COLORECTAL CA SCREEN DOC REV: CPT | Performed by: INTERNAL MEDICINE

## 2020-07-06 NOTE — PATIENT INSTRUCTIONS
KNOW YOUR KIDNEY NUMBERS    Your kidney speed (eGFR) was 16 ml/min this visit (normal is  ml/min)(Ml/min=milliliters of blood filtered per minute). The higher this number is, the better your kidney function is. Your serum creatinine was 4.0 (normal 0.8-1.2 mg/dl at most labs). The higher this number is, the worse your kidney function is. You are in stage G-IV-A1 of chronic kidney disease. Your kidney function has declined as compared to your last visit. Your last eGFR was  21 Ml/Min. Stages of kidney disease    EGFR (estimated glomerular filtration rate)    G-I > 90 ml/min Kidney damage with normal kidney function (blood or protein in the urine)  G-II 60-89 ml/min Normal kidney function with mild damage with or without blood or protein in the urine  G-IIIA 45-59 ml/min Mild to moderate loss of kidney function. G-IIIB 30-44 ml/min Moderate to severe loss of kidney function  G-IV 15-29 ml/min Severe loss of kidney function  G-V < 15 mlmin     May need dialysis or kidney transplant    ACR (urine albumin/creatinine ratio) (Mg/Gm)    A-1      ACR<30 Normal to mildly increased protein in the urine. A-2 ACR  Moderate increase in urine protein loss. A-3 ACR >300 Severe increase in urine protein loss    Our goal is to keep your eGFR going as fast as possible ( ml/min is normal). If your eGFR declines to 15-24 ml/min and stays there without recovery,  we will begin to educate you about dialysis or kidney transplant. We also want to keep the protein in your urine as low as possible. The leading cause of kidney disease in the world is diabetes mellitus. Keep your sugar  as much as possible. The second leading cause is hypertension. Keep Your blood pressure 120-140/70-80 as much as possible. If you need refills, call the office or your drug store. You may call the office any time with any questions or concerns. DUE TO THE CORONAVIRUS CONCERN, PLEASE LIMIT YOUR TIME IN PUBLIC. [address] Labidi YOUR HANDS COMPLETELY AND FREQUENTLY. Ale Shah

## 2020-07-06 NOTE — PROGRESS NOTES
[facility]    [address] [facility]  [address], [address]  [phone]       Progress Note    7/6/2020 2:36 PM    Pt Name:    Ann Marie Deluna  YOB: 1965  Primary Care Physician:  Daniele Lieberman DO       Chief Complaint:   Chief Complaint   Patient presents with    Chronic Kidney Disease    Diabetes    Hypertension    Nephropathy    Proteinuria        History of Chief Complaint: stage G-IV-A1 CKD from DM and HTN. Transplant kidney [address] Subjective:  I last saw the patient in clinic 12/18/19. I follow the patient for Chronic Kidney disease stage G-IV-A1. Since our last visit the patient has not been hospitalized. The patient is sleeping well at night with 1-2 times per night nocturia. The patient has a good appetite and is remaining active. The patient denied N/V/C/D/SOB/CP. He feels tired and washed out lately. He is developing shortness of breath. Protein/creatinine ratio:       Last six eGFR readings:  Lab Results   Component Value Date    LABGLOM 16 07/02/2020    LABGLOM 21 12/17/2019    LABGLOM 25 10/31/2019    LABGLOM 23 10/16/2019    LABGLOM 25 09/17/2019    LABGLOM 23 07/16/2019    LABGLOM 19 06/03/2019    LABGLOM 21 04/17/2019    LABGLOM 14 04/16/2019    LABGLOM 14 04/15/2019    LABGLOM 15 04/14/2019    LABGLOM 16 04/13/2019          Objective:  VITALS:  /64 (Site: Right Upper Arm, Position: Sitting, Cuff Size: Small Adult)   Pulse 61   Temp 97.9 °F (36.6 °C)   Ht 5' 2\" (1.575 m)   Wt 126 lb (57.2 kg)   SpO2 94%   BMI 23.05 kg/m²   Weight:   Wt Readings from Last 3 Encounters:   07/06/20 126 lb (57.2 kg)   02/11/20 125 lb 3.2 oz (56.8 kg)   12/18/19 123 lb (55.8 kg)     Body mass index is 23.05 kg/m². Physical examination    General:  Alert and cooperative with exam  HEENT:  Normocephalic. No lesions nor rashes. KARLA. EOMI  Neck:   No JVD and no bruits. Thyroid gland is normal  Lungs:  Breathing easily. 7.0 04/10/2019    WBCUA NONE SEEN 04/10/2019    RBCUA 3-5 04/10/2019    YEAST NONE SEEN 04/10/2019    BACTERIA NONE 04/10/2019    LEUKOCYTESUR NEGATIVE 04/10/2019    UROBILINOGEN 1.0 04/10/2019    BILIRUBINUR NEGATIVE 04/10/2019    BLOODU SMALL 04/10/2019    GLUCOSEU NEGATIVE 04/10/2019    KETUA NEGATIVE 04/10/2019      Microalbumen/Creatinine ratio:  No components found for: RUCREAT        Medications:    Current Outpatient Medications   Medication Sig Dispense Refill    hydrALAZINE (APRESOLINE) 50 MG tablet Take 1.5 tablets by mouth 3 times daily 90 tablet 3    insulin aspart (NOVOLOG) 100 UNIT/ML injection vial Inject into the skin 3 times daily (before meals) PT TAKES IT BASED OFF OF HIS CARBS;  1  Unit per 8 cho      Insulin Glargine (LANTUS SC) Inject 11 Units into the skin daily       NONFORMULARY Dexcom 6 checks sugar      pantoprazole (PROTONIX) 40 MG tablet Take 40 mg by mouth 2 times daily       bumetanide (BUMEX) 2 MG tablet Take 1 tablet by mouth daily (Patient taking differently: Take 2 mg by mouth daily as needed Swelling in ankles) 90 tablet 3    isosorbide mononitrate (IMDUR) 60 MG extended release tablet Take 1 tablet by mouth daily (Patient taking differently: Take 30 mg by mouth nightly ) 30 tablet 3    levothyroxine (SYNTHROID) 100 MCG tablet Take 1 tablet by mouth Daily (Patient taking differently: Take 88 mcg by mouth See Admin Instructions PT ALTERNATES BETWEEN 88 MCG  MCG EVERYDAY) 30 tablet 3    nitroGLYCERIN (NITROSTAT) 0.4 MG SL tablet DISSOLVE 1 TABLET UNDER THE TONGUE EVERY 5 MIN AS NEEDED FOR CHEST PAIN  0    atorvastatin (LIPITOR) 80 MG tablet Take 80 mg by mouth daily      clopidogrel (PLAVIX) 75 MG tablet Take 75 mg by mouth daily      metoprolol (LOPRESSOR) 50 MG tablet Take 50 mg by mouth 2 times daily      aspirin 81 MG tablet Take 81 mg by mouth daily       No current facility-administered medications for this visit.             IMPRESSIONS:        Kidney

## 2020-08-14 LAB
BASOPHILS ABSOLUTE: ABNORMAL
BASOPHILS RELATIVE PERCENT: ABNORMAL
BUN BLDV-MCNC: 48 MG/DL
CALCIUM SERPL-MCNC: 9.2 MG/DL
CHLORIDE BLD-SCNC: 99 MMOL/L
CO2: 21 MMOL/L
CREAT SERPL-MCNC: 3.5 MG/DL
EOSINOPHILS ABSOLUTE: ABNORMAL
EOSINOPHILS RELATIVE PERCENT: ABNORMAL
GFR CALCULATED: 19
GLUCOSE BLD-MCNC: 234 MG/DL
HCT VFR BLD CALC: 39.5 % (ref 41–53)
HEMOGLOBIN: 13.5 G/DL (ref 13.5–17.5)
LYMPHOCYTES ABSOLUTE: ABNORMAL
LYMPHOCYTES RELATIVE PERCENT: ABNORMAL
MCH RBC QN AUTO: ABNORMAL PG
MCHC RBC AUTO-ENTMCNC: ABNORMAL G/DL
MCV RBC AUTO: ABNORMAL FL
MONOCYTES ABSOLUTE: ABNORMAL
MONOCYTES RELATIVE PERCENT: ABNORMAL
NEUTROPHILS ABSOLUTE: ABNORMAL
NEUTROPHILS RELATIVE PERCENT: ABNORMAL
PLATELET # BLD: 251 K/ΜL
PMV BLD AUTO: ABNORMAL FL
POTASSIUM SERPL-SCNC: 4.8 MMOL/L
RBC # BLD: 4.19 10^6/ΜL
SODIUM BLD-SCNC: 135 MMOL/L
WBC # BLD: 6.1 10^3/ML

## 2020-08-19 ENCOUNTER — TELEPHONE (OUTPATIENT)
Dept: CARDIOLOGY CLINIC | Age: 55
End: 2020-08-19

## 2020-08-19 ENCOUNTER — OFFICE VISIT (OUTPATIENT)
Dept: NEPHROLOGY | Age: 55
End: 2020-08-19
Payer: COMMERCIAL

## 2020-08-19 ENCOUNTER — TELEPHONE (OUTPATIENT)
Dept: NEPHROLOGY | Age: 55
End: 2020-08-19

## 2020-08-19 VITALS
HEART RATE: 56 BPM | HEIGHT: 62 IN | TEMPERATURE: 97.6 F | OXYGEN SATURATION: 98 % | RESPIRATION RATE: 18 BRPM | BODY MASS INDEX: 23.37 KG/M2 | WEIGHT: 127 LBS | DIASTOLIC BLOOD PRESSURE: 68 MMHG | SYSTOLIC BLOOD PRESSURE: 140 MMHG

## 2020-08-19 PROCEDURE — G8420 CALC BMI NORM PARAMETERS: HCPCS | Performed by: INTERNAL MEDICINE

## 2020-08-19 PROCEDURE — G8427 DOCREV CUR MEDS BY ELIG CLIN: HCPCS | Performed by: INTERNAL MEDICINE

## 2020-08-19 PROCEDURE — 3017F COLORECTAL CA SCREEN DOC REV: CPT | Performed by: INTERNAL MEDICINE

## 2020-08-19 PROCEDURE — 1036F TOBACCO NON-USER: CPT | Performed by: INTERNAL MEDICINE

## 2020-08-19 PROCEDURE — 99213 OFFICE O/P EST LOW 20 MIN: CPT | Performed by: INTERNAL MEDICINE

## 2020-08-19 NOTE — PATIENT INSTRUCTIONS
KNOW YOUR KIDNEY NUMBERS    Your kidney speed (eGFR) was 19 ml/min this visit (normal is  ml/min)(Ml/min=milliliters of blood filtered per minute). The higher this number is, the better your kidney function is. Your serum creatinine was 3.5 (normal 0.8-1.2 mg/dl at most labs). The higher this number is, the worse your kidney function is. You are in stage G-IV-A1 of chronic kidney disease. Your kidney function has improved as compared to your last visit. Your last eGFR was  16 Ml/Min. Stages of kidney disease    EGFR (estimated glomerular filtration rate)    G-I > 90 ml/min Kidney damage with normal kidney function (blood or protein in the urine)  G-II 60-89 ml/min Normal kidney function with mild damage with or without blood or protein in the urine  G-IIIA 45-59 ml/min Mild to moderate loss of kidney function. G-IIIB 30-44 ml/min Moderate to severe loss of kidney function  G-IV 15-29 ml/min Severe loss of kidney function  G-V < 15 mlmin     May need dialysis or kidney transplant    ACR (urine albumin/creatinine ratio) (Mg/Gm)    A-1      ACR<30 Normal to mildly increased protein in the urine. A-2 ACR  Moderate increase in urine protein loss. A-3 ACR >300 Severe increase in urine protein loss    Our goal is to keep your eGFR going as fast as possible ( ml/min is normal). If your eGFR declines to 15-24 ml/min and stays there without recovery,  we will begin to educate you about dialysis or kidney transplant. We also want to keep the protein in your urine as low as possible. The leading cause of kidney disease in the world is diabetes mellitus. Keep your sugar  as much as possible. The second leading cause is hypertension. Keep Your blood pressure 120-140/70-80 as much as possible. If you need refills, call the office or your drug store. You may call the office any time with any questions or concerns. DUE TO THE CORONAVIRUS CONCERN, PLEASE LIMIT YOUR TIME IN PUBLIC.

## 2020-08-19 NOTE — PROGRESS NOTES
[facility]    [address] [facility] [address]  [address], [address]  [phone]       Progress Note    8/19/2020 10:13 AM    Pt Name:    Ann Marie Deluna  YOB: 1965  Primary Care Physician:  Daniele Lieberman DO       Chief Complaint:   Chief Complaint   Patient presents with    Chronic Kidney Disease    Anemia    Diabetes    Nephropathy    Proteinuria        History of Chief Complaint: stage G-IV-A1 CKD from DM and HTN. Transplant kidney [address] Subjective:  I last saw the patient in clinic 07/06/2020. I follow the patient for Chronic Kidney disease stage G-IV-A1. Since our last visit the patient has not been hospitalized. The patient is sleeping well at night with 1-2 times per night nocturia. The patient has a poor appetite and is remaining active. The patient denied N/V/C/D/SOB/CP. He has no trouble at bladder emptying. He has had to take more diuretics due to shortness of breath. COVID-19 screening  Fever: none  Cough: none  Exposure: none  Shortness of breath: none    Protein/creatinine ratio:   eGFR: 19 Ml/min      Last six eGFR readings:  Lab Results   Component Value Date    LABGLOM 19 08/14/2020    LABGLOM 16 07/02/2020    LABGLOM 21 12/17/2019    LABGLOM 25 10/31/2019    LABGLOM 23 10/16/2019    LABGLOM 25 09/17/2019    LABGLOM 23 07/16/2019    LABGLOM 21 04/17/2019    LABGLOM 14 04/16/2019    LABGLOM 14 04/15/2019    LABGLOM 15 04/14/2019    LABGLOM 16 04/13/2019          Objective:  VITALS:  BP (!) 140/68 (Site: Right Upper Arm, Position: Sitting, Cuff Size: Small Adult)   Pulse 56   Temp 97.6 °F (36.4 °C)   Resp 18   Ht 5' 2\" (1.575 m)   Wt 127 lb (57.6 kg)   SpO2 98%   BMI 23.23 kg/m²   Weight:   Wt Readings from Last 3 Encounters:   08/19/20 127 lb (57.6 kg)   07/06/20 126 lb (57.2 kg)   02/11/20 125 lb 3.2 oz (56.8 kg)     Body mass index is 23.23 kg/m².      Physical examination    General:  Alert and cooperative with Nereyda Gonzalezdavid  Lab Results   Component Value Date    NITRU NEGATIVE 04/10/2019    COLORU YELLOW 04/10/2019    PHUR 7.0 04/10/2019    WBCUA NONE SEEN 04/10/2019    RBCUA 3-5 04/10/2019    YEAST NONE SEEN 04/10/2019    BACTERIA NONE 04/10/2019    LEUKOCYTESUR NEGATIVE 04/10/2019    UROBILINOGEN 1.0 04/10/2019    BILIRUBINUR NEGATIVE 04/10/2019    BLOODU SMALL 04/10/2019    GLUCOSEU NEGATIVE 04/10/2019    KETUA NEGATIVE 04/10/2019      Microalbumen/Creatinine ratio:  No components found for: RUCREAT        Medications:    Current Outpatient Medications   Medication Sig Dispense Refill    hydrALAZINE (APRESOLINE) 50 MG tablet Take 1.5 tablets by mouth 3 times daily 90 tablet 3    insulin aspart (NOVOLOG) 100 UNIT/ML injection vial Inject into the skin 3 times daily (before meals) PT TAKES IT BASED OFF OF HIS CARBS;  1  Unit per 8 cho      Insulin Glargine (LANTUS SC) Inject 11 Units into the skin daily       NONFORMULARY Dexcom 6 checks sugar      pantoprazole (PROTONIX) 40 MG tablet Take 40 mg by mouth 2 times daily       bumetanide (BUMEX) 2 MG tablet Take 1 tablet by mouth daily (Patient taking differently: Take 2 mg by mouth daily as needed Swelling in ankles) 90 tablet 3    isosorbide mononitrate (IMDUR) 60 MG extended release tablet Take 1 tablet by mouth daily (Patient taking differently: Take 30 mg by mouth nightly ) 30 tablet 3    levothyroxine (SYNTHROID) 100 MCG tablet Take 1 tablet by mouth Daily (Patient taking differently: Take 88 mcg by mouth Daily ) 30 tablet 3    nitroGLYCERIN (NITROSTAT) 0.4 MG SL tablet DISSOLVE 1 TABLET UNDER THE TONGUE EVERY 5 MIN AS NEEDED FOR CHEST PAIN  0    atorvastatin (LIPITOR) 80 MG tablet Take 80 mg by mouth daily      clopidogrel (PLAVIX) 75 MG tablet Take 75 mg by mouth daily      metoprolol (LOPRESSOR) 50 MG tablet Take 50 mg by mouth 2 times daily      aspirin 81 MG tablet Take 81 mg by mouth daily       No current facility-administered medications for this visit.            IMPRESSIONS:        Kidney disease: CKD stage G-IV-A1  Anemia: Anemia remains stable. No need for an erythrocyte stimulating agent (TRAM). Bone and mineral metabolism: He has no bone pain. PLAN:  1. We discussed the eGFR today. 2. We will continue all current medications without changes. 3. Hepatitis screen  4. Insert PD catheter at [facility]  5. Prepare for PD  6. We will see the patient back in 1.5 months.               _________________________________  Yaneth Grew.  Laura Mcdonald DO  [facility]      Cy Lewis DO

## 2020-08-19 NOTE — TELEPHONE ENCOUNTER
Dr. Donald Torres on nurse line stating pt is approaching dialysis very soon and needs a PD cath placement ASAP,   Asking for clearance to hold plavix 7 days     Had stent 10/15/19    Please advise   Dr. Evin Sofia anything else you would like to add?

## 2020-08-20 ENCOUNTER — TELEPHONE (OUTPATIENT)
Dept: NEPHROLOGY | Age: 55
End: 2020-08-20

## 2020-08-20 LAB
ANTIBODY: NONREACTIVE
ANTIGEN INTERPRETATION: NON REACTIVE

## 2020-08-20 NOTE — TELEPHONE ENCOUNTER
I spoke to Fabrizio campos about the information below. He expressed understanding. He will go to [facility] to get Hep B test and cxr done today.

## 2020-08-26 NOTE — TELEPHONE ENCOUNTER
I spoke to Krystina Devine to see if [facility] admissions has received the admission paper. She said yes they had. He is going to be a PD patient.

## 2020-08-27 RX ORDER — MIDAZOLAM HYDROCHLORIDE 1 MG/ML
1 INJECTION INTRAMUSCULAR; INTRAVENOUS ONCE
Status: CANCELLED | OUTPATIENT
Start: 2020-08-27 | End: 2020-08-27

## 2020-08-27 RX ORDER — BUPIVACAINE HYDROCHLORIDE 2.5 MG/ML
10 INJECTION, SOLUTION EPIDURAL; INFILTRATION; INTRACAUDAL ONCE
Status: CANCELLED | OUTPATIENT
Start: 2020-08-27 | End: 2020-08-27

## 2020-08-27 RX ORDER — SODIUM CHLORIDE 450 MG/100ML
INJECTION, SOLUTION INTRAVENOUS CONTINUOUS
Status: CANCELLED | OUTPATIENT
Start: 2020-08-27

## 2020-08-28 ENCOUNTER — HOSPITAL ENCOUNTER (OUTPATIENT)
Dept: INTERVENTIONAL RADIOLOGY/VASCULAR | Age: 55
Discharge: HOME | End: 2020-08-28
Payer: COMMERCIAL

## 2020-08-28 VITALS
RESPIRATION RATE: 16 BRPM | SYSTOLIC BLOOD PRESSURE: 178 MMHG | HEART RATE: 64 BPM | TEMPERATURE: 97.5 F | BODY MASS INDEX: 23.78 KG/M2 | WEIGHT: 130 LBS | OXYGEN SATURATION: 98 % | DIASTOLIC BLOOD PRESSURE: 78 MMHG

## 2020-08-28 LAB
ERYTHROCYTE [DISTWIDTH] IN BLOOD BY AUTOMATED COUNT: 14.1 % (ref 11.5–14.5)
ERYTHROCYTE [DISTWIDTH] IN BLOOD BY AUTOMATED COUNT: 50.4 FL (ref 35–45)
HCT VFR BLD CALC: 41 % (ref 42–52)
HEMOGLOBIN: 13.6 GM/DL (ref 14–18)
MCH RBC QN AUTO: 32.2 PG (ref 26–33)
MCHC RBC AUTO-ENTMCNC: 33.2 GM/DL (ref 32.2–35.5)
MCV RBC AUTO: 97.2 FL (ref 80–94)
PLATELET # BLD: 274 THOU/MM3 (ref 130–400)
PMV BLD AUTO: 10.5 FL (ref 9.4–12.4)
RBC # BLD: 4.22 MILL/MM3 (ref 4.7–6.1)
WBC # BLD: 7 THOU/MM3 (ref 4.8–10.8)

## 2020-08-28 PROCEDURE — 2500000003 HC RX 250 WO HCPCS

## 2020-08-28 PROCEDURE — 85027 COMPLETE CBC AUTOMATED: CPT

## 2020-08-28 PROCEDURE — C1750 CATH, HEMODIALYSIS,LONG-TERM: HCPCS

## 2020-08-28 PROCEDURE — 2580000003 HC RX 258: Performed by: RADIOLOGY

## 2020-08-28 PROCEDURE — 2500000003 HC RX 250 WO HCPCS: Performed by: RADIOLOGY

## 2020-08-28 PROCEDURE — 6360000002 HC RX W HCPCS: Performed by: RADIOLOGY

## 2020-08-28 PROCEDURE — 49418 INSERT TUN IP CATH PERC: CPT | Performed by: RADIOLOGY

## 2020-08-28 PROCEDURE — 6360000004 HC RX CONTRAST MEDICATION: Performed by: RADIOLOGY

## 2020-08-28 PROCEDURE — 6360000002 HC RX W HCPCS

## 2020-08-28 PROCEDURE — C1769 GUIDE WIRE: HCPCS

## 2020-08-28 PROCEDURE — 36415 COLL VENOUS BLD VENIPUNCTURE: CPT

## 2020-08-28 PROCEDURE — C1894 INTRO/SHEATH, NON-LASER: HCPCS

## 2020-08-28 PROCEDURE — 2709999900 HC NON-CHARGEABLE SUPPLY

## 2020-08-28 RX ORDER — BUPIVACAINE HYDROCHLORIDE 2.5 MG/ML
10 INJECTION, SOLUTION EPIDURAL; INFILTRATION; INTRACAUDAL ONCE
Status: COMPLETED | OUTPATIENT
Start: 2020-08-28 | End: 2020-08-28

## 2020-08-28 RX ORDER — MIDAZOLAM HYDROCHLORIDE 1 MG/ML
0.5 INJECTION INTRAMUSCULAR; INTRAVENOUS ONCE
Status: COMPLETED | OUTPATIENT
Start: 2020-08-28 | End: 2020-08-28

## 2020-08-28 RX ORDER — MIDAZOLAM HYDROCHLORIDE 1 MG/ML
1 INJECTION INTRAMUSCULAR; INTRAVENOUS ONCE
Status: COMPLETED | OUTPATIENT
Start: 2020-08-28 | End: 2020-08-28

## 2020-08-28 RX ORDER — SODIUM CHLORIDE 450 MG/100ML
INJECTION, SOLUTION INTRAVENOUS CONTINUOUS
Status: DISCONTINUED | OUTPATIENT
Start: 2020-08-28 | End: 2020-08-29 | Stop reason: HOSPADM

## 2020-08-28 RX ORDER — CEFAZOLIN SODIUM 1 G/50ML
1 INJECTION, SOLUTION INTRAVENOUS
Status: COMPLETED | OUTPATIENT
Start: 2020-08-28 | End: 2020-08-28

## 2020-08-28 RX ADMIN — Medication 1 MG: at 10:13

## 2020-08-28 RX ADMIN — Medication 0.5 MG: at 10:34

## 2020-08-28 RX ADMIN — MIDAZOLAM HYDROCHLORIDE 0.5 MG: 1 INJECTION INTRAMUSCULAR; INTRAVENOUS at 10:34

## 2020-08-28 RX ADMIN — IOHEXOL 20 ML: 240 INJECTION, SOLUTION INTRATHECAL; INTRAVASCULAR; INTRAVENOUS; ORAL at 11:03

## 2020-08-28 RX ADMIN — MIDAZOLAM HYDROCHLORIDE 1 MG: 1 INJECTION INTRAMUSCULAR; INTRAVENOUS at 10:13

## 2020-08-28 RX ADMIN — CEFAZOLIN SODIUM 1 G: 1 INJECTION, SOLUTION INTRAVENOUS at 08:42

## 2020-08-28 RX ADMIN — MIDAZOLAM HYDROCHLORIDE 0.5 MG: 1 INJECTION INTRAMUSCULAR; INTRAVENOUS at 10:24

## 2020-08-28 RX ADMIN — SODIUM CHLORIDE 50000 UNITS: 0.9 IRRIGANT IRRIGATION at 10:50

## 2020-08-28 RX ADMIN — BUPIVACAINE HYDROCHLORIDE 10 ML: 2.5 INJECTION, SOLUTION EPIDURAL; INFILTRATION; INTRACAUDAL at 10:32

## 2020-08-28 RX ADMIN — SODIUM CHLORIDE: 4.5 INJECTION, SOLUTION INTRAVENOUS at 08:42

## 2020-08-28 RX ADMIN — Medication 0.5 MG: at 10:24

## 2020-08-28 ASSESSMENT — PAIN SCALES - GENERAL
PAINLEVEL_OUTOF10: 0
PAINLEVEL_OUTOF10: 4
PAINLEVEL_OUTOF10: 0
PAINLEVEL_OUTOF10: 0

## 2020-08-28 ASSESSMENT — PAIN - FUNCTIONAL ASSESSMENT: PAIN_FUNCTIONAL_ASSESSMENT: 0-10

## 2020-08-28 NOTE — PROGRESS NOTES
332 [facility] . DENEIS PAIN. 28 [facility] DRY NO PAIN.  [address]071 SOFT DENIES PAIN. [place] FLUID.  1230 GAIT STEADY. DRESSING DRY NAUSEA BETTER.  1235 DISCHARGE INSTRUCTIONS TO PT AND DAD VERBALIZED UNDERSTANDING.   10 [facility]

## 2020-08-28 NOTE — PROGRESS NOTES
[address] PD CATHETER INSERTION OFF BLOOD THINNERS 8 DAYS. Pt rights and responsibilities offered to pt to read.     __MET__ Safety:       (Environmental)   [place] to environment   Ensure ID band is correct and in place/ allergy band as needed   Assess for fall risk   Initiate fall precautions as applicable (fall band, side rails, etc.)   Call light within reach   Bed in low position/ wheels locked    ___MET_ Pain:        Assess pain level and characteristics   Administer analgesics as ordered   Assess effectiveness of pain management and report to MD as needed    __MET__ Knowledge Deficit:   Assess baseline knowledge   Provide teaching at level of understanding   Provide teaching via preferred learning method   Evaluate teaching effectiveness    __MET__ Hemodynamic/Respiratory Status:       (Pre and Post Procedure Monitoring)   Assess/Monitor vital signs and LOC   Assess Baseline SpO2 prior to any sedation   Obtain weight/height   Assess vital signs/ LOC until patient meets discharge criteria   Monitor procedure site and notify MD of any issues

## 2020-08-28 NOTE — H&P
[facility]  Sedation/Analgesia History & Physical    Pt Name: Richelle Bradshaw  MRN: [id number]  YOB: 1965  Provider Performing Procedure: John Andrews MD  Primary Care Physician: Jose Guadalupe Turner,     PRE-PROCEDURE   DNR-CCA/DNR-CC []Yes [x]No  Brief History/Pre-Procedure Diagnosis: Renal failure           MEDICAL HISTORY  []CAD/Valve  []Liver Disease  []Lung Disease []Diabetes  []Hypertension []Renal Disease  []Additional information:       has a past medical history of Broken ankle, CAD (coronary artery disease), Cerebral artery occlusion with cerebral infarction [facility]), CHF (congestive heart failure) ([facility] [address] Chronic kidney disease, [facility] GERD (gastroesophageal reflux disease), Hyperlipidemia, Hypertension, and Thyroid disease. SURGICAL HISTORY   has a past surgical history that includes Appendectomy; Cardiac surgery (Oct. 2015); and Colonoscopy.   Additional information:       ALLERGIES   Allergies as of 08/28/2020 - Review Complete 08/28/2020   Allergen Reaction Noted    Aranesp (albumin free) [darbepoetin eri] Hives 12/19/2018     Additional information:       MEDICATIONS   Coumadin Use Last 5 Days [x]No []Yes  Antiplatelet drug therapy use last 5 days  [x]No []Yes  Other anticoagulant use last 5 days  [x]No []Yes    Current Outpatient Medications:     hydrALAZINE (APRESOLINE) 50 MG tablet, Take 1.5 tablets by mouth 3 times daily, Disp: 90 tablet, Rfl: 3    insulin aspart (NOVOLOG) 100 UNIT/ML injection vial, Inject into the skin 3 times daily (before meals) PT TAKES IT BASED OFF OF HIS CARBS;  1  Unit per 8 cho, Disp: , Rfl:     bumetanide (BUMEX) 2 MG tablet, Take 1 tablet by mouth daily (Patient taking differently: Take 2 mg by mouth daily as needed Swelling in ankles), Disp: 90 tablet, Rfl: 3    isosorbide mononitrate (IMDUR) 60 MG extended release tablet, Take 1 tablet by mouth daily (Patient taking differently: Take 30 mg by mouth nightly ), Disp: as needed Swelling in ankles 5/15/19  Yes Chandler Martin,    isosorbide mononitrate (IMDUR) 60 MG extended release tablet Take 1 tablet by mouth daily  Patient taking differently: Take 30 mg by mouth nightly  4/18/19  Yes Carrie Rai MD   levothyroxine (SYNTHROID) 100 MCG tablet Take 1 tablet by mouth Daily  Patient taking differently: Take 88 mcg by mouth Daily  4/19/19  Yes Carrie Rai MD   atorvastatin (LIPITOR) 80 MG tablet Take 80 mg by mouth daily   Yes Historical Provider, MD   metoprolol (LOPRESSOR) 50 MG tablet Take 50 mg by mouth 2 times daily   Yes Historical Provider, MD   Insulin Glargine (LANTUS SC) Inject 11 Units into the skin daily     Historical Provider, MD   NONFORMULARY Dexcom 6 checks sugar    Historical Provider, MD   nitroGLYCERIN (NITROSTAT) 0.4 MG SL tablet DISSOLVE 1 TABLET UNDER THE TONGUE EVERY 5 MIN AS NEEDED FOR CHEST PAIN 1/25/18   Historical Provider, MD   clopidogrel (PLAVIX) 75 MG tablet Take 75 mg by mouth daily    Historical Provider, MD   aspirin 81 MG tablet Take 81 mg by mouth daily    Historical Provider, MD     Additional information:       VITAL SIGNS   Vitals:    08/28/20 1001   BP: (!) 171/76   Pulse: 60   Resp: 12   Temp:    SpO2: 98%       PHYSICAL:   Heart:  [x]Regular rate and rhythm  []Other:    Lungs:  [x]Clear    []Other:    Abdomen: []Soft    [x]Other: distended    Mental Status: [x]Alert & Oriented  []Other:      PLANNED PROCEDURE   []Biospy []Arteriogram              []Drainage   []Mediport Insertion  []Fistulogram []IV access       []Vertebroplasty / Augmentation  []IVC filter []Dialysis catheter []Biliary drainage  []Other: [x]CAPD Catheter []Nephrostomy Tube / Stent  SEDATION  Planned agent:[x]Midazolam []Meperidine []Sublimaze [x]Dilaudid []Morphine     []Diazepam  []Other:     ASA Classification:  []1 [x]2 []3 []4 []5  Class 1: A normal healthy patient  Class 2: Pt with mild to moderate systemic disease  Class 3: Severe systemic disease or disturbance  Class 4: Severe systemic disorders that are already life threatening. Class 5: Moribund pt with little chances of survival, for more than 24 hours. Mallampati I Airway Classification:   []1 [x]2 []3 []4    [x]Pre-procedure diagnostic studies complete and results available. Comment:    [x]Previous sedation/anesthesia experiences assessed. Comment:    [x]The patient is an appropriate candidate to undergo the planned procedure sedation and anesthesia. (Refer to nursing sedation/analgesia documentation record)  [x]Formulation and discussion of sedation/procedure plan, risks, and expectations with patient and/or responsible adult completed. [x]Patient examined immediately prior to the procedure.  (Refer to nursing sedation/analgesia documentation record)    Kendall Crowell MD  Electronically signed 8/28/2020 at 10:12 AM

## 2020-08-28 NOTE — OP NOTE
Department of Radiology  Post Procedure Progress Note      Pre-Procedure Diagnosis:  Renal Failure    Procedure Performed: CAPD Catheter insertion     Anesthesia: local , versed and dilaudid    Findings: successful    Immediate Complications:  None    Estimated Blood Loss: minimal    SEE DICTATED PROCEDURE NOTE FOR COMPLETE DETAILS.       Lilli Shine MD   8/28/2020 11:08 AM

## 2020-08-28 NOTE — PROGRESS NOTES
0945 Patient received in IR for CAPD catheter insertion  5780 Pt taking to the bathroom. 0957 This procedure has been fully reviewed with the patient and written informed consent has been obtained. 1011 Dr. Abhishek Lopez in to assess the patient. 1022 Procedure started with Dr. Abhishek Lopez  7341 420mL of saline inserted to the perineal cavity. 1043 CAPD catheter inserted. Fluid being drained. 1106 Procedure completed; patient tolerated well. Sutures, Derma bond, steri strips, 4x4,bio-patch, op-site; no bleeding noted. 1121 Patient on cart; comfort ensured. 1122 Patient taken to OPN via cart.

## 2020-09-02 NOTE — TELEPHONE ENCOUNTER
PJ called he said that Peyton Madera is scheduled for 9/2 his chair time for dialysis will be Mon., Weds., Fri. I said I thought he was doing home dialysis he had to clarify that. He called me back and let me know that the patient is doing home dialysis.

## 2020-10-02 ENCOUNTER — HOSPITAL ENCOUNTER (OUTPATIENT)
Age: 55
Discharge: HOME | End: 2020-10-02
Payer: COMMERCIAL

## 2020-10-02 PROCEDURE — U0003 INFECTIOUS AGENT DETECTION BY NUCLEIC ACID (DNA OR RNA); SEVERE ACUTE RESPIRATORY SYNDROME CORONAVIRUS 2 (SARS-COV-2) (CORONAVIRUS DISEASE [COVID-19]), AMPLIFIED PROBE TECHNIQUE, MAKING USE OF HIGH THROUGHPUT TECHNOLOGIES AS DESCRIBED BY CMS-2020-01-R: HCPCS

## 2020-10-03 LAB — SARS-COV-2: NOT DETECTED

## 2020-10-16 ENCOUNTER — HOSPITAL ENCOUNTER (OUTPATIENT)
Age: 55
Discharge: HOME | End: 2020-10-16
Payer: COMMERCIAL

## 2020-10-16 ENCOUNTER — HOSPITAL ENCOUNTER (OUTPATIENT)
Dept: NON INVASIVE DIAGNOSTICS | Age: 55
Discharge: HOME | End: 2020-10-16
Payer: COMMERCIAL

## 2020-10-16 LAB
LV EF: 43 %
LVEF MODALITY: NORMAL

## 2020-10-16 PROCEDURE — U0003 INFECTIOUS AGENT DETECTION BY NUCLEIC ACID (DNA OR RNA); SEVERE ACUTE RESPIRATORY SYNDROME CORONAVIRUS 2 (SARS-COV-2) (CORONAVIRUS DISEASE [COVID-19]), AMPLIFIED PROBE TECHNIQUE, MAKING USE OF HIGH THROUGHPUT TECHNOLOGIES AS DESCRIBED BY CMS-2020-01-R: HCPCS

## 2020-10-16 PROCEDURE — 93306 TTE W/DOPPLER COMPLETE: CPT

## 2020-10-17 LAB — SARS-COV-2: NOT DETECTED

## 2020-10-19 ENCOUNTER — TELEPHONE (OUTPATIENT)
Dept: CARDIOLOGY CLINIC | Age: 55
End: 2020-10-19

## 2020-10-19 NOTE — TELEPHONE ENCOUNTER
Result note for ECHO Complete 2D W Doppler W Color         ----- Message from Mikayla Wisdom MD sent at 10/19/2020 11:42 AM EDT -----  New drop in EF \"evidence of new weakening of heart muscle strength\", pulmonary HTN \"Elevated lung pressure\" and known h/o CKD/CHF. Needs LHC, RHC, possible PCI. Schedule with myself. on 11/5/2020 at [address] [place] [address].   Needs to be admitted the night prior for pre-hydration.

## 2020-10-29 ENCOUNTER — HOSPITAL ENCOUNTER (OUTPATIENT)
Age: 55
Setting detail: SPECIMEN
Discharge: HOME | End: 2020-10-29
Payer: COMMERCIAL

## 2020-10-29 PROCEDURE — U0003 INFECTIOUS AGENT DETECTION BY NUCLEIC ACID (DNA OR RNA); SEVERE ACUTE RESPIRATORY SYNDROME CORONAVIRUS 2 (SARS-COV-2) (CORONAVIRUS DISEASE [COVID-19]), AMPLIFIED PROBE TECHNIQUE, MAKING USE OF HIGH THROUGHPUT TECHNOLOGIES AS DESCRIBED BY CMS-2020-01-R: HCPCS

## 2020-10-31 LAB — SARS-COV-2: NOT DETECTED

## 2020-11-04 ENCOUNTER — HOSPITAL ENCOUNTER (INPATIENT)
Age: 55
LOS: 1 days | Discharge: HOME | DRG: 286 | End: 2020-11-06
Attending: INTERNAL MEDICINE | Admitting: INTERNAL MEDICINE
Payer: COMMERCIAL

## 2020-11-04 ENCOUNTER — PREP FOR PROCEDURE (OUTPATIENT)
Dept: CARDIOLOGY | Age: 55
End: 2020-11-04

## 2020-11-04 PROBLEM — I25.10 CAD IN NATIVE ARTERY: Status: ACTIVE | Noted: 2020-11-04

## 2020-11-04 LAB
ABO: NORMAL
ANION GAP SERPL CALCULATED.3IONS-SCNC: 13 MEQ/L (ref 8–16)
ANTIBODY SCREEN: NORMAL
APTT: 37.9 SECONDS (ref 22–38)
BUN BLDV-MCNC: 35 MG/DL (ref 7–22)
CALCIUM SERPL-MCNC: 9 MG/DL (ref 8.5–10.5)
CHLORIDE BLD-SCNC: 99 MEQ/L (ref 98–111)
CO2: 21 MEQ/L (ref 23–33)
CREAT SERPL-MCNC: 3.4 MG/DL (ref 0.4–1.2)
ERYTHROCYTE [DISTWIDTH] IN BLOOD BY AUTOMATED COUNT: 13.3 % (ref 11.5–14.5)
ERYTHROCYTE [DISTWIDTH] IN BLOOD BY AUTOMATED COUNT: 48.9 FL (ref 35–45)
GFR SERPL CREATININE-BSD FRML MDRD: 19 ML/MIN/1.73M2
GLUCOSE BLD-MCNC: 284 MG/DL (ref 70–108)
HCT VFR BLD CALC: 33.9 % (ref 42–52)
HEMOGLOBIN: 10.7 GM/DL (ref 14–18)
INR BLD: 0.91 (ref 0.85–1.13)
MCH RBC QN AUTO: 31.4 PG (ref 26–33)
MCHC RBC AUTO-ENTMCNC: 31.6 GM/DL (ref 32.2–35.5)
MCV RBC AUTO: 99.4 FL (ref 80–94)
PLATELET # BLD: 247 THOU/MM3 (ref 130–400)
PMV BLD AUTO: 10.4 FL (ref 9.4–12.4)
POTASSIUM REFLEX MAGNESIUM: 4.9 MEQ/L (ref 3.5–5.2)
RBC # BLD: 3.41 MILL/MM3 (ref 4.7–6.1)
RH FACTOR: NORMAL
SODIUM BLD-SCNC: 133 MEQ/L (ref 135–145)
WBC # BLD: 5.6 THOU/MM3 (ref 4.8–10.8)

## 2020-11-04 PROCEDURE — 94761 N-INVAS EAR/PLS OXIMETRY MLT: CPT

## 2020-11-04 PROCEDURE — 6370000000 HC RX 637 (ALT 250 FOR IP): Performed by: PHYSICIAN ASSISTANT

## 2020-11-04 PROCEDURE — 80048 BASIC METABOLIC PNL TOTAL CA: CPT

## 2020-11-04 PROCEDURE — 85610 PROTHROMBIN TIME: CPT

## 2020-11-04 PROCEDURE — 86850 RBC ANTIBODY SCREEN: CPT

## 2020-11-04 PROCEDURE — 85730 THROMBOPLASTIN TIME PARTIAL: CPT

## 2020-11-04 PROCEDURE — 86900 BLOOD TYPING SEROLOGIC ABO: CPT

## 2020-11-04 PROCEDURE — 86901 BLOOD TYPING SEROLOGIC RH(D): CPT

## 2020-11-04 PROCEDURE — 85027 COMPLETE CBC AUTOMATED: CPT

## 2020-11-04 PROCEDURE — 36415 COLL VENOUS BLD VENIPUNCTURE: CPT

## 2020-11-04 PROCEDURE — 2580000003 HC RX 258: Performed by: PHYSICIAN ASSISTANT

## 2020-11-04 RX ORDER — SODIUM CHLORIDE 9 MG/ML
INJECTION, SOLUTION INTRAVENOUS CONTINUOUS
Status: DISCONTINUED | OUTPATIENT
Start: 2020-11-04 | End: 2020-11-05

## 2020-11-04 RX ORDER — ASPIRIN 325 MG
325 TABLET ORAL ONCE
Status: CANCELLED | OUTPATIENT
Start: 2020-11-04 | End: 2020-11-04

## 2020-11-04 RX ORDER — PANTOPRAZOLE SODIUM 40 MG/1
40 TABLET, DELAYED RELEASE ORAL 2 TIMES DAILY
COMMUNITY

## 2020-11-04 RX ORDER — SODIUM CHLORIDE 0.9 % (FLUSH) 0.9 %
10 SYRINGE (ML) INJECTION EVERY 12 HOURS SCHEDULED
Status: CANCELLED | OUTPATIENT
Start: 2020-11-04

## 2020-11-04 RX ORDER — SODIUM CHLORIDE 9 MG/ML
INJECTION, SOLUTION INTRAVENOUS CONTINUOUS
Status: CANCELLED | OUTPATIENT
Start: 2020-11-04

## 2020-11-04 RX ORDER — SODIUM CHLORIDE 0.9 % (FLUSH) 0.9 %
10 SYRINGE (ML) INJECTION PRN
Status: DISCONTINUED | OUTPATIENT
Start: 2020-11-04 | End: 2020-11-05 | Stop reason: SDUPTHER

## 2020-11-04 RX ORDER — NITROGLYCERIN 0.4 MG/1
0.4 TABLET SUBLINGUAL EVERY 5 MIN PRN
Status: CANCELLED | OUTPATIENT
Start: 2020-11-04

## 2020-11-04 RX ORDER — INSULIN GLARGINE 100 [IU]/ML
10 INJECTION, SOLUTION SUBCUTANEOUS DAILY
COMMUNITY

## 2020-11-04 RX ORDER — ASPIRIN 325 MG
325 TABLET ORAL ONCE
Status: COMPLETED | OUTPATIENT
Start: 2020-11-04 | End: 2020-11-04

## 2020-11-04 RX ORDER — SODIUM CHLORIDE 0.9 % (FLUSH) 0.9 %
10 SYRINGE (ML) INJECTION PRN
Status: CANCELLED | OUTPATIENT
Start: 2020-11-04

## 2020-11-04 RX ORDER — SODIUM CHLORIDE 0.9 % (FLUSH) 0.9 %
10 SYRINGE (ML) INJECTION EVERY 12 HOURS SCHEDULED
Status: DISCONTINUED | OUTPATIENT
Start: 2020-11-04 | End: 2020-11-05 | Stop reason: SDUPTHER

## 2020-11-04 RX ORDER — NITROGLYCERIN 0.4 MG/1
0.4 TABLET SUBLINGUAL EVERY 5 MIN PRN
Status: DISCONTINUED | OUTPATIENT
Start: 2020-11-04 | End: 2020-11-06 | Stop reason: HOSPADM

## 2020-11-04 RX ADMIN — SODIUM CHLORIDE: 9 INJECTION, SOLUTION INTRAVENOUS at 21:36

## 2020-11-04 RX ADMIN — ASPIRIN 325 MG: 325 TABLET, FILM COATED ORAL at 21:37

## 2020-11-04 RX ADMIN — SODIUM CHLORIDE, PRESERVATIVE FREE 10 ML: 5 INJECTION INTRAVENOUS at 21:37

## 2020-11-05 PROBLEM — I50.23 ACUTE ON CHRONIC HFREF (HEART FAILURE WITH REDUCED EJECTION FRACTION) (HCC): Status: ACTIVE | Noted: 2020-11-05

## 2020-11-05 LAB
ACTIVATED CLOTTING TIME: 318 SECONDS (ref 1–150)
ANION GAP SERPL CALCULATED.3IONS-SCNC: 12 MEQ/L (ref 8–16)
AVERAGE GLUCOSE: 201 MG/DL (ref 70–126)
BUN BLDV-MCNC: 34 MG/DL (ref 7–22)
CALCIUM SERPL-MCNC: 9.2 MG/DL (ref 8.5–10.5)
CHLORIDE BLD-SCNC: 101 MEQ/L (ref 98–111)
CO2: 21 MEQ/L (ref 23–33)
COLLECTED BY:: ABNORMAL
COLLECTED BY:: ABNORMAL
CREAT SERPL-MCNC: 3.1 MG/DL (ref 0.4–1.2)
EKG ATRIAL RATE: 58 BPM
EKG P AXIS: 48 DEGREES
EKG P-R INTERVAL: 160 MS
EKG Q-T INTERVAL: 474 MS
EKG QRS DURATION: 90 MS
EKG QTC CALCULATION (BAZETT): 465 MS
EKG R AXIS: 54 DEGREES
EKG T AXIS: -101 DEGREES
EKG VENTRICULAR RATE: 58 BPM
ERYTHROCYTE [DISTWIDTH] IN BLOOD BY AUTOMATED COUNT: 13.5 % (ref 11.5–14.5)
ERYTHROCYTE [DISTWIDTH] IN BLOOD BY AUTOMATED COUNT: 49.7 FL (ref 35–45)
GFR SERPL CREATININE-BSD FRML MDRD: 21 ML/MIN/1.73M2
GLUCOSE BLD-MCNC: 263 MG/DL (ref 70–108)
GLUCOSE BLD-MCNC: 371 MG/DL (ref 70–108)
HBA1C MFR BLD: 8.7 % (ref 4.4–6.4)
HCT VFR BLD CALC: 36.1 % (ref 42–52)
HEMOGLOBIN: 11.3 GM/DL (ref 14–18)
MCH RBC QN AUTO: 31.5 PG (ref 26–33)
MCHC RBC AUTO-ENTMCNC: 31.3 GM/DL (ref 32.2–35.5)
MCV RBC AUTO: 100.6 FL (ref 80–94)
PLATELET # BLD: 238 THOU/MM3 (ref 130–400)
PMV BLD AUTO: 10.5 FL (ref 9.4–12.4)
POC O2 SATURATION: 60 % (ref 94–97)
POC O2 SATURATION: 88 % (ref 94–97)
POTASSIUM REFLEX MAGNESIUM: 5.1 MEQ/L (ref 3.5–5.2)
RBC # BLD: 3.59 MILL/MM3 (ref 4.7–6.1)
SODIUM BLD-SCNC: 134 MEQ/L (ref 135–145)
SOURCE, BLOOD GAS: ABNORMAL
SOURCE, BLOOD GAS: ABNORMAL
WBC # BLD: 6.5 THOU/MM3 (ref 4.8–10.8)

## 2020-11-05 PROCEDURE — 93010 ELECTROCARDIOGRAM REPORT: CPT | Performed by: INTERNAL MEDICINE

## 2020-11-05 PROCEDURE — 85347 COAGULATION TIME ACTIVATED: CPT

## 2020-11-05 PROCEDURE — 82810 BLOOD GASES O2 SAT ONLY: CPT

## 2020-11-05 PROCEDURE — B2151ZZ FLUOROSCOPY OF LEFT HEART USING LOW OSMOLAR CONTRAST: ICD-10-PCS | Performed by: INTERNAL MEDICINE

## 2020-11-05 PROCEDURE — 6360000004 HC RX CONTRAST MEDICATION: Performed by: INTERNAL MEDICINE

## 2020-11-05 PROCEDURE — 93005 ELECTROCARDIOGRAM TRACING: CPT | Performed by: PHYSICIAN ASSISTANT

## 2020-11-05 PROCEDURE — C1887 CATHETER, GUIDING: HCPCS

## 2020-11-05 PROCEDURE — 82948 REAGENT STRIP/BLOOD GLUCOSE: CPT

## 2020-11-05 PROCEDURE — B2111ZZ FLUOROSCOPY OF MULTIPLE CORONARY ARTERIES USING LOW OSMOLAR CONTRAST: ICD-10-PCS | Performed by: INTERNAL MEDICINE

## 2020-11-05 PROCEDURE — 2140000000 HC CCU INTERMEDIATE R&B

## 2020-11-05 PROCEDURE — 2580000003 HC RX 258: Performed by: INTERNAL MEDICINE

## 2020-11-05 PROCEDURE — 36415 COLL VENOUS BLD VENIPUNCTURE: CPT

## 2020-11-05 PROCEDURE — 93460 R&L HRT ART/VENTRICLE ANGIO: CPT | Performed by: INTERNAL MEDICINE

## 2020-11-05 PROCEDURE — 83036 HEMOGLOBIN GLYCOSYLATED A1C: CPT

## 2020-11-05 PROCEDURE — 4A023N7 MEASUREMENT OF CARDIAC SAMPLING AND PRESSURE, LEFT HEART, PERCUTANEOUS APPROACH: ICD-10-PCS | Performed by: INTERNAL MEDICINE

## 2020-11-05 PROCEDURE — C1894 INTRO/SHEATH, NON-LASER: HCPCS

## 2020-11-05 PROCEDURE — 99221 1ST HOSP IP/OBS SF/LOW 40: CPT | Performed by: INTERNAL MEDICINE

## 2020-11-05 PROCEDURE — 93571 IV DOP VEL&/PRESS C FLO 1ST: CPT

## 2020-11-05 PROCEDURE — 2500000003 HC RX 250 WO HCPCS

## 2020-11-05 PROCEDURE — 6370000000 HC RX 637 (ALT 250 FOR IP): Performed by: INTERNAL MEDICINE

## 2020-11-05 PROCEDURE — 93571 IV DOP VEL&/PRESS C FLO 1ST: CPT | Performed by: INTERNAL MEDICINE

## 2020-11-05 PROCEDURE — 4A023N6 MEASUREMENT OF CARDIAC SAMPLING AND PRESSURE, RIGHT HEART, PERCUTANEOUS APPROACH: ICD-10-PCS | Performed by: INTERNAL MEDICINE

## 2020-11-05 PROCEDURE — 2709999900 HC NON-CHARGEABLE SUPPLY

## 2020-11-05 PROCEDURE — 2500000003 HC RX 250 WO HCPCS: Performed by: INTERNAL MEDICINE

## 2020-11-05 PROCEDURE — 80048 BASIC METABOLIC PNL TOTAL CA: CPT

## 2020-11-05 PROCEDURE — C1769 GUIDE WIRE: HCPCS

## 2020-11-05 PROCEDURE — 93460 R&L HRT ART/VENTRICLE ANGIO: CPT

## 2020-11-05 PROCEDURE — 6360000002 HC RX W HCPCS

## 2020-11-05 PROCEDURE — 99223 1ST HOSP IP/OBS HIGH 75: CPT | Performed by: NURSE PRACTITIONER

## 2020-11-05 PROCEDURE — 85027 COMPLETE CBC AUTOMATED: CPT

## 2020-11-05 RX ORDER — ACETAMINOPHEN 325 MG/1
650 TABLET ORAL EVERY 4 HOURS PRN
Status: DISCONTINUED | OUTPATIENT
Start: 2020-11-05 | End: 2020-11-06 | Stop reason: HOSPADM

## 2020-11-05 RX ORDER — SODIUM CHLORIDE, SODIUM LACTATE, CALCIUM CHLORIDE, MAGNESIUM CHLORIDE AND DEXTROSE 2.5; 538; 448; 18.3; 5.08 G/100ML; MG/100ML; MG/100ML; MG/100ML; MG/100ML
1200 INJECTION, SOLUTION INTRAPERITONEAL 4 TIMES DAILY
Status: DISCONTINUED | OUTPATIENT
Start: 2020-11-05 | End: 2020-11-05

## 2020-11-05 RX ORDER — ISOSORBIDE MONONITRATE 30 MG/1
30 TABLET, EXTENDED RELEASE ORAL NIGHTLY
COMMUNITY

## 2020-11-05 RX ORDER — SODIUM CHLORIDE, SODIUM LACTATE, CALCIUM CHLORIDE, MAGNESIUM CHLORIDE AND DEXTROSE 2.5; 538; 448; 18.3; 5.08 G/100ML; MG/100ML; MG/100ML; MG/100ML; MG/100ML
2000 INJECTION, SOLUTION INTRAPERITONEAL EVERY 6 HOURS
Status: DISCONTINUED | OUTPATIENT
Start: 2020-11-05 | End: 2020-11-05

## 2020-11-05 RX ORDER — SODIUM CHLORIDE 0.9 % (FLUSH) 0.9 %
10 SYRINGE (ML) INJECTION EVERY 12 HOURS SCHEDULED
Status: DISCONTINUED | OUTPATIENT
Start: 2020-11-05 | End: 2020-11-06 | Stop reason: HOSPADM

## 2020-11-05 RX ORDER — HYDRALAZINE HYDROCHLORIDE 50 MG/1
50 TABLET, FILM COATED ORAL 3 TIMES DAILY
Status: ON HOLD | COMMUNITY
End: 2020-11-06 | Stop reason: HOSPADM

## 2020-11-05 RX ORDER — LEVOTHYROXINE SODIUM 0.1 MG/1
100 TABLET ORAL DAILY
Status: ON HOLD | COMMUNITY
End: 2020-11-06 | Stop reason: HOSPADM

## 2020-11-05 RX ORDER — CLOPIDOGREL BISULFATE 75 MG/1
75 TABLET ORAL DAILY
Status: DISCONTINUED | OUTPATIENT
Start: 2020-11-05 | End: 2020-11-06 | Stop reason: HOSPADM

## 2020-11-05 RX ORDER — BUMETANIDE 0.25 MG/ML
2 INJECTION, SOLUTION INTRAMUSCULAR; INTRAVENOUS 2 TIMES DAILY
Status: DISCONTINUED | OUTPATIENT
Start: 2020-11-05 | End: 2020-11-06

## 2020-11-05 RX ORDER — PANTOPRAZOLE SODIUM 40 MG/1
40 TABLET, DELAYED RELEASE ORAL 2 TIMES DAILY
Status: DISCONTINUED | OUTPATIENT
Start: 2020-11-05 | End: 2020-11-06 | Stop reason: HOSPADM

## 2020-11-05 RX ORDER — LEVOTHYROXINE SODIUM 0.1 MG/1
100 TABLET ORAL DAILY
Status: DISCONTINUED | OUTPATIENT
Start: 2020-11-05 | End: 2020-11-06 | Stop reason: HOSPADM

## 2020-11-05 RX ORDER — ASPIRIN 81 MG/1
81 TABLET ORAL DAILY
Status: DISCONTINUED | OUTPATIENT
Start: 2020-11-05 | End: 2020-11-06 | Stop reason: HOSPADM

## 2020-11-05 RX ORDER — BUMETANIDE 0.25 MG/ML
2 INJECTION, SOLUTION INTRAMUSCULAR; INTRAVENOUS 2 TIMES DAILY
Status: DISCONTINUED | OUTPATIENT
Start: 2020-11-05 | End: 2020-11-05 | Stop reason: SDUPTHER

## 2020-11-05 RX ORDER — PEN NEEDLE, DIABETIC 31 G X1/4"
1 NEEDLE, DISPOSABLE MISCELLANEOUS DAILY
Qty: 100 EACH | Refills: 0 | Status: SHIPPED | OUTPATIENT
Start: 2020-11-05 | End: 2022-06-22

## 2020-11-05 RX ORDER — SODIUM CHLORIDE 0.9 % (FLUSH) 0.9 %
10 SYRINGE (ML) INJECTION PRN
Status: DISCONTINUED | OUTPATIENT
Start: 2020-11-05 | End: 2020-11-06 | Stop reason: HOSPADM

## 2020-11-05 RX ORDER — SODIUM CHLORIDE, SODIUM LACTATE, CALCIUM CHLORIDE, MAGNESIUM CHLORIDE AND DEXTROSE 2.5; 538; 448; 18.3; 5.08 G/100ML; MG/100ML; MG/100ML; MG/100ML; MG/100ML
1200 INJECTION, SOLUTION INTRAPERITONEAL 4 TIMES DAILY
Status: DISCONTINUED | OUTPATIENT
Start: 2020-11-06 | End: 2020-11-06 | Stop reason: HOSPADM

## 2020-11-05 RX ORDER — METOPROLOL TARTRATE 50 MG/1
50 TABLET, FILM COATED ORAL 2 TIMES DAILY
Status: DISCONTINUED | OUTPATIENT
Start: 2020-11-05 | End: 2020-11-06 | Stop reason: HOSPADM

## 2020-11-05 RX ORDER — SODIUM CHLORIDE, SODIUM LACTATE, CALCIUM CHLORIDE, MAGNESIUM CHLORIDE AND DEXTROSE 2.5; 538; 448; 18.3; 5.08 G/100ML; MG/100ML; MG/100ML; MG/100ML; MG/100ML
1200 INJECTION, SOLUTION INTRAPERITONEAL
Status: ACTIVE | OUTPATIENT
Start: 2020-11-05 | End: 2020-11-05

## 2020-11-05 RX ORDER — ISOSORBIDE MONONITRATE 30 MG/1
30 TABLET, EXTENDED RELEASE ORAL DAILY
Status: DISCONTINUED | OUTPATIENT
Start: 2020-11-05 | End: 2020-11-06 | Stop reason: HOSPADM

## 2020-11-05 RX ORDER — HYDRALAZINE HYDROCHLORIDE 50 MG/1
50 TABLET, FILM COATED ORAL 3 TIMES DAILY
Status: DISCONTINUED | OUTPATIENT
Start: 2020-11-05 | End: 2020-11-06

## 2020-11-05 RX ORDER — ATORVASTATIN CALCIUM 80 MG/1
80 TABLET, FILM COATED ORAL DAILY
Status: DISCONTINUED | OUTPATIENT
Start: 2020-11-05 | End: 2020-11-06 | Stop reason: HOSPADM

## 2020-11-05 RX ORDER — DEXTROSE MONOHYDRATE 25 G/50ML
12.5 INJECTION, SOLUTION INTRAVENOUS PRN
Status: DISCONTINUED | OUTPATIENT
Start: 2020-11-05 | End: 2020-11-06 | Stop reason: HOSPADM

## 2020-11-05 RX ORDER — INSULIN GLARGINE 100 [IU]/ML
10 INJECTION, SOLUTION SUBCUTANEOUS DAILY
Status: DISCONTINUED | OUTPATIENT
Start: 2020-11-05 | End: 2020-11-05

## 2020-11-05 RX ORDER — NICOTINE POLACRILEX 4 MG
15 LOZENGE BUCCAL PRN
Status: DISCONTINUED | OUTPATIENT
Start: 2020-11-05 | End: 2020-11-06 | Stop reason: HOSPADM

## 2020-11-05 RX ORDER — DEXTROSE MONOHYDRATE 50 MG/ML
100 INJECTION, SOLUTION INTRAVENOUS PRN
Status: DISCONTINUED | OUTPATIENT
Start: 2020-11-05 | End: 2020-11-06 | Stop reason: HOSPADM

## 2020-11-05 RX ORDER — INSULIN GLARGINE 100 [IU]/ML
10 INJECTION, SOLUTION SUBCUTANEOUS DAILY
Status: DISCONTINUED | OUTPATIENT
Start: 2020-11-06 | End: 2020-11-06 | Stop reason: HOSPADM

## 2020-11-05 RX ADMIN — PANTOPRAZOLE SODIUM 40 MG: 40 TABLET, DELAYED RELEASE ORAL at 21:59

## 2020-11-05 RX ADMIN — SODIUM CHLORIDE, SODIUM LACTATE, CALCIUM CHLORIDE, MAGNESIUM CHLORIDE AND DEXTROSE 1200 ML: 2.5; 538; 448; 18.3; 5.08 INJECTION, SOLUTION INTRAPERITONEAL at 17:57

## 2020-11-05 RX ADMIN — HYDRALAZINE HYDROCHLORIDE 50 MG: 50 TABLET ORAL at 17:51

## 2020-11-05 RX ADMIN — HYDRALAZINE HYDROCHLORIDE 50 MG: 50 TABLET ORAL at 11:53

## 2020-11-05 RX ADMIN — SODIUM CHLORIDE, SODIUM LACTATE, CALCIUM CHLORIDE, MAGNESIUM CHLORIDE AND DEXTROSE 1200 ML: 2.5; 538; 448; 18.3; 5.08 INJECTION, SOLUTION INTRAPERITONEAL at 19:33

## 2020-11-05 RX ADMIN — SODIUM CHLORIDE, SODIUM LACTATE, CALCIUM CHLORIDE, MAGNESIUM CHLORIDE AND DEXTROSE 2000 ML: 2.5; 538; 448; 18.3; 5.08 INJECTION, SOLUTION INTRAPERITONEAL at 14:13

## 2020-11-05 RX ADMIN — ATORVASTATIN CALCIUM 80 MG: 80 TABLET, FILM COATED ORAL at 21:59

## 2020-11-05 RX ADMIN — Medication 10 ML: at 11:54

## 2020-11-05 RX ADMIN — INSULIN GLARGINE 10 UNITS: 100 INJECTION, SOLUTION SUBCUTANEOUS at 12:20

## 2020-11-05 RX ADMIN — LEVOTHYROXINE SODIUM 100 MCG: 100 TABLET ORAL at 11:55

## 2020-11-05 RX ADMIN — PANTOPRAZOLE SODIUM 40 MG: 40 TABLET, DELAYED RELEASE ORAL at 11:51

## 2020-11-05 RX ADMIN — ISOSORBIDE MONONITRATE 30 MG: 30 TABLET, EXTENDED RELEASE ORAL at 21:59

## 2020-11-05 RX ADMIN — METOPROLOL TARTRATE 50 MG: 50 TABLET, FILM COATED ORAL at 21:59

## 2020-11-05 RX ADMIN — IOPAMIDOL 45 ML: 755 INJECTION, SOLUTION INTRAVENOUS at 09:25

## 2020-11-05 RX ADMIN — METOPROLOL TARTRATE 50 MG: 50 TABLET, FILM COATED ORAL at 11:48

## 2020-11-05 RX ADMIN — BUMETANIDE 2 MG: 0.25 INJECTION INTRAMUSCULAR; INTRAVENOUS at 19:14

## 2020-11-05 RX ADMIN — ASPIRIN 81 MG: 81 TABLET ORAL at 08:00

## 2020-11-05 RX ADMIN — CLOPIDOGREL BISULFATE 75 MG: 75 TABLET, FILM COATED ORAL at 08:00

## 2020-11-05 RX ADMIN — Medication 10 ML: at 22:00

## 2020-11-05 ASSESSMENT — PAIN SCALES - GENERAL
PAINLEVEL_OUTOF10: 0

## 2020-11-05 NOTE — CONSULTS
Nephrology Consult Note  Patient's Name: Uzma Deluna  11:22 AM  11/5/2020    Nephrologist: Stefany Wilhelm    Reason for Consult: End stage kidney disease. Peritoneal dialysis. Management. Requesting Physician:  Lizbeth Sheldon MD  PCP: Joanna Tidwell DO    Chief Complaint: Cough  Assessment  1. End-stage kidneys on peritoneal dialysis. 2. Pulmonary edema. 3. Cough likely due to pulmonary edema. 4. Diabetes mellitus type 2.  5. Hypertension primary. 6. Hyponatremia multifactorial.  7. Metabolic acidosis due to combination of end-stage kidney disease and inadequate dialysis. 8. Cardiomyopathy with low ejection fraction new    Plan    1. I discussed my thoughts with the patient. 2. He understood. 3. I addressed his questions. 4. Labs reviewed. 5. Cardiac catheterization  report reviewed  6. Medications reviewed. 7. Stop intravenous fluid. 8. Okay with intravenous bumetanide. 9. This will help with  fluid status and also will improve blood pressure. 10. We will start peritoneal dialysis with 2.5% solution of 2000 mL every 6 hours. 11. Change diet to regular diet per patient's request.  12. Labs in the morning. 13. Monitor blood pressure closely. 14. Will follow. History Obtained From: Patient, staff from medical record  History of Present Ilness:    Sole Diane is a 63 y.o. 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 Patient apparently had a recent echocardiogram done that revealed a low ejection fraction of 40 to 45% which is said to be new. As a result he was scheduled for cardiac catheterization electively. This will also meet the requirement for cardiac clearance pre kidney transplant evaluation. Patient admits to shortness of breath with exertion.   He also has been having a productive cough with thick sputum. No chest pain. No nausea vomiting. No fever chills. He does not do his peritoneal dialysis every day as he is supposed to. Past Medical History:   Diagnosis Date    Broken ankle 2016    Broke right ankle.  CAD (coronary artery disease) October 2015    MI     Cerebral artery occlusion with cerebral infarction [facility])  march 2015    left side    CHF (congestive heart failure) (HCC)     Chronic kidney disease     Diabetes mellitus (HCC)     GERD (gastroesophageal reflux disease)     Hyperlipidemia     Hypertension     Thyroid disease        Past Surgical History:   Procedure Laterality Date    APPENDECTOMY      CARDIAC SURGERY  Oct. 2015    2 stents placed    COLONOSCOPY      EYE SURGERY         Family History   Adopted: Yes   Problem Relation Age of Onset    Diabetes Mother     Cancer Sister         reports that he quit smoking about 5 years ago. He has a 45.00 pack-year smoking history. He has quit using smokeless tobacco. He reports current alcohol use of about 4.0 standard drinks of alcohol per week. He reports that he does not use drugs.     Allergies:  Aranesp (albumin free) [darbepoetin eri]    Current Medications:    aspirin EC tablet 81 mg, Daily  atorvastatin (LIPITOR) tablet 80 mg, Daily  clopidogrel (PLAVIX) tablet 75 mg, Daily  hydrALAZINE (APRESOLINE) tablet 50 mg, TID  insulin glargine (LANTUS) injection vial 10 Units, Daily  isosorbide mononitrate (IMDUR) extended release tablet 30 mg, Daily  levothyroxine (SYNTHROID) tablet 100 mcg, Daily  metoprolol tartrate (LOPRESSOR) tablet 50 mg, BID  pantoprazole (PROTONIX) tablet 40 mg, BID  sodium chloride flush 0.9 % injection 10 mL, 2 times per day  sodium chloride flush 0.9 % injection 10 mL, PRN  acetaminophen (TYLENOL) tablet 650 mg, Q4H PRN  glucose (GLUTOSE) 40 % oral gel 15 g, PRN  dextrose 50 % IV solution, PRN  glucagon (rDNA) injection 1 mg, PRN  dextrose 5 % solution, 08/14/2020    GLUCOSE 359 07/02/2020    PHOS 3.7 11/25/2018    WBC 5.6 11/04/2020    WBC 7.0 08/28/2020    WBC 6.1 08/14/2020    HGB 10.7 (L) 11/04/2020    HGB 13.6 (L) 08/28/2020    HGB 13.5 08/14/2020    HCT 33.9 (L) 11/04/2020    HCT 41.0 (L) 08/28/2020    HCT 39.5 (A) 08/14/2020    MCV 99.4 (H) 11/04/2020     11/04/2020     {Labs reviewed    Imaging:  CXR results:  @ studies        Thank you Dr. [place] Johnston DO for allowing us to participate in care of [address] [place] [address].       Electronically signed by Patrick Correia MD on 11/5/2020 at 11:22 AM

## 2020-11-05 NOTE — PROGRESS NOTES
Pt. A&O x4. PERRL. Heart sounds regular. Lung sounds clear. Pt. Has a persistent cough. Radial pulses +2 bilaterally. Post tibial pulses +2 bilaterally. Pedal pulses +1 bilaterally. AROM present for all extremities. Pt. Sitting up on side of bed comfortably. Bed wheels locked and in lowest position. Bed side table and call light within reach.  Dori PETTIT/

## 2020-11-05 NOTE — PROCEDURES
[address] [id number]                            CARDIAC CATHETERIZATION    PATIENT NAME: MAYA COOPER                     :        1965  MED REC NO:   [id number]                           ROOM:       0021  ACCOUNT NO:   [de-identified]                           ADMIT DATE: 2020  PROVIDER:     Jenni Hebert MD    DATE OF PROCEDURE:  2020    INDICATIONS:  1. Acute congestive heart failure with reduced ejection fraction. 2.  New-onset cardiomyopathy. 3.  New-onset drop in ejection fraction to 40% to 45%, previously  normal.  4.  Dyspnea on exertion, angina equivalent symptoms, CCS class III. 5.  NYHA class III. 6.  Chronic kidney disease. 7.  Pulmonary hypertension. PROCEDURES PERFORMED:  1. Left cardiac catheterization, selective coronary angiogram.  2.  Left ventriculography. 3.  Right heart catheterization. DESCRIPTION OF PROCEDURE:  After informed consent, the patient was  brought to the cardiac catheterization room. He was prepped and draped  in a sterile fashion. Lidocaine 2% was injected in the skin and  subcutaneous tissue overlying the right brachial vein. Under ultrasound  guidance, using modified Seldinger technique, access was obtained in the  right brachial vein. A 5-St Lucian sheath was inserted and then flushed. Same approach was used to obtain access in the right radial artery. A  5/6 Slender sheath was inserted. Standard antithrombotic/antispasmodic  medications were given. I then proceeded with right heart  catheterization, which was completed using a 5-St Lucian Solomon catheter. Afterwards, I turned my attention to the left cardiac catheterization. The right coronary artery was engaged using 6-St Lucian JR-4 diagnostic  catheter. I used 6-St Lucian VL 3.0 guide-catheter to cannulate the left  main coronary artery. The patient had evidence of intermediate stenosis  of the mid-LAD. Heparin was given. ACT was confirmed to be above 250. I proceeded with fractional flow reserve measurement of LAD. The FFR  wire was prepared outside the body as per 's  recommendations. I then advanced the FFR wire to the level of the left  main coronary artery. The guide and the coronary arteries were flushed  with normal saline. Equalization of pressures was completed. I then  advanced the FFR wire beyond the target lesion area in the mid-LAD. FFR  at baseline was 0.88. Adenosine at 140 mcg/kg/minute was initiated. After maximal hyperemia, the final FFR was 0.82. I did perform a  pullback of the wire and there was very minimal drift. I decided to  repeat the study. Again, baseline was 0.88 and after adenosine infusion  and maximal hyperemia, the final FFR was again 0.82, consistent with the  previous finding. On pullback, there was no drift. Repeat angiogram  revealed no complication including no dissection, distal embolization or  perforation. The final angiogram was done, afterwards the wire was  removed. I then removed the guide-catheter over the 0.035 wire. FINDINGS:  LEFT VENTRICULOGRAPHY:  There is evidence for mild-to-moderate global  hypokinesis. Ejection fraction estimated at 40%. CORONARY ANGIOGRAM:  1. Right coronary artery:  Right coronary artery is a large dominant  vessel. The proximal RCA had 30% in-stent restenosis that is stable  when compared to previous cath. The stent in the mid-RCA is widely  patent. Distal RCA has mild luminal irregularities. The right  posterior descending artery has 10% to 20% stenosis. The right  posterolateral branch is a large vessel with multiple branches. There  is evidence for about 60% stenosis in the RPL _____. 2.  Left main coronary artery:  Mild luminal irregularities, otherwise  patent. No significant stenosis. Bifurcates into LAD and LCX.   3.  Left circumflex artery:  Mild luminal irregularities noted in left  circumflex artery and obtuse marginal branches. No significant stenotic  lesions. 4.  Left anterior descending artery:  Proximal LAD is patent. The  mid-segment of the left anterior descending artery has a long 50% to 60%  tubular lesion. D1 has mild diffuse disease. Distal LAD with mild  diffuse disease. RIGHT HEART CATHETERIZATION FINDINGS/HEMODYNAMICS:  1. The pulmonary artery oxygen saturation was 60%. 2.  Pulmonary arterial pressure was 81/32 with a mean pulmonary arterial  pressure of 49 mmHg. Pulmonary capillary wedge pressure was 32 mmHg. The right ventricular pressure was 80/13. Left ventricular  end-diastolic pressure was elevated at 33 mmHg. Cardiac output was 4.4  L/min. Cardiac index was 2.8 L/min x m2. MEDICATIONS:  See MAR. COMPLICATIONS:  None. ESTIMATED BLOOD LOSS:  Less than 50 mL. ACCESS:  Right brachial vein access for right heart catheterization. Right radial artery access for left heart catheterization. IMPRESSION:  1. Patent RCA stents, only mild in-stent restenosis of proximal RCA,  stable from previous study. 2.  Intermediate mid left anterior descending artery lesion 60%. Hemodynamically insignificant by FFR. FFR was 0.88 at baseline, final  FFR post adenosine infusion was 0.82.  3.  Acute-on-chronic congestive heart failure with reduced ejection  fraction, decompensated. 4.  Severe volume overload. 5.  Elevated filling pressures. 6.  Chronic kidney disease. 7.  Moderate pulmonary venous hypertension. PLAN:  1. Continue medical therapy and aggressive risk factor modification for  coronary artery disease. No intervention is indicated. 2.  Optimize medical therapy for congestive heart failure. 3.  The patient needs optimization of his volume status. 4.  He was given one dose of Lasix 80 mg IV x1 in the cath lab. 5.  Admit the patient to telemetry. 6.  The patient is currently on peritoneal dialysis. 7.  Start IV diuretics.   8. Consult Nephrology for further management of volume status,  diuretics and peritoneal dialysis settings. Findings and plan of care discussed with the patient. He is agreeable  to the plan.         Radha Jacobo MD    D: 11/05/2020 10:10:02       T: 11/05/2020 10:18:13     [email or web address]  Job#: [id number]     Doc#: [id number]    CC:

## 2020-11-05 NOTE — PROGRESS NOTES
Pt. A&Ox4. PERRL. Iv site in right AC. IV is clean, clear, and dry. IV site dressing changed by instructor. Heart sounds are regular. Lung sounds have crackles in the bases bilaterally. Radial pulses +2 bilaterally. Post tibial pulses +2 bilaterally. Pedal pulses +1 bilaterally. AROM of all extremities present. Skin is pink, cool, and dry. Pressure areas checked and skin is intact. No numbness or tingling. Pt. Denies pain. Call light within reach. SBAR report given to primary nurse JUVENCIO PETTIT/SN

## 2020-11-05 NOTE — H&P
Hospitalist - History & Physical      Patient: Sole Diane    Unit/Bed:3B-21/021-A  YOB: 1965  MRN: [id number]   Acct: [de-identified]   PCP: Joanna Tidwell DO    Date of Service: Pt seen/examined on 11/05/20  and Admitted to Inpatient with expected LOS greater than two midnights due to medical therapy. Chief Complaint:  Volume overload    Assessment and Plan:-    1. Acute on chronic decompensated HFrEF: Echo performed on 10/16/2020 demonstrated mild global hypokinesis of the left ventricle with mild concentric left ventricular hypertrophy. EF was estimated approximately 40-45% with right ventricular systolic pressures consistent with moderate pulmonary hypertension. · Heart cath performed on 11/5/2020 demonstrated severe volume overload with elevated filling pressures. Patient was started on IV diuretics per cardiology recommendations, continue. · Continue Lopressor and Imdur. · Maintain telemetry, strict I&O's, daily weights, with recommendations for a low-sodium, cardiac diet. It is of note that the patient is refusing the low sodium, cardiac diet. 2. CKD with severe volume overload: Identified during heart cath on 11/5/2020. Baseline creatinine over the past year appears to be 3.16, currently 3.1. CAPD patient of Dr. Stella Briceño. Nephrology consulted with plans for peritoneal dialysis today, appreciate assistance/recommendations. · Renally dose medications, avoid nephrotoxins. Recommendations for renal diet given, patient refusing this diet as well. 3. Pulmonary edema: Secondary to volume overload, secondary to acute on chronic decompensated HFrEF/CKD. Patient also endorses cough. Continue IV diuretic therapy. 4. Type 2 diabetes mellitus: Hemoglobin A1c 7.3 on 4/10/2019, repeat pending. Continue Accu-Cheks before meals and at bedtime. Hypoglycemia protocol in place.   · It is of note that the patient brought his own insulin into the hospital and wishes to maintain his home evaluate, treat, and manage this patient's case. Past Medical History:        Diagnosis Date    Broken ankle 2016    Broke right ankle.  CAD (coronary artery disease) October 2015    MI     Cerebral artery occlusion with cerebral infarction [facility])  march 2015    left side    CHF (congestive heart failure) (HCC)     Chronic kidney disease     Diabetes mellitus ([address]     GERD (gastroesophageal reflux disease)     Hyperlipidemia     Hypertension     Thyroid disease        Past Surgical History:        Procedure Laterality Date    APPENDECTOMY      CARDIAC SURGERY  Oct. 2015    2 stents placed    COLONOSCOPY      EYE SURGERY         Home Medications:   No current facility-administered medications on file prior to encounter.       Current Outpatient Medications on File Prior to Encounter   Medication Sig Dispense Refill    insulin glargine (BASAGLAR KWIKPEN) 100 UNIT/ML injection pen Inject 10 Units into the skin daily      pantoprazole (PROTONIX) 40 MG tablet Take 40 mg by mouth 2 times daily      hydrALAZINE (APRESOLINE) 50 MG tablet Take 1.5 tablets by mouth 3 times daily (Patient taking differently: Take 50 mg by mouth 3 times daily ) 90 tablet 3    insulin aspart (NOVOLOG) 100 UNIT/ML injection vial Inject into the skin 3 times daily (before meals) PT TAKES IT BASED OFF OF HIS CARBS;  1  Unit per 8 cho      bumetanide (BUMEX) 2 MG tablet Take 1 tablet by mouth daily (Patient taking differently: Take 2 mg by mouth daily as needed Swelling in ankles) 90 tablet 3    isosorbide mononitrate (IMDUR) 60 MG extended release tablet Take 1 tablet by mouth daily (Patient taking differently: Take 30 mg by mouth daily ) 30 tablet 3    levothyroxine (SYNTHROID) 100 MCG tablet Take 1 tablet by mouth Daily 30 tablet 3    nitroGLYCERIN (NITROSTAT) 0.4 MG SL tablet DISSOLVE 1 TABLET UNDER THE TONGUE EVERY 5 MIN AS NEEDED FOR CHEST PAIN  0    clopidogrel (PLAVIX) 75 MG tablet Take 75 mg by mouth daily      metoprolol (LOPRESSOR) 50 MG tablet Take 50 mg by mouth 2 times daily      aspirin 81 MG tablet Take 81 mg by mouth daily      NONFORMULARY Dexcom 6 checks sugar      atorvastatin (LIPITOR) 80 MG tablet Take 80 mg by mouth daily         Allergies:    Aranesp (albumin free) [darbepoetin eri]    Social History:    reports that he quit smoking about 5 years ago. He has a 45.00 pack-year smoking history. He has quit using smokeless tobacco. He reports current alcohol use of about 4.0 standard drinks of alcohol per week. He reports that he does not use drugs. Family History:       Adopted: Yes   Problem Relation Age of Onset    Diabetes Mother     Cancer Sister        Diet:  DIET RENAL;    Review of systems:   Pertinent positives as noted in the HPI. All other systems reviewed and negative. PHYSICAL EXAM:  BP (!) 175/80   Pulse 56   Temp 97.6 °F (36.4 °C) (Oral)   Resp 12   Ht 5' 2\" (1.575 m)   Wt 126 lb 14.4 oz (57.6 kg)   SpO2 96%   BMI 23.21 kg/m²     General appearance: Alert and appropriate, pleasant adult male no apparent distress, appears stated age and cooperative. HEENT: Normal cephalic, atraumatic without obvious deformity. Pupils equal, round, and reactive to light. Extra ocular muscles intact. Conjunctivae/corneas clear. Neck: Supple, with full range of motion. No jugular venous distention. Trachea midline. Respiratory:  Normal respiratory effort. Clear to auscultation, bilaterally without Rales/Wheezes/Rhonchi. Cardiovascular: Regular rate and rhythm with normal S1/S2 without murmurs, rubs or gallops. Abdomen: Soft, non-tender, non-distended with normal bowel sounds. Musculoskeletal:  No clubbing, cyanosis or edema bilaterally. Skin: Skin color, texture, turgor normal.  No rashes or lesions. Neurologic:  Neurovascularly intact without any focal sensory/motor deficits.  Cranial nerves: II-XII intact, grossly non-focal.  Psychiatric: Alert and oriented, thought content appropriate, normal insight  Capillary Refill: Brisk,< 3 seconds   Peripheral Pulses: +2 palpable, equal bilaterally     Labs:   Recent Labs     11/04/20 1940   WBC 5.6   HGB 10.7*   HCT 33.9*        Recent Labs     11/04/20 1940   *   K 4.9   CL 99   CO2 21*   BUN 35*   CREATININE 3.4*   CALCIUM 9.0     No results for input(s): AST, ALT, BILIDIR, BILITOT, ALKPHOS in the last 72 hours. Recent Labs     11/04/20 1940   INR 0.91     No results for input(s): Drew Breeze in the last 72 hours. Urinalysis:    Lab Results   Component Value Date    NITRU NEGATIVE 04/10/2019    WBCUA NONE SEEN 04/10/2019    BACTERIA NONE 04/10/2019    RBCUA 3-5 04/10/2019    BLOODU SMALL 04/10/2019    GLUCOSEU NEGATIVE 04/10/2019       Radiology:   No orders to display     No results found. EKG: sinus bradycardia, rate=58   · 12-lead analysis was personally reviewed by myself. Sinus bradycardia is noted with a ventricular rate of 58 without ectopy or ST changes at this time. **This report has been created using voice recognition software. It may contain minor errors which are inherent in voice recognition technology. **  Electronically signed by PAOLA Gandhi CNP on 11/5/2020 at 11:16 AM

## 2020-11-05 NOTE — PRE SEDATION
[facility]  Sedation/Analgesia History & Physical    Pt Name: Ramos Oliveira  Account number: [de-identified]  MRN: [id number]  YOB: 1965  Provider Performing Procedure: Bebeto March MD  Referring Provider: Bebeto March MD   Primary Care Physician: Junior Nelson DO  Date: 11/5/2020    PRE-PROCEDURE    Code Status: FULL CODE  Brief History/Pre-Procedure Diagnosis:      New onset cardiomyopathy   EF 40-45% (new drop in EF)    Dyspnea on exertion, fatigue, exertional symtpoms, resistant to medical therapy, Angina equivalent symptoms, CCS Class III    NYHA Class III    Acute/Chronic HFrEF    Pulmonary hypertension    CAD. Prior PCI (Most recently, had PCI for RCA ISR 2019)   CKD, followed by Nephrology (Dr Wen Saldivar). Recently started on peritoneal dialysis. PD Catheter was placed on 8/28/2020. Plan for possible kidney transplant in the future per Nephrology    H/o CVA   DM   HTN   Dyslipidemia     Consent: : I have discussed with the patient risks, benefits, and alternatives (and relevant risks, benefits, and side effects related to alternatives or not receiving care), and likelihood of the success. The patient and/or representative appear to understand and agree to proceed. The discussion encompasses risks, benefits, and side effects related to the alternatives and the risks related to not receiving the proposed care, treatment, and services. The indication, risks and benefits of the procedure and possible therapeutic consequences and alternatives were discussed with the patient. The patient was given the opportunity to ask questions and to have them answered to his/her satisfaction.  Risks of the procedure include but are not limited to the following: Bleeding, hematoma including retroperitoneal hemmorhage, infection, pain and discomfort, injury to the aorta and other blood vessels, rhythm disturbance, low blood pressure, myocardial infarction, stroke, kidney damage/failure, myocardial perforation, allergic reactions to sedatives/contrast material, loss of pulse/vascular compromise requiring surgery, aneurysm/pseudoaneurysm formation, possible loss of a limb/hand/leg, needing blood transfusion, requiring emergent open heart surgery or emergent coronary intervention, the need for intubation/respiratory support, the requirement for defibrillation/cardioversion, and death. Alternatives to and omission of the suggested procedure were discussed. The patient had no further questions and wished to proceed; the consent form was signed. MEDICAL HISTORY  []ASHD/ANGINA/MI/CHF   []Hypertension  []Diabetes  []Hyperlipidemia  []Smoking  []Family Hx of ASHD  []Additional information:       has a past medical history of Broken ankle, CAD (coronary artery disease), Cerebral artery occlusion with cerebral infarction ([address] CHF (congestive heart failure) ([address] Chronic kidney disease, Diabetes mellitus ([address] GERD (gastroesophageal reflux disease), Hyperlipidemia, Hypertension, and Thyroid disease. SURGICAL HISTORY   has a past surgical history that includes Appendectomy; Cardiac surgery (Oct. 2015); Colonoscopy; and eye surgery.   Additional information:       ALLERGIES   Allergies as of 11/04/2020 - Review Complete 11/04/2020   Allergen Reaction Noted    Aranesp (albumin free) [darbepoetin eri] Hives 12/19/2018     Additional information:       MEDICATIONS   Aspirin  [x] 81 mg  [] 325 mg  [] None  Antiplatelet drug therapy use last 5 days  []No [x]Yes  Coumadin Use Last 5 Days [x]No []Yes  Other anticoagulant use last 5 days  [x]No []Yes    Current Facility-Administered Medications:     0.9 % sodium chloride infusion, , Intravenous, Continuous, Ashutosh Bridges PA-C, Last Rate: 75 mL/hr at 11/04/20 1589    nitroGLYCERIN (NITROSTAT) SL tablet 0.4 mg, 0.4 mg, Sublingual, Q5 Min PRN, Ashutosh Bridges PA-C    sodium chloride flush 0.9 % injection 10 mL, 10 mL, Intravenous, 2 times per day, Bonifacio Sage PA-C, 10 mL at 11/04/20 2137    sodium chloride flush 0.9 % injection 10 mL, 10 mL, Intravenous, PRN, Bonifacio Sage PA-C  Prior to Admission medications    Medication Sig Start Date End Date Taking?  Authorizing Provider   insulin glargine (BASAGLAR KWIKPEN) 100 UNIT/ML injection pen Inject 10 Units into the skin daily   Yes Historical Provider, MD   pantoprazole (PROTONIX) 40 MG tablet Take 40 mg by mouth 2 times daily   Yes Historical Provider, MD   hydrALAZINE (APRESOLINE) 50 MG tablet Take 1.5 tablets by mouth 3 times daily  Patient taking differently: Take 50 mg by mouth 3 times daily  10/16/19  Yes PAOLA Malloy CNP   insulin aspart (NOVOLOG) 100 UNIT/ML injection vial Inject into the skin 3 times daily (before meals) PT TAKES IT BASED OFF OF HIS CARBS;  1  Unit per 8 cho   Yes Historical Provider, MD   bumetanide (BUMEX) 2 MG tablet Take 1 tablet by mouth daily  Patient taking differently: Take 2 mg by mouth daily as needed Swelling in ankles 5/15/19  Yes Lora Lewis DO   isosorbide mononitrate (IMDUR) 60 MG extended release tablet Take 1 tablet by mouth daily  Patient taking differently: Take 30 mg by mouth daily  4/18/19  Yes [address] [place] [address], MD   levothyroxine (SYNTHROID) 100 MCG tablet Take 1 tablet by mouth Daily 4/19/19  Yes Radha Mosley MD   nitroGLYCERIN (NITROSTAT) 0.4 MG SL tablet DISSOLVE 1 TABLET UNDER THE TONGUE EVERY 5 MIN AS NEEDED FOR CHEST PAIN 1/25/18  Yes Historical Provider, MD   clopidogrel (PLAVIX) 75 MG tablet Take 75 mg by mouth daily   Yes Historical Provider, MD   metoprolol (LOPRESSOR) 50 MG tablet Take 50 mg by mouth 2 times daily   Yes Historical Provider, MD   aspirin 81 MG tablet Take 81 mg by mouth daily   Yes Historical Provider, MD   NONFORMULARY Dexcom 6 checks sugar    Historical Provider, MD   atorvastatin (LIPITOR) 80 MG tablet Take 80 mg by mouth daily Historical Provider, MD     Additional information:       VITAL SIGNS   Vitals:    11/05/20 0315   BP: (!) 171/70   Pulse: 53   Resp: 18   Temp: 97.6 °F (36.4 °C)   SpO2: 95%       PHYSICAL:   General: No acute distress  HEENT:  Unremarkable for age  Neck: without increased JVD, carotid pulses 2+ bilaterally without bruits  Heart: RRR, S1 & S2 WNL. systolic murmur   NYHA: III   Lungs: Clear to auscultation    Abdomen: BS present, without HSM, masses, or tenderness    Extremities: without C,C. Mild pitting edema Pulses 2+ bilaterally   Mental Status: Alert & Oriented        PLANNED PROCEDURE   [x]Cath  [x]PCI                []Pacemaker/AICD  []ESTEFANI             []Cardioversion []Peripheral angiography/PTA  []Other:      SEDATION  Planned agent:[x]Midazolam []Meperidine []Sublimaze []Morphine  []Diazepam  []Other:     ASA Classification:  []1 []2 [x]3 []4 []5   Class 1: A normal healthy patient  Class 2: Pt with mild to moderate systemic disease  Class 3: Severe systemic disease or disturbance  Class 4: Severe systemic disorders that are already life threatening. Class 5: Moribund pt with little chances of survival, for more than 24 hours. Mallampati I Airway Classification:   []1 []2 [x]3 []4     [x]Pre-procedure diagnostic studies complete and results available. Comment:    [x]Previous sedation/anesthesia experiences assessed. Comment:    [x]The patient is an appropriate candidate to undergo the planned procedure sedation and anesthesia. (Refer to nursing sedation/analgesia documentation record)  [x]Formulation and discussion of sedation/procedure plan, risks, and expectations with patient and/or responsible adult completed. [x]Patient examined immediately prior to the procedure.  (Refer to nursing sedation/analgesia documentation record)      Jen Valenzuela MD, Morena Hayes    Electronically signed 11/5/2020 at 7:41 AM

## 2020-11-05 NOTE — BRIEF OP NOTE
[address] [address]  Sedation/Analgesia Post Sedation Record    Pt Name: Michelle Hernandez  Account number: [de-identified]  MRN: [id number]  YOB: 1965  Procedure Performed By: Rasta Barillas MD   Primary Care Physician: Leo Ybarra DO  Date: 11/5/2020    POST-PROCEDURE    Physicians/Assistants: Rasta Barillas MD     Procedure Performed: RHC, LHC, FFR LAD    Sedation/Anesthesia: Versed/ Fentanyl and 2% xylocaine local anesthesia. Estimated Blood Loss: < 50 ml. Specimens Removed: None         Disposition of Specimen: N/A        Complications: No Immediate Complications. Post-procedure Diagnosis/Findings:        Patents RCA stents   Intermediate mLAD lesion, hemodynamically insignificant by FFR (0.88>0.082)   Acute on chronic HFrEF, decompensated    Volume overload, severe    Elevated filling pressures   Pulmonary HTN   CKD     Recommendations:   Continued medical therapy for CAD, no intervention is indicated   OMT for CHF   Needs optimization of his volume status    Start IV diuretics (Lasix 80 mg IV*1 was given in the cath lab)   The patient is on peritoneal dialysis   Kishore Bach Nephrology consult     Above findings and plan of care were discussed with patient, questions were answered, agreeable with plan.        Rasta Barillas MD, Wilmar Lees   Electronically signed 11/5/2020 at 9:44 AM  Interventional Cardiology

## 2020-11-05 NOTE — PROGRESS NOTES
Pt. Returned to floor from cath lab. Pt. Is A&Ox4 but still recovering from sedation. Bed wheels locked and in lowest position. Call light within reach. Bed alarm in use.  Dori PETTIT/

## 2020-11-05 NOTE — FLOWSHEET NOTE
Pt declined prayerful visit.       11/05/20 1428   Encounter Summary   Services provided to: Patient   Referral/Consult From: Rounding   Continue Visiting No  (11/5 Declined Prayerful Visit. )   Complexity of Encounter Low   Length of Encounter 15 minutes   Routine   Type Initial   Outcome Refused/declined

## 2020-11-05 NOTE — CARE COORDINATION
11/5/20, 10:10 AM EST  DISCHARGE PLANNING EVALUATION:    Anjana Deluna       Admitted from: ED 11/4/2020/ [facility] CLINIC & HOSP day: 0   Location: -21/021-A Reason for admit: CAD in native artery [I25.10] Status: IP  Admit order signed?: yes  PMH:  has a past medical history of Broken ankle, CAD (coronary artery disease), Cerebral artery occlusion with cerebral infarction ([facility] [address] CHF (congestive heart failure) ([facility] [address] [facility] Chronic kidney disease, Diabetes mellitus ([facility] [address] GERD (gastroesophageal reflux disease), Hyperlipidemia, Hypertension, and Thyroid disease. Procedure: 11/5 RHC, LHC, FFR LAD  Pertinent abnormal Imaging:none  Medications:  Scheduled Meds:   sodium chloride flush  10 mL Intravenous 2 times per day     Continuous Infusions:   sodium chloride 75 mL/hr at 11/04/20 2136      Pertinent Info/Orders/Treatment Plan:  Presented to pre cath hydration. HX home CAPD. Cardio and nephro following. Post cath care. IV bumex bid. Diet: Diet NPO Time Specified   Smoking status:  reports that he quit smoking about 5 years ago. He has a 45.00 pack-year smoking history. He has quit using smokeless tobacco.   PCP: Mary Fulton DO  Readmission 30 days or less: no   %    Discharge Planning Evaluation  Current Residence:  Private Residence  Living Arrangements:  Spouse/Significant Other, Children   Support Systems:  Spouse/Significant Other, Children, Family Members  Current Services PTA:     Potential Assistance Needed:  N/A  Potential Assistance Purchasing Medications:  No  Does patient want to participate in local refill/ meds to beds program?  Yes  Type of Home Care Services:  None  Patient expects to be discharged to:  home  Expected Discharge date:  11/05/20  Follow Up Appointment: Best Day/ Time: Tuesday AM    Patient Goals/Plan/Treatment Preferences: Spoke with patient, he plans to return home independently with wife. He does not anticipate discharge needs.    Transportation/Food Security/Housekeeping Addressed:  No issues identified.     Evaluation: no

## 2020-11-05 NOTE — PROGRESS NOTES
Pharmacy Medication History Note      List of current medications patient is taking is complete. Source of information: Talked to patient and talked with pharmacy ([facility]  [phone]). Also visually confirmed with patient's medication bottles. Changes made to medication list:  Medications removed (include reason, ex. therapy complete or physician discontinued): · Removed hydralazine 50mg 1.5 tablets TID. · Removed isosorbide mononitrate ER 60mg QD. Medications added/doses adjusted:  · Added hydralazine 50mg 1 tablet TID. · Added isosorbide mononitrate ER 30mg QD. · Changed synthroid SIG to include \"take on an empty stomach\". Other notes (ex. Recent course of antibiotics, Coumadin dosing): · Synthroid - last confirmed Rx per pharmacy ([facility]) was 88mcg and 100mcg alternating every other day. Patient stated that their doctor recently instructed them to start taking the 100mcg daily. · Bumetanide 2mg tablets are an old Rx filled in 2019. Patient still has bottle with him at this time and takes 1 tab (2mg) daily as needed for swelling in ankles, rather than 1 tab every day. · Denies use of other OTC or herbal medications. Allergies reviewed.       Electronically signed by Ronal Grace on 11/5/2020 at 12:32 PM

## 2020-11-06 VITALS
SYSTOLIC BLOOD PRESSURE: 138 MMHG | WEIGHT: 125.4 LBS | DIASTOLIC BLOOD PRESSURE: 66 MMHG | OXYGEN SATURATION: 92 % | TEMPERATURE: 97.5 F | HEART RATE: 59 BPM | HEIGHT: 62 IN | BODY MASS INDEX: 23.08 KG/M2 | RESPIRATION RATE: 20 BRPM

## 2020-11-06 LAB
ANION GAP SERPL CALCULATED.3IONS-SCNC: 12 MEQ/L (ref 8–16)
BUN BLDV-MCNC: 36 MG/DL (ref 7–22)
CALCIUM SERPL-MCNC: 8.9 MG/DL (ref 8.5–10.5)
CHLORIDE BLD-SCNC: 103 MEQ/L (ref 98–111)
CO2: 22 MEQ/L (ref 23–33)
CREAT SERPL-MCNC: 3.2 MG/DL (ref 0.4–1.2)
ERYTHROCYTE [DISTWIDTH] IN BLOOD BY AUTOMATED COUNT: 13.4 % (ref 11.5–14.5)
ERYTHROCYTE [DISTWIDTH] IN BLOOD BY AUTOMATED COUNT: 48.1 FL (ref 35–45)
FOLATE: 10.2 NG/ML (ref 4.8–24.2)
GFR SERPL CREATININE-BSD FRML MDRD: 20 ML/MIN/1.73M2
GLUCOSE BLD-MCNC: 162 MG/DL (ref 70–108)
HCT VFR BLD CALC: 32.8 % (ref 42–52)
HEMOGLOBIN: 10.5 GM/DL (ref 14–18)
MCH RBC QN AUTO: 31.2 PG (ref 26–33)
MCHC RBC AUTO-ENTMCNC: 32 GM/DL (ref 32.2–35.5)
MCV RBC AUTO: 97.3 FL (ref 80–94)
PLATELET # BLD: 231 THOU/MM3 (ref 130–400)
PMV BLD AUTO: 10.5 FL (ref 9.4–12.4)
POTASSIUM REFLEX MAGNESIUM: 3.9 MEQ/L (ref 3.5–5.2)
RBC # BLD: 3.37 MILL/MM3 (ref 4.7–6.1)
SODIUM BLD-SCNC: 137 MEQ/L (ref 135–145)
VITAMIN B-12: 334 PG/ML (ref 211–911)
WBC # BLD: 5.3 THOU/MM3 (ref 4.8–10.8)

## 2020-11-06 PROCEDURE — 36415 COLL VENOUS BLD VENIPUNCTURE: CPT

## 2020-11-06 PROCEDURE — 82607 VITAMIN B-12: CPT

## 2020-11-06 PROCEDURE — 82746 ASSAY OF FOLIC ACID SERUM: CPT

## 2020-11-06 PROCEDURE — 80048 BASIC METABOLIC PNL TOTAL CA: CPT

## 2020-11-06 PROCEDURE — 90945 DIALYSIS ONE EVALUATION: CPT | Performed by: NURSE PRACTITIONER

## 2020-11-06 PROCEDURE — 2580000003 HC RX 258: Performed by: INTERNAL MEDICINE

## 2020-11-06 PROCEDURE — 85027 COMPLETE CBC AUTOMATED: CPT

## 2020-11-06 PROCEDURE — 99239 HOSP IP/OBS DSCHRG MGMT >30: CPT | Performed by: FAMILY MEDICINE

## 2020-11-06 PROCEDURE — 6370000000 HC RX 637 (ALT 250 FOR IP): Performed by: NURSE PRACTITIONER

## 2020-11-06 RX ORDER — HYDRALAZINE HYDROCHLORIDE 25 MG/1
75 TABLET, FILM COATED ORAL 3 TIMES DAILY
Qty: 90 TABLET | Refills: 0 | Status: SHIPPED | OUTPATIENT
Start: 2020-11-06

## 2020-11-06 RX ORDER — BUMETANIDE 1 MG/1
2 TABLET ORAL DAILY
Status: DISCONTINUED | OUTPATIENT
Start: 2020-11-06 | End: 2020-11-06 | Stop reason: HOSPADM

## 2020-11-06 RX ORDER — SODIUM CHLORIDE, SODIUM LACTATE, CALCIUM CHLORIDE, MAGNESIUM CHLORIDE AND DEXTROSE 2.5; 538; 448; 18.3; 5.08 G/100ML; MG/100ML; MG/100ML; MG/100ML; MG/100ML
1200 INJECTION, SOLUTION INTRAPERITONEAL 4 TIMES DAILY
Qty: 144000 ML | Refills: 0 | Status: ON HOLD
Start: 2020-11-06 | End: 2022-02-26 | Stop reason: HOSPADM

## 2020-11-06 RX ADMIN — PANTOPRAZOLE SODIUM 40 MG: 40 TABLET, DELAYED RELEASE ORAL at 08:58

## 2020-11-06 RX ADMIN — LEVOTHYROXINE SODIUM 100 MCG: 100 TABLET ORAL at 08:57

## 2020-11-06 RX ADMIN — METOPROLOL TARTRATE 50 MG: 50 TABLET, FILM COATED ORAL at 08:57

## 2020-11-06 RX ADMIN — BUMETANIDE 2 MG: 1 TABLET ORAL at 08:52

## 2020-11-06 RX ADMIN — INSULIN GLARGINE 10 UNITS: 100 INJECTION, SOLUTION SUBCUTANEOUS at 09:00

## 2020-11-06 RX ADMIN — Medication 10 ML: at 08:59

## 2020-11-06 RX ADMIN — HYDRALAZINE HYDROCHLORIDE 75 MG: 50 TABLET ORAL at 14:00

## 2020-11-06 RX ADMIN — HYDRALAZINE HYDROCHLORIDE 75 MG: 50 TABLET ORAL at 08:56

## 2020-11-06 RX ADMIN — SODIUM CHLORIDE, SODIUM LACTATE, CALCIUM CHLORIDE, MAGNESIUM CHLORIDE AND DEXTROSE 1200 ML: 2.5; 538; 448; 18.3; 5.08 INJECTION, SOLUTION INTRAPERITONEAL at 13:51

## 2020-11-06 RX ADMIN — SODIUM CHLORIDE, SODIUM LACTATE, CALCIUM CHLORIDE, MAGNESIUM CHLORIDE AND DEXTROSE 1200 ML: 2.5; 538; 448; 18.3; 5.08 INJECTION, SOLUTION INTRAPERITONEAL at 11:59

## 2020-11-06 RX ADMIN — SODIUM CHLORIDE, SODIUM LACTATE, CALCIUM CHLORIDE, MAGNESIUM CHLORIDE AND DEXTROSE 1200 ML: 2.5; 538; 448; 18.3; 5.08 INJECTION, SOLUTION INTRAPERITONEAL at 08:46

## 2020-11-06 RX ADMIN — ASPIRIN 81 MG: 81 TABLET ORAL at 08:51

## 2020-11-06 ASSESSMENT — PAIN SCALES - GENERAL
PAINLEVEL_OUTOF10: 0
PAINLEVEL_OUTOF10: 0

## 2020-11-06 NOTE — PROGRESS NOTES
CLINICAL PHARMACY: DISCHARGE MED RECONCILIATION/REVIEW    [facility] Select Patient?: No  Total # of Interventions Recommended: 0  Total # Interventions Accepted: 0  Intervention Severity:   - Level 1 Intervention Present?: No   - Level 2 #: 0   - Level 3 #: 0   Time Spent (min): 15    Dung Mitchell PharmD  11/6/2020  3:30 PM

## 2020-11-06 NOTE — PROGRESS NOTES
CLINICAL PHARMACY NOTE: MEDS TO [address] Select Patient?: No  Total # of Prescriptions Filled: 1   The following medications were delivered to the patient:  Pen Needles  Total # of Interventions Completed: 2  Time Spent (min): 30    Additional Documentation:

## 2020-11-06 NOTE — FLOWSHEET NOTE
Pt appears to be resting comfortably in bed. Alert and oriented x 4. Pain 0/10. Respirations shallow but unlabored. Lung sounds diminished with crackles in the bases. Abdomen rounded and taunt, but nontender. PD catheter in place. States no further concerns at this time. Pt refuses all alarms, call light in reach.

## 2020-11-06 NOTE — CARE COORDINATION
11/6/20, 2:09 PM EST    Patient goals/plan/ treatment preferences discussed by  and . Patient goals/plan/ treatment preferences reviewed with patient/ family. Patient/ family verbalize understanding of discharge plan and are in agreement with goal/plan/treatment preferences. Understanding was demonstrated using the teach back method. AVS provided by RN at time of discharge, which includes all necessary medical information pertaining to the patients current course of illness, treatment, post-discharge goals of care, and treatment preferences. Ok to discharge from renal standpoint. Hospitalist following. Anticipate discharge today vs weekend pending symptoms, per report crackles this morning. Bumex changed to po. Plans to return home with wife. Denied needs. Order for CHF RN/cllinic obtained.

## 2020-11-06 NOTE — PROGRESS NOTES
Patient has a dexcom and refuses us to get his blood sugars. Patient adamant on using his own insulin and going by his glucose monitoring system. Patient refuses bed alarm to be activated.

## 2020-11-06 NOTE — DISCHARGE SUMMARY
DISCHARGE SUMMARY      Patient Identification:   Jamila Herman   : 1965  MRN: [id number]   Account: [de-identified]      Patient's PCP: Joselyn Samuel DO    Admit Date: 2020     Discharge Date: 2020     Admitting Physician:  PAOLA Garcia - SHAILESH/Kathleen Angela MD     Discharge Physician: Nori Edwards MD (Resident)/ Susie Red MD (Attending)     Discharge Diagnoses:    -Acute on chronic decompensated HFrEF, improved   -ESRD on PD, with severe volume overload  -Pulmonary edema, secondary to volume overload, secondary to acute on chronic decompensated HFrEF/CKD, improved   -Non-anion gap metabolic acidosis due to combination of end-stage kidney disease and inadequate dialysis  -Cough likely secondary to pulmonary edema  -Type 2 diabetes mellitus, uncontrolled   - Hx of CAD  -Acute on chronic macrocytic anemia, stable   -Hx of Essential hypertension  -Hx of Dyslipidemia  - Hx of Hypothyroidism  -Hx of GERD    The patient was seen and examined on day of discharge and this discharge summary is in conjunction with any daily progress note from day of discharge. Hospital Course:   Per H&P: \"Jean Deluna is a 69-year-old  male, reformed smoker, with a medical history of coronary artery disease, history of cerebral artery occlusion with cerebral infarction in 2015, chronic HFrEF, diabetes mellitus, cardiomyopathy, pulmonary hypertension, dyslipidemia, GERD, hyperlipidemia, hypertension, and thyroid disease. Patient presented to [facility] as a direct admission with volume overload. Patient was seen by Dr. Kady Gudino MD and received a heart cath which found patent RCA stents, intermediate mLAD lesion, hemodynamically insignificant by FFR, acute on chronic HFrEF that was decompensated, severe volume overload, elevated filling pressures, pulmonary hypertension, and chronic kidney disease.   Recommendations for inpatient admission were noted for continued medical therapy for CAD, no intervention was indicated, optimize medical therapy for CHF and volume status, initiation of IV diuretics with a nephrology consultation as well. The hospitalist service was contacted to assume attending and further evaluate, treat, and manage this patient's case. \"    11/6: Patient is currently receiving peritoneal dialysis and is doing well. Did receive intravenous Bumex. Vital signs and labs are stable. Overall patient's clinical picture is stable and improved as compared to initial presentation. Cardiology and nephrology are okay for discharge today. Of note, his blood sugars are uncontrolled, POC glucose 200s to 300s, A1C 8.7% on 11/5/2020, per RN, patient refused insulin. He is advised to follow-up with his PCP in 1 to 2 weeks. He is advised to follow-up with nephrology as scheduled. He is advised to follow-up with cardiology as scheduled. Discharged home in stable condition at this time. Exam:     Vitals:  Vitals:    11/06/20 0425 11/06/20 0845 11/06/20 1200 11/06/20 1344   BP: (!) 172/72 (!) 179/79 (!) 148/69 138/66   Pulse: 56 55 59 59   Resp: 14 16 18 20   Temp: 98.9 °F (37.2 °C) 97.8 °F (36.6 °C) 97.8 °F (36.6 °C) 97.5 °F (36.4 °C)   TempSrc: Oral Oral Oral Oral   SpO2: 91% 94% 92% 92%   Weight:       Height:         Weight: Weight: 125 lb 6.4 oz (56.9 kg)(dry weight)     24 hour intake/output:    Intake/Output Summary (Last 24 hours) at 11/6/2020 1928  Last data filed at 11/6/2020 1425  Gross per 24 hour   Intake 4190 ml   Output 8225 ml   Net -4035 ml         General appearance:  No apparent distress, appears stated age and cooperative. HEENT:  Normal cephalic, atraumatic without obvious deformity. Pupils equal, round, and reactive to light. Extra ocular muscles intact. Conjunctivae/corneas clear. Neck: Supple, with full range of motion. No jugular venous distention. Trachea midline.   Respiratory:  Normal respiratory effort. (+) fine crackles on lung bases. No wheezing, no rhonchi. Cardiovascular:  Regular rate and rhythm with normal S1/S2 without murmurs, rubs or gallops. Abdomen: Soft, non-tender, non-distended with normal bowel sounds. (+) PD cath in placed. Musculoskeletal:  No clubbing, cyanosis or edema bilaterally. Full range of motion without deformity. Skin: Skin color, texture, turgor normal.  No rashes or lesions. Neurological exam reveals alert, oriented, normal speech, no focal findings or movement disorder noted. Psychiatric:  thought content appropriate, normal insight  Exam of extremities: peripheral pulses normal, no pedal edema, no clubbing or cyanosis      Labs: For convenience and continuity at follow-up the following most recent labs are provided:      CBC:    Lab Results   Component Value Date    WBC 5.3 11/06/2020    HGB 10.5 11/06/2020    HCT 32.8 11/06/2020     11/06/2020       Renal:    Lab Results   Component Value Date     11/06/2020    K 3.9 11/06/2020     11/06/2020    CO2 22 11/06/2020    BUN 36 11/06/2020    CREATININE 3.2 11/06/2020    CALCIUM 8.9 11/06/2020    PHOS 3.7 11/25/2018         Significant Diagnostic Studies    Radiology:   No orders to display          Consults:     IP CONSULT TO NEPHROLOGY  IP CONSULT TO HOSPITALIST  IP CONSULT TO HEART FAILURE NURSE/COORDINATOR    Disposition:    [] Home       [] TCU       [] Rehab       [] Psych       [] SNF       [] [place]       [] Other-    Condition at Discharge: Stable    Code Status:  Full Code    Patient Instructions: Activity: activity as tolerated  Diet: DIET GENERAL;      Follow-up visits:   [address] [place] [address] Dr [phone]    will call you with an appointment.     PAOLA De Souza - CNP  [address] [place] [address]  [phone]    On 11/11/2020  @ 1:00, will call on your cell phone, will send a text message 15min before to connect to televisit.     Patricia Samaniego MD  [address]  [phone]    Go on 11/24/2020  @ 10:45    Brian Anderson DO  [address] [facility] [address]    On 11/23/2020  keep already scheduled appointment         Discharge Medications:      Queenie Goel Medication Instructions STZ:[id number]    Printed on:11/06/20 1928     Medication Information                        aspirin 81 MG EC tablet  Take 81 mg by mouth daily              atorvastatin (LIPITOR) 80 MG tablet  Take 80 mg by mouth daily             bumetanide (BUMEX) 2 MG tablet  Take 1 tablet by mouth daily             clopidogrel (PLAVIX) 75 MG tablet  Take 75 mg by mouth daily             hydrALAZINE (APRESOLINE) 25 MG tablet  Take 3 tablets by mouth 3 times daily             insulin aspart (NOVOLOG) 100 UNIT/ML injection vial  Inject into the skin 3 times daily (before meals) PT TAKES IT BASED OFF OF HIS CARBS;  1  Unit per 8 cho             insulin glargine (BASAGLAR KWIKPEN) 100 UNIT/ML injection pen  Inject 10 Units into the skin daily             Insulin Pen Needle ([employer] PEN NEEDLES) 31G X 6 MM MISC  1 each by Does not apply route daily             isosorbide mononitrate (IMDUR) 30 MG extended release tablet  Take 30 mg by mouth daily             levothyroxine (SYNTHROID) 100 MCG tablet  Take 1 tablet by mouth Daily             metoprolol (LOPRESSOR) 50 MG tablet  Take 50 mg by mouth 2 times daily (with meals)              nitroGLYCERIN (NITROSTAT) 0.4 MG SL tablet  DISSOLVE 1 TABLET UNDER THE TONGUE EVERY 5 MIN AS NEEDED FOR CHEST PAIN             NONFORMULARY  Dexcom 6 checks sugar             pantoprazole (PROTONIX) 40 MG tablet  Take 40 mg by mouth 2 times daily             Per[employer] (DIANEAL LO-JARON, ULTRABAG, 2.5%)  Inject 1,200 mLs into the peritoneum 4 times daily                 Time Spent on discharge is more than 30 minutes in the examination, evaluation, counseling and review of medications and discharge plan. Signed: Thank you Rebeca Chun DO for the opportunity to be involved in this patient's care.     Electronically signed by Dashawn Tompkins MD on 11/6/2020 at 7:28 PM

## 2020-11-06 NOTE — PROGRESS NOTES
Patient given discharge instructions via teachback method. Patient verbalizes understanding of new medications and side effects, he verbalizes care of his radial cath site and verbalizes understanding of CHF. He verbalizes understanding of follow up appointments. Patient taken to the discharge lobby in stable condition.

## 2020-11-06 NOTE — PROGRESS NOTES
Nephrology Progress Note    Patient Stephanie Lieberman   MRN -  [id number]   Acct # - [de-identified]      - 1965    63 y.o. Admit Date: 2020  Hospital Day: 1  Location: -[facility]  Date of evaluation -  2020    Subjective:   CC: cough  Denies shortness of breath on Room air, cough resolved  Good UOP  BP up  Peritoneal dialysis effluent clear yellow.  Adequate Ultrafiltration   BP Range: Systolic (74XCN), ZCI:061 , Min:150 , ENS:592      Diastolic (89IGL), ZOT:92, Min:70, Max:91    Objective:   VITALS:  BP (!) 172/72   Pulse 56   Temp 98.9 °F (37.2 °C) (Oral)   Resp 14   Ht 5' 2\" (1.575 m)   Wt 125 lb 6.4 oz (56.9 kg) Comment: dry weight  SpO2 91%   BMI 22.94 kg/m²    Patient Vitals for the past 24 hrs:   BP Temp Temp src Pulse Resp SpO2 Weight   20 0425 (!) 172/72 98.9 °F (37.2 °C) Oral 56 14 91 % --   20 0000 (!) 170/79 -- -- 60 -- -- --   20 2230 -- -- -- -- -- -- 125 lb 6.4 oz (56.9 kg)   20 2149 (!) 191/82 -- -- 60 18 94 % --   20 1945 (!) 172/77 98.1 °F (36.7 °C) Oral 63 20 95 % 128 lb 15.5 oz (58.5 kg)   20 1730 (!) 176/77 -- -- 64 -- 93 % --   20 1605 (!) 194/86 97.8 °F (36.6 °C) Oral 63 22 94 % --   20 1545 (!) 178/76 -- -- 64 -- 93 % --   20 1445 (!) 172/90 -- -- 62 -- 94 % --   20 1345 (!) 150/70 -- -- 68 -- 93 % --   20 1245 (!) 175/70 -- -- 55 -- 97 % --   20 1215 (!) 179/74 -- -- 54 -- -- --   20 1200 (!)  97.5 °F (36.4 °C) Oral 55 -- 98 % --   20 1145 (!) 187/81 -- -- 54 -- -- --   20 1115 (!)  -- -- 55 -- 96 % --   20 1045 (!) 186/ -- -- 56 -- 95 % --   20 1030 (!)  -- -- 56 -- 96 % --   20 1015 (!) 179 -- -- 58 -- 93 % --   20 1000 (!) 169/73 -- -- 57 -- 93 % --   20 0945 (!) 180 -- -- 57 -- 91 % --          Intake/Output Summary (Last 24 hours) at 2020 0804  Last data filed at 2020 0425  Gross per 24 hour   Intake 3240 ml

## 2020-11-06 NOTE — PROGRESS NOTES
Went in to do CAPD exchange for patient, upon explaining the procedure patient stated I do not dwell for 6 hours, I have to drain every 2 hours and I only instill 1200mL. Talked to Dr. Jasmina Cee. He stated to do how the patient does at home. Talked to Parish RN per patient request, his PD RN. Parish stated the patient is very difficult to work with and does things how he wants to do them. Parish stated that he only does instill 1200 mL and is supposed to do 4 bags a day. Parish stated he does drain about every 2 hours if he does, patient is noncompliant. Updated Dr. Jasmina Cee on instilling 1200 mL of fluid and drain every 2 hours for 4 bags. Dr. Jasmina Cee stated to do what he does at home. Updated patient on plan of care.

## 2020-11-10 NOTE — PROGRESS NOTES
CLINICAL PHARMACY NOTE: MEDS TO [address] Select Patient?: No  Total # of Prescriptions Filled: 1   The following medications were delivered to the patient:  Hydralazine 25mg  Total # of Interventions Completed: 1  Time Spent (min): 30      Additional Documentation:

## 2020-11-24 ENCOUNTER — OFFICE VISIT (OUTPATIENT)
Dept: CARDIOLOGY CLINIC | Age: 55
End: 2020-11-24
Payer: COMMERCIAL

## 2020-11-24 VITALS
SYSTOLIC BLOOD PRESSURE: 106 MMHG | WEIGHT: 122.25 LBS | HEIGHT: 62 IN | DIASTOLIC BLOOD PRESSURE: 72 MMHG | HEART RATE: 66 BPM | BODY MASS INDEX: 22.5 KG/M2

## 2020-11-24 PROCEDURE — 99213 OFFICE O/P EST LOW 20 MIN: CPT | Performed by: INTERNAL MEDICINE

## 2020-11-24 PROCEDURE — G8420 CALC BMI NORM PARAMETERS: HCPCS | Performed by: INTERNAL MEDICINE

## 2020-11-24 PROCEDURE — G8428 CUR MEDS NOT DOCUMENT: HCPCS | Performed by: INTERNAL MEDICINE

## 2020-11-24 PROCEDURE — 1111F DSCHRG MED/CURRENT MED MERGE: CPT | Performed by: INTERNAL MEDICINE

## 2020-11-24 PROCEDURE — 3017F COLORECTAL CA SCREEN DOC REV: CPT | Performed by: INTERNAL MEDICINE

## 2020-11-24 PROCEDURE — G8484 FLU IMMUNIZE NO ADMIN: HCPCS | Performed by: INTERNAL MEDICINE

## 2020-11-24 PROCEDURE — 1036F TOBACCO NON-USER: CPT | Performed by: INTERNAL MEDICINE

## 2020-11-24 RX ORDER — GENTAMICIN SULFATE 1 MG/G
CREAM TOPICAL
Status: ON HOLD | COMMUNITY
Start: 2020-09-03 | End: 2022-02-24

## 2020-11-24 NOTE — PROGRESS NOTES
[address] [place] [address],[place] [address] [address] [place] [address]  Dept: [phone]  Dept Fax: [phone]  Loc: [phone]    Visit Date: 11/24/2020    Mr. Deluna is a 63 y.o. male  who presented for:    HFpEF  CAD  PCI RCA ISR 10/2019  H/O MI  Recent admission for CHF, see summary below     HPI:   MIRANDA Herbert is a pleasant 63year old male patient who  has a past medical history of Broken ankle, CAD (coronary artery disease), Cerebral artery occlusion with cerebral infarction ([facility] [address] CHF (congestive heart failure) ([facility] [address] Chronic kidney disease, Diabetes mellitus ([facility] [address] GERD (gastroesophageal reflux disease), Hyperlipidemia, Hypertension, and Thyroid disease. The patient has known h/o CAD, HFpEF. He has had prior PCI of RCA. The patient underwent successful IVUS guided PCI/Stenting of RCA 10/2019. On 11/2019, he underwent RHC/LHC, patent RCA noted, FFR of LAD was negative (0.82) and patient was noted to have decompensated CHF. He was admitted to [facility], received IV diuretics, seen by Nephrology for continued PD. The patient feels better now. Patient denies chest pain, shortness of breath, dyspnea on exertion, orthopnea, paroxysmal nocturnal dyspnea, palpitations, dizziness, syncope, weight gain or leg swelling.          Current Outpatient Medications:     hydrALAZINE (APRESOLINE) 25 MG tablet, Take 3 tablets by mouth 3 times daily, Disp: 90 tablet, Rfl: 0    Peritoneal [employer]ialysis [employer] (DIANEAL LO-JARON, ULTRABAG, 2.5%), Inject 1,200 mLs into the peritoneum 4 times daily, Disp: 216585 mL, Rfl: 0    isosorbide mononitrate (IMDUR) 30 MG extended release tablet, Take 30 mg by mouth daily, Disp: , Rfl:     Insulin Pen Needle ([employer] PEN NEEDLES) 31G X 6 MM MISC, 1 each by Does not apply route daily, Disp: 100 each, Rfl: 0    insulin glargine (BASAGLAR KWIKPEN) 100 UNIT/ML injection pen, Inject 10 Units into the skin daily, Disp: , Rfl:     pantoprazole (PROTONIX) 40 MG tablet, Take 40 mg by mouth 2 times daily, Disp: , Rfl:     insulin aspart (NOVOLOG) 100 UNIT/ML injection vial, Inject into the skin 3 times daily (before meals) PT TAKES IT BASED OFF OF HIS CARBS;  1  Unit per 8 cho, Disp: , Rfl:     NONFORMULARY, Dexcom 6 checks sugar, Disp: , Rfl:     bumetanide (BUMEX) 2 MG tablet, Take 1 tablet by mouth daily (Patient taking differently: Take 2 mg by mouth daily as needed Swelling in ankles), Disp: 90 tablet, Rfl: 3    levothyroxine (SYNTHROID) 100 MCG tablet, Take 1 tablet by mouth Daily, Disp: 30 tablet, Rfl: 3    nitroGLYCERIN (NITROSTAT) 0.4 MG SL tablet, DISSOLVE 1 TABLET UNDER THE TONGUE EVERY 5 MIN AS NEEDED FOR CHEST PAIN, Disp: , Rfl: 0    atorvastatin (LIPITOR) 80 MG tablet, Take 80 mg by mouth daily, Disp: , Rfl:     clopidogrel (PLAVIX) 75 MG tablet, Take 75 mg by mouth daily, Disp: , Rfl:     metoprolol (LOPRESSOR) 50 MG tablet, Take 50 mg by mouth 2 times daily (with meals) , Disp: , Rfl:     aspirin 81 MG EC tablet, Take 81 mg by mouth daily , Disp: , Rfl:     Past Medical History  Kate Brown  has a past medical history of Broken ankle, CAD (coronary artery disease), Cerebral artery occlusion with cerebral infarction ([facility] [address] CHF (congestive heart failure) ([facility] [address] Chronic kidney disease, Diabetes mellitus ([facility] [address] GERD (gastroesophageal reflux disease), Hyperlipidemia, Hypertension, and Thyroid disease. Social History  Kate Brown  reports that he quit smoking about 5 years ago. He has a 45.00 pack-year smoking history. He has quit using smokeless tobacco. He reports current alcohol use of about 4.0 standard drinks of alcohol per week. He reports that he does not use drugs. Family History  Kate Brown family history includes Cancer in his sister; Diabetes in his mother. He was adopted.       Past Surgical History   Past Surgical History:   Procedure Laterality Date    APPENDECTOMY     [facility] CARDIAC SURGERY  Oct. 2015    2 stents placed    COLONOSCOPY      EYE SURGERY         Review of Systems   Constitutional: Negative for chills and fever  HENT: Negative for congestion, sinus pressure, sneezing and sore throat. Eyes: Negative for pain, discharge, redness and itching. Respiratory: Negative for apnea, cough  Gastrointestinal: Negative for blood in stool, constipation, diarrhea   Endocrine: Negative for cold intolerance, heat intolerance, polydipsia. Genitourinary: Negative for dysuria, enuresis, flank pain and hematuria. Musculoskeletal: Negative for arthralgias, joint swelling and neck pain. Neurological: Negative for numbness and headaches. Psychiatric/Behavioral: Negative for agitation, confusion, decreased concentration and dysphoric mood. Objective: There were no vitals taken for this visit. Wt Readings from Last 3 Encounters:   11/05/20 125 lb 6.4 oz (56.9 kg)   08/28/20 130 lb (59 kg)   08/19/20 127 lb (57.6 kg)     BP Readings from Last 3 Encounters:   11/06/20 138/66   08/28/20 (!) 178/78   08/19/20 (!) 140/68       Nursing note and vitals reviewed. Physical Exam   Constitutional: Oriented to person, place, and time. Appears well-developed and well-nourished. ENT: Moist mucous membranes. No bleeding. Tongue is midline. Head: Normocephalic and atraumatic. Eyes: EOM are normal. Pupils are equal, round, and reactive to light. Neck: Normal range of motion. Neck supple. No JVD present. Cardiovascular: Normal rate, regular rhythm, murmur, no rubs, and intact distal pulses. Pulmonary/Chest: Effort normal and breath sounds normal. No respiratory distress. No wheezes. No rales. Abdominal: Soft. Bowel sounds are normal. No distension. There is no tenderness. Musculoskeletal: Normal range of motion. no edema. Neurological: Alert and oriented to person, place, and time. No cranial nerve deficit. Coordination normal.   Skin: Skin is warm and dry. Psychiatric: Normal mood and affect.        Lab Results Component Value Date    CKTOTAL 132 04/09/2016       Lab Results   Component Value Date    WBC 5.3 11/06/2020    RBC 3.37 11/06/2020    HGB 10.5 11/06/2020    HCT 32.8 11/06/2020    MCV 97.3 11/06/2020    MCH 31.2 11/06/2020    MCHC 32.0 11/06/2020    RDW 16.2 04/09/2016     11/06/2020    MPV 10.5 11/06/2020       Lab Results   Component Value Date     11/06/2020    K 3.9 11/06/2020     11/06/2020    CO2 22 11/06/2020    BUN 36 11/06/2020    LABALBU 4.0 06/03/2019    CREATININE 3.2 11/06/2020    CALCIUM 8.9 11/06/2020    LABGLOM 20 11/06/2020    GLUCOSE 162 11/06/2020       Lab Results   Component Value Date    ALKPHOS 185 06/03/2019    ALT 66 06/03/2019    AST 64 06/03/2019    PROT 6.6 04/10/2019    BILITOT 0.5 06/03/2019    BILIDIR 0.3 04/10/2019    LABALBU 4.0 06/03/2019       Lab Results   Component Value Date    MG 1.9 11/25/2018       Lab Results   Component Value Date    INR 0.91 11/04/2020    INR 0.96 10/16/2019    INR 1.01 04/10/2019    PROTIME 12.5 08/27/2018         Lab Results   Component Value Date    LABA1C 8.7 11/05/2020       Lab Results   Component Value Date    TRIG 166 10/12/2019    HDL 56 10/12/2019    LDLCALC 59 10/12/2019       Lab Results   Component Value Date    .400 04/11/2019         Testing Reviewed:      I have individually reviewed the cardiac test below:    ECHO:   Results for orders placed during the hospital encounter of 10/16/20   ECHO Complete 2D W Doppler W Color    Narrative Transthoracic Echocardiography Report (TTE)     Demographics      Patient Name  ALLISON Campos  Gender             Male                 L      MR #          [id number]     Race                                                 Ethnicity      Account #     [de-identified]     Room Number      Accession     [id number]    Date of Study      10/16/2020   Number      Date of Birth 1965    Referring          Delmar Pelletier.  [address] [place] [address] Titi Rivera MD      Age           63 year(s)    Sonographer        JUAN Ace, RDCS,                                                  RDMS, RVT                                  Interpreting       Antonino Guillen MD                               Physician     Procedure    Type of Study      TTE procedure:ECHOCARDIOGRAM COMPLETE 2D W DOPPLER W COLOR. Procedure Date  Date: 10/16/2020 Start: 10:36 AM    Study Location: Echo Lab  Technical Quality: Adequate visualization    Indications:Congestive heart failure and Hypertension. Additional Medical History:thyroid disease, diabetes, congestive heart  failure, stroke, coronary artery disease, hypertension, hyperlipidemia,  gastroesophageal reflux disease, chronic kidney disease, exsmoker    Patient Status: Routine    Height: 62 inches Weight: 130 pounds BSA: 1.59 m^2 BMI: 23.78 kg/m^2    BP: 178/78 mmHg    Allergies    - See Epic. Conclusions      Summary   Left ventricle size is normal.   Mild concentric left ventricular hypertrophy. There was mild global hypokinesis of the left ventricle. Ejection fraction is visually estimated in the range of 40% to 45%. Mildly enlarged right atrium size. Structurally normal mitral valve. Mild mitral regurgitation is present. Tricuspid valve is structurally normal.   Mild tricuspid regurgitation. Right ventricular systolic pressure of 50 mmHg consistent with moderate   pulmonary hypertension. Pulmonic valve is structurally normal.   Mild pulmonic regurgitation visualized. Dilated IVC with poor inspiration collapse consistent with elevated right   atrial pressure. Signature      ----------------------------------------------------------------   Electronically signed by Antonino Guillen MD (Interpreting   physician) on 10/16/2020 at 04:18 PM   ----------------------------------------------------------------      Findings      Mitral Valve   Structurally normal mitral valve.    Mild mitral regurgitation is LV Length: 7.3 cm         Ascending Aorta: 3 cm                                             LA volume/Index: 51.5 ml /32m^2      LV Area   Diastolic: 65.3   cm^2   LV Area   Systolic: 61.1   cm^2     Doppler Measurements & Calculations      MV Peak E-Wave: 70.3 cm/s  AV Peak Velocity: 98.9 LVOT Peak Velocity: 81                              cm/s                   cm/s   MV Peak Gradient: 1.98     AV Peak Gradient: 3.91 LVOT Peak Gradient: 3   mmHg                       mmHg                   mmHg      MV Deceleration Time: 162                         TV Peak E-Wave: 46.7   msec                                              cm/s                                                     TV Peak A-Wave: 33 cm/s                              IVRT: 155 msec   MV E' Septal Velocity: 3.8                        TV Peak Gradient: 0.87   cm/s                                              mmHg   MV A' Septal Velocity: 3.8 AV DVI (Vmax):0.82     TR Velocity:309 cm/s   cm/s                                              TR Gradient:38.19 mmHg   MV E' Lateral Velocity:                           PV Peak Velocity: 49.7   5.1 cm/s                                          cm/s   MV A' Lateral Velocity:                           PV Peak Gradient: 0.99   5.1 cm/s                                          mmHg   E/E' septal: 18.5   E/E' lateral: 13.78   MR Velocity: 364 cm/s                             ND ED Velocity: 168 cm/s     [id number]%[id number]%3d        Cath:10/2019:  HEMODYNAMICS:  1.  RA 20 mmHg. 2.  RV 78/11 with a mean of 25.  3.  Pulmonary capillary wedge pressure, 21 mmHg. 4.  Pulmonary arterial pressure 80/33 with a mean of 49.  5.  LVEDP 29 mmHg. 6.  Cardiac output 3.94 liters per minute.   7.  Cardiac index 2.5 liters/minute per cubic meter.     CORONARY ANGIOGRAM:  1.  Right coronary artery, 70% in-stent coronary artery:  Right coronary artery is a large dominant  vessel. The proximal RCA had 30% in-stent restenosis that is stable  when compared to previous cath. The stent in the mid-RCA is widely  patent. Distal RCA has mild luminal irregularities. The right  posterior descending artery has 10% to 20% stenosis. The right  posterolateral branch is a large vessel with multiple branches. There  is evidence for about 60% stenosis in the RPL _____. 2.  Left main coronary artery:  Mild luminal irregularities, otherwise  patent. No significant stenosis. Bifurcates into LAD and LCX. 3.  Left circumflex artery:  Mild luminal irregularities noted in left  circumflex artery and obtuse marginal branches. No significant stenotic  lesions. 4.  Left anterior descending artery:  Proximal LAD is patent. The  mid-segment of the left anterior descending artery has a long 50% to 60%  tubular lesion. D1 has mild diffuse disease. Distal LAD with mild  diffuse disease.     RIGHT HEART CATHETERIZATION FINDINGS/HEMODYNAMICS:  1. The pulmonary artery oxygen saturation was 60%. 2.  Pulmonary arterial pressure was 81/32 with a mean pulmonary arterial  pressure of 49 mmHg. Pulmonary capillary wedge pressure was 32 mmHg. The right ventricular pressure was 80/13. Left ventricular  end-diastolic pressure was elevated at 33 mmHg. Cardiac output was 4.4  L/min. Cardiac index was 2.8 L/min x m2.     MEDICATIONS:  See MAR.     COMPLICATIONS:  None.     ESTIMATED BLOOD LOSS:  Less than 50 mL.     ACCESS:  Right brachial vein access for right heart catheterization. Right radial artery access for left heart catheterization.     IMPRESSION:  1. Patent RCA stents, only mild in-stent restenosis of proximal RCA,  stable from previous study. 2.  Intermediate mid left anterior descending artery lesion 60%. Hemodynamically insignificant by FFR.   FFR was 0.88 at baseline, final  FFR post adenosine infusion was 0.82.  3. Acute-on-chronic congestive heart failure with reduced ejection  fraction, decompensated. 4.  Severe volume overload. 5.  Elevated filling pressures. 6.  Chronic kidney disease. 7.  Moderate pulmonary venous hypertension.     PLAN:  1. Continue medical therapy and aggressive risk factor modification for  coronary artery disease. No intervention is indicated. 2.  Optimize medical therapy for congestive heart failure. 3.  The patient needs optimization of his volume status. 4.  He was given one dose of Lasix 80 mg IV x1 in the cath lab. 5.  Admit the patient to telemetry. 6.  The patient is currently on peritoneal dialysis. 7.  Start IV diuretics. 6.  Consult Nephrology for further management of volume status,  diuretics and peritoneal dialysis settings.         AssessmentPlan:     Chava Garcia is a pleasant 63year old male patient who  has a past medical history of Broken ankle, CAD (coronary artery disease), Cerebral artery occlusion with cerebral infarction ([facility] [address] CHF (congestive heart failure) ([facility] [address] Chronic kidney disease, Diabetes mellitus ([facility] [address] GERD (gastroesophageal reflux disease), Hyperlipidemia, Hypertension, and Thyroid disease. The patient has known h/o CAD, HFpEF. He has had prior PCI of RCA. The patient underwent successful IVUS guided PCI/Stenting of RCA 10/2019. On 11/2019, he underwent RHC/LHC, patent RCA noted, FFR of LAD was negative (0.82) and patient was noted to have decompensated CHF. He was admitted to [facility], received IV diuretics, seen by Nephrology for continued PD. The patient feels better now. Patient denies chest pain, shortness of breath, dyspnea on exertion, orthopnea, paroxysmal nocturnal dyspnea, palpitations, dizziness, syncope, weight gain or leg swelling. 1. HFpEF  2. NYHA Class III  3. CAD  4. S/P PCI for severe RCA ISR 10/2019  5. 60% mLAD lesion, FFR 0.82 11/2020  6.  CKD (On PD, follows with Nephrology, being considered for Renal Transplant)     · Cont plavix, ASA  · Cont Imdur  · Denies chest pain  · Cont Lopressor   · Cont diuretics   · Daily weight  · Follow with nephrology as scheduled     Above findings and plan of care were discussed with patient in details, patient's questions were answered.      Disposition:  RTC in 6 months             Electronically signed by Rasta Barillas MD, [facility]    11/24/2020 at 8:35 AM EST

## 2020-11-24 NOTE — PROGRESS NOTES
Pt here for [facility] fu CHF     Pt states med list is correct from 11/5/20

## 2021-01-08 ENCOUNTER — TELEPHONE (OUTPATIENT)
Dept: CARDIOLOGY CLINIC | Age: 56
End: 2021-01-08

## 2021-01-08 DIAGNOSIS — R06.09 DOE (DYSPNEA ON EXERTION): ICD-10-CM

## 2021-01-08 DIAGNOSIS — I25.10 CAD IN NATIVE ARTERY: Primary | ICD-10-CM

## 2021-01-08 DIAGNOSIS — I10 ESSENTIAL HYPERTENSION: ICD-10-CM

## 2021-01-08 NOTE — TELEPHONE ENCOUNTER
Pt needing a nuclear medicine stress test and echo within the last 12 months for a kidney transplant. Last Echo was done 10-. No stress test noted. Ok to order one?     Fax :[phone]  Phone# [phone]

## 2021-01-18 ENCOUNTER — HOSPITAL ENCOUNTER (OUTPATIENT)
Dept: NON INVASIVE DIAGNOSTICS | Age: 56
Discharge: HOME | End: 2021-01-18
Payer: COMMERCIAL

## 2021-01-18 VITALS — WEIGHT: 127 LBS | HEIGHT: 62 IN | BODY MASS INDEX: 23.37 KG/M2

## 2021-01-18 DIAGNOSIS — I25.10 CAD IN NATIVE ARTERY: ICD-10-CM

## 2021-01-18 DIAGNOSIS — R06.09 DOE (DYSPNEA ON EXERTION): ICD-10-CM

## 2021-01-18 DIAGNOSIS — I10 ESSENTIAL HYPERTENSION: ICD-10-CM

## 2021-01-18 PROCEDURE — 93017 CV STRESS TEST TRACING ONLY: CPT

## 2021-01-18 PROCEDURE — 78452 HT MUSCLE IMAGE SPECT MULT: CPT

## 2021-01-18 PROCEDURE — 93017 CV STRESS TEST TRACING ONLY: CPT | Performed by: INTERNAL MEDICINE

## 2021-01-18 PROCEDURE — 3430000000 HC RX DIAGNOSTIC RADIOPHARMACEUTICAL: Performed by: INTERNAL MEDICINE

## 2021-01-18 PROCEDURE — 6360000002 HC RX W HCPCS

## 2021-01-18 PROCEDURE — A9500 TC99M SESTAMIBI: HCPCS | Performed by: INTERNAL MEDICINE

## 2021-01-18 RX ADMIN — Medication 9.4 MILLICURIE: at 10:35

## 2021-01-18 RX ADMIN — Medication 30.9 MILLICURIE: at 11:37

## 2021-01-19 ENCOUNTER — TELEPHONE (OUTPATIENT)
Dept: CARDIOLOGY CLINIC | Age: 56
End: 2021-01-19

## 2021-01-19 NOTE — TELEPHONE ENCOUNTER
Stress test result note:    ----- Message from Francia Bowser MD sent at 1/18/2021  7:53 PM EST -----  Please inform patient that stress test result was unremarkable with no evidence for ischemia.

## 2021-06-18 ENCOUNTER — TELEPHONE (OUTPATIENT)
Dept: CARDIOLOGY CLINIC | Age: 56
End: 2021-06-18

## 2021-06-18 DIAGNOSIS — Z01.818 PREOPERATIVE CLEARANCE: Primary | ICD-10-CM

## 2021-06-18 DIAGNOSIS — R93.1 ABNORMAL ECHOCARDIOGRAM: ICD-10-CM

## 2021-06-18 DIAGNOSIS — I50.33 ACUTE ON CHRONIC DIASTOLIC CONGESTIVE HEART FAILURE (HCC): ICD-10-CM

## 2021-06-18 DIAGNOSIS — I10 ESSENTIAL HYPERTENSION: ICD-10-CM

## 2021-06-18 DIAGNOSIS — R94.39 ABNORMAL STRESS TEST: ICD-10-CM

## 2021-06-18 DIAGNOSIS — I25.10 CAD IN NATIVE ARTERY: ICD-10-CM

## 2021-06-18 NOTE — TELEPHONE ENCOUNTER
Pt states that transplant team is requesting you to do another Right heart cath on pt, is it ok to go ahead and order.

## 2021-06-23 ENCOUNTER — PRE-PROCEDURE TELEPHONE (OUTPATIENT)
Dept: INPATIENT UNIT | Age: 56
End: 2021-06-23

## 2021-06-23 NOTE — TELEPHONE ENCOUNTER
Right heart cath scheduled 06-28-21 @ 10:00am    Call to pt, pt has another appt on 06-28-21 that can not be rescheduled  Rescheduled heart cath to 06-30-21 at 10:00am  Pt informed, date, time and instructions  Mailed to pt

## 2021-06-29 ENCOUNTER — PREP FOR PROCEDURE (OUTPATIENT)
Dept: CARDIOLOGY | Age: 56
End: 2021-06-29

## 2021-06-29 RX ORDER — NITROGLYCERIN 0.4 MG/1
0.4 TABLET SUBLINGUAL EVERY 5 MIN PRN
Status: CANCELLED | OUTPATIENT
Start: 2021-06-29

## 2021-06-29 RX ORDER — DIPHENHYDRAMINE HYDROCHLORIDE 50 MG/ML
50 INJECTION INTRAMUSCULAR; INTRAVENOUS ONCE
Status: CANCELLED | OUTPATIENT
Start: 2021-06-29 | End: 2021-06-29

## 2021-06-29 RX ORDER — SODIUM CHLORIDE 0.9 % (FLUSH) 0.9 %
5-40 SYRINGE (ML) INJECTION PRN
Status: CANCELLED | OUTPATIENT
Start: 2021-06-29

## 2021-06-29 RX ORDER — ASPIRIN 325 MG
325 TABLET ORAL ONCE
Status: CANCELLED | OUTPATIENT
Start: 2021-06-29 | End: 2021-06-29

## 2021-06-29 RX ORDER — SODIUM CHLORIDE 9 MG/ML
INJECTION, SOLUTION INTRAVENOUS CONTINUOUS
Status: CANCELLED | OUTPATIENT
Start: 2021-06-29

## 2021-06-29 RX ORDER — SODIUM CHLORIDE 0.9 % (FLUSH) 0.9 %
5-40 SYRINGE (ML) INJECTION EVERY 12 HOURS SCHEDULED
Status: CANCELLED | OUTPATIENT
Start: 2021-06-29

## 2021-06-29 RX ORDER — SODIUM CHLORIDE 9 MG/ML
25 INJECTION, SOLUTION INTRAVENOUS PRN
Status: CANCELLED | OUTPATIENT
Start: 2021-06-29

## 2021-07-27 ENCOUNTER — TELEPHONE (OUTPATIENT)
Dept: CARDIOLOGY CLINIC | Age: 56
End: 2021-07-27

## 2021-09-30 RX ORDER — MIDAZOLAM HYDROCHLORIDE 1 MG/ML
1 INJECTION INTRAMUSCULAR; INTRAVENOUS ONCE
Status: CANCELLED | OUTPATIENT
Start: 2021-09-30 | End: 2021-09-30

## 2021-09-30 RX ORDER — SODIUM CHLORIDE 450 MG/100ML
INJECTION, SOLUTION INTRAVENOUS CONTINUOUS
Status: CANCELLED | OUTPATIENT
Start: 2021-09-30

## 2021-10-04 ENCOUNTER — HOSPITAL ENCOUNTER (OUTPATIENT)
Dept: INTERVENTIONAL RADIOLOGY/VASCULAR | Age: 56
Discharge: HOME | End: 2021-10-04
Payer: COMMERCIAL

## 2021-10-04 VITALS
TEMPERATURE: 97.7 F | RESPIRATION RATE: 23 BRPM | HEART RATE: 73 BPM | SYSTOLIC BLOOD PRESSURE: 150 MMHG | DIASTOLIC BLOOD PRESSURE: 94 MMHG | OXYGEN SATURATION: 95 %

## 2021-10-04 LAB
ERYTHROCYTE [DISTWIDTH] IN BLOOD BY AUTOMATED COUNT: 14 % (ref 11.5–14.5)
ERYTHROCYTE [DISTWIDTH] IN BLOOD BY AUTOMATED COUNT: 49.3 FL (ref 35–45)
HCT VFR BLD CALC: 39 % (ref 42–52)
HEMOGLOBIN: 13.2 GM/DL (ref 14–18)
MCH RBC QN AUTO: 32.3 PG (ref 26–33)
MCHC RBC AUTO-ENTMCNC: 33.8 GM/DL (ref 32.2–35.5)
MCV RBC AUTO: 95.4 FL (ref 80–94)
PLATELET # BLD: 242 THOU/MM3 (ref 130–400)
PMV BLD AUTO: 9.9 FL (ref 9.4–12.4)
RBC # BLD: 4.09 MILL/MM3 (ref 4.7–6.1)
WBC # BLD: 6.5 THOU/MM3 (ref 4.8–10.8)

## 2021-10-04 PROCEDURE — 36415 COLL VENOUS BLD VENIPUNCTURE: CPT

## 2021-10-04 PROCEDURE — 2580000003 HC RX 258: Performed by: RADIOLOGY

## 2021-10-04 PROCEDURE — 77001 FLUOROGUIDE FOR VEIN DEVICE: CPT

## 2021-10-04 PROCEDURE — 76937 US GUIDE VASCULAR ACCESS: CPT

## 2021-10-04 PROCEDURE — 36558 INSERT TUNNELED CV CATH: CPT

## 2021-10-04 PROCEDURE — 6360000002 HC RX W HCPCS: Performed by: RADIOLOGY

## 2021-10-04 PROCEDURE — 85027 COMPLETE CBC AUTOMATED: CPT

## 2021-10-04 PROCEDURE — 2709999900 IR FLUORO GUIDED CVA DEVICE PLMT/REPLACE/REMOVAL

## 2021-10-04 RX ORDER — CEFAZOLIN SODIUM 2 G/100ML
2000 INJECTION, SOLUTION INTRAVENOUS ONCE
Status: COMPLETED | OUTPATIENT
Start: 2021-10-04 | End: 2021-10-04

## 2021-10-04 RX ORDER — SODIUM CHLORIDE 450 MG/100ML
INJECTION, SOLUTION INTRAVENOUS CONTINUOUS
Status: DISCONTINUED | OUTPATIENT
Start: 2021-10-04 | End: 2021-10-05 | Stop reason: HOSPADM

## 2021-10-04 RX ORDER — MIDAZOLAM HYDROCHLORIDE 1 MG/ML
1 INJECTION INTRAMUSCULAR; INTRAVENOUS ONCE
Status: COMPLETED | OUTPATIENT
Start: 2021-10-04 | End: 2021-10-04

## 2021-10-04 RX ADMIN — CEFAZOLIN SODIUM 2000 MG: 2 INJECTION, SOLUTION INTRAVENOUS at 08:51

## 2021-10-04 RX ADMIN — MIDAZOLAM 1 MG: 1 INJECTION INTRAMUSCULAR; INTRAVENOUS at 10:04

## 2021-10-04 RX ADMIN — SODIUM CHLORIDE: 4.5 INJECTION, SOLUTION INTRAVENOUS at 08:40

## 2021-10-04 RX ADMIN — HYDROMORPHONE HYDROCHLORIDE 1 MG: 1 INJECTION, SOLUTION INTRAMUSCULAR; INTRAVENOUS; SUBCUTANEOUS at 10:04

## 2021-10-04 ASSESSMENT — PAIN SCALES - GENERAL: PAINLEVEL_OUTOF10: 0

## 2021-10-04 NOTE — H&P
[address] [place] [address]  Sedation/Analgesia History & Physical    Pt Name: Zayra Le  MRN: [id number]  YOB: 1965  Provider Performing Procedure: Jez Salgado MD, MD  Primary Care Physician: Joanie Lindsay, DO    PRE-PROCEDURE   DNR-CCA/DNR-CC []Yes [x]No  Brief History/Pre-Procedure Diagnosis: Renal failure          MEDICAL HISTORY  [x]CAD/Valve  []Liver Disease  []Lung Disease []Diabetes  [x]Hypertension [x]Renal Disease  []Additional information:       has a past medical history of Broken ankle, CAD (coronary artery disease), Cerebral artery occlusion with cerebral infarction [facility]), CHF (congestive heart failure) ([facility] [address] Chronic kidney disease, Diabetes mellitus ([facility] [address] GERD (gastroesophageal reflux disease), Hyperlipidemia, Hypertension, and Thyroid disease. SURGICAL HISTORY   has a past surgical history that includes Appendectomy; Cardiac surgery (Oct. 2015); Colonoscopy; eye surgery; and Diagnostic Cardiac Cath Lab Procedure (11/05/2020).   Additional information:       ALLERGIES   Allergies as of 10/04/2021 - Fully Reviewed 10/04/2021   Allergen Reaction Noted    Aranesp (albumin free) [darbepoetin eri] Hives 12/19/2018     Additional information:       MEDICATIONS   Coumadin Use Last 5 Days [x]No []Yes  Antiplatelet drug therapy use last 5 days  [x]No []Yes  Other anticoagulant use last 5 days  [x]No []Yes    Current Outpatient Medications:     gentamicin (GARAMYCIN) 0.1 % cream, APPLY PEA SIZE AMOUNT DAILY TO PERITOREAL CATHETER SITE, Disp: , Rfl:     hydrALAZINE (APRESOLINE) 25 MG tablet, Take 3 tablets by mouth 3 times daily, Disp: 90 tablet, Rfl: 0    Peritoneal [employer]ialysis Solutions (DIANEAL LO-JARON, ULTRABAG, 2.5%), Inject 1,200 mLs into the peritoneum 4 times daily, Disp: 464342 mL, Rfl: 0    isosorbide mononitrate (IMDUR) 30 MG extended release tablet, Take 30 mg by mouth daily, Disp: , Rfl:     Insulin Pen Needle ([employer] PEN NEEDLES) 31G X 6 MM MISC, 1 each by Does not apply route daily, Disp: 100 each, Rfl: 0    insulin glargine (BASAGLAR KWIKPEN) 100 UNIT/ML injection pen, Inject 10 Units into the skin daily, Disp: , Rfl:     pantoprazole (PROTONIX) 40 MG tablet, Take 40 mg by mouth 2 times daily, Disp: , Rfl:     insulin aspart (NOVOLOG) 100 UNIT/ML injection vial, Inject into the skin 3 times daily (before meals) PT TAKES IT BASED OFF OF HIS CARBS;  1  Unit per 8 cho, Disp: , Rfl:     NONFORMULARY, Dexcom 6 checks sugar, Disp: , Rfl:     bumetanide (BUMEX) 2 MG tablet, Take 1 tablet by mouth daily (Patient taking differently: Take 2 mg by mouth daily as needed Swelling in ankles), Disp: 90 tablet, Rfl: 3    levothyroxine (SYNTHROID) 100 MCG tablet, Take 1 tablet by mouth Daily, Disp: 30 tablet, Rfl: 3    nitroGLYCERIN (NITROSTAT) 0.4 MG SL tablet, DISSOLVE 1 TABLET UNDER THE TONGUE EVERY 5 MIN AS NEEDED FOR CHEST PAIN, Disp: , Rfl: 0    atorvastatin (LIPITOR) 80 MG tablet, Take 80 mg by mouth daily, Disp: , Rfl:     clopidogrel (PLAVIX) 75 MG tablet, Take 75 mg by mouth daily, Disp: , Rfl:     metoprolol (LOPRESSOR) 50 MG tablet, Take 50 mg by mouth 2 times daily (with meals) , Disp: , Rfl:     aspirin 81 MG EC tablet, Take 81 mg by mouth daily , Disp: , Rfl:     Current Facility-Administered Medications:     0.45 % sodium chloride infusion, , IntraVENous, Continuous, Pao Dominguez MD, Last Rate: 20 mL/hr at 10/04/21 0840, New Bag at 10/04/21 0840  Prior to Admission medications    Medication Sig Start Date End Date Taking?  Authorizing Provider   gentamicin (GARAMYCIN) 0.1 % cream APPLY PEA SIZE AMOUNT DAILY TO [address] 9/3/20   Historical Provider, MD   hydrALAZINE (APRESOLINE) 25 MG tablet Take 3 tablets by mouth 3 times daily 11/6/20   Bubba Baca MD   Peritoneal [facility] [employer] (DIANEAL LO-JARON, ULTRABAG, 2.5%) Inject 1,200 mLs into the peritoneum 4 times daily 11/6/20 12/6/20  Bubba Baca MD   isosorbide mononitrate (IMDUR) 30 MG extended release tablet Take 30 mg by mouth daily    Historical Provider, MD   Insulin Pen Needle ([employer] PEN NEEDLES) 31G X 6 MM MISC 1 each by Does not apply route daily 11/5/20 12/5/20  PAOLA Hadley - CNP   insulin glargine (BASAGLAR KWIKPEN) 100 UNIT/ML injection pen Inject 10 Units into the skin daily    Historical Provider, MD   pantoprazole (PROTONIX) 40 MG tablet Take 40 mg by mouth 2 times daily    Historical Provider, MD   insulin aspart (NOVOLOG) 100 UNIT/ML injection vial Inject into the skin 3 times daily (before meals) PT TAKES IT BASED OFF OF HIS CARBS;  1  Unit per 8 cho    Historical Provider, MD   NONFORMULARY Dexcom 6 checks sugar    Historical Provider, MD   bumetanide (BUMEX) 2 MG tablet Take 1 tablet by mouth daily  Patient taking differently: Take 2 mg by mouth daily as needed Swelling in ankles 5/15/19   Adri Horner DO   levothyroxine (SYNTHROID) 100 MCG tablet Take 1 tablet by mouth Daily 4/19/19   Danya Macias MD   nitroGLYCERIN (NITROSTAT) 0.4 MG SL tablet DISSOLVE 1 TABLET UNDER THE TONGUE EVERY 5 MIN AS NEEDED FOR CHEST PAIN 1/25/18   Historical Provider, MD   atorvastatin (LIPITOR) 80 MG tablet Take 80 mg by mouth daily    Historical Provider, MD   clopidogrel (PLAVIX) 75 MG tablet Take 75 mg by mouth daily    Historical Provider, MD   metoprolol (LOPRESSOR) 50 MG tablet Take 50 mg by mouth 2 times daily (with meals)     Historical Provider, MD   aspirin 81 MG EC tablet Take 81 mg by mouth daily     Historical Provider, MD     Additional information:       VITAL SIGNS   Vitals:    10/04/21 1010   BP: (!) 143/62   Pulse: 77   Resp: 12   Temp:    SpO2: 97%       PHYSICAL:   Heart:  [x]Regular rate and rhythm  []Other:    Lungs:  [x]Clear    []Other:    Abdomen: [x]Soft    []Other:    Mental Status: [x]Alert & Oriented  []Other:      PLANNED PROCEDURE   []Biospy []Arteriogram              []Drainage   []Mediport Insertion  []Fistulogram []IV access       []Vertebroplasty / Augmentation  []IVC filter [x]Dialysis catheter []Biliary drainage  []Other: []CAPD Catheter []Nephrostomy Tube / Stent  SEDATION  Planned agent:[x]Midazolam []Meperidine []Sublimaze [x]Dilaudid []Morphine     []Diazepam  []Other:     ASA Classification:  []1 [x]2 []3 []4 []5  Class 1: A normal healthy patient  Class 2: Pt with mild to moderate systemic disease  Class 3: Severe systemic disease or disturbance  Class 4: Severe systemic disorders that are already life threatening. Class 5: Moribund pt with little chances of survival, for more than 24 hours. Mallampati I Airway Classification:   []1 [x]2 []3 []4    [x]Pre-procedure diagnostic studies complete and results available. Comment:    [x]Previous sedation/anesthesia experiences assessed. Comment:    [x]The patient is an appropriate candidate to undergo the planned procedure sedation and anesthesia. (Refer to nursing sedation/analgesia documentation record)  [x]Formulation and discussion of sedation/procedure plan, risks, and expectations with patient and/or responsible adult completed. [x]Patient examined immediately prior to the procedure.  (Refer to nursing sedation/analgesia documentation record)    Jez Salgado MD, MD  Electronically signed 10/4/2021 at 10:16 AM

## 2021-10-04 NOTE — OP NOTE
Department of Radiology  Post Procedure Progress Note      Pre-Procedure Diagnosis:  Renal Failure    Procedure Performed: Dialysis Catheter insertion     Anesthesia: local , versed and dilaudid    Findings: successful    Immediate Complications:  None    Estimated Blood Loss: minimal    SEE DICTATED PROCEDURE NOTE FOR COMPLETE DETAILS.       Electronically signed by Anibal Ayers MD on 10/4/2021 at 10:19 AM

## 2021-10-04 NOTE — PROGRESS NOTES
0229 Pt arrived in IR for tunneled dialysis catheter  0949 Pt positioned supine on table and monitor applied  0951 Pt prepped for procedure with chloraprep  1005 Procedure started with Dr. Shirley Gil  21  Procedure complete. Sutures to right neck puncture site, covered with gauze and opsite. biopatch to catheter exit site, covered with opsite. 1020 Report called to [facility]  1023 Pt transported back to [facility] via cart. Skin pink, warm, dry. Respirations even and unlabored at rest. No complaints voiced at this time.

## 2021-10-04 NOTE — H&P
Formulation and discussion of sedation / procedure plans, risks, benefits, side effects and alternatives with patient and/or responsible adult completed.     Electronically signed by Ynes Flores MD on 10/4/2021 at 10:15 AM

## 2021-11-08 RX ORDER — CLINDAMYCIN PHOSPHATE 600 MG/50ML
600 INJECTION INTRAVENOUS
Status: CANCELLED | OUTPATIENT
Start: 2021-11-08

## 2021-11-08 RX ORDER — SODIUM CHLORIDE 450 MG/100ML
INJECTION, SOLUTION INTRAVENOUS CONTINUOUS
Status: CANCELLED | OUTPATIENT
Start: 2021-11-08

## 2021-11-09 ENCOUNTER — HOSPITAL ENCOUNTER (OUTPATIENT)
Dept: INTERVENTIONAL RADIOLOGY/VASCULAR | Age: 56
Discharge: HOME | End: 2021-11-09
Payer: COMMERCIAL

## 2021-11-09 VITALS — SYSTOLIC BLOOD PRESSURE: 125 MMHG | OXYGEN SATURATION: 97 % | DIASTOLIC BLOOD PRESSURE: 70 MMHG

## 2021-11-09 PROCEDURE — 6370000000 HC RX 637 (ALT 250 FOR IP): Performed by: RADIOLOGY

## 2021-11-09 PROCEDURE — 36589 REMOVAL TUNNELED CV CATH: CPT

## 2021-11-09 PROCEDURE — 2500000003 HC RX 250 WO HCPCS: Performed by: RADIOLOGY

## 2021-11-09 PROCEDURE — 2580000003 HC RX 258: Performed by: RADIOLOGY

## 2021-11-09 RX ORDER — SODIUM CHLORIDE 450 MG/100ML
INJECTION, SOLUTION INTRAVENOUS CONTINUOUS
Status: DISCONTINUED | OUTPATIENT
Start: 2021-11-09 | End: 2021-11-10 | Stop reason: HOSPADM

## 2021-11-09 RX ORDER — IBUPROFEN 200 MG
TABLET ORAL ONCE
Status: COMPLETED | OUTPATIENT
Start: 2021-11-09 | End: 2021-11-09

## 2021-11-09 RX ORDER — CLINDAMYCIN PHOSPHATE 600 MG/50ML
600 INJECTION INTRAVENOUS
Status: COMPLETED | OUTPATIENT
Start: 2021-11-09 | End: 2021-11-09

## 2021-11-09 RX ADMIN — BACITRACIN, NEOMYCIN, POLYMYXIN B 0.9 G: 400; 3.5; 5 OINTMENT TOPICAL at 13:35

## 2021-11-09 RX ADMIN — SODIUM CHLORIDE: 4.5 INJECTION, SOLUTION INTRAVENOUS at 11:43

## 2021-11-09 RX ADMIN — CLINDAMYCIN PHOSPHATE 600 MG: 600 INJECTION, SOLUTION INTRAVENOUS at 11:42

## 2021-11-09 ASSESSMENT — PAIN SCALES - GENERAL
PAINLEVEL_OUTOF10: 0

## 2021-11-09 NOTE — PROGRESS NOTES
1130  Pt ambulated into room for HD catheter removal. Pt has held aspirin and plavix for 5 days. Pt has been NPO for over 2 hours, only had  A sip of water with pills this AM. No food since midnight last night. Procedure explained and questions answered. Antibiotic started and iv started for patient. Patient has CAPD catheter also. Pt has  at bedside. 1214  Pt resting on the stretcher and was provided a warm blanket. Pt has no other needs at this time. 1255     Pt picked up for procedure    1345         Pt returned from procedure. See [place] RN note     1400  Pt states no pain, no nausea. Pt dressing clean dry intact. Pt was offered snack and drink. 26    Pt states no pain no nausea, dressing CDI pt eating snack, no other needs at this time. Pt tolerating food and drink. 1435   Dressing clean dry intact. Pt has no needs at this time. D/c instructions explained and pt verbalized understanding. Family member also understood d/c information.  Pt wheeled out via wheelchair for d/c.    __m__ Safety:       (Environmental)  Linda Grippe to environment   Ensure ID band is correct and in place/ allergy band as needed   Assess for fall risk   Initiate fall precautions as applicable (fall band, side rails, etc.)   Call light within reach   Bed in low position/ wheels locked    _m___ Pain:        mAssess pain level and characteristics   Administer analgesics as ordered   Assess effectiveness of pain management and report to MD as needed    __m__ Knowledge Deficit:   Assess baseline knowledge   Provide teaching at level of understanding   Provide teaching via preferred learning method   Evaluate teaching effectiveness    ___m_ Hemodynamic/Respiratory Status:       (Pre and Post Procedure Monitoring)   Assess/Monitor vital signs and LOC   Assess Baseline SpO2 prior to any sedation   Obtain weight/height   Assess vital signs/ LOC until patient meets discharge criteria   Monitor procedure site and notify MD of any issues    __m__ Infection-Risk of Central Venous Catheter:   Monitor for infection signs and symptoms (catheter site redness, temperature elevation, etc)   Assess for infection risks   Educate regarding infection prevention   Manage central venous catheter (flushes/ dressing changes per protocol)

## 2021-11-09 NOTE — PROGRESS NOTES
0 Pt in specials radiology for dialysis catheter removal. Explained procedure to pt and pt verbalizes understanding. Consent signed. 1 Dr Mena Siemens to speak to pt.  1309 Right chest prepped and draped. 1322 Dialysis catheter removed. Pressure applied to neck site. No bleeding noted. 1328 Incision right chest approximated with suture. 1335 Triple antibiotic ointment applied to site on right chest with 4 x 4 and op-site dressing. 1338 Transferred to [facility] per cart. Stable condition. Pt offers no complaints.

## 2021-11-09 NOTE — PROGRESS NOTES
Patient in recovery, vitals are stable. He denies pain. Dialysis catheter removal dressing is dry and intact. no

## 2022-01-01 NOTE — PROGRESS NOTES
[facility]    [address] [facility]  [address], [address]  [phone]       Progress Note    5/15/2019 9:26 AM    Pt Name:    Karen Deluna  YOB: 1965  Primary Care Physician:  PAOLA Day CNP       Chief Complaint:   Chief Complaint   Patient presents with    Chronic Kidney Disease     iv    Diabetes    Hypertension        History of Chief Complaint: stage IV CKD from DM and HTN. Transplant kidney candidate. Subjective:  I last saw the patient in clinic 04/25/19. I follow the patient for Chronic Kidney disease stage IV. Since our last visit the patient has been hospitalized at [address] [place] [address] for fluid overload. He underwent dialysis. The patient is sleeping well at night with 1-2 times per night nocturia. The patient has a good appetite and is remaining active. The patient denied N/V/C/D/SOB/CP. He has been worked up for kidney and pancreas transplant at [facility]. He has leg swelling. He was found in the hospital to be hypothyroid. Last six eGFR readings:  Lab Results   Component Value Date    LABGLOM 29 05/14/2019    LABGLOM 29 05/14/2019    LABGLOM 18 04/23/2019    LABGLOM 18 04/23/2019    LABGLOM 21 04/17/2019    LABGLOM 14 04/16/2019    LABGLOM 14 04/15/2019    LABGLOM 15 04/14/2019    LABGLOM 16 04/13/2019    LABGLOM 18 04/12/2019          Objective:  VITALS:  /68 (Site: Left Upper Arm, Position: Sitting, Cuff Size: Small Adult)   Pulse 61   Resp 18   Ht 5' 2\" (1.575 m)   Wt 122 lb (55.3 kg)   SpO2 98%   BMI 22.31 kg/m²   Weight:   Wt Readings from Last 3 Encounters:   05/15/19 122 lb (55.3 kg)   04/25/19 125 lb (56.7 kg)   04/18/19 99 lb 3.3 oz (45 kg)     Body mass index is 22.31 kg/m². Physical examination    General:  Alert and cooperative with exam  HEENT:  Normocephalic. No lesions nor rashes. KARLA. EOMI  Neck:   No JVD and no bruits. Thyroid gland is normal  Lungs:  Breathing easily. No rales nor rhonchi.  No [place] -  Nursery  History & Physical    Nursery    Patient Name: Cary Ruvalcaba  MRN: [id number]  Admission Date: 2022      Subjective:     Chief Complaint/Reason for Admission:  Infant is a 0 days Girl Alma Ruvalcaba born at 38w1d  Infant female was born on 20[birth year] at 6:42 AM via Vaginal, Spontaneous.    No data found    Maternal History:  The mother is a 28 y.o.   . She  has a past medical history of Mental disorder.     Prenatal Labs Review:  ABO/Rh:   Lab Results   Component Value Date/Time    GROUPTRH O POS 07/15/2021 02:52 PM      Group B Beta Strep:   Lab Results   Component Value Date/Time    STREPBCULT No Group B Streptococcus isolated 2021 10:29 AM      HIV: 10/21/2021: HIV 1/2 Ag/Ab Negative (Ref range: Negative)  RPR:   Lab Results   Component Value Date/Time    RPR Non-reactive 10/21/2021 10:41 AM      Hepatitis B Surface Antigen:   Lab Results   Component Value Date/Time    HEPBSAG Negative 07/15/2021 02:52 PM      Rubella Immune Status:   Lab Results   Component Value Date/Time    RUBELLAIMMUN Reactive 07/15/2021 02:52 PM        Pregnancy/Delivery Course:  The pregnancy was uncomplicated. Prenatal ultrasound revealed normal anatomy. Prenatal care was good. Mother received no medications. Membranes ruptured on 21 @0415 by SROM. The delivery was complicated by nuchal cord x 2. Apgar scores   [place] Assessment:     1 Minute:  Skin color:    Muscle tone:    Heart rate:    Breathing:    Grimace:    Total: 9          5 Minute:  Skin color:    Muscle tone:    Heart rate:    Breathing:    Grimace:    Total: 9          10 Minute:  Skin color:    Muscle tone:    Heart rate:    Breathing:    Grimace:    Total:          Living Status:      .          Review of Systems   All other systems reviewed and are negative.      Objective:     Vital Signs (Most Recent)       Most Recent Weight: 2529 g (5 lb 9.2 oz) (Filed from Delivery Summary) (22 0642)  Admission  Weight: 2529 g (5 lb 9.2 oz) (Filed from Delivery Summary) (01/07/22 0642)  Admission      Admission Length:      Physical Exam  Vitals and nursing note reviewed.   Constitutional:       General: She is active. She is not in acute distress.     Appearance: She is well-developed. She is not toxic-appearing.   HENT:      Head: Normocephalic and atraumatic. Anterior fontanelle is flat.      Right Ear: External ear normal.      Left Ear: External ear normal.      Nose: Nose normal. No congestion or rhinorrhea.      Mouth/Throat:      Mouth: Mucous membranes are moist.      Pharynx: Oropharynx is clear. No oropharyngeal exudate or posterior oropharyngeal erythema.   Eyes:      General:         Right eye: No discharge.         Left eye: No discharge.      Extraocular Movements: Extraocular movements intact.      Conjunctiva/sclera: Conjunctivae normal.   Cardiovascular:      Rate and Rhythm: Normal rate and regular rhythm.      Pulses: Normal pulses.      Heart sounds: Normal heart sounds.   Pulmonary:      Effort: Pulmonary effort is normal. No respiratory distress, nasal flaring or retractions.      Breath sounds: Normal breath sounds. No stridor or decreased air movement. No wheezing, rhonchi or rales.   Abdominal:      General: Abdomen is flat. Bowel sounds are normal.      Tenderness: There is no abdominal tenderness. There is no guarding or rebound.   Genitourinary:     General: Normal vulva.      Rectum: Normal.   Musculoskeletal:         General: Normal range of motion.      Cervical back: Normal range of motion.      Right hip: Negative right Ortolani and negative right Oleary.      Left hip: Negative left Ortolani and negative left Oleary.   Skin:     General: Skin is warm and dry.      Capillary Refill: Capillary refill takes less than 2 seconds.      Turgor: Normal.      Coloration: Skin is not cyanotic, jaundiced or mottled.      Findings: No petechiae or rash. There is no diaper rash.   Neurological:       cough nor sputum production. Heart[de-identified]            RRR. No murmurs nor rubs. PMI is not enlarged nor displaced. Abdomen:  Soft and non tender. Bowel sounds are active in all four quadrants. Extremities:  2+ edema  Neurologic:  CN II-XII are intact. No deficits noted. Muscle strength and tone are equal throughout. Skin:                Warm and dry with no rashes. Muscles:         Hand  and leg strength are equal and strong bilaterally.      Lab Data      CBC:   Lab Results   Component Value Date    WBC 6.1 05/14/2019    WBC 6.1 05/14/2019    HGB 11.5 (A) 05/14/2019    HGB 11.5 (A) 05/14/2019    HCT 4.39 (A) 05/14/2019    HCT 35.6 (A) 05/14/2019    MCV 76.4 (L) 04/18/2019     05/14/2019     05/14/2019     BMP:    Lab Results   Component Value Date     05/14/2019     05/14/2019     04/23/2019     04/23/2019    K 5.5 05/14/2019    K 5.5 05/14/2019    K 5.5 04/23/2019    K 5.5 04/23/2019     05/14/2019     05/14/2019     04/23/2019     04/23/2019    CO2 23 05/14/2019    CO2 23 05/14/2019    CO2 18 04/23/2019    CO2 22 04/23/2019    BUN 30 05/14/2019    BUN 30 05/14/2019    BUN 49 04/23/2019    BUN 49 04/23/2019    CREATININE 2.50 05/14/2019    CREATININE 2.5 05/14/2019    CREATININE 3.84 04/23/2019    CREATININE 3.84 04/23/2019    GLUCOSE 154 05/14/2019    GLUCOSE 154 05/14/2019    GLUCOSE 225 04/23/2019      Hepatic:   Lab Results   Component Value Date    AST 62 (H) 04/10/2019    AST 75 (H) 04/10/2019    AST 40 11/24/2018    ALT 38 04/10/2019    ALT 47 04/10/2019    ALT 54 11/24/2018    BILITOT 0.6 04/10/2019    BILITOT 0.7 04/10/2019    BILITOT 0.5 11/24/2018    ALKPHOS 129 (H) 04/10/2019    ALKPHOS 138 (H) 04/10/2019    ALKPHOS 149 (H) 11/24/2018     BNP:   Lab Results   Component Value Date    BNP 2,800.0 11/12/2018     Lipids: No results found for: CHOL, HDL  INR:   Lab Results   Component Value Date    INR 1.01 04/10/2019    INR 1.05 11/24/2018 General: No focal deficit present.      Mental Status: She is alert.      Primitive Reflexes: Suck normal. Symmetric Nina.         No results found for this or any previous visit (from the past 168 hour(s)).      Assessment and Plan:     SGA (small for gestational age)  Monitor glucose/temp/weight    Term  delivered vaginally, current hospitalization  Routine  care. Encourage breastfeeding        Sherry Vance MD  Pediatrics  [place] -  Nursery   INR 1.1 08/27/2018     URINE: No results found for: Glorine Lu  Lab Results   Component Value Date    NITRU NEGATIVE 04/10/2019    COLORU YELLOW 04/10/2019    PHUR 7.0 04/10/2019    WBCUA NONE SEEN 04/10/2019    RBCUA 3-5 04/10/2019    YEAST NONE SEEN 04/10/2019    BACTERIA NONE 04/10/2019    LEUKOCYTESUR NEGATIVE 04/10/2019    UROBILINOGEN 1.0 04/10/2019    BILIRUBINUR NEGATIVE 04/10/2019    BLOODU SMALL 04/10/2019    GLUCOSEU NEGATIVE 04/10/2019    KETUA NEGATIVE 04/10/2019      Microalbumen/Creatinine ratio:  No components found for: RUCREAT        Medications:    Current Outpatient Medications   Medication Sig Dispense Refill    hydrALAZINE (APRESOLINE) 50 MG tablet Take 1 tablet by mouth 3 times daily 90 tablet 3    isosorbide mononitrate (IMDUR) 60 MG extended release tablet Take 1 tablet by mouth daily 30 tablet 3    levothyroxine (SYNTHROID) 100 MCG tablet Take 1 tablet by mouth Daily 30 tablet 3    docusate sodium (COLACE, DULCOLAX) 100 MG CAPS Take 100 mg by mouth daily 30 capsule 0    famotidine (PEPCID) 20 MG tablet Take 1 tablet by mouth daily 60 tablet 3    Insulin Degludec (TRESIBA) 100 UNIT/ML SOLN Inject 13 Units into the skin nightly       insulin regular (HUMULIN R;NOVOLIN R) 100 UNIT/ML injection Inject into the skin See Admin Instructions 1 unit for every 8 carbs consumed  In addition:In the evening, If BS>200   200-250= 1unit  251-300= 2 units  301-350=3 units      nitroGLYCERIN (NITROSTAT) 0.4 MG SL tablet DISSOLVE 1 TABLET UNDER THE TONGUE EVERY 5 MIN AS NEEDED FOR CHEST PAIN  0    FREESTYLE LANCETS MISC   0    FREESTYLE INSULINX TEST strip   0    atorvastatin (LIPITOR) 80 MG tablet Take 80 mg by mouth daily      clopidogrel (PLAVIX) 75 MG tablet Take 75 mg by mouth daily      metoprolol (LOPRESSOR) 50 MG tablet Take 50 mg by mouth 2 times daily      aspirin 81 MG tablet Take 81 mg by mouth daily       No current facility-administered medications for this visit. IMPRESSIONS:        Kidney disease: CKD stage IV from DM and HTN  Hypothyroid  Kidney and pancrease transplant candidate. Anemia:  Bone and mineral metabolism:       PLAN:  1. We discussed the eGFR today. 2. We will continue all current medications without changes. 3. Adding bumex 2 mg oral daily. 4. He will meet with Kaden Gamez for peritoneal dialysis education. 5. We will see the patient back in 1.5 months.               _________________________________  Cari Herrera.  Apolonia Simon,   [facility]      CC  Malaika Lund, APRN - CNP

## 2022-02-21 LAB
ANION GAP SERPL CALCULATED.3IONS-SCNC: 9 MMOL/L (ref 4–12)
BUN BLDV-MCNC: 47 MG/DL (ref 7–20)
CALCIUM SERPL-MCNC: 8.8 MG/DL (ref 8.8–10.5)
CHLORIDE BLD-SCNC: 96 MEQ/L (ref 101–111)
CO2: 24 MEQ/L (ref 21–32)
CREAT SERPL-MCNC: 5.03 MG/DL (ref 0.6–1.3)
CREATININE CLEARANCE: 12
GLUCOSE, FASTING: 366 MG/DL (ref 70–110)
POTASSIUM SERPL-SCNC: 4.2 MEQ/L (ref 3.6–5)
SARS-COV-2: NEGATIVE
SODIUM BLD-SCNC: 129 MEQ/L (ref 135–145)

## 2022-02-24 ENCOUNTER — APPOINTMENT (OUTPATIENT)
Dept: GENERAL RADIOLOGY | Age: 57
DRG: 291 | End: 2022-02-24
Attending: FAMILY MEDICINE
Payer: COMMERCIAL

## 2022-02-24 ENCOUNTER — HOSPITAL ENCOUNTER (INPATIENT)
Age: 57
LOS: 2 days | Discharge: HOME | DRG: 291 | End: 2022-02-26
Attending: FAMILY MEDICINE | Admitting: INTERNAL MEDICINE
Payer: COMMERCIAL

## 2022-02-24 PROBLEM — E87.70 FLUID OVERLOAD: Status: ACTIVE | Noted: 2022-02-24

## 2022-02-24 PROBLEM — R06.02 SHORTNESS OF BREATH: Status: ACTIVE | Noted: 2022-02-24

## 2022-02-24 LAB
ANION GAP SERPL CALCULATED.3IONS-SCNC: 14 MEQ/L (ref 8–16)
BASOPHILS # BLD: 0.4 %
BASOPHILS ABSOLUTE: 0 THOU/MM3 (ref 0–0.1)
BUN BLDV-MCNC: 49 MG/DL (ref 7–22)
CALCIUM SERPL-MCNC: 9.2 MG/DL (ref 8.5–10.5)
CHLORIDE BLD-SCNC: 101 MEQ/L (ref 98–111)
CO2: 18 MEQ/L (ref 23–33)
CREAT SERPL-MCNC: 6.4 MG/DL (ref 0.4–1.2)
EOSINOPHIL # BLD: 2.3 %
EOSINOPHILS ABSOLUTE: 0.1 THOU/MM3 (ref 0–0.4)
ERYTHROCYTE [DISTWIDTH] IN BLOOD BY AUTOMATED COUNT: 15.9 % (ref 11.5–14.5)
ERYTHROCYTE [DISTWIDTH] IN BLOOD BY AUTOMATED COUNT: 55.9 FL (ref 35–45)
GFR SERPL CREATININE-BSD FRML MDRD: 9 ML/MIN/1.73M2
GLUCOSE BLD-MCNC: 118 MG/DL (ref 70–108)
GLUCOSE BLD-MCNC: 238 MG/DL (ref 70–108)
GLUCOSE BLD-MCNC: 263 MG/DL (ref 70–108)
GLUCOSE BLD-MCNC: 312 MG/DL (ref 70–108)
HBV SURFACE AB TITR SER: POSITIVE {TITER}
HCT VFR BLD CALC: 27 % (ref 42–52)
HEMOGLOBIN: 8.9 GM/DL (ref 14–18)
HEPATITIS B CORE TOTAL ANTIBODY: NONREACTIVE
HEPATITIS B SURFACE ANTIGEN: NEGATIVE
IMMATURE GRANS (ABS): 0.02 THOU/MM3 (ref 0–0.07)
IMMATURE GRANULOCYTES: 0.4 %
LYMPHOCYTES # BLD: 14.2 %
LYMPHOCYTES ABSOLUTE: 0.8 THOU/MM3 (ref 1–4.8)
MCH RBC QN AUTO: 31.9 PG (ref 26–33)
MCHC RBC AUTO-ENTMCNC: 33 GM/DL (ref 32.2–35.5)
MCV RBC AUTO: 96.8 FL (ref 80–94)
MONOCYTES # BLD: 12 %
MONOCYTES ABSOLUTE: 0.7 THOU/MM3 (ref 0.4–1.3)
NUCLEATED RED BLOOD CELLS: 0 /100 WBC
PLATELET # BLD: 199 THOU/MM3 (ref 130–400)
PMV BLD AUTO: 10.6 FL (ref 9.4–12.4)
POTASSIUM REFLEX MAGNESIUM: 5.2 MEQ/L (ref 3.5–5.2)
PRO-BNP: ABNORMAL PG/ML (ref 0–900)
RBC # BLD: 2.79 MILL/MM3 (ref 4.7–6.1)
SEG NEUTROPHILS: 70.7 %
SEGMENTED NEUTROPHILS ABSOLUTE COUNT: 4 THOU/MM3 (ref 1.8–7.7)
SODIUM BLD-SCNC: 133 MEQ/L (ref 135–145)
TROPONIN T: 0.21 NG/ML
TROPONIN T: 0.23 NG/ML
TROPONIN T: 0.29 NG/ML
TROPONIN T: 0.3 NG/ML
TROPONIN T: 0.32 NG/ML
TROPONIN T: 0.33 NG/ML
WBC # BLD: 5.7 THOU/MM3 (ref 4.8–10.8)

## 2022-02-24 PROCEDURE — 86706 HEP B SURFACE ANTIBODY: CPT

## 2022-02-24 PROCEDURE — 99223 1ST HOSP IP/OBS HIGH 75: CPT | Performed by: INTERNAL MEDICINE

## 2022-02-24 PROCEDURE — 85025 COMPLETE CBC W/AUTO DIFF WBC: CPT

## 2022-02-24 PROCEDURE — 1200000003 HC TELEMETRY R&B

## 2022-02-24 PROCEDURE — 6370000000 HC RX 637 (ALT 250 FOR IP)

## 2022-02-24 PROCEDURE — 93005 ELECTROCARDIOGRAM TRACING: CPT

## 2022-02-24 PROCEDURE — 83880 ASSAY OF NATRIURETIC PEPTIDE: CPT

## 2022-02-24 PROCEDURE — 84484 ASSAY OF TROPONIN QUANT: CPT

## 2022-02-24 PROCEDURE — 87340 HEPATITIS B SURFACE AG IA: CPT

## 2022-02-24 PROCEDURE — 71045 X-RAY EXAM CHEST 1 VIEW: CPT

## 2022-02-24 PROCEDURE — 5A1D70Z PERFORMANCE OF URINARY FILTRATION, INTERMITTENT, LESS THAN 6 HOURS PER DAY: ICD-10-PCS | Performed by: INTERNAL MEDICINE

## 2022-02-24 PROCEDURE — 86704 HEP B CORE ANTIBODY TOTAL: CPT

## 2022-02-24 PROCEDURE — 90935 HEMODIALYSIS ONE EVALUATION: CPT

## 2022-02-24 PROCEDURE — 2580000003 HC RX 258

## 2022-02-24 PROCEDURE — 36415 COLL VENOUS BLD VENIPUNCTURE: CPT

## 2022-02-24 PROCEDURE — 99223 1ST HOSP IP/OBS HIGH 75: CPT

## 2022-02-24 PROCEDURE — 90935 HEMODIALYSIS ONE EVALUATION: CPT | Performed by: INTERNAL MEDICINE

## 2022-02-24 PROCEDURE — 80048 BASIC METABOLIC PNL TOTAL CA: CPT

## 2022-02-24 PROCEDURE — 82948 REAGENT STRIP/BLOOD GLUCOSE: CPT

## 2022-02-24 RX ORDER — NICOTINE POLACRILEX 4 MG
15 LOZENGE BUCCAL PRN
Status: DISCONTINUED | OUTPATIENT
Start: 2022-02-24 | End: 2022-02-25 | Stop reason: SDUPTHER

## 2022-02-24 RX ORDER — SODIUM CHLORIDE 9 MG/ML
25 INJECTION, SOLUTION INTRAVENOUS PRN
Status: DISCONTINUED | OUTPATIENT
Start: 2022-02-24 | End: 2022-02-26 | Stop reason: HOSPADM

## 2022-02-24 RX ORDER — CLOPIDOGREL BISULFATE 75 MG/1
75 TABLET ORAL DAILY
Status: DISCONTINUED | OUTPATIENT
Start: 2022-02-24 | End: 2022-02-26 | Stop reason: HOSPADM

## 2022-02-24 RX ORDER — INSULIN GLARGINE 100 [IU]/ML
10 INJECTION, SOLUTION SUBCUTANEOUS DAILY
Status: DISCONTINUED | OUTPATIENT
Start: 2022-02-24 | End: 2022-02-26 | Stop reason: HOSPADM

## 2022-02-24 RX ORDER — DEXTROSE MONOHYDRATE 25 G/50ML
12.5 INJECTION, SOLUTION INTRAVENOUS PRN
Status: DISCONTINUED | OUTPATIENT
Start: 2022-02-24 | End: 2022-02-24

## 2022-02-24 RX ORDER — ATORVASTATIN CALCIUM 80 MG/1
80 TABLET, FILM COATED ORAL NIGHTLY
Status: DISCONTINUED | OUTPATIENT
Start: 2022-02-24 | End: 2022-02-26 | Stop reason: HOSPADM

## 2022-02-24 RX ORDER — SODIUM CHLORIDE 0.9 % (FLUSH) 0.9 %
10 SYRINGE (ML) INJECTION PRN
Status: DISCONTINUED | OUTPATIENT
Start: 2022-02-24 | End: 2022-02-26 | Stop reason: HOSPADM

## 2022-02-24 RX ORDER — LEVOTHYROXINE SODIUM 0.1 MG/1
100 TABLET ORAL DAILY
Status: DISCONTINUED | OUTPATIENT
Start: 2022-02-24 | End: 2022-02-26 | Stop reason: HOSPADM

## 2022-02-24 RX ORDER — ACETAMINOPHEN 650 MG/1
650 SUPPOSITORY RECTAL EVERY 6 HOURS PRN
Status: DISCONTINUED | OUTPATIENT
Start: 2022-02-24 | End: 2022-02-26 | Stop reason: HOSPADM

## 2022-02-24 RX ORDER — MAGNESIUM SULFATE IN WATER 40 MG/ML
2000 INJECTION, SOLUTION INTRAVENOUS PRN
Status: DISCONTINUED | OUTPATIENT
Start: 2022-02-24 | End: 2022-02-26 | Stop reason: HOSPADM

## 2022-02-24 RX ORDER — POLYETHYLENE GLYCOL 3350 17 G/17G
17 POWDER, FOR SOLUTION ORAL DAILY PRN
Status: DISCONTINUED | OUTPATIENT
Start: 2022-02-24 | End: 2022-02-26 | Stop reason: HOSPADM

## 2022-02-24 RX ORDER — ISOSORBIDE MONONITRATE 30 MG/1
30 TABLET, EXTENDED RELEASE ORAL DAILY
Status: DISCONTINUED | OUTPATIENT
Start: 2022-02-24 | End: 2022-02-26 | Stop reason: HOSPADM

## 2022-02-24 RX ORDER — BUMETANIDE 1 MG/1
2 TABLET ORAL DAILY
Status: DISCONTINUED | OUTPATIENT
Start: 2022-02-24 | End: 2022-02-26 | Stop reason: HOSPADM

## 2022-02-24 RX ORDER — HEPARIN SODIUM 5000 [USP'U]/ML
5000 INJECTION, SOLUTION INTRAVENOUS; SUBCUTANEOUS EVERY 8 HOURS SCHEDULED
Status: DISCONTINUED | OUTPATIENT
Start: 2022-02-24 | End: 2022-02-26 | Stop reason: HOSPADM

## 2022-02-24 RX ORDER — DEXTROSE MONOHYDRATE 50 MG/ML
100 INJECTION, SOLUTION INTRAVENOUS PRN
Status: DISCONTINUED | OUTPATIENT
Start: 2022-02-24 | End: 2022-02-25 | Stop reason: SDUPTHER

## 2022-02-24 RX ORDER — SODIUM CHLORIDE 0.9 % (FLUSH) 0.9 %
10 SYRINGE (ML) INJECTION EVERY 12 HOURS SCHEDULED
Status: DISCONTINUED | OUTPATIENT
Start: 2022-02-24 | End: 2022-02-26 | Stop reason: HOSPADM

## 2022-02-24 RX ORDER — PANTOPRAZOLE SODIUM 40 MG/1
40 TABLET, DELAYED RELEASE ORAL 2 TIMES DAILY
Status: DISCONTINUED | OUTPATIENT
Start: 2022-02-24 | End: 2022-02-26 | Stop reason: HOSPADM

## 2022-02-24 RX ORDER — ONDANSETRON 4 MG/1
4 TABLET, ORALLY DISINTEGRATING ORAL EVERY 8 HOURS PRN
Status: DISCONTINUED | OUTPATIENT
Start: 2022-02-24 | End: 2022-02-26 | Stop reason: HOSPADM

## 2022-02-24 RX ORDER — ACETAMINOPHEN 325 MG/1
650 TABLET ORAL EVERY 6 HOURS PRN
Status: DISCONTINUED | OUTPATIENT
Start: 2022-02-24 | End: 2022-02-26 | Stop reason: HOSPADM

## 2022-02-24 RX ORDER — ONDANSETRON 2 MG/ML
4 INJECTION INTRAMUSCULAR; INTRAVENOUS EVERY 6 HOURS PRN
Status: DISCONTINUED | OUTPATIENT
Start: 2022-02-24 | End: 2022-02-26 | Stop reason: HOSPADM

## 2022-02-24 RX ORDER — ASPIRIN 81 MG/1
81 TABLET ORAL DAILY
Status: DISCONTINUED | OUTPATIENT
Start: 2022-02-24 | End: 2022-02-26 | Stop reason: HOSPADM

## 2022-02-24 RX ORDER — METOPROLOL TARTRATE 50 MG/1
50 TABLET, FILM COATED ORAL 2 TIMES DAILY WITH MEALS
Status: DISCONTINUED | OUTPATIENT
Start: 2022-02-24 | End: 2022-02-26 | Stop reason: HOSPADM

## 2022-02-24 RX ADMIN — HYDRALAZINE HYDROCHLORIDE 75 MG: 50 TABLET, FILM COATED ORAL at 15:24

## 2022-02-24 RX ADMIN — HYDRALAZINE HYDROCHLORIDE 75 MG: 50 TABLET, FILM COATED ORAL at 20:44

## 2022-02-24 RX ADMIN — SODIUM CHLORIDE, PRESERVATIVE FREE 10 ML: 5 INJECTION INTRAVENOUS at 20:46

## 2022-02-24 RX ADMIN — METOPROLOL TARTRATE 50 MG: 50 TABLET, FILM COATED ORAL at 15:24

## 2022-02-24 RX ADMIN — ASPIRIN 81 MG: 81 TABLET, COATED ORAL at 15:25

## 2022-02-24 RX ADMIN — ISOSORBIDE MONONITRATE 30 MG: 30 TABLET, EXTENDED RELEASE ORAL at 15:29

## 2022-02-24 RX ADMIN — INSULIN LISPRO 4 UNITS: 100 INJECTION, SOLUTION INTRAVENOUS; SUBCUTANEOUS at 11:05

## 2022-02-24 RX ADMIN — PANTOPRAZOLE SODIUM 40 MG: 40 TABLET, DELAYED RELEASE ORAL at 15:25

## 2022-02-24 RX ADMIN — ATORVASTATIN CALCIUM 80 MG: 80 TABLET, FILM COATED ORAL at 20:44

## 2022-02-24 RX ADMIN — BUMETANIDE 2 MG: 1 TABLET ORAL at 15:25

## 2022-02-24 RX ADMIN — PANTOPRAZOLE SODIUM 40 MG: 40 TABLET, DELAYED RELEASE ORAL at 20:44

## 2022-02-24 RX ADMIN — INSULIN GLARGINE 10 UNITS: 100 INJECTION, SOLUTION SUBCUTANEOUS at 11:05

## 2022-02-24 RX ADMIN — SODIUM CHLORIDE, PRESERVATIVE FREE 10 ML: 5 INJECTION INTRAVENOUS at 15:26

## 2022-02-24 RX ADMIN — INSULIN LISPRO 3 UNITS: 100 INJECTION, SOLUTION INTRAVENOUS; SUBCUTANEOUS at 21:37

## 2022-02-24 RX ADMIN — LEVOTHYROXINE SODIUM 100 MCG: 0.1 TABLET ORAL at 15:24

## 2022-02-24 ASSESSMENT — ENCOUNTER SYMPTOMS
CONSTIPATION: 0
ABDOMINAL PAIN: 0
VOMITING: 0
BLOOD IN STOOL: 0
RHINORRHEA: 0
SHORTNESS OF BREATH: 1
BACK PAIN: 0
COUGH: 0
DIARRHEA: 0
CHEST TIGHTNESS: 1
ABDOMINAL DISTENTION: 0
NAUSEA: 0
SORE THROAT: 0

## 2022-02-24 NOTE — FLOWSHEET NOTE
02/24/22 1150 02/24/22 1507   Vital Signs   BP (!) 146/68 (!) 140/72   Temp 97.9 °F (36.6 °C) 97.5 °F (36.4 °C)   Pulse 85 82   Resp 18 16   Weight 130 lb 11.7 oz (59.3 kg) 125 lb 10.6 oz (57 kg)   Weight Method Bed scale Bed scale   Percent Weight Change -1.66 -3.88   Post-Hemodialysis Assessment   Post-Treatment Procedures  --  Blood returned;Catheter Capped, clamped with Saline x2 ports   Machine Disinfection Process  --  Acid/Vinegar Clean;Heat Disinfect; Exterior Machine Disinfection   Rinseback Volume (ml)  --  400 ml   Total Liters Processed (l/min)  --  51.3 l/min   Dialyzer Clearance  --  Lightly streaked   Duration of Treatment (minutes)  --  180 minutes   Hemodialysis Intake (ml)  --  400 ml   Hemodialysis Output (ml)  --  2900 ml   NET Removed (ml)  --  2500 ml   Tolerated Treatment  --  Good   Stable 3 hr tx. 2.5L removed during HD; patient tolerated well. Both ports of hemodialysis flushed and clamped protocol. Treatment record printed for scanning into EMR.  Report given to primary RN

## 2022-02-24 NOTE — CONSULTS
[facility]    [address], [address]  [facility]  2/24/2022 2:41 PM    Pt Name:    Ramses Bethea  MRN:     [id number]   [id number]  YOB: 1965  Admit Date:    2/24/2022  9:01 AM  Primary Care Physician:  PAOLA Arambula CNP    [facility] Number:   [id number]    Reason for Consult:  ESRD on hemodialysis  Requesting provider:  Janneth Sutton PA-C    History:   The patient is a 61 y.o. white male with history of end-stage renal disease who used to be on CAPD modality. Patient apparently was not tolerating CAPD well volumes. He could not tolerate more than 1200 ml fill volume. Was also not very compliant with CAPd treatments at home. He was only doing three exchanges per day. Anyway patient unfortunately had PD catheter malfunction. Patient underwent catheter manipulation but was unsuccessful. Subsequently had intra-abdominal bleeding and received blood transfusion at outside hospital.  No intervention was needed. Patient was transitioned over to hemodialysis. Patient presented to the hospital at [facility] with complaints of progressively worsening shortness of breath and symptoms of volume overload for last 1 day. Symptoms worse with minimal activity or exertion. He has not had good dialysis treatments recently due to PD catheter malfunction. He was supposed to start hemodialysis as outpatient tomorrow. Past Medical History:  Past Medical History:   Diagnosis Date    Broken ankle 2016    Broke right ankle.     CAD (coronary artery disease) October 2015    MI     Cerebral artery occlusion with cerebral infarction [facility])  march 2015    left side    CHF (congestive heart failure) (HCC)     Chronic kidney disease     peritoneal dialysis    Diabetes mellitus ([address]     GERD (gastroesophageal reflux disease)     Hyperlipidemia     Hypertension     Thyroid disease        Past Surgical History:  Past Surgical History: Procedure Laterality Date    APPENDECTOMY      CARDIAC SURGERY  Oct. 2015    2 stents placed    COLONOSCOPY      DIAGNOSTIC CARDIAC CATH LAB PROCEDURE  2020    EYE SURGERY         Family History:  Family History   Adopted: Yes   Problem Relation Age of Onset    Diabetes Mother     Cancer Sister        Social History:  Social History     Socioeconomic History    Marital status:      Spouse name: Mago Cadena    Number of children: 3    Years of education: Not on file    Highest education level: Not on file   Occupational History    Not on file   Tobacco Use    Smoking status: Former Smoker     Packs/day: 1.50     Years: 30.00     Pack years: 45.00     Quit date: 10/9/2015     Years since quittin.3    Smokeless tobacco: Former User    Tobacco comment: currently uses vap pens   Vaping Use    Vaping Use: Every day    Substances: Always   Substance and Sexual Activity    Alcohol use: Yes     Alcohol/week: 4.0 standard drinks     Types: 4 Cans of beer per week     Comment: occasional, once month    Drug use: No    Sexual activity: Yes     Partners: Female   Other Topics Concern    Not on file   Social History Narrative    Not on file     Social Determinants of Health     Financial Resource Strain:     Difficulty of Paying Living Expenses: Not on file   Food Insecurity:     Worried About Running Out of Food in the Last Year: Not on file    Santi of Food in the Last Year: Not on file   Transportation Needs:     Lack of Transportation (Medical): Not on file    Lack of Transportation (Non-Medical):  Not on file   Physical Activity:     Days of Exercise per Week: Not on file    Minutes of Exercise per Session: Not on file   Stress:     Feeling of Stress : Not on file   Social Connections:     Frequency of Communication with Friends and Family: Not on file    Frequency of Social Gatherings with Friends and Family: Not on file    Attends Sikh Services: Not on file   Erich Active Member of Clubs or Organizations: Not on file    Attends Club or Organization Meetings: Not on file    Marital Status: Not on file   Intimate Partner Violence:     Fear of Current or Ex-Partner: Not on file    Emotionally Abused: Not on file    Physically Abused: Not on file    Sexually Abused: Not on file   Housing Stability:     Unable to Pay for Housing in the Last Year: Not on file    Number of Enedinamomay in the Last Year: Not on file    Unstable Housing in the Last Year: Not on file       Home Meds:  Prior to Admission medications    Medication Sig Start Date End Date Taking?  Authorizing Provider   hydrALAZINE (APRESOLINE) 25 MG tablet Take 3 tablets by mouth 3 times daily 11/6/20  Yes Elenita Hutchinson MD   isosorbide mononitrate (IMDUR) 30 MG extended release tablet Take 30 mg by mouth daily   Yes Historical Provider, MD   insulin glargine (BASAGLAR KWIKPEN) 100 UNIT/ML injection pen Inject 10 Units into the skin daily   Yes Historical Provider, MD   pantoprazole (PROTONIX) 40 MG tablet Take 40 mg by mouth 2 times daily   Yes Historical Provider, MD   insulin aspart (NOVOLOG) 100 UNIT/ML injection vial Inject into the skin 3 times daily (before meals) PT TAKES IT BASED OFF OF HIS CARBS;  1  Unit per 8 cho   Yes Historical Provider, MD   bumetanide (BUMEX) 2 MG tablet Take 1 tablet by mouth daily  Patient taking differently: Take 2 mg by mouth daily Swelling in ankles 5/15/19  Yes Lina Tijerina DO   levothyroxine (SYNTHROID) 100 MCG tablet Take 1 tablet by mouth Daily 4/19/19  Yes Nery Moraes MD   nitroGLYCERIN (NITROSTAT) 0.4 MG SL tablet DISSOLVE 1 TABLET UNDER THE TONGUE EVERY 5 MIN AS NEEDED FOR CHEST PAIN 1/25/18  Yes Historical Provider, MD   atorvastatin (LIPITOR) 80 MG tablet Take 80 mg by mouth at bedtime    Yes Historical Provider, MD   clopidogrel (PLAVIX) 75 MG tablet Take 75 mg by mouth daily Patient not taking at this time since Sunday   Yes Historical Provider, MD   metoprolol (LOPRESSOR) 50 MG tablet Take 50 mg by mouth 2 times daily (with meals)    Yes Historical Provider, MD   aspirin 81 MG EC tablet Take 81 mg by mouth daily    Yes Historical Provider, MD   Peritoneal [employer] (DIANEAL LO-JARON, ULTRABAG, 2.5%) Inject 1,200 mLs into the peritoneum 4 times daily 11/6/20 12/6/20  Ezequiel Diaz MD   Insulin Pen Needle (Gerre Flirt PEN NEEDLES) 31G X 6 MM MISC 1 each by Does not apply route daily 11/5/20 12/5/20  PAOLA Barber - CNP   NONFORMULARY Dexcom 6 checks sugar    Historical Provider, MD       Review of Systems:  Constitutional: Positive for shortness of breath  Head: Negative for headaches  Eyes: Negative for blurry vision or discharge  Ears: Negative for ear pain or hearing changes  Nose: Negative for runny nose or epistaxis  Respiratory: Negative for shortness of breath. Negative for cough or sputum production. Negative for hemoptysis  Cardiovascular: Negative for chest pain  GI: Negative for nausea, vomiting and diarrhea.   Negative for hematochezia and melena  : Negative for discharge, dysuria, or hematuria  Skin: Negative for rash  Musculoskeletal: Negative for joint pain, moves all ext  Neuro: Negative for numbness or tingling, negative for slurred speech  Psychiatric: Reports stable mood, negative for depression or insomnia    All other review of systems were reviewed and negative    Current Meds:  Infusion:    sodium chloride      dextrose       Meds:    aspirin  81 mg Oral Daily    atorvastatin  80 mg Oral Nightly    bumetanide  2 mg Oral Daily    [Held by provider] clopidogrel  75 mg Oral Daily    hydrALAZINE  75 mg Oral TID    insulin glargine  10 Units SubCUTAneous Daily    isosorbide mononitrate  30 mg Oral Daily    levothyroxine  100 mcg Oral Daily    metoprolol tartrate  50 mg Oral BID WC    pantoprazole  40 mg Oral BID    sodium chloride flush  10 mL IntraVENous 2 times per day    [Held by provider] heparin (porcine)  5,000 Units SubCUTAneous 3 times per day    insulin lispro  0-6 Units SubCUTAneous TID WC    insulin lispro  0-3 Units SubCUTAneous Nightly     Meds prn: sodium chloride flush, sodium chloride, ondansetron **OR** ondansetron, polyethylene glycol, acetaminophen **OR** acetaminophen, magnesium sulfate, glucose, glucagon (rDNA), dextrose, dextrose bolus (hypoglycemia)     Allergies/Intolerances: ALLERGIES: Aranesp (albumin free) [darbepoetin eri]    24HR INTAKE/OUTPUT:  No intake or output data in the 24 hours ending 02/24/22 1441  No intake/output data recorded. No intake/output data recorded. Admission weight: 132 lb 15 oz (60.3 kg)  Wt Readings from Last 3 Encounters:   02/24/22 130 lb 11.7 oz (59.3 kg)   06/30/21 125 lb (56.7 kg)   01/18/21 127 lb (57.6 kg)     Body mass index is 23.91 kg/m².     Physical Examination:  VITALS:   Vitals:    02/24/22 0900 02/24/22 1045 02/24/22 1150   BP: (!) 175/77 (!) 163/74 (!) 146/68   Pulse: 82 83 85   Resp: 16 18 18   Temp: 98.6 °F (37 °C) 98.3 °F (36.8 °C) 97.9 °F (36.6 °C)   TempSrc: Oral Oral    SpO2: 96% 98%    Weight: 132 lb 15 oz (60.3 kg)  130 lb 11.7 oz (59.3 kg)   Height: 5' 2\" (1.575 m)       Weight:   Wt Readings from Last 3 Encounters:   02/24/22 130 lb 11.7 oz (59.3 kg)   06/30/21 125 lb (56.7 kg)   01/18/21 127 lb (57.6 kg)     Constitutional and General Appearance: alert and cooperative with exam, appears comfortable, no distress, not diaphoretic  Eyes: no icteric sclera in left eye or right eye,  no pallor conjunctiva in left or right eye, no discharge seen from left eye or right eye  Ears and Nose: normal external appearance of left and right ear  Oral: moist oral mucus membranes  Neck: No jugular venous distention, appears symmetric, good ROM  Lungs: Diminished air entry at the bases  Chest: No chest wall tenderness  Heart: regular rate, S1, S2  Extremities: no LE edema, no tenderness  GI: soft, non-tender, no guarding, no distention  Skin: no rash seen on exposed extremities, warm to touch  Musculo: moves all extremities, no clubbing or cyanosis of digits of either upper extremity. Neuro: no slurred speech, no facial drooping, symmetric strength  Psychiatric: Normal mood and affect, Not agitated    Lab Data  CBC:   Recent Labs     02/24/22  0955   WBC 5.7   HGB 8.9*   HCT 27.0*        BMP:  Recent Labs     02/24/22  0955   *   K 5.2      CO2 18*   BUN 49*   CREATININE 6.4*   GLUCOSE 238*   CALCIUM 9.2     Hepatic: No results for input(s): LABALBU, AST, ALT, ALB, BILITOT, ALKPHOS in the last 72 hours. Additional Labs: proBNP greater than 70,000  Diagnostics: Chest x-ray shows cardiomegaly and interstitial edema    Old tests reviewed. Echo: October 2020 EF 40 to 45%    Impression and Plan:  1. ESRD on hemodialysis. Patient has not had dialysis treatment due to recent CAPD catheter malfunction. Now presenting with interstitial edema. Hemodialysis treatment today. Discussed with dialysis staff. Orders placed. 2. Shortness of breath secondary to interstitial edema. Patient is however on room air at this time. Clinically appearing comfortable. But will plan dialysis treatment with 3 to 4 kg of ultrafiltration  3. Recent CAPD catheter malfunction status post removal  4. Hyperkalemia. Will correct with dialysis  5. Metabolic acidosis. Will correct with dialysis  6. Anemia in ESRD  7. Mild chronic systolic dysfunction  8. History of noncompliance  9. Insulin Dependent diabetes mellitus    Thank you for the consult. Please feel free to call me if you have any questions.      Miranda Garcia MD  [facility]

## 2022-02-24 NOTE — H&P
Hospitalist - History & Physical      Patient: Gwendolyn Sorensen    Unit/Bed:6K-19/019-A  YOB: 1965  MRN: [id number]   Acct: [de-identified]   PCP: PAOLA Lees CNP    Date of Service: Pt seen/examined on 02/24/22  and Admitted to Inpatient with expected LOS greater than two midnights due to medical therapy. Chief Complaint:  Worsening SOB and chest pain    Assessment and Plan:  1. Elevated Troponin:    Likely demand ischemia in the setting of ESRD and possible acute CHF exacerbation. Patient has associated chest discomfort with deep breaths. Reports not having dialysis in the last week. Troponin 0.226 today. EKG shows no significant change from previous. Limited ECHO and chest Xray ordered. Trend Troponin x 2 more occurrences. Repeat EKG this afternoon. Consider cardiology consult if troponin continues to trend up. 2. ESRD on HD:    Cr 6.4, BUN 49 today. Patient was evaluated by Nephrology service at [facility] for possible HD, but was deemed not necessary at the time. Plan upon discharge from [facility] yesterday was to have HD this coming Friday (2/25). Pt appears fluid overloaded today. Plan for HD dialysis. Nephrology consulted. Continue to monitor with daily BMP. Pt on home PD but had recent PD removal and HD catheter placement at [facility] with post-op bleeding complications for which he was transferred to [facility] and received 2 units PRBC. Pt states he is also pursuing a kidney transplant through [facility]. 3. Pseudo- hyponatremia:    Sodium 133. Corrected Na 135. Continue to monitor with daily BMP. Nephrology consulted. 4. Macrocytic Anemia, acute on chronic:    Hgb 8.9 today, slightly improved from yesterday at [facility]. Recent history of significant blood loss requiring 2 unit PRBC infusion earlier this week. Underlying CKD. Continue to monitor daily CBC and daily dressing changes from recent PD catheter removal.     5. T2DM:    POC Glucose 263 today. Last A1C 12/29/2021 9.5%.  Continue home lantus with Low Dose SSI. Hypoglycemic protocol in place. 6. Essential HTN:    /77mmHg upon arrival. Resume home medications. Continue to monitor. 7. CAD:   Stable. Sara Seguralander 1/18/2021 not suggestive for ischemia. Hx of PCI. Continue statin, ASA, Plavix, BB, Imdur. 8. HFpEF, with grade II diastolic dysfunction:   Last ECHO 2/11/2022 at [address] [place] [address] reveals EF 50-55% with grade II diastolic dysfunction. Pt endorses worsening SOB. CXR and repeat Limited ECHO ordered today. Continue Bumex. Cardiac diet and 2L fluid restriction today. Initial troponin elevated. ProBNP and repeat troponin ordered. 9. GERD:    Stable. Pt reports not taking Protonix due price of prescription. Resume Protonix today. 10. Hypothyroid disease:    Stable. Continue Synthroid. History Of Present Illness:    Milad Reynoso is a 62 y/o  male smoker with a past medical history of CKD on PD, CAD, HTN, CHF, GERD, Hypothyroid disease who presents to [facility] via direct admit for the evaluation of shortness of breath and intermittent chest discomfort x 1 day. The patient states the shortness of breath has been worsening since being discharged from [address] [place] [address] yesterday. The chest pain is intermittent and worse with deep inspiration. He describes the discomfort as tightness and as if someone is pushing down on his chest. He states it's located in the center of his chest and is also described as dull, non radiating.  The patient has previously done daily peritoneal dialysis at home, but starting late last week, he noticed fluid was not draining from his catheter as normal. He went to [facility] for evaluation of his catheter, and then was transferred to [facility], against the patient's wishes and had the PD catheter removed and a HD catheter placed on the right side of his chest. The patient states he was discharged Monday and started profusely bleeding from his PD catheter site at home, started feeling lightheaded, and called EMS who took him back to [facility] than life-flighted patient to [facility] due to the blood loss. He states he received 2 units of blood at [facility], and was seen by a nephrologist who said HD was not indicated at this time. He was discharged home from [facility] yesterday, and then developed worsening shortness of breath, for which the patient states he suspects he is fluid overloaded. He returned to [facility] ED late last night for evaluation and  arrived to [facility] this morning. The patient states he has associate soreness from the PD catheter site and generalized weakness from the events of this past week, but denies chest pain, abdominal pain, LE swelling, abdominal distention, cough, fever, chills, additional bleeding from previous PD catheter site. Pt denies change in urinary habits, n/v, change in appetite. Past Medical History:        Diagnosis Date    Broken ankle 2016    Broke right ankle.  CAD (coronary artery disease) October 2015    MI     Cerebral artery occlusion with cerebral infarction [facility])  march 2015    left side    CHF (congestive heart failure) (HCC)     Chronic kidney disease     peritoneal dialysis    Diabetes mellitus ([address]     GERD (gastroesophageal reflux disease)     Hyperlipidemia     Hypertension     Thyroid disease        Past Surgical History:        Procedure Laterality Date    APPENDECTOMY      CARDIAC SURGERY  Oct. 2015    2 stents placed    COLONOSCOPY      DIAGNOSTIC CARDIAC CATH LAB PROCEDURE  11/05/2020    EYE SURGERY         Home Medications:   No current facility-administered medications on file prior to encounter.      Current Outpatient Medications on File Prior to Encounter   Medication Sig Dispense Refill    gentamicin (GARAMYCIN) 0.1 % cream APPLY PEA SIZE AMOUNT DAILY TO PERITOREAL CATHETER SITE      hydrALAZINE (APRESOLINE) 25 MG tablet Take 3 tablets by mouth 3 times daily 90 tablet 0    Peritoneal Dialysis Solutions (DIANEAL LO-JARON, ULTRABAG, 2.5%) Inject 1,200 mLs into the peritoneum 4 times daily 730950 mL 0    isosorbide mononitrate (IMDUR) 30 MG extended release tablet Take 30 mg by mouth daily      Insulin Pen Needle ([employer] PEN NEEDLES) 31G X 6 MM MISC 1 each by Does not apply route daily 100 each 0    insulin glargine (BASAGLAR KWIKPEN) 100 UNIT/ML injection pen Inject 10 Units into the skin daily      pantoprazole (PROTONIX) 40 MG tablet Take 40 mg by mouth 2 times daily      insulin aspart (NOVOLOG) 100 UNIT/ML injection vial Inject into the skin 3 times daily (before meals) PT TAKES IT BASED OFF OF HIS CARBS;  1  Unit per 8 cho      NONFORMULARY Dexcom 6 checks sugar      bumetanide (BUMEX) 2 MG tablet Take 1 tablet by mouth daily (Patient taking differently: Take 2 mg by mouth daily as needed Swelling in ankles) 90 tablet 3    levothyroxine (SYNTHROID) 100 MCG tablet Take 1 tablet by mouth Daily 30 tablet 3    nitroGLYCERIN (NITROSTAT) 0.4 MG SL tablet DISSOLVE 1 TABLET UNDER THE TONGUE EVERY 5 MIN AS NEEDED FOR CHEST PAIN  0    atorvastatin (LIPITOR) 80 MG tablet Take 80 mg by mouth daily      clopidogrel (PLAVIX) 75 MG tablet Take 75 mg by mouth daily      metoprolol (LOPRESSOR) 50 MG tablet Take 50 mg by mouth 2 times daily (with meals)       aspirin 81 MG EC tablet Take 81 mg by mouth daily          Allergies:    Aranesp (albumin free) [darbepoetin eri]    Social History:    reports that he quit smoking about 6 years ago. He has a 45.00 pack-year smoking history. He has quit using smokeless tobacco. He reports current alcohol use of about 4.0 standard drinks of alcohol per week. He reports that he does not use drugs. Family History:       Adopted: Yes   Problem Relation Age of Onset    Diabetes Mother     Cancer Sister        Diet:  No diet orders on file    Review of systems:     Review of Systems   Constitutional: Positive for activity change. Negative for appetite change, chills, fatigue and fever.    HENT: Negative for congestion, rhinorrhea and sore throat. Eyes: Positive for visual disturbance (Pt reports having a style on his left eye x 1 month). Respiratory: Positive for chest tightness and shortness of breath. Negative for cough. Cardiovascular: Negative for chest pain, palpitations and leg swelling. Gastrointestinal: Negative for abdominal distention, abdominal pain (Pt reports soreness around the old PD catheter site), blood in stool, constipation, diarrhea, nausea and vomiting. Genitourinary: Negative for difficulty urinating, dysuria and frequency. Musculoskeletal: Negative for arthralgias, back pain and myalgias. Neurological: Positive for weakness (generalized ). Negative for dizziness, light-headedness, numbness and headaches. PHYSICAL EXAM:  BP (!) 175/77   Pulse 82   Temp 98.6 °F (37 °C) (Oral)   Resp 16   Ht 5' 2\" (1.575 m)   Wt 132 lb 15 oz (60.3 kg)   SpO2 96%   BMI 24.31 kg/m²   General appearance: Chronically ill appearing. No apparent distress. Appears stated age and cooperative. Skin: Skin color, texture, turgor normal.  No rashes or lesions. HEENT: Left eyelid erythema and hordeolum. Normal cephalic, atraumatic without obvious deformity. Pupils equal, round, and reactive to light. Extra-ocular muscles intact. Conjunctivae/corneas clear. Neck: Trachea midline. Supple, with full range of motion. No jugular venous distention. Cardiovascular: Regular rate and rhythm with normal S1/S2. No murmurs, rubs or gallops. Respiratory:  Normal respiratory effort. Clear to auscultation, bilaterally without rales, wheezes, or rhonchi. Abdomen: Distended. Tenderness to palpation to left lower quadrant. Dressing on left lower quadrant over old PD catheter site. Steri strips in place. No acute bleeding. Normal bowel sounds. Musculoskeletal:  No weakness or instability noted. No edema, erythema, or gross deformity noted. Vascular:  Pulses +2 palpable, equal bilaterally.   Neurologic:  Neurovascularly intact without any focal sensory/motor deficits. Cranial nerves: II-XII grossly intact. Psychiatric: Alert and oriented, thought content appropriate, normal insight      Labs:   No results for input(s): WBC, HGB, HCT, PLT in the last 72 hours. Recent Labs     02/21/22  1147   *   K 4.2   CL 96*   CO2 24   BUN 47*   CREATININE 5.03*   CALCIUM 8.80     No results for input(s): AST, ALT, BILIDIR, BILITOT, ALKPHOS in the last 72 hours. Recent Labs     02/21/22  1147   INR 0.96     No results for input(s): Migdalia Ion in the last 72 hours. Urinalysis:    Lab Results   Component Value Date    NITRU NEGATIVE 04/10/2019    WBCUA NONE SEEN 04/10/2019    BACTERIA NONE 04/10/2019    RBCUA 3-5 04/10/2019    BLOODU SMALL 04/10/2019    GLUCOSEU NEGATIVE 04/10/2019       Radiology:   No orders to display     No results found. EKG: NSR with possible left atrial enlargement and ST-T wave abnormality- no significant change from previous.      Electronically signed by Catherine Rasmussen PA-C on 2/24/2022 at 9:07 AM

## 2022-02-24 NOTE — PROGRESS NOTES
Pt seen during dialysis  Tolerating HD well  UF goal ~ 3.5 to 4 kg as tolerated  Currently on room air. Oxygenating well  Tunneled catheter is working well  Vitals stable  Will plan to repeat HD in am    V.  Joshua Don MD

## 2022-02-24 NOTE — PROGRESS NOTES
Transfer  Report from [facility]  Referring Physician Dr Sandre Koyanagi Physician Dr Bernabe David  Patient Condition:57 yo presents to [facility] with c/o worsening SOB and chest pain. Pt was a peritoneal dialysis patient but recently had his catheter removed and a hemodialysis catheter placed. He began to bleed from the site and was transferred to [facility] to have it repaired, and also received several units of blood. Today he has worsening SOB but  no EKG changes. Labs Hgb 9.2 BNP 3920 Trop 0.192, Creat 5.8 Na 130 K 4.8 BUN 49. He is scheduled for his first hemodialysis on Friday, but may need it sooner. products  Vitals 98.9 83 19 135/75 96% on RA.        Accepted on behalf of Dr Bernabe David to 2G10 with diagnosis of Fluid Overload

## 2022-02-25 LAB
AVERAGE GLUCOSE: 174 MG/DL (ref 70–126)
EKG ATRIAL RATE: 78 BPM
EKG P AXIS: 39 DEGREES
EKG P-R INTERVAL: 156 MS
EKG Q-T INTERVAL: 430 MS
EKG QRS DURATION: 90 MS
EKG QTC CALCULATION (BAZETT): 490 MS
EKG R AXIS: 48 DEGREES
EKG T AXIS: -122 DEGREES
EKG VENTRICULAR RATE: 78 BPM
GLUCOSE BLD-MCNC: 138 MG/DL (ref 70–108)
GLUCOSE BLD-MCNC: 163 MG/DL (ref 70–108)
GLUCOSE BLD-MCNC: 216 MG/DL (ref 70–108)
GLUCOSE BLD-MCNC: 299 MG/DL (ref 70–108)
HBA1C MFR BLD: 7.8 % (ref 4.4–6.4)

## 2022-02-25 PROCEDURE — 99222 1ST HOSP IP/OBS MODERATE 55: CPT | Performed by: NUCLEAR MEDICINE

## 2022-02-25 PROCEDURE — 1200000003 HC TELEMETRY R&B

## 2022-02-25 PROCEDURE — 83036 HEMOGLOBIN GLYCOSYLATED A1C: CPT

## 2022-02-25 PROCEDURE — 6360000002 HC RX W HCPCS: Performed by: INTERNAL MEDICINE

## 2022-02-25 PROCEDURE — 93010 ELECTROCARDIOGRAM REPORT: CPT | Performed by: INTERNAL MEDICINE

## 2022-02-25 PROCEDURE — 36415 COLL VENOUS BLD VENIPUNCTURE: CPT

## 2022-02-25 PROCEDURE — 99233 SBSQ HOSP IP/OBS HIGH 50: CPT | Performed by: INTERNAL MEDICINE

## 2022-02-25 PROCEDURE — 2580000003 HC RX 258

## 2022-02-25 PROCEDURE — 82948 REAGENT STRIP/BLOOD GLUCOSE: CPT

## 2022-02-25 PROCEDURE — 90935 HEMODIALYSIS ONE EVALUATION: CPT | Performed by: INTERNAL MEDICINE

## 2022-02-25 PROCEDURE — 93307 TTE W/O DOPPLER COMPLETE: CPT

## 2022-02-25 PROCEDURE — 90935 HEMODIALYSIS ONE EVALUATION: CPT

## 2022-02-25 PROCEDURE — 6370000000 HC RX 637 (ALT 250 FOR IP): Performed by: INTERNAL MEDICINE

## 2022-02-25 PROCEDURE — 6370000000 HC RX 637 (ALT 250 FOR IP)

## 2022-02-25 RX ORDER — DEXTROSE MONOHYDRATE 25 G/50ML
12.5 INJECTION, SOLUTION INTRAVENOUS PRN
Status: DISCONTINUED | OUTPATIENT
Start: 2022-02-25 | End: 2022-02-25 | Stop reason: SDUPTHER

## 2022-02-25 RX ORDER — DEXTROSE MONOHYDRATE 50 MG/ML
100 INJECTION, SOLUTION INTRAVENOUS PRN
Status: DISCONTINUED | OUTPATIENT
Start: 2022-02-25 | End: 2022-02-26 | Stop reason: HOSPADM

## 2022-02-25 RX ORDER — NICOTINE POLACRILEX 4 MG
15 LOZENGE BUCCAL PRN
Status: DISCONTINUED | OUTPATIENT
Start: 2022-02-25 | End: 2022-02-26 | Stop reason: HOSPADM

## 2022-02-25 RX ADMIN — INSULIN GLARGINE 10 UNITS: 100 INJECTION, SOLUTION SUBCUTANEOUS at 12:18

## 2022-02-25 RX ADMIN — INSULIN LISPRO 4 UNITS: 100 INJECTION, SOLUTION INTRAVENOUS; SUBCUTANEOUS at 13:37

## 2022-02-25 RX ADMIN — PANTOPRAZOLE SODIUM 40 MG: 40 TABLET, DELAYED RELEASE ORAL at 12:16

## 2022-02-25 RX ADMIN — PANTOPRAZOLE SODIUM 40 MG: 40 TABLET, DELAYED RELEASE ORAL at 21:58

## 2022-02-25 RX ADMIN — ATORVASTATIN CALCIUM 80 MG: 80 TABLET, FILM COATED ORAL at 21:57

## 2022-02-25 RX ADMIN — SODIUM CHLORIDE, PRESERVATIVE FREE 10 ML: 5 INJECTION INTRAVENOUS at 21:58

## 2022-02-25 RX ADMIN — ISOSORBIDE MONONITRATE 30 MG: 30 TABLET, EXTENDED RELEASE ORAL at 12:18

## 2022-02-25 RX ADMIN — LEVOTHYROXINE SODIUM 100 MCG: 0.1 TABLET ORAL at 06:14

## 2022-02-25 RX ADMIN — INSULIN LISPRO 6 UNITS: 100 INJECTION, SOLUTION INTRAVENOUS; SUBCUTANEOUS at 16:59

## 2022-02-25 RX ADMIN — EPOETIN ALFA-EPBX 2000 UNITS: 2000 INJECTION, SOLUTION INTRAVENOUS; SUBCUTANEOUS at 13:27

## 2022-02-25 RX ADMIN — SODIUM CHLORIDE, PRESERVATIVE FREE 10 ML: 5 INJECTION INTRAVENOUS at 12:16

## 2022-02-25 RX ADMIN — METOPROLOL TARTRATE 50 MG: 50 TABLET, FILM COATED ORAL at 16:59

## 2022-02-25 RX ADMIN — HYDRALAZINE HYDROCHLORIDE 75 MG: 50 TABLET, FILM COATED ORAL at 21:58

## 2022-02-25 RX ADMIN — ASPIRIN 81 MG: 81 TABLET, COATED ORAL at 12:14

## 2022-02-25 RX ADMIN — BUMETANIDE 2 MG: 1 TABLET ORAL at 12:15

## 2022-02-25 RX ADMIN — EPOETIN ALFA-EPBX 3000 UNITS: 3000 INJECTION, SOLUTION INTRAVENOUS; SUBCUTANEOUS at 13:28

## 2022-02-25 ASSESSMENT — ENCOUNTER SYMPTOMS
DIARRHEA: 0
NAUSEA: 0
PHOTOPHOBIA: 0
CONSTIPATION: 0
BACK PAIN: 0
SHORTNESS OF BREATH: 0
FACIAL SWELLING: 0
ABDOMINAL PAIN: 1
COUGH: 0
VOMITING: 0
ABDOMINAL DISTENTION: 0
CHEST TIGHTNESS: 0
TROUBLE SWALLOWING: 0
SORE THROAT: 0
WHEEZING: 0
VOICE CHANGE: 0

## 2022-02-25 NOTE — PLAN OF CARE
Problem: Fluid Volume:  Goal: Hemodynamic stability will improve  Description: Hemodynamic stability will improve  Outcome: Ongoing   Assessing patient fluid volume status. Patient has IV fluids infusing. Will continue to assess intake and output. Problem: Skin Integrity:  Goal: Absence of new skin breakdown  Description: Absence of new skin breakdown  Outcome: Ongoing   Patient free from skin breakdown. Patient turns self and makes frequent positional changes. Will continue to monitor. Problem: Pain:  Goal: Control of acute pain  Description: Control of acute pain  Outcome: Ongoing   Patient free from pain this shift. Pain rated on 0-10 pain rating scale. Will continue to reassess. Problem: Discharge Planning:  Goal: Discharged to appropriate level of care  Description: Discharged to appropriate level of care  Outcome: Ongoing   Patient plans to be discharged to home when medically stable.

## 2022-02-25 NOTE — FLOWSHEET NOTE
02/25/22 0715 02/25/22 1030   Vital Signs   BP (!) 115/51 (!) 112/55   Temp 98.1 °F (36.7 °C) 98.1 °F (36.7 °C)   Pulse 79 72   Resp 16 14   Weight 117 lb 15.1 oz (53.5 kg) 113 lb 1.5 oz (51.3 kg)   Weight Method Bed scale Bed scale   Percent Weight Change -6.14 -4.11   Post-Hemodialysis Assessment   Post-Treatment Procedures  --  Blood returned;Catheter Capped, clamped with Saline x2 ports   Machine Disinfection Process  --  Exterior Machine Disinfection   Rinseback Volume (ml)  --  400 ml   Total Liters Processed (l/min)  --  47 l/min   Dialyzer Clearance  --  Lightly streaked   Duration of Treatment (minutes)  --  160 minutes   Heparin amount administered during treatment (units)  --  0 units   Hemodialysis Intake (ml)  --  400 ml   Hemodialysis Output (ml)  --  2150 ml   NET Removed (ml)  --  1750 ml   treatment ended 18 minutes early due to symptomatic hypotension. symptoms and bp improved with blood return. 1.8 liters net uf. Hemodialysis catheter ports flushed, clamped and capped. Dressing clean, dry and intact. Report given to primary RN. Treatment record printed for scanning into EMR.

## 2022-02-25 NOTE — CARE COORDINATION
2/25/22, 8:26 AM EST  DISCHARGE PLANNING EVALUATION:    Elina Gonzalez       Admitted: 2/24/2022/ [facility] day: 1   Location: [facility]- Reason for admit: Fluid overload [E87.70]  Shortness of breath [R06.02]   PMH:  has a past medical history of Broken ankle, CAD (coronary artery disease), Cerebral artery occlusion with cerebral infarction ([facility] [address] CHF (congestive heart failure) ([facility] [address] Chronic kidney disease, Diabetes mellitus ([facility] [address] GERD (gastroesophageal reflux disease), Hyperlipidemia, Hypertension, and Thyroid disease. Procedure: none  Barriers to Discharge: Trops elev, BNP >67868, creat 6.4, CO2 18. Tmax 100. HD yesterday, plan again today, nephrology following. Echo pending. PCP: Killian Echols, PAOLA - CNP  Readmission Risk Score: 17.3 ( )%    Patient Goals/Plan/Treatment Preferences: Met with Savi Louise, he plans to return home independently. He is to begin OP HD at [facility]. He denies need for DME or [facility]. Verifies PCP and insurance. Transportation/Food Security/Housekeeping Addressed:  No issues identified.

## 2022-02-25 NOTE — PROGRESS NOTES
PROGRESS NOTE      Patient:  Debra Garcia Hand      Unit/Bed:6K-19/019-A    YOB: 1965    MRN: [id number]       Acct: [de-identified]     PCP: PAOLA Lees CNP    Date of Admission: 2/24/2022      Assessment/Plan:    Anticipated Discharge in : Pending. Active Hospital Problems    Diagnosis Date Noted    Fluid overload [E87.70] 02/24/2022    Shortness of breath [R06.02] 02/24/2022    Precordial pain [R07.2] 04/09/2016     Elevated Troponin:  Likely 2/2 demand ischemia in the setting of ESRD and possible acute CHF exacerbation vs ACS. Pt reports Chest pain with deep breaths, currently resolved. Missed PD last week. Tropnin remains elevated x 3 but downtretending, EKG shows no significant change from previous in June, Last cath 60% LAD, LMCA. Limited ECHO pending. Cardiology consulted. ESRD on HD/Electrolytes/Acidosis: Cr 6.4, BUN 49 on admit. Patient was evaluated by Nephrology service at [facility] for possible HD, but was deemed not necessary at the time. Plan upon discharge from [facility] yesterday was to have HD this coming Friday (2/25). Nephrology consulted and on board. Had emergent HD on Feb/24/2022. S/P HD today. Monitor Nephro to manage HD. Electrolytes derrangments and acidosis to resolved S/P HD.     Macrocytic Anemia, acute on chronic: Hgb 8.9 today, slightly improved from yesterday at [facility]. Recent history of significant blood loss requiring 2 unit PRBC infusion earlier this week. Underlying CKD. Continue to monitor daily CBC and daily dressing changes from recent PD catheter removal.   Retacrit Per Nephro.     T2DM: Last A1C 12/29/2021 9.5%. Add Lispro 6 QHS, on home lantus, SSI,  Hypoglycemic protocol in place.      Essential HTN: Uncontrolled on arrival.   Resume home medications. Continue to monitor.      CAD: See Elevated tropnin above. Stable. Isidoro Hardin 1/18/2021 not suggestive for ischemia. Hx of PCI.    Continue statin, ASA, Plavix, BB, Imdur.     HFpEF, with grade II diastolic dysfunction: Last ECHO 2/11/2022 at [address] [place] [address] reveals EF 50-55% with grade II diastolic dysfunction. Pt endorses worsening SOB. Repeat Echo Pending. S/P HD. Continue Bumex. Cardiac diet and 2L fluid restriction today. GERD: Stable. Resume Protonix today.      Hypothyroid disease: Stable. Continue Synthroid. Chief Complaint:  Worsening SOB and chest pain. Hospital Course  Per initial H&P:    \"Jean is a 62 y/o  male smoker with a past medical history of CKD on PD, CAD, HTN, CHF, GERD, Hypothyroid disease who presents to [facility] via direct admit for the evaluation of shortness of breath and intermittent chest discomfort x 1 day. The patient states the shortness of breath has been worsening since being discharged from [address] [place] [address] yesterday. The chest pain is intermittent and worse with deep inspiration. He describes the discomfort as tightness and as if someone is pushing down on his chest. He states it's located in the center of his chest and is also described as dull, non radiating. The patient has previously done daily peritoneal dialysis at home, but starting late last week, he noticed fluid was not draining from his catheter as normal. He went to [facility] for evaluation of his catheter, and then was transferred to [facility], against the patient's wishes and had the PD catheter removed and a HD catheter placed on the right side of his chest. The patient states he was discharged Monday and started profusely bleeding from his PD catheter site at home, started feeling lightheaded, and called EMS who took him back to [facility]. Kirsten Saucedo than life-flighted patient to [facility] due to the blood loss. He states he received 2 units of blood at [address] [place] [address], and was seen by a nephrologist who said HD was not indicated at this time. He was discharged home from [address] [place] [address] yesterday, and then developed worsening shortness of breath, for which the patient states he suspects he is fluid overloaded.  He returned to [facility] ED late last night for evaluation and  arrived to [facility] this morning. The patient states he has associate soreness from the PD catheter site and generalized weakness from the events of this past week, but denies chest pain, abdominal pain, LE swelling, abdominal distention, cough, fever, chills, additional bleeding from previous PD catheter site. Pt denies change in urinary habits, n/v, change in appetite. \"    Successfully emergently HD, S/P HD. Subjective:  Pt seen and examined. S/P HD. Pt noted fatigue with HD. Otherwise no major complaints. Wants to go home, Chest pain resolved. Breathing improved on Room air. Pending cardiology consult for EKG changes. Review of Systems   Constitutional: Negative for appetite change, chills, diaphoresis, fatigue and fever. HENT: Negative for congestion, drooling, facial swelling, sore throat, trouble swallowing and voice change. Eyes: Negative for photophobia and visual disturbance. Respiratory: Negative for cough, chest tightness, shortness of breath and wheezing. Cardiovascular: Negative for chest pain, palpitations and leg swelling. Gastrointestinal: Positive for abdominal pain (s/p removal PD.). Negative for abdominal distention, constipation, diarrhea, nausea and vomiting. Genitourinary: Positive for decreased urine volume. Negative for difficulty urinating, dysuria, hematuria and urgency. Musculoskeletal: Negative for arthralgias, back pain, gait problem and myalgias. Neurological: Negative for dizziness, tremors, seizures, weakness, numbness and headaches. Psychiatric/Behavioral: Negative for agitation, behavioral problems and confusion.        Medications:  Reviewed    Infusion Medications    dextrose      sodium chloride       Scheduled Medications    epoetin eri-epbx  2,000 Units SubCUTAneous Once per day on Mon Wed Fri    And    epoetin eri-epbx  3,000 Units SubCUTAneous Once per day on Mon Wed Fri    insulin lispro  0-12 Units SubCUTAneous TID WC    insulin lispro  0-6 Units SubCUTAneous Nightly    aspirin  81 mg Oral Daily    atorvastatin  80 mg Oral Nightly    bumetanide  2 mg Oral Daily    [Held by provider] clopidogrel  75 mg Oral Daily    hydrALAZINE  75 mg Oral TID    insulin glargine  10 Units SubCUTAneous Daily    isosorbide mononitrate  30 mg Oral Daily    levothyroxine  100 mcg Oral Daily    metoprolol tartrate  50 mg Oral BID WC    pantoprazole  40 mg Oral BID    sodium chloride flush  10 mL IntraVENous 2 times per day    [Held by provider] heparin (porcine)  5,000 Units SubCUTAneous 3 times per day     PRN Meds: glucose, glucagon (rDNA), dextrose, sodium chloride flush, sodium chloride, ondansetron **OR** ondansetron, polyethylene glycol, acetaminophen **OR** acetaminophen, magnesium sulfate, dextrose bolus (hypoglycemia)      Intake/Output Summary (Last 24 hours) at 2/25/2022 1442  Last data filed at 2/25/2022 1157  Gross per 24 hour   Intake 1090 ml   Output 5050 ml   Net -3960 ml       Diet:  ADULT DIET; Regular; 4 carb choices (60 gm/meal); Low Fat/Low Chol/High Fiber/2 gm Na; 2000 ml    Exam:  BP (!) 102/56   Pulse 89   Temp 98.1 °F (36.7 °C) (Oral)   Resp 16   Ht 5' 2\" (1.575 m)   Wt 113 lb 1.5 oz (51.3 kg)   SpO2 95%   BMI 20.69 kg/m²     Physical Exam  Constitutional:       General: He is not in acute distress. Appearance: Normal appearance. He is not ill-appearing or diaphoretic. HENT:      Head: Normocephalic and atraumatic. Nose: Nose normal. No congestion or rhinorrhea. Mouth/Throat:      Mouth: Mucous membranes are moist.      Pharynx: Oropharynx is clear. No oropharyngeal exudate or posterior oropharyngeal erythema. Eyes:      General: No scleral icterus. Right eye: No discharge. Left eye: No discharge. Extraocular Movements: Extraocular movements intact. Conjunctiva/sclera: Conjunctivae normal.      Pupils: Pupils are equal, round, and reactive to light.    Cardiovascular: Rate and Rhythm: Normal rate and regular rhythm. Pulses: Normal pulses. Heart sounds: Normal heart sounds. No murmur heard. No friction rub. No gallop. Pulmonary:      Effort: Pulmonary effort is normal. No respiratory distress. Breath sounds: Normal breath sounds. No wheezing, rhonchi or rales. Abdominal:      General: Abdomen is flat. A surgical scar is present. Bowel sounds are normal.      Palpations: Abdomen is soft. Tenderness: There is abdominal tenderness in the left upper quadrant and left lower quadrant. Musculoskeletal:         General: No swelling, tenderness or signs of injury. Normal range of motion. Cervical back: Normal range of motion and neck supple. No rigidity or tenderness. Right lower leg: No edema. Left lower leg: No edema. Skin:     General: Skin is warm and dry. Capillary Refill: Capillary refill takes 2 to 3 seconds. Coloration: Skin is not jaundiced or pale. Findings: No erythema or rash. Neurological:      General: No focal deficit present. Mental Status: He is alert and oriented to person, place, and time. Cranial Nerves: No cranial nerve deficit. Motor: Weakness (2/2 Fatigue) present. Psychiatric:         Mood and Affect: Mood normal.         Behavior: Behavior normal.         Thought Content: Thought content normal.         Labs:   Recent Labs     02/24/22  0955   WBC 5.7   HGB 8.9*   HCT 27.0*        Recent Labs     02/24/22  0955   *   K 5.2      CO2 18*   BUN 49*   CREATININE 6.4*   CALCIUM 9.2     No results for input(s): AST, ALT, BILIDIR, BILITOT, ALKPHOS in the last 72 hours. No results for input(s): INR in the last 72 hours. No results for input(s): Meldon Granados in the last 72 hours.     Urinalysis:      Lab Results   Component Value Date    NITRU NEGATIVE 04/10/2019    WBCUA NONE SEEN 04/10/2019    BACTERIA NONE 04/10/2019    RBCUA 3-5 04/10/2019    BLOODU SMALL 04/10/2019    GLUCOSEU NEGATIVE 04/10/2019       Radiology:  XR CHEST PORTABLE   Final Result   Cardiomegaly and interstitial edema and infiltrates throughout both lungs            **This report has been created using voice recognition software. It may contain minor errors which are inherent in voice recognition technology. **      Final report electronically signed by Dr. Lali Tubbs on 2/24/2022 11:14 AM          Diet: ADULT DIET; Regular; 4 carb choices (60 gm/meal); Low Fat/Low Chol/High Fiber/2 gm Na; 2000 ml    DVT prophylaxis: [] Lovenox                                 [] SCDs                                 [] SQ Heparin                                 [] Encourage ambulation           [] Already on Anticoagulation     Disposition:    [x] Home       [] TCU       [] Rehab       [] Psych       [] SNF       [] [place]       [] Other-    Code Status: Full Code    PT/OT Eval Status: Pending        Electronically signed by Devon Renner MD on 2/25/2022 at 2:42 PM    This note was electronically signed. Parts of this note may have been dictated by use of voice recognition software and electronically transcribed. The note may contain errors not detected in proofreading. Please refer to my supervising physician's documentation if my documentation differs.

## 2022-02-25 NOTE — FLOWSHEET NOTE
Pt declined visit and prayer. He said that he was not [employer]. Though, I wished him well and it was accepted.       02/25/22 7959   Encounter Summary   Services provided to: Patient   Referral/Consult From: Rounding   Continue Visiting No  (2/25 DECLINED)   Complexity of Encounter Low   Length of Encounter 15 minutes   Routine   Type Initial   Assessment Calm   Outcome Refused/declined

## 2022-02-25 NOTE — PROGRESS NOTES
[facility]   Nephrology progress note  2/25/2022, 11:47 AM      Pt Name:    Lenin Ceja  MRN:     [id number]     YOB: 1965  Admit Date:    2/24/2022  9:01 AM  Primary Care Physician:  PAOLA Jarrell CNP   Room number  8J-92/910-I    Chief Complaint: Nephrology following for ESRD and HD    Subjective:  Patient seen and examined  No chest pain or shortness of breath  Feels okay    Objective:  24HR INTAKE/OUTPUT:    Intake/Output Summary (Last 24 hours) at 2/25/2022 1147  Last data filed at 2/25/2022 1030  Gross per 24 hour   Intake 910 ml   Output 5050 ml   Net -4140 ml     I/O last 3 completed shifts: In: 510 [P.O.:100; I.V.:10]  Out: 2900   I/O this shift: In: 400   Out: 2150   Admission weight: 132 lb 15 oz (60.3 kg)  Wt Readings from Last 3 Encounters:   02/25/22 113 lb 1.5 oz (51.3 kg)   06/30/21 125 lb (56.7 kg)   01/18/21 127 lb (57.6 kg)     Body mass index is 20.69 kg/m².     Physical examination  VITALS:     Vitals:    02/24/22 2334 02/25/22 0401 02/25/22 0715 02/25/22 1030   BP: (!) 107/39 (!) 116/52 (!) 115/51 (!) 112/55   Pulse: 77 71 79 72   Resp: 16 16 16 14   Temp: 99.9 °F (37.7 °C) 98.2 °F (36.8 °C) 98.1 °F (36.7 °C) 98.1 °F (36.7 °C)   TempSrc: Oral Oral     SpO2: 94% 95%     Weight:   117 lb 15.1 oz (53.5 kg) 113 lb 1.5 oz (51.3 kg)   Height:         General Appearance: alert and cooperative with exam, appears comfortable, no distress  Mouth/Throat: Oral mucosa moist  Neck: No JVD  Lungs: Air entry B/L, no rales, no use of accessory muscles  Heart:  S1, S2 heard  GI: soft, non-tender, no guarding  Extremities: no LE edema      Lab Data  CBC:   Recent Labs     02/24/22  0955   WBC 5.7   HGB 8.9*   HCT 27.0*        BMP:  Recent Labs     02/24/22  0955   *   K 5.2      CO2 18*   BUN 49*   CREATININE 6.4*   GLUCOSE 238*   CALCIUM 9.2     Hepatic: No results for input(s): LABALBU, AST, ALT, ALB, BILITOT, ALKPHOS in the last 72 hours.      Meds:  Infusion:    sodium chloride      dextrose       Meds:    aspirin  81 mg Oral Daily    atorvastatin  80 mg Oral Nightly    bumetanide  2 mg Oral Daily    [Held by provider] clopidogrel  75 mg Oral Daily    hydrALAZINE  75 mg Oral TID    insulin glargine  10 Units SubCUTAneous Daily    isosorbide mononitrate  30 mg Oral Daily    levothyroxine  100 mcg Oral Daily    metoprolol tartrate  50 mg Oral BID WC    pantoprazole  40 mg Oral BID    sodium chloride flush  10 mL IntraVENous 2 times per day    [Held by provider] heparin (porcine)  5,000 Units SubCUTAneous 3 times per day    insulin lispro  0-6 Units SubCUTAneous TID WC    insulin lispro  0-3 Units SubCUTAneous Nightly     Meds prn: sodium chloride flush, sodium chloride, ondansetron **OR** ondansetron, polyethylene glycol, acetaminophen **OR** acetaminophen, magnesium sulfate, glucose, glucagon (rDNA), dextrose, dextrose bolus (hypoglycemia)       Impression and Plan:  1. ESRD on hemodialysis  Tolerating dialysis treatment very well  Respiratory status stable  Planning to remove 2.5 L  No peripheral edema  Breathing better    2. Anemia in ESRD: add retacrit, first dose today  3. Recent intra-abdominal bleeding status post CAPD catheter removal  4. Hypertension  5. Insulin-dependent diabetes mellitus  6. Disposition: placement at outpatient dialysis unit at [facility] dialysis unit.  CM/SW following    D/W patient and HD RN  I have updated Dr. Dony Stevenson (nephrologist at [facility] dialysis unit    Shan Ortiz MD  [facility]

## 2022-02-25 NOTE — PLAN OF CARE
Problem: Falls - Risk of:  Goal: Will remain free from falls  Description: Will remain free from falls  Outcome: Ongoing  Goal: Absence of physical injury  Description: Absence of physical injury  Outcome: Ongoing     Problem: Health Behavior:  Goal: Identification of resources available to assist in meeting health care needs will improve  Description: Identification of resources available to assist in meeting health care needs will improve  Outcome: Ongoing     Problem: Respiratory:  Goal: Respiratory status will improve  Description: Respiratory status will improve  Outcome: Ongoing     Problem: Fluid Volume:  Goal: Hemodynamic stability will improve  Description: Hemodynamic stability will improve  Outcome: Ongoing  Goal: Ability to maintain a balanced intake and output will improve  Description: Ability to maintain a balanced intake and output will improve  Outcome: Ongoing

## 2022-02-26 VITALS
WEIGHT: 117.28 LBS | RESPIRATION RATE: 16 BRPM | HEART RATE: 75 BPM | HEIGHT: 62 IN | DIASTOLIC BLOOD PRESSURE: 56 MMHG | TEMPERATURE: 98.2 F | OXYGEN SATURATION: 96 % | BODY MASS INDEX: 21.58 KG/M2 | SYSTOLIC BLOOD PRESSURE: 128 MMHG

## 2022-02-26 LAB
ALBUMIN SERPL-MCNC: 3.2 G/DL (ref 3.5–5.1)
ALP BLD-CCNC: 98 U/L (ref 38–126)
ALT SERPL-CCNC: 13 U/L (ref 11–66)
ANION GAP SERPL CALCULATED.3IONS-SCNC: 12 MEQ/L (ref 8–16)
AST SERPL-CCNC: 20 U/L (ref 5–40)
BASOPHILS # BLD: 0.6 %
BASOPHILS ABSOLUTE: 0 THOU/MM3 (ref 0–0.1)
BILIRUB SERPL-MCNC: 0.5 MG/DL (ref 0.3–1.2)
BUN BLDV-MCNC: 20 MG/DL (ref 7–22)
CALCIUM SERPL-MCNC: 8.8 MG/DL (ref 8.5–10.5)
CHLORIDE BLD-SCNC: 99 MEQ/L (ref 98–111)
CO2: 23 MEQ/L (ref 23–33)
CREAT SERPL-MCNC: 4.5 MG/DL (ref 0.4–1.2)
EOSINOPHIL # BLD: 2.6 %
EOSINOPHILS ABSOLUTE: 0.1 THOU/MM3 (ref 0–0.4)
ERYTHROCYTE [DISTWIDTH] IN BLOOD BY AUTOMATED COUNT: 16 % (ref 11.5–14.5)
ERYTHROCYTE [DISTWIDTH] IN BLOOD BY AUTOMATED COUNT: 55.2 FL (ref 35–45)
GFR SERPL CREATININE-BSD FRML MDRD: 14 ML/MIN/1.73M2
GLUCOSE BLD-MCNC: 206 MG/DL (ref 70–108)
GLUCOSE BLD-MCNC: 226 MG/DL (ref 70–108)
HCT VFR BLD CALC: 30.7 % (ref 42–52)
HEMOGLOBIN: 10.1 GM/DL (ref 14–18)
IMMATURE GRANS (ABS): 0.02 THOU/MM3 (ref 0–0.07)
IMMATURE GRANULOCYTES: 0.4 %
LYMPHOCYTES # BLD: 19.8 %
LYMPHOCYTES ABSOLUTE: 1 THOU/MM3 (ref 1–4.8)
MAGNESIUM: 2.2 MG/DL (ref 1.6–2.4)
MCH RBC QN AUTO: 31.8 PG (ref 26–33)
MCHC RBC AUTO-ENTMCNC: 32.9 GM/DL (ref 32.2–35.5)
MCV RBC AUTO: 96.5 FL (ref 80–94)
MONOCYTES # BLD: 15.8 %
MONOCYTES ABSOLUTE: 0.8 THOU/MM3 (ref 0.4–1.3)
NUCLEATED RED BLOOD CELLS: 0 /100 WBC
PLATELET # BLD: 214 THOU/MM3 (ref 130–400)
PMV BLD AUTO: 10 FL (ref 9.4–12.4)
POTASSIUM SERPL-SCNC: 4.2 MEQ/L (ref 3.5–5.2)
RBC # BLD: 3.18 MILL/MM3 (ref 4.7–6.1)
SEG NEUTROPHILS: 60.8 %
SEGMENTED NEUTROPHILS ABSOLUTE COUNT: 3 THOU/MM3 (ref 1.8–7.7)
SODIUM BLD-SCNC: 134 MEQ/L (ref 135–145)
TOTAL PROTEIN: 6.2 G/DL (ref 6.1–8)
WBC # BLD: 5 THOU/MM3 (ref 4.8–10.8)

## 2022-02-26 PROCEDURE — 2580000003 HC RX 258

## 2022-02-26 PROCEDURE — 85025 COMPLETE CBC W/AUTO DIFF WBC: CPT

## 2022-02-26 PROCEDURE — 99232 SBSQ HOSP IP/OBS MODERATE 35: CPT | Performed by: INTERNAL MEDICINE

## 2022-02-26 PROCEDURE — 99232 SBSQ HOSP IP/OBS MODERATE 35: CPT | Performed by: NURSE PRACTITIONER

## 2022-02-26 PROCEDURE — 83735 ASSAY OF MAGNESIUM: CPT

## 2022-02-26 PROCEDURE — 82948 REAGENT STRIP/BLOOD GLUCOSE: CPT

## 2022-02-26 PROCEDURE — 36415 COLL VENOUS BLD VENIPUNCTURE: CPT

## 2022-02-26 PROCEDURE — 80053 COMPREHEN METABOLIC PANEL: CPT

## 2022-02-26 PROCEDURE — 6370000000 HC RX 637 (ALT 250 FOR IP)

## 2022-02-26 RX ADMIN — BUMETANIDE 2 MG: 1 TABLET ORAL at 09:03

## 2022-02-26 RX ADMIN — INSULIN LISPRO 4 UNITS: 100 INJECTION, SOLUTION INTRAVENOUS; SUBCUTANEOUS at 12:09

## 2022-02-26 RX ADMIN — PANTOPRAZOLE SODIUM 40 MG: 40 TABLET, DELAYED RELEASE ORAL at 09:03

## 2022-02-26 RX ADMIN — METOPROLOL TARTRATE 50 MG: 50 TABLET, FILM COATED ORAL at 09:03

## 2022-02-26 RX ADMIN — SODIUM CHLORIDE, PRESERVATIVE FREE 10 ML: 5 INJECTION INTRAVENOUS at 09:07

## 2022-02-26 RX ADMIN — ISOSORBIDE MONONITRATE 30 MG: 30 TABLET, EXTENDED RELEASE ORAL at 09:03

## 2022-02-26 RX ADMIN — INSULIN GLARGINE 10 UNITS: 100 INJECTION, SOLUTION SUBCUTANEOUS at 09:03

## 2022-02-26 RX ADMIN — INSULIN LISPRO 4 UNITS: 100 INJECTION, SOLUTION INTRAVENOUS; SUBCUTANEOUS at 09:04

## 2022-02-26 RX ADMIN — ASPIRIN 81 MG: 81 TABLET, COATED ORAL at 09:03

## 2022-02-26 RX ADMIN — LEVOTHYROXINE SODIUM 100 MCG: 0.1 TABLET ORAL at 05:45

## 2022-02-26 ASSESSMENT — ENCOUNTER SYMPTOMS
WHEEZING: 0
CONSTIPATION: 0
TROUBLE SWALLOWING: 0
SHORTNESS OF BREATH: 0
NAUSEA: 0
VOICE CHANGE: 0
ABDOMINAL DISTENTION: 0
CHEST TIGHTNESS: 0
FACIAL SWELLING: 0
VOMITING: 0
ABDOMINAL PAIN: 1
PHOTOPHOBIA: 0
COUGH: 0
BACK PAIN: 0
DIARRHEA: 0
SORE THROAT: 0

## 2022-02-26 NOTE — PROGRESS NOTES
Discharge teaching and instructions for diagnosis/procedure of fluid overload completed with patient using teachback method. AVS reviewed. Printed prescriptions given to patient. Patient voiced understanding regarding prescriptions, follow up appointments, and care of self at home. Discharged in a wheelchair to  home with support per father.

## 2022-02-26 NOTE — PLAN OF CARE
Problem: Falls - Risk of:  Goal: Will remain free from falls  Description: Will remain free from falls  Outcome: Met This Shift  Note: Call light within reach. Bed alarm on. Problem: Fluid Volume:  Goal: Hemodynamic stability will improve  Description: Hemodynamic stability will improve  2/26/2022 0050 by Iva Doll RN  Outcome: Ongoing  Note: Patient received dialysis on 2/25 2/25/2022 1616 by Fatuma Sanders RN  Outcome: Ongoing     Problem: Health Behavior:  Goal: Identification of resources available to assist in meeting health care needs will improve  Description: Identification of resources available to assist in meeting health care needs will improve  Outcome: Ongoing     Problem: Respiratory:  Goal: Respiratory status will improve  Description: Respiratory status will improve  Outcome: Ongoing  Note: Patient on room air, lungs clear     Problem: Skin Integrity:  Goal: Will show no infection signs and symptoms  Description: Will show no infection signs and symptoms  Outcome: Met This Shift  Note: No signs and symptoms of infection  Goal: Absence of new skin breakdown  Description: Absence of new skin breakdown  2/25/2022 1617 by Fatuma Sanders RN  Outcome: Ongoing     Problem: Pain:  Goal: Pain level will decrease  Description: Pain level will decrease  Outcome: Met This Shift  Note: Patient denies any pain this shift.   Goal: Control of acute pain  Description: Control of acute pain  2/25/2022 1617 by Fatuma Sanders RN  Outcome: Ongoing

## 2022-02-26 NOTE — CONSULTS
[address] [place] [address]                                  CONSULTATION    PATIENT NAME: MAYA COOPER                     :        1965  MED REC NO:   [id number]                           ROOM:       0019  ACCOUNT NO:   [de-identified]                           ADMIT DATE: 2022  PROVIDER:     SURY Reyna DATE:  2022    CARDIAC CONSULT    REASON FOR CONSULTATION:  Chest pain. REQUESTING PROVIDER:  Hospitalist Service. HISTORY OF PRESENT ILLNESS:  This is a 79-year-old gentleman with  history of coronary artery disease, previous angioplasty as well as  renal failure, on peritoneal dialysis; hypertension; and hyperlipidemia. Apparently, the patient has had chronic intermittent angina symptoms. He comes in with failure of dialysis and switching to hemodialysis with  some dysfunction of the peritoneal drain. He apparently mentioned some  symptoms of chest heaviness and discomfort for which we were consulted. He gets a heavy and tight feeling that gets better after dialysis. When  I evaluated him, he was chest pain free. We are consulted because of  mildly elevated cardiac enzymes. The patient's cardiac catheterization  was about a year ago. REVIEW OF SYSTEMS:  No obvious fever or chills. No hematuria or  dysuria. No obvious abdominal pain, nausea, vomiting, or diarrhea. No  obvious active psych problems or suicidal ideation. No skin rashes. No  obvious dizziness, lightheadedness or loss of consciousness. No recent  trauma. No bleeding problem. No HEENT-related problems. PAST MEDICAL HISTORY:  1. Coronary artery disease. 2.  Hypertension. 3.  Hyperlipidemia. 4.  Renal failure. ALLERGIES:  _____. CURRENT MEDICATIONS:  Protonix 40 a day, Lopressor 50 twice a day, Imdur  30 a day _____ a day. SOCIAL HISTORY:  No tobacco, no drugs, no alcohol.   Previous history of  smoking. FAMILY HISTORY:  Significant for coronary artery disease. PHYSICAL EXAMINATION:  VITAL SIGNS:  Showed blood pressure of 130/80, heart rate of 70. GENERAL APPEARANCE:  Pleasant gentleman in no obvious acute distress. EYES AND EARS:  No discharge. NECK:  No JVD, no bruits, no masses. LUNGS:  Decreased air entry. No crackles, no wheezes. HEART:  Normal S1, S2. Systolic murmur grade 2/6. ABDOMEN:  Soft, nontender. Positive bowel sounds. No organomegaly. EXTREMITIES:  No significant edema. NEUROLOGIC:  Grossly intact. Awake, alert. No focal deficits. PSYCH:  No evidence of active psychosis. SKIN:  No rashes. LABORATORY DATA:  Showed sodium 133, potassium 5.2, BUN 49, creatinine  6.4. White count 5.7, hemoglobin 8.9, hematocrit 27, platelets 670. Troponin 0.3. IMPRESSION:  This is a gentleman who comes in with the above  presentation, seems to be somehow fluid overloaded, got better with  dialysis. He does have chronically abnormal EKG and has had mildly  elevated cardiac enzymes. Seems to be totally asymptomatic at the  current time. RECOMMENDATION:  As follows:  1. We will review the patient's echo. 2.  We will track the patient's clinical status. 3.  If the patient has recurrent symptoms, he will considered for  cardiac catheterization and/or if there is abnormality on the  echocardiogram.  Otherwise, we will continue with aggressive medical  treatment and followup accordingly. My team will be taking over  tomorrow and will be following on the patient's clinical condition and  decide accordingly. Thank you for allowing me to participate in his care.         Leodan Izquierdo M.D.    D: 02/25/2022 17:26:38       T: 02/25/2022 17:29:07     [email or web address]  Job#: [id number]     Doc#: [id number]    CC:

## 2022-02-26 NOTE — DISCHARGE INSTR - DIET

## 2022-02-26 NOTE — PROGRESS NOTES
rash or erythema  Head: normocephalic and atraumatic  Eyes: pupils equal, round, and reactive to light  Musculoskeletal: normal range of motion, no joint swelling, deformity or tenderness  Neurological: alert, oriented, normal speech, no focal findings or movement disorder noted    Medications:    epoetin eri-epbx  2,000 Units SubCUTAneous Once per day on     And    epoetin eri-epbx  3,000 Units SubCUTAneous Once per day on     insulin lispro  0-12 Units SubCUTAneous TID     insulin lispro  0-6 Units SubCUTAneous Nightly    aspirin  81 mg Oral Daily    atorvastatin  80 mg Oral Nightly    bumetanide  2 mg Oral Daily    [Held by provider] clopidogrel  75 mg Oral Daily    hydrALAZINE  75 mg Oral TID    insulin glargine  10 Units SubCUTAneous Daily    isosorbide mononitrate  30 mg Oral Daily    levothyroxine  100 mcg Oral Daily    metoprolol tartrate  50 mg Oral BID WC    pantoprazole  40 mg Oral BID    sodium chloride flush  10 mL IntraVENous 2 times per day    [Held by provider] heparin (porcine)  5,000 Units SubCUTAneous 3 times per day      dextrose      sodium chloride       glucose, 15 g, PRN  glucagon (rDNA), 1 mg, PRN  dextrose, 100 mL/hr, PRN  sodium chloride flush, 10 mL, PRN  sodium chloride, 25 mL, PRN  ondansetron, 4 mg, Q8H PRN   Or  ondansetron, 4 mg, Q6H PRN  polyethylene glycol, 17 g, Daily PRN  acetaminophen, 650 mg, Q6H PRN   Or  acetaminophen, 650 mg, Q6H PRN  magnesium sulfate, 2,000 mg, PRN  dextrose bolus (hypoglycemia), 125 mL, PRN        Diagnostics:  EK2022  EKG 12 Lead  Order: [id number]   Status: Final result     Visible to patient: No (not released)     Next appt: None     0 Result Notes    Component Ref Range & Units 22 0953 21 0900 20 0551 10/15/19 2021 4/10/19 0219 18 1824 16 0556   Ventricular Rate BPM 78  60  58  73  60  76  66    Atrial Rate BPM 78  60  58  73  60  76  66    P-R Interval ms 156  142  160  164 152  148  146    QRS Duration ms 90  86  90  94  82  86  92    Q-T Interval ms 430  496  474  426  468  416  432    QTc Calculation (Bazett) ms 490  496  465  469  468  468  452    P Axis degrees 39  39  48  41  34  40  38    R Axis degrees 48  19  54  25  46  47  44    T Axis degrees -122  -140  -101  -112  -90  -101  -147    Resulting Agency  [address] [facility] [facility] [address] [facility] [address] [facility] [address] [facility] [address] [facility] [address] [facility]             Narrative & Impression    Normal sinus rhythm  Possible Left atrial enlargement  ST & Marked T wave abnormality, consider anterolateral ischemia  Prolonged QT interval or tu fusion, consider myocardial disease, electrolyte imbalance, or drug effects  Abnormal ECG  When compared with ECG of 30-JUN-2021 09:00,  No significant change was found  Confirmed by [facility] FROY VELASCO ([id number]) on 2/25/2022 5:26:25 AM      Specimen Collected: 02/24/22 09:53               Echo: 2/25/2022  TTE procedure:ECHOCARDIOGRAM LIMITED. Procedure Date  Date: 02/25/2022 Start: 01:05 PM     Study Location: Bedside  Technical Quality: Adequate visualization     Indications:Shortness of breath.     Additional Medical History:Thyroid disease, hypertension, hyperlipidemia,  congestive heart failure, diabetes, chronic kidney disease, coronary artery  disease, CVA, anemia, pulmonary edema, GERD     Patient Status: Routine     Height: 62.2 inches Weight: 132.28 pounds BSA: 1.61 m^2 BMI: 24.03 kg/m^2     BP: 175/77 mmHg     Allergies    - See Epic.       - See Epic.       - See Epic.      Conclusions      Summary   Ejection fraction is visually estimated at 50%. Moderately increased left ventricle wall thickness. Septal wall asymmetrical left ventricular hypertrophy. Aortic valve appears tricuspid. Aortic valve leaflets are somewhat thickened. Aortic valve leaflets are Mildly calcified.       Signature      ----------------------------------------------------------------   Electronically signed by Keysha Sims MD (Interpreting   physician) on 02/25/2022 at 04:11 PM   ----------------------------------------------------------------      Findings      Mitral Valve   Structurally normal mitral valve. Aortic Valve   Aortic valve appears tricuspid. Aortic valve leaflets are somewhat thickened. Aortic valve leaflets are Mildly calcified. Tricuspid Valve   Tricuspid valve is structurally normal.      Pulmonic Valve   The pulmonic valve was not well visualized . Pulmonic valve is structurally normal.      Left Atrium   Left atrial size was normal.      Left Ventricle   Ejection fraction is visually estimated at 50%. Moderately increased left ventricle wall thickness. Septal wall asymmetrical left ventricular hypertrophy. Right Atrium   Right atrial size was normal.      Right Ventricle   The right ventricular size was normal with normal systolic function and   wall thickness. Pericardial Effusion   The pericardium was normal in appearance with no evidence of a pericardial   effusion. Pleural Effusion   No evidence of pleural effusion. Aorta / Great Vessels   -Aortic root dimension within normal limits.   -The Pulmonary artery is within normal limits. -IVC size is within normal limits with normal respiratory phasic changes.      M-Mode/2D Measurements & Calculations      LV Diastolic    LV Systolic Dimension: 2.3  AV Cusp Separation: 2.1 cmLA   Dimension: 3.3  cm                          Dimension: 2.6 cmAO Root   cm              LV Volume Diastolic: 79.4   Dimension: 3.3 cm   LV FS:30.3 %    ml   LV PW           LV Volume Systolic: 81.3 ml   Diastolic: 1.2  LV EDV/LV EDV Index: 35.9   cm              ml/22 m^2LV ESV/LV ESV      RV Diastolic Dimension: 2.9 cm   Septum          Index: 12.2 ml/8 m^2   Diastolic: 1.9  EF Calculated: 66 %         LA/Aorta: 0.79   cm                                          Ascending Aorta: 3 cm [email or web address] [id number]             Specimen Collected: 22 13:05           Echo: 10/16/20  Summary   Left ventricle size is normal.   Mild concentric left ventricular hypertrophy. There was mild global hypokinesis of the left ventricle. Ejection fraction is visually estimated in the range of 40% to 45%. Mildly enlarged right atrium size. Structurally normal mitral valve. Mild mitral regurgitation is present. Tricuspid valve is structurally normal.   Mild tricuspid regurgitation. Right ventricular systolic pressure of 50 mmHg consistent with moderate   pulmonary hypertension. Pulmonic valve is structurally normal.   Mild pulmonic regurgitation visualized. Dilated IVC with poor inspiration collapse consistent with elevated right   atrial pressure. Signature    ----------------------------------------------------------------   Electronically signed by Esmer Conklin MD (Interpreting   physician) on 10/16/2020 at 04:18 PM        Left Heart Cath: 21  CARDIAC CATHETERIZATION     PATIENT NAME: MAYA COOPER                     :        1965  MED REC NO:   [id number]                           ROOM:       0011  ACCOUNT NO:   [de-identified]                           ADMIT DATE: 2021  PROVIDER:     Selwyn Reyes MD     DATE OF PROCEDURE:  2021     INDICATION:  This is a 60-year-old male patient with past medical  history of congestive heart failure and end-stage renal disease. He is  being evaluated for kidney transplantation. Right heart catheterization  was requested per kidney transplantation team.  The patient had  shortness of breath.     PROCEDURES PERFORMED:  Right heart catheterization.     DESCRIPTION OF PROCEDURE:  After informed consent, the patient was  brought to the cardiac catheterization room. He was prepped and draped  in a sterile fashion. 2% lidocaine was injected in the skin and  subcutaneous tissue overlying the right groin area. Under ultrasound  guidance using modified Seldinger technique, I obtained access in the  right common femoral vein. 5-[place] sheath was inserted and flushed. I  used 5-[place] Solomon catheter to complete the right heart  catheterization. At the end of the procedure, the catheter was removed. The sheath was pulled out with subsequent manual compression. Hemostasis was achieved.     FINDINGS/HEMODYNAMICS:  Right atrial pressure 8 mmHg. Right ventricular  pressure 56/7. Apparently, arterial pressure 55/22. Mean pulmonary  arterial pressure was 35 mmHg. Pulmonary capillary wedge pressure 15  mmHg. Cardiac output 3.37 liters per minute. Cardiac index 2.1. Pulmonary arterial oxygen saturation 65%.     MEDICATIONS:  See MAR.     COMPLICATIONS:  None.     ESTIMATED BLOOD LOSS:  Less than 10 mL.     ACCESS:  Right common femoral vein access.     IMPRESSION:  Mildly elevated filling pressures, mild pulmonary  hypertension.     RECOMMENDATIONS:  Continue management per Nephrology and kidney  transplantation team.  Asad Eden MD   D: 06/30/2021 11:51:16           Lab Data:    Cardiac Enzymes:  No results for input(s): CKTOTAL, CKMB, CKMBINDEX, TROPONINI in the last 72 hours.     CBC:   Lab Results   Component Value Date    WBC 5.0 02/26/2022    RBC 3.18 02/26/2022    HGB 10.1 02/26/2022    HCT 30.7 02/26/2022     02/26/2022       CMP:    Lab Results   Component Value Date     02/26/2022    K 4.2 02/26/2022    K 5.2 02/24/2022    CL 99 02/26/2022    CO2 23 02/26/2022    BUN 20 02/26/2022    CREATININE 4.5 02/26/2022    LABGLOM 14 02/26/2022    GLUCOSE 206 02/26/2022    CALCIUM 8.8 02/26/2022       Hepatic Function Panel:    Lab Results   Component Value Date    ALKPHOS 98 02/26/2022    ALT 13 02/26/2022    AST 20 02/26/2022    PROT 6.2 02/26/2022    BILITOT 0.5 02/26/2022    BILIDIR 0.3 04/10/2019 LABALBU 3.2 02/26/2022       Magnesium:    Lab Results   Component Value Date    MG 2.2 02/26/2022       PT/INR:    Lab Results   Component Value Date    PROTIME 11.0 02/21/2022    PROTIME 12.5 08/27/2018    INR 0.96 02/21/2022       HgBA1c:    Lab Results   Component Value Date    LABA1C 7.8 02/25/2022       FLP:    Lab Results   Component Value Date    TRIG 166 10/12/2019    HDL 56 10/12/2019    LDLCALC 59 10/12/2019       TSH:    Lab Results   Component Value Date    .400 04/11/2019         Assessment/Plan:  · Chest pain  · Denies chest discomfort today  · States resolved since dialysis and fluid overload corrected  · Echo 2/25/22: EF 50% (Echo 2020 EF 40 - 45%)  · VSS  · Tolerating activity  · No evidence of fluid overload today  · CAD  · HTN  · HLD  · Renal failure      Stable for discharge from cardiology standpoint. Follow-up with Dr. Zarina Johnson in the office in 2 - 4 weeks.         Electronically signed by PAOLA Morrow CNP on 2/26/2022 at 11:16 AM

## 2022-02-26 NOTE — PROGRESS NOTES
PROGRESS NOTE      Patient:  Jon Jacobsen Hand      Unit/Bed:6K-19/019-A    YOB: 1965    MRN: [id number]       Acct: [de-identified]     PCP: PAOLA Berkowitz CNP    Date of Admission: 2/24/2022      Assessment/Plan:    Anticipated Discharge in : Pending. Active Hospital Problems    Diagnosis Date Noted    Fluid overload [E87.70] 02/24/2022    Shortness of breath [R06.02] 02/24/2022    Precordial pain [R07.2] 04/09/2016     Elevated Troponin:  Likely 2/2 demand ischemia in the setting of ESRD and possible acute CHF exacerbation vs ACS. Pt reports Chest pain with deep breaths, currently resolved. Missed PD last week. Tropnin remains elevated x 3 but downtretending, EKG shows no significant change from previous in June, Last cath 60% LAD, LMCA. Limited ECHO  Ejection fraction is visually estimated at 50%. Moderately increased left ventricle wall thickness. Septal wall asymmetrical left ventricular hypertrophy. Aortic valve appears tricuspid. Aortic valve leaflets are somewhat thickened. Aortic valve leaflets are Mildly calcified. [place] ESRD on HD/Electrolytes/Acidosis: Cr 4.5, BUN 20 . Patient was evaluated by Nephrology service at [facility] for possible HD, but was deemed not necessary at the time. Plan upon discharge from [facility] yesterday was to have HD this coming Friday (2/25). Nephrology consulted and on board. Had emergent HD on Feb/24/2022. S/P HD today. Monitor Nephro to manage HD. Electrolytes derrangments and acidosis to resolved S/P HD.     Macrocytic Anemia, acute on chronic: Hgb 10.1 today, slightly improved from yesterday  Recent history of significant blood loss requiring 2 unit PRBC infusion earlier this week. Underlying CKD. Continue to monitor daily CBC and daily dressing changes from recent PD catheter removal.   Retacrit Per Nephro.     T2DM: Last A1C 12/29/2021 9.5%.  Add Lispro 6 QHS, on home lantus, SSI,  Hypoglycemic protocol in place.      Essential HTN: Uncontrolled on arrival.   Resume home medications. Continue to monitor.      CAD: See Elevated tropnin above. Stable. Chelsea Evansril 1/18/2021 not suggestive for ischemia. Hx of PCI. Continue statin, ASA, Plavix, BB, Imdur.     HFpEF, with grade II diastolic dysfunction: Last ECHO 2/11/2022 at [facility] reveals EF 50-55% with grade II diastolic dysfunction. Pt endorses worsening SOB. Repeat Echo Pending. S/P HD. Continue Bumex. Cardiac diet and 2L fluid restriction today. GERD: Stable. Resume Protonix today.      Hypothyroid disease: Stable. Continue Synthroid. Chief Complaint:  Worsening SOB and chest pain. Hospital Course  Per initial H&P:    \"Jean is a 60 y/o  male smoker with a past medical history of CKD on PD, CAD, HTN, CHF, GERD, Hypothyroid disease who presents to [facility] via direct admit for the evaluation of shortness of breath and intermittent chest discomfort x 1 day. The patient states the shortness of breath has been worsening since being discharged from [facility] yesterday. The chest pain is intermittent and worse with deep inspiration. He describes the discomfort as tightness and as if someone is pushing down on his chest. He states it's located in the center of his chest and is also described as dull, non radiating. The patient has previously done daily peritoneal dialysis at home, but starting late last week, he noticed fluid was not draining from his catheter as normal. He went to [facility] for evaluation of his catheter, and then was transferred to [facility], against the patient's wishes and had the PD catheter removed and a HD catheter placed on the right side of his chest. The patient states he was discharged Monday and started profusely bleeding from his PD catheter site at home, started feeling lightheaded, and called EMS who took him back to [facility]. [facility] than life-flighted patient to [facility] due to the blood loss.  He states he received 2 units of blood at [facility], and was seen by a nephrologist who said HD was not indicated at this time. He was discharged home from [facility] yesterday, and then developed worsening shortness of breath, for which the patient states he suspects he is fluid overloaded. He returned to [facility] ED late last night for evaluation and  arrived to [facility] this morning. The patient states he has associate soreness from the PD catheter site and generalized weakness from the events of this past week, but denies chest pain, abdominal pain, LE swelling, abdominal distention, cough, fever, chills, additional bleeding from previous PD catheter site. Pt denies change in urinary habits, n/v, change in appetite. \"    Successfully emergently HD, S/P HD. Subjective:  Pt seen and examined. S/P HD. Pt noted fatigue with HD. Otherwise no major complaints. Wants to go home, Chest pain resolved. Breathing improved on Room air. Pending cardiology consult for EKG changes. Review of Systems   Constitutional: Negative for appetite change, chills, diaphoresis, fatigue and fever. HENT: Negative for congestion, drooling, facial swelling, sore throat, trouble swallowing and voice change. Eyes: Negative for photophobia and visual disturbance. Respiratory: Negative for cough, chest tightness, shortness of breath and wheezing. Cardiovascular: Negative for chest pain, palpitations and leg swelling. Gastrointestinal: Positive for abdominal pain (s/p removal PD.). Negative for abdominal distention, constipation, diarrhea, nausea and vomiting. Genitourinary: Positive for decreased urine volume. Negative for difficulty urinating, dysuria, hematuria and urgency. Musculoskeletal: Negative for arthralgias, back pain, gait problem and myalgias. Neurological: Negative for dizziness, tremors, seizures, weakness, numbness and headaches. Psychiatric/Behavioral: Negative for agitation, behavioral problems and confusion.        Medications:  Reviewed    Infusion Medications    dextrose      sodium chloride       Scheduled Medications    epoetin eri-epbx  2,000 Units SubCUTAneous Once per day on Mon Wed Fri    And    epoetin eri-epbx  3,000 Units SubCUTAneous Once per day on Mon Wed Fri    insulin lispro  0-12 Units SubCUTAneous TID WC    insulin lispro  0-6 Units SubCUTAneous Nightly    aspirin  81 mg Oral Daily    atorvastatin  80 mg Oral Nightly    bumetanide  2 mg Oral Daily    [Held by provider] clopidogrel  75 mg Oral Daily    hydrALAZINE  75 mg Oral TID    insulin glargine  10 Units SubCUTAneous Daily    isosorbide mononitrate  30 mg Oral Daily    levothyroxine  100 mcg Oral Daily    metoprolol tartrate  50 mg Oral BID WC    pantoprazole  40 mg Oral BID    sodium chloride flush  10 mL IntraVENous 2 times per day    [Held by provider] heparin (porcine)  5,000 Units SubCUTAneous 3 times per day     PRN Meds: glucose, glucagon (rDNA), dextrose, sodium chloride flush, sodium chloride, ondansetron **OR** ondansetron, polyethylene glycol, acetaminophen **OR** acetaminophen, magnesium sulfate, dextrose bolus (hypoglycemia)      Intake/Output Summary (Last 24 hours) at 2/26/2022 1047  Last data filed at 2/25/2022 2057  Gross per 24 hour   Intake 680 ml   Output 0 ml   Net 680 ml       Diet:  ADULT DIET; Regular; Low Sodium (2 gm); 2000 ml    Exam:  BP (!) 105/55   Pulse 82   Temp 98.2 °F (36.8 °C) (Oral)   Resp 16   Ht 5' 2\" (1.575 m)   Wt 117 lb 4.6 oz (53.2 kg)   SpO2 98%   BMI 21.45 kg/m²     Physical Exam  Constitutional:       General: He is not in acute distress. Appearance: Normal appearance. He is not ill-appearing or diaphoretic. HENT:      Head: Normocephalic and atraumatic. Nose: Nose normal. No congestion or rhinorrhea. Mouth/Throat:      Mouth: Mucous membranes are moist.      Pharynx: Oropharynx is clear. No oropharyngeal exudate or posterior oropharyngeal erythema. Eyes:      General: No scleral icterus. Right eye: No discharge. Left eye: No discharge. Extraocular Movements: Extraocular movements intact. Conjunctiva/sclera: Conjunctivae normal.      Pupils: Pupils are equal, round, and reactive to light. Cardiovascular:      Rate and Rhythm: Normal rate and regular rhythm. Pulses: Normal pulses. Heart sounds: Normal heart sounds. No murmur heard. No friction rub. No gallop. Pulmonary:      Effort: Pulmonary effort is normal. No respiratory distress. Breath sounds: Normal breath sounds. No wheezing, rhonchi or rales. Abdominal:      General: Abdomen is flat. A surgical scar is present. Bowel sounds are normal.      Palpations: Abdomen is soft. Tenderness: There is abdominal tenderness in the left upper quadrant and left lower quadrant. Musculoskeletal:         General: No swelling, tenderness or signs of injury. Normal range of motion. Cervical back: Normal range of motion and neck supple. No rigidity or tenderness. Right lower leg: No edema. Left lower leg: No edema. Skin:     General: Skin is warm and dry. Capillary Refill: Capillary refill takes 2 to 3 seconds. Coloration: Skin is not jaundiced or pale. Findings: No erythema or rash. Neurological:      General: No focal deficit present. Mental Status: He is alert and oriented to person, place, and time. Cranial Nerves: No cranial nerve deficit. Motor: Weakness (2/2 Fatigue) present. Psychiatric:         Mood and Affect: Mood normal.         Behavior: Behavior normal.         Thought Content:  Thought content normal.         Labs:   Recent Labs     02/24/22  0955 02/26/22 0416   WBC 5.7 5.0   HGB 8.9* 10.1*   HCT 27.0* 30.7*    214     Recent Labs     02/24/22  0955 02/26/22 0416   * 134*   K 5.2 4.2    99   CO2 18* 23   BUN 49* 20   CREATININE 6.4* 4.5*   CALCIUM 9.2 8.8     Recent Labs     02/26/22 0416   AST 20   ALT 13   BILITOT 0.5   ALKPHOS 98     No results for input(s): INR in the last 72 hours. No results for input(s): [employer] in the last 72 hours. Urinalysis:      Lab Results   Component Value Date    NITRU NEGATIVE 04/10/2019    WBCUA NONE SEEN 04/10/2019    BACTERIA NONE 04/10/2019    RBCUA 3-5 04/10/2019    BLOODU SMALL 04/10/2019    GLUCOSEU NEGATIVE 04/10/2019       Radiology:  XR CHEST PORTABLE   Final Result   Cardiomegaly and interstitial edema and infiltrates throughout both lungs            **This report has been created using voice recognition software. It may contain minor errors which are inherent in voice recognition technology. **      Final report electronically signed by Dr. Caleb Braga on 2/24/2022 11:14 AM          Diet: ADULT DIET; Regular; Low Sodium (2 gm); 2000 ml    DVT prophylaxis: [] Lovenox                                 [] SCDs                                 [] SQ Heparin                                 [] Encourage ambulation           [] Already on Anticoagulation     Disposition:    [x] Home       [] TCU       [] Rehab       [] Psych       [] SNF       [] [place]       [] Other-    Code Status: Full Code    PT/OT Eval Status: Pending        Electronically signed by Yusef Parra DO on 2/26/2022 at 10:47 AM    This note was electronically signed. Parts of this note may have been dictated by use of voice recognition software and electronically transcribed. The note may contain errors not detected in proofreading. Please refer to my supervising physician's documentation if my documentation differs.

## 2022-02-26 NOTE — CARE COORDINATION
2/26/22, 1:45 PM EST    Patient goals/plan/ treatment preferences discussed by  and . Patient goals/plan/ treatment preferences reviewed with patient/ family. Patient/ family verbalize understanding of discharge plan and are in agreement with goal/plan/treatment preferences. Understanding was demonstrated using the teach back method. AVS provided by RN at time of discharge, which includes all necessary medical information pertaining to the patients current course of illness, treatment, post-discharge goals of care, and treatment preferences. IMM Letter  IMM Letter given to Patient/Family/Significant other/Guardian/POA/by[de-identified] staff  IMM Letter date given[de-identified] 02/24/22  IMM Letter time given[de-identified] 9572       Discharging home today. No needs.

## 2022-02-26 NOTE — PROGRESS NOTES
[facility]   Nephrology progress note  2/26/2022, 11:43 AM      Pt Name:    Yoly Morrissey  MRN:     [id number]     YOB: 1965  Admit Date:    2/24/2022  9:01 AM  Primary Care Physician:  PAOLA Sandy CNP   Room number  5C-75/876-J    Chief Complaint: Nephrology following for ESRD and HD    Subjective:  Patient seen and examined  No chest pain or shortness of breath  Feels okay  Wants to go home    Objective:  24HR INTAKE/OUTPUT:      Intake/Output Summary (Last 24 hours) at 2/26/2022 1143  Last data filed at 2/25/2022 2057  Gross per 24 hour   Intake 680 ml   Output 0 ml   Net 680 ml     I/O last 3 completed shifts: In: 1180 [P.O.:780]  Out: 2150   No intake/output data recorded. Admission weight: 132 lb 15 oz (60.3 kg)  Wt Readings from Last 3 Encounters:   02/26/22 117 lb 4.6 oz (53.2 kg)   06/30/21 125 lb (56.7 kg)   01/18/21 127 lb (57.6 kg)     Body mass index is 21.45 kg/m².     Physical examination  VITALS:     Vitals:    02/25/22 2334 02/26/22 0544 02/26/22 0545 02/26/22 0845   BP: (!) 94/48 (!) 98/41 (!) 103/52 (!) 105/55   Pulse: 73 70  82   Resp: 16 16  16   Temp: 98.8 °F (37.1 °C) 98.4 °F (36.9 °C)  98.2 °F (36.8 °C)   TempSrc: Oral Oral  Oral   SpO2: 99% 98%  98%   Weight:  117 lb 4.6 oz (53.2 kg)     Height:         General Appearance: alert and cooperative with exam, appears comfortable, no distress  Mouth/Throat: Oral mucosa moist  Neck: No JVD  Lungs: Air entry B/L, no rales, no use of accessory muscles  Heart:  S1, S2 heard  GI: soft, non-tender, no guarding  Extremities: no LE edema      Lab Data  CBC:   Recent Labs     02/24/22  0955 02/26/22  0416   WBC 5.7 5.0   HGB 8.9* 10.1*   HCT 27.0* 30.7*    214     BMP:  Recent Labs     02/24/22  0955 02/26/22  0416   * 134*   K 5.2 4.2    99   CO2 18* 23   BUN 49* 20   CREATININE 6.4* 4.5*   GLUCOSE 238* 206*   CALCIUM 9.2 8.8   MG  --  2.2     Hepatic:   Recent Labs     02/26/22  0416 LABALBU 3.2*   AST 20   ALT 13   BILITOT 0.5   ALKPHOS 98         Meds:  Infusion:    dextrose      sodium chloride       Meds:    epoetin eri-epbx  2,000 Units SubCUTAneous Once per day on Mon Wed Fri    And    epoetin eri-epbx  3,000 Units SubCUTAneous Once per day on Mon Wed Fri    insulin lispro  0-12 Units SubCUTAneous TID WC    insulin lispro  0-6 Units SubCUTAneous Nightly    aspirin  81 mg Oral Daily    atorvastatin  80 mg Oral Nightly    bumetanide  2 mg Oral Daily    [Held by provider] clopidogrel  75 mg Oral Daily    hydrALAZINE  75 mg Oral TID    insulin glargine  10 Units SubCUTAneous Daily    isosorbide mononitrate  30 mg Oral Daily    levothyroxine  100 mcg Oral Daily    metoprolol tartrate  50 mg Oral BID WC    pantoprazole  40 mg Oral BID    sodium chloride flush  10 mL IntraVENous 2 times per day    [Held by provider] heparin (porcine)  5,000 Units SubCUTAneous 3 times per day     Meds prn: glucose, glucagon (rDNA), dextrose, sodium chloride flush, sodium chloride, ondansetron **OR** ondansetron, polyethylene glycol, acetaminophen **OR** acetaminophen, magnesium sulfate, dextrose bolus (hypoglycemia)       Impression and Plan:  1. ESRD on hemodialysis  Had dialysis treatment yesterday  Tolerated dialysis well  No acute distress  Labs stable  No need for dialysis today    2. Anemia in ESRD: Improved with Retacrit  3. Recent intra-abdominal bleeding status post CAPD catheter removal  4. Hypertension  5. Insulin-dependent diabetes mellitus  6.   Disposition per hospitalist team        Kristian Zepeda MD  [facility]

## 2022-02-26 NOTE — DISCHARGE INSTR - MEDS
FOLLOW UP WITH NEPHROLOGIST AND DIALYSIS CENTER ON Monday  CONTINUE WITH FLUID RESTRICTION AND DIET RESTRICTION

## 2022-03-15 NOTE — DISCHARGE SUMMARY
Hospital Medicine Discharge Summary      Patient Identification:   Robe Garrison   : 1965  MRN: [id number]   Account: [de-identified]      Patient's PCP: PAOLA Colon CNP    Admit Date: 2022     Discharge Date: 2022      Admitting Physician: Danyell Mosley MD     Discharge Physician: Alejandro Phelan DO     Discharge Diagnoses: Active Hospital Problems    Diagnosis Date Noted    Fluid overload [E87.70] 2022    Shortness of breath [R06.02] 2022    Precordial pain [R07.2] 2016       The patient was seen and examined on day of discharge and this discharge summary is in conjunction with any daily progress note from day of discharge. Hospital Course:   Robe Garrison is a 61 y.o. male admitted to [facility] on 2022 for SOB/CP. Elevated Troponin:  Likely 2/2 demand ischemia in the setting of ESRD and possible acute CHF exacerbation vs ACS. Pt reports Chest pain with deep breaths, currently resolved. Missed PD last week. Tropnin remains elevated x 3 but downtretending, EKG shows no significant change from previous in , Last cath 60% LAD, LMCA.  Limited ECHO  Ejection fraction is visually estimated at 50%.   Moderately increased left ventricle wall thickness.   Septal wall asymmetrical left ventricular hypertrophy.   Aortic valve appears tricuspid.   Aortic valve leaflets are somewhat thickened.       Aortic valve leaflets are Mildly calcified. .     ESRD on HD/Electrolytes/Acidosis: Cr 4.5, BUN 20 . Patient was evaluated by Nephrology service at [address] [place] [address] for possible HD, but was deemed not necessary at the time. Plan upon discharge from [facility] yesterday was to have HD this coming Friday (). Nephrology consulted and on board. Had emergent HD on . S/P HD today.  Monitor Nephro to manage HD.  Electrolytes derrangments and acidosis to resolved S/P HD.     Macrocytic Anemia, acute on chronic: Hgb 10.1 today, slightly improved from yesterday  Recent history of significant blood loss requiring 2 unit PRBC infusion earlier this week. Underlying CKD. Continue to monitor daily CBC and daily dressing changes from recent PD catheter removal.   Retacrit Per Nephro.     T2DM: Last A1C 12/29/2021 9.5%. Add Lispro 6 QHS, on home lantus, SSI,  Hypoglycemic protocol in place.      Essential HTN: Uncontrolled on arrival.   Resume home medications. Continue to monitor.      CAD: See Elevated tropnin above. Stable. [place] 1/18/2021 not suggestive for ischemia. Hx of PCI. Continue statin, ASA, Plavix, BB, Imdur.     HFpEF, with grade II diastolic dysfunction: Last ECHO 2/11/2022 at [facility] reveals EF 50-55% with grade II diastolic dysfunction. Pt endorses worsening SOB. Repeat Echo Pending. S/P HD. Continue Bumex. Cardiac diet and 2L fluid restriction today.     GERD: Stable. Resume Protonix today.      Hypothyroid disease: Stable. Continue Synthroid.         Chief Complaint:  Worsening SOB and chest pain.     Hospital Course  Per initial H&P:    \"Jean is a 60 y/o  male smoker with a past medical history of CKD on PD, CAD, HTN, CHF, GERD, Hypothyroid disease who presents to [facility] via direct admit for the evaluation of shortness of breath and intermittent chest discomfort x 1 day. The patient states the shortness of breath has been worsening since being discharged from [facility] yesterday. The chest pain is intermittent and worse with deep inspiration. He describes the discomfort as tightness and as if someone is pushing down on his chest. He states it's located in the center of his chest and is also described as dull, non radiating.  The patient has previously done daily peritoneal dialysis at home, but starting late last week, he noticed fluid was not draining from his catheter as normal. He went to [facility] for evaluation of his catheter, and then was transferred to [facility], against the patient's wishes and had the PD catheter removed and a HD catheter placed on the right side of his chest. The patient states he was discharged Monday and started profusely bleeding from his PD catheter site at home, started feeling lightheaded, and called EMS who took him back to [facility]. [facility] than life-flighted patient to [facility] due to the blood loss. He states he received 2 units of blood at [facility], and was seen by a nephrologist who said HD was not indicated at this time. He was discharged home from [facility] yesterday, and then developed worsening shortness of breath, for which the patient states he suspects he is fluid overloaded. He returned to [facility] ED late last night for evaluation and  arrived to [facility] this morning. The patient states he has associate soreness from the PD catheter site and generalized weakness from the events of this past week, but denies chest pain, abdominal pain, LE swelling, abdominal distention, cough, fever, chills, additional bleeding from previous PD catheter site. Pt denies change in urinary habits, n/v, change in appetite. \"      Exam:     Vitals:  Vitals:    02/26/22 0544 02/26/22 0545 02/26/22 0845 02/26/22 1147   BP: (!) 98/41 (!) 103/52 (!) 105/55 (!) 128/56   Pulse: 70  82 75   Resp: 16  16 16   Temp: 98.4 °F (36.9 °C)  98.2 °F (36.8 °C) 98.2 °F (36.8 °C)   TempSrc: Oral  Oral Oral   SpO2: 98%  98% 96%   Weight: 117 lb 4.6 oz (53.2 kg)      Height:         Weight: Weight: 117 lb 4.6 oz (53.2 kg)     24 hour intake/output:No intake or output data in the 24 hours ending 03/15/22 1127      General appearance:  No apparent distress, appears stated age and cooperative. HEENT:  Normal cephalic, atraumatic without obvious deformity. Pupils equal, round, and reactive to light. Extra ocular muscles intact. Conjunctivae/corneas clear. Neck: Supple, with full range of motion. No jugular venous distention. Trachea midline. Respiratory:  Normal respiratory effort. Clear to auscultation, bilaterally without Rales/Wheezes/Rhonchi.   Cardiovascular: Regular rate and rhythm with normal S1/S2 without murmurs, rubs or gallops. Abdomen: Soft, non-tender, non-distended with normal bowel sounds. Musculoskeletal:  No clubbing, cyanosis or edema bilaterally. Full range of motion without deformity. Skin: Skin color, texture, turgor normal.  No rashes or lesions. Neurologic:  Neurovascularly intact without any focal sensory/motor deficits. Cranial nerves: II-XII intact, grossly non-focal.  Psychiatric:  Alert and oriented, thought content appropriate, normal insight  Capillary Refill: Brisk,< 3 seconds   Peripheral Pulses: +2 palpable, equal bilaterally       Labs: For convenience and continuity at follow-up the following most recent labs are provided:      CBC:    Lab Results   Component Value Date    WBC 5.0 02/26/2022    HGB 10.1 02/26/2022    HCT 30.7 02/26/2022     02/26/2022       Renal:    Lab Results   Component Value Date     02/26/2022    K 4.2 02/26/2022    K 5.2 02/24/2022    CL 99 02/26/2022    CO2 23 02/26/2022    BUN 20 02/26/2022    CREATININE 4.5 02/26/2022    CALCIUM 8.8 02/26/2022    PHOS 3.7 11/25/2018         Significant Diagnostic Studies    Radiology:   XR CHEST PORTABLE   Final Result   Cardiomegaly and interstitial edema and infiltrates throughout both lungs            **This report has been created using voice recognition software. It may contain minor errors which are inherent in voice recognition technology. **      Final report electronically signed by Dr. Yelitza Kaufman on 2/24/2022 11:14 AM             Consults:     IP CONSULT TO NEPHROLOGY  IP CONSULT TO CARDIOLOGY    Disposition:    [x] Home       [] TCU       [] Rehab       [] Psych       [] SNF       [] [place]       [] Other-    Condition at Discharge: Stable    Code Status:  Prior     Patient Instructions:    Discharge lab work:    Activity: activity as tolerated  Diet: No diet orders on file      Follow-up visits:   Irene Penn, APRN - CNP  [address] [address]  [place] [address]  [phone]    Schedule an appointment as soon as possible for a visit      Mariia Velez MD  [address]  [address]  [phone]    In 3 weeks           Discharge Medications:        Medication List      CHANGE how you take these medications    bumetanide 2 MG tablet  Commonly known as: BUMEX  Take 1 tablet by mouth daily  What changed: additional instructions        CONTINUE taking these medications    aspirin 81 MG EC tablet     atorvastatin 80 MG tablet  Commonly known as: LIPITOR     Basaglar KwikPen 100 UNIT/ML injection pen  Generic drug: insulin glargine     clopidogrel 75 MG tablet  Commonly known as: PLAVIX     hydrALAZINE 25 MG tablet  Commonly known as: APRESOLINE  Take 3 tablets by mouth 3 times daily     isosorbide mononitrate 30 MG extended release tablet  Commonly known as: IMDUR     [employer] Pen Needles 31G X 6 MM Misc  Generic drug: Insulin Pen Needle  1 each by Does not apply route daily     levothyroxine 100 MCG tablet  Commonly known as: SYNTHROID  Take 1 tablet by mouth Daily     metoprolol tartrate 50 MG tablet  Commonly known as: LOPRESSOR     nitroGLYCERIN 0.4 MG SL tablet  Commonly known as: NITROSTAT     NONFORMULARY     NovoLOG 100 UNIT/ML injection vial  Generic drug: insulin aspart     pantoprazole 40 MG tablet  Commonly known as: PROTONIX        STOP taking these medications    dianeal lo-sae (ULTRABAG) 2.5%        Medication Instructions:    FOLLOW UP WITH NEPHROLOGIST AND DIALYSIS CENTER ON Monday  CONTINUE WITH FLUID RESTRICTION AND DIET RESTRICTION           Time Spent on discharge is more than 45 minutes in the examination, evaluation, counseling and review of medications and discharge plan. Signed: Thank you PAOLA Monique CNP for the opportunity to be involved in this patient's care.     Electronically signed by Norval Kayser, DO on 3/15/2022 at 11:27 AM

## 2022-04-25 ENCOUNTER — HOSPITAL ENCOUNTER (OUTPATIENT)
Dept: CT IMAGING | Age: 57
Discharge: HOME | End: 2022-04-25

## 2022-04-25 ENCOUNTER — OFFICE VISIT (OUTPATIENT)
Dept: SURGERY | Age: 57
End: 2022-04-25
Payer: COMMERCIAL

## 2022-04-25 ENCOUNTER — TELEPHONE (OUTPATIENT)
Dept: SURGERY | Age: 57
End: 2022-04-25

## 2022-04-25 VITALS
TEMPERATURE: 97.5 F | BODY MASS INDEX: 22.45 KG/M2 | DIASTOLIC BLOOD PRESSURE: 58 MMHG | HEIGHT: 62 IN | OXYGEN SATURATION: 99 % | WEIGHT: 122 LBS | SYSTOLIC BLOOD PRESSURE: 100 MMHG | HEART RATE: 110 BPM | RESPIRATION RATE: 18 BRPM

## 2022-04-25 DIAGNOSIS — D63.1 ANEMIA ASSOCIATED WITH CHRONIC RENAL FAILURE: ICD-10-CM

## 2022-04-25 DIAGNOSIS — E11.22 DM TYPE 2 CAUSING CKD STAGE 5 (HCC): ICD-10-CM

## 2022-04-25 DIAGNOSIS — Z00.6 ENCOUNTER FOR EXAMINATION FOR NORMAL COMPARISON AND CONTROL IN CLINICAL RESEARCH PROGRAM: ICD-10-CM

## 2022-04-25 DIAGNOSIS — Z99.2 ESRD ON DIALYSIS (HCC): ICD-10-CM

## 2022-04-25 DIAGNOSIS — N18.5 DM TYPE 2 CAUSING CKD STAGE 5 (HCC): ICD-10-CM

## 2022-04-25 DIAGNOSIS — I50.32 CHRONIC HEART FAILURE WITH PRESERVED EJECTION FRACTION (HFPEF) (HCC): ICD-10-CM

## 2022-04-25 DIAGNOSIS — I10 ESSENTIAL HYPERTENSION: ICD-10-CM

## 2022-04-25 DIAGNOSIS — N18.6 ESRD ON DIALYSIS (HCC): ICD-10-CM

## 2022-04-25 DIAGNOSIS — I25.10 CAD IN NATIVE ARTERY: ICD-10-CM

## 2022-04-25 DIAGNOSIS — N18.9 ANEMIA ASSOCIATED WITH CHRONIC RENAL FAILURE: ICD-10-CM

## 2022-04-25 DIAGNOSIS — I50.21 ACUTE HFREF (HEART FAILURE WITH REDUCED EJECTION FRACTION) (HCC): Primary | ICD-10-CM

## 2022-04-25 DIAGNOSIS — T81.89XA SURGICAL WOUND, NON HEALING, INITIAL ENCOUNTER: ICD-10-CM

## 2022-04-25 PROCEDURE — 3051F HG A1C>EQUAL 7.0%<8.0%: CPT | Performed by: SURGERY

## 2022-04-25 PROCEDURE — G8427 DOCREV CUR MEDS BY ELIG CLIN: HCPCS | Performed by: SURGERY

## 2022-04-25 PROCEDURE — 20102 EXPL PENTRG WND ABD/FLNK/BK: CPT | Performed by: SURGERY

## 2022-04-25 PROCEDURE — 99204 OFFICE O/P NEW MOD 45 MIN: CPT | Performed by: SURGERY

## 2022-04-25 PROCEDURE — 3017F COLORECTAL CA SCREEN DOC REV: CPT | Performed by: SURGERY

## 2022-04-25 PROCEDURE — 1036F TOBACCO NON-USER: CPT | Performed by: SURGERY

## 2022-04-25 PROCEDURE — G8420 CALC BMI NORM PARAMETERS: HCPCS | Performed by: SURGERY

## 2022-04-25 PROCEDURE — 2022F DILAT RTA XM EVC RTNOPTHY: CPT | Performed by: SURGERY

## 2022-04-25 ASSESSMENT — ENCOUNTER SYMPTOMS
VOICE CHANGE: 0
EYE PAIN: 0
TROUBLE SWALLOWING: 0
STRIDOR: 0
EYE DISCHARGE: 0
EYE ITCHING: 0
RHINORRHEA: 0
VOMITING: 0
CHOKING: 0
FACIAL SWELLING: 0
CHEST TIGHTNESS: 0
NAUSEA: 0
DIARRHEA: 0
SINUS PAIN: 0
ABDOMINAL DISTENTION: 0
ANAL BLEEDING: 0
COLOR CHANGE: 0
APNEA: 0
PHOTOPHOBIA: 0
SINUS PRESSURE: 0
BLOOD IN STOOL: 0
RECTAL PAIN: 0
BACK PAIN: 0
ABDOMINAL PAIN: 0
CONSTIPATION: 0
WHEEZING: 0
EYE REDNESS: 0
SORE THROAT: 0
SHORTNESS OF BREATH: 0
COUGH: 0

## 2022-04-25 NOTE — PROGRESS NOTES
Raymond Ramos MD [facility]  General Surgery  New Patient Evaluation in Office  Pt Name: Krista Mitchell  Date of Birth 1965   Today's Date: 4/25/2022  Medical Record Number: [id number]  Referring Provider: Masood Santos  Primary Care Provider: PAOLA Sanchez - CNP  Chief Complaint   Patient presents with   [facility]     New patient-referred by JUAN Hyatt-Non healing PD site abdominal wound     ASSESSMENT      Problem List Items Addressed This Visit     Acute HFrEF (heart failure with reduced ejection fraction) (HCC) - Primary    Anemia associated with chronic renal failure    CAD in native artery    Chronic heart failure with preserved ejection fraction (HFpEF) ([address]    DM type 2 causing CKD stage 5 ([address]    ESRD on dialysis [facility])    Essential hypertension    Surgical wound, non healing, initial encounter    Relevant Orders    OK EXPLORE WOUND,ABDOMEN/FLANK/BACK (Completed)        Past Medical History:   Diagnosis Date    Broken ankle 2016    Broke right ankle.  CAD (coronary artery disease) October 2015    MI     Cerebral artery occlusion with cerebral infarction [facility])  march 2015    left side    CHF (congestive heart failure) (HCC)     Chronic kidney disease     peritoneal dialysis    Diabetes mellitus ([address]     GERD (gastroesophageal reflux disease)     Hyperlipidemia     Hypertension     Thyroid disease           PLANS      1. Schedule Jean for wound exploration for non healing wound  2. The risks, options and alternatives were discussed in the office. Bleeding, infection, reoperation and recurrence were discussed. Mesh infection specifically  was discussed, and the possible treatment options were discussed. The possibility of inadvertent injury to other structures was also covered. The patient was given opportunity to ask questions. Once answered, the patient has agreed to proceed with surgery. (OPEN)  3. The risks, options and alternatives were discussed in the office. Bleeding, infection, reoperation and recurrence were discussed. Injury to other intra abdominal structures and possible conversion to an open procedure as well as the risks of mesh infection were covered. The patient was given the opportunity to ask questions. Once answered, the patient was agreeable to proceed with the surgery. (L/S)  4. Status: outpatient  5. Planned anesthesia: MAC  6. He will undergo pre-operative clearance per anesthesia guidelines with risk factors listed under the past medical history diagnosis & problem list.  7. Perioperative discontinuation of ASA, Plavix, warfarin, Brillinta, Effient, Pradaxa, Eliquis and Xarelto. Continuation of 81 mg Aspirin is acceptable. 8. Perioperative medical clearance will be scheduled with Dr. Rashi Elias to stop asa and plavix  Orders Placed This Encounter   Procedures    FL EXPLORE WOUND,ABDOMEN/FLANK/BACK           Nonah Dancer is a 61 y.o. male seen in the office for evaluation of nonhealing site at the previous CAPD catheter removal done at  by radiology February 21. He had this removed and went home and he developed bleeding from his peritoneal dialysis site and went back to his local hospital but he was transferred to [facility] because the facility was on diversion. He was found to have acute blood loss anemia received 2 units of packed red cells and CT scan at that time showed hematoma formation as peritoneal dialysis site. He remained stable they required no intervention. Since that time he has had issues with the wound not healing now its been over 2 months. There is a indurated spot that extends up towards the tract where the skin hole exit site was and there is an indurated area that measures about 3 cm with a small volcano like opening in the incision which drains a intermittent clear straw fluid.   He says it is never closed right from the time they removed it  Past Medical History  Past Medical History: Diagnosis Date    Broken ankle 2016    Broke right ankle.     CAD (coronary artery disease) October 2015    MI     Cerebral artery occlusion with cerebral infarction [facility])  march 2015    left side    CHF (congestive heart failure) (HCC)     Chronic kidney disease     peritoneal dialysis    Diabetes mellitus ([address]     GERD (gastroesophageal reflux disease)     Hyperlipidemia     Hypertension     Thyroid disease      Past Surgical History  Past Surgical History:   Procedure Laterality Date    APPENDECTOMY      CARDIAC SURGERY  Oct. 2015    2 stents placed    COLONOSCOPY      DIAGNOSTIC CARDIAC CATH LAB PROCEDURE  11/05/2020    EYE SURGERY       Medications  Current Outpatient Medications on File Prior to Visit   Medication Sig Dispense Refill    hydrALAZINE (APRESOLINE) 25 MG tablet Take 3 tablets by mouth 3 times daily 90 tablet 0    isosorbide mononitrate (IMDUR) 30 MG extended release tablet Take 30 mg by mouth daily      insulin glargine (BASAGLAR KWIKPEN) 100 UNIT/ML injection pen Inject 10 Units into the skin daily      pantoprazole (PROTONIX) 40 MG tablet Take 40 mg by mouth 2 times daily      insulin aspart (NOVOLOG) 100 UNIT/ML injection vial Inject into the skin 3 times daily (before meals) PT TAKES IT BASED OFF OF HIS CARBS;  1  Unit per 8 cho      NONFORMULARY Dexcom 6 checks sugar      bumetanide (BUMEX) 2 MG tablet Take 1 tablet by mouth daily (Patient taking differently: Take 2 mg by mouth daily Swelling in ankles) 90 tablet 3    levothyroxine (SYNTHROID) 100 MCG tablet Take 1 tablet by mouth Daily 30 tablet 3    nitroGLYCERIN (NITROSTAT) 0.4 MG SL tablet DISSOLVE 1 TABLET UNDER THE TONGUE EVERY 5 MIN AS NEEDED FOR CHEST PAIN  0    atorvastatin (LIPITOR) 80 MG tablet Take 80 mg by mouth at bedtime       clopidogrel (PLAVIX) 75 MG tablet Take 75 mg by mouth daily Patient not taking at this time since Sunday      metoprolol (LOPRESSOR) 50 MG tablet Take 50 mg by mouth 2 times daily (with meals)       aspirin 81 MG EC tablet Take 81 mg by mouth daily       Insulin Pen Needle ([employer] PEN NEEDLES) 31G X 6 MM MISC 1 each by Does not apply route daily 100 each 0     No current facility-administered medications on file prior to visit. Allergies  Allergies   Allergen Reactions    Aranesp (Albumin Free) [Darbepoetin Damon] Hives     Patient tolerated 19. Family History  Family History   Adopted: Yes   Problem Relation Age of Onset    Diabetes Mother     Cancer Sister      Social History  Social History     Socioeconomic History    Marital status:      Spouse name: Jere De La O    Number of children: 3    Years of education: Not on file    Highest education level: Not on file   Occupational History    Not on file   Tobacco Use    Smoking status: Former Smoker     Packs/day: 1.50     Years: 30.00     Pack years: 45.00     Quit date: 10/9/2015     Years since quittin.5    Smokeless tobacco: Former User    Tobacco comment: currently uses vap pens   Vaping Use    Vaping Use: Every day    Substances: Always   Substance and Sexual Activity    Alcohol use: Yes     Alcohol/week: 4.0 standard drinks     Types: 4 Cans of beer per week     Comment: occasional, once month    Drug use: No    Sexual activity: Yes     Partners: Female   Other Topics Concern    Not on file   Social History Narrative    Not on file     Social Determinants of Health     Financial Resource Strain:     Difficulty of Paying Living Expenses: Not on file   Food Insecurity:     Worried About Running Out of Food in the Last Year: Not on file    Santi of Food in the Last Year: Not on file   Transportation Needs:     Lack of Transportation (Medical): Not on file    Lack of Transportation (Non-Medical):  Not on file   Physical Activity:     Days of Exercise per Week: Not on file    Minutes of Exercise per Session: Not on file   Stress:     Feeling of Stress : Not on file   Social postnasal drip, rhinorrhea, sinus pressure, sinus pain, sneezing, sore throat, tinnitus, trouble swallowing and voice change. Eyes: Negative for photophobia, pain, discharge, redness, itching and visual disturbance. Respiratory: Negative for apnea, cough, choking, chest tightness, shortness of breath, wheezing and stridor. Cardiovascular: Negative for chest pain, palpitations and leg swelling. Gastrointestinal: Negative for abdominal distention, abdominal pain, anal bleeding, blood in stool, constipation, diarrhea, nausea, rectal pain and vomiting. Endocrine: Negative for cold intolerance, heat intolerance, polydipsia, polyphagia and polyuria. Musculoskeletal: Negative for arthralgias, back pain, gait problem, joint swelling, myalgias, neck pain and neck stiffness. Skin: Positive for wound. Negative for color change, pallor and rash. Allergic/Immunologic: Negative for environmental allergies, food allergies and immunocompromised state. Neurological: Positive for dizziness, weakness and light-headedness. Negative for tremors, seizures, syncope, facial asymmetry, speech difficulty, numbness and headaches. Hematological: Negative for adenopathy. Bruises/bleeds easily. Psychiatric/Behavioral: Negative for behavioral problems, confusion, decreased concentration, dysphoric mood, hallucinations, self-injury, sleep disturbance and suicidal ideas. The patient is not nervous/anxious and is not hyperactive. OBJECTIVE    VITALS:  height is 5' 2\" (1.575 m) and weight is 122 lb (55.3 kg). His temporal temperature is 97.5 °F (36.4 °C). His blood pressure is 100/58 (abnormal) and his pulse is 110. His respiration is 18 and oxygen saturation is 99%. CONSTITUTIONAL: Alert and oriented times 3, no acute distress and cooperative to examination with proper mood and affect. SKIN: Skin color, texture, turgor normal. No rashes or lesions.   LYMPH: no cervical nodes, no inguinal nodes  HEENT: Head is normocephalic, atraumatic. EOMI, PERRLA. NECK: Supple, symmetrical, trachea midline, no adenopathy, thyroid symmetric, not enlarged and no tenderness, skin normal.  He has a right-sided dialysis catheter subclavian position  CHEST/LUNGS: chest symmetric with normal A/P diameter, normal respiratory rate and rhythm, lungs clear to auscultation without wheezes, rales or rhonchi. No accessory muscle use. Scars None   CARDIOVASCULAR: Heart sounds are normal.  Regular rate and rhythm without murmur, gallop or rub. Normal S1 and S2. . Carotid and femoral pulses 2+/4 and equal bilaterally. ABDOMEN: Normal shape. Appendectomy scar(s) present. Normal bowel sounds. No bruits. . soft, nontender, nondistended, no masses or organomegaly. Just in the left paramedian position there is a vertically placed incision old scar that at the superior edge is about a 2 and half to 3 cm hard indurated area with a small volcano like eruption in the incision itself which is scabbed over and raised but not draining at this time. There is also a firm hard tract that extends over from this site to the exit site from his CAPD catheter. RECTAL: deferred, not clinically indicated  NEUROLOGIC: There are no focalizing motor or sensory deficits. CN II-XII are grossly intact. [place] EXTREMITIES: no cyanosis, no clubbing and no edema. Thank you for the interesting evaluation. Further recommendations as listed above.          Electronically signed by Miryam Ellis MD MD FACS on 4/25/2022 at 11:50 AM

## 2022-04-25 NOTE — TELEPHONE ENCOUNTER
Patient is scheduled for surgery with Dr. Adonna Bamberger on 05- for a wound exploration under a MAC. Could you please ask Dr. Vijaya Navarrete if the patient is okay to proceed with this scheduled procedure?   Thank you

## 2022-04-25 NOTE — TELEPHONE ENCOUNTER
Patient scheduled for surgery with Dr. Randall Dahl on 05- at 10:30am with an arrival of 9:00am.  Preop instructions and antibacterial soap given. Surgery consent signed. Lab work from [facility] scanned into [facility].   Cardiac clearance per Dr. Donnie Rosales.

## 2022-04-25 NOTE — LETTER
[facility]  Reji Waddell MD [facility]  Phone- [phone]  Fax [phone]    Pt Name: Uzma Deluna  Medical Record Number: [id number]  Date of Birth 1965   Today's Date: 4/25/2022    Bela Nation was evaluated in the office today. My assessment and plans are listed below. Assessment:     Robert Ruiz was seen today for surgical consult. Diagnoses and all orders for this visit:    Acute HFrEF (heart failure with reduced ejection fraction) (HCC)    Surgical wound, non healing, initial encounter  -     TX EXPLORE WOUND,ABDOMEN/FLANK/BACK    Anemia associated with chronic renal failure    CAD in native artery    Chronic heart failure with preserved ejection fraction (HFpEF) ([facility] [address]    DM type 2 causing CKD stage 5 [facility]    ESRD on dialysis [facility])    Essential hypertension         Plan:  1. Schedule Robert Ruiz for wound exploration for non healing wound  2. The risks, options and alternatives were discussed in the office. Bleeding, infection, reoperation and recurrence were discussed. Mesh infection specifically  was discussed, and the possible treatment options were discussed. The possibility of inadvertent injury to other structures was also covered. The patient was given opportunity to ask questions. Once answered, the patient has agreed to proceed with surgery. (OPEN)  3. The risks, options and alternatives were discussed in the office. Bleeding, infection, reoperation and recurrence were discussed. Injury to other intra abdominal structures and possible conversion to an open procedure as well as the risks of mesh infection were covered. The patient was given the opportunity to ask questions. Once answered, the patient was agreeable to proceed with the surgery. (L/S)  4. Status: outpatient  5. Planned anesthesia: MAC  6.  He will undergo pre-operative clearance per anesthesia guidelines with risk factors listed under the past medical history diagnosis & problem list.  7. Perioperative discontinuation of ASA, Plavix, warfarin, Brillinta, Effient, Pradaxa, Eliquis and Xarelto. Continuation of 81 mg Aspirin is acceptable. 8. Perioperative medical clearance will be scheduled with Dr. Jake Lanier to stop asa and plavix  Orders Placed This Encounter   Procedures    WI EXPLORE WOUND,ABDOMEN/FLANK/BACK                   If I can provide any additional assistance or you have any concerns, please feel free to contact me. Thank you for allowing to participate in the care of your patients. Sincerely,      Whitney Travis MD FACS  [address]  [facility].[place], [address]  Office: [phone]  Fax: [phone]

## 2022-04-25 NOTE — PROGRESS NOTES
Subjective:      Patient ID: Guerline Burton is a 61 y.o. male. Chief Complaint   Patient presents with    Surgical Consult     New patient-referred by JUAN Hyatt-Non healing PD site abdominal wound     HPI  BP (!) 100/58   Pulse 110   Temp 97.5 °F (36.4 °C) (Temporal)   Resp 18   Ht 5' 2\" (1.575 m)   Wt 122 lb (55.3 kg)   SpO2 99%   BMI 22.31 kg/m²     Review of Systems   Constitutional: Positive for activity change, appetite change and fatigue. Negative for chills, diaphoresis, fever and unexpected weight change. HENT: Negative for congestion, dental problem, drooling, ear discharge, ear pain, facial swelling, hearing loss, mouth sores, nosebleeds, postnasal drip, rhinorrhea, sinus pressure, sinus pain, sneezing, sore throat, tinnitus, trouble swallowing and voice change. Eyes: Negative for photophobia, pain, discharge, redness, itching and visual disturbance. Respiratory: Negative for apnea, cough, choking, chest tightness, shortness of breath, wheezing and stridor. Cardiovascular: Negative for chest pain, palpitations and leg swelling. Gastrointestinal: Negative for abdominal distention, abdominal pain, anal bleeding, blood in stool, constipation, diarrhea, nausea, rectal pain and vomiting. Endocrine: Negative for cold intolerance, heat intolerance, polydipsia, polyphagia and polyuria. Musculoskeletal: Negative for arthralgias, back pain, gait problem, joint swelling, myalgias, neck pain and neck stiffness. Skin: Positive for wound. Negative for color change, pallor and rash. Allergic/Immunologic: Negative for environmental allergies, food allergies and immunocompromised state. Neurological: Positive for dizziness, weakness and light-headedness. Negative for tremors, seizures, syncope, facial asymmetry, speech difficulty, numbness and headaches. Hematological: Negative for adenopathy. Bruises/bleeds easily.    Psychiatric/Behavioral: Negative for behavioral problems, confusion, decreased concentration, dysphoric mood, hallucinations, self-injury, sleep disturbance and suicidal ideas. The patient is not nervous/anxious and is not hyperactive.         Objective:   Physical Exam    Assessment:             Plan:              April Davey RN

## 2022-04-27 ENCOUNTER — HOSPITAL ENCOUNTER (OUTPATIENT)
Dept: INTERVENTIONAL RADIOLOGY/VASCULAR | Age: 57
Discharge: HOME | End: 2022-04-27
Payer: COMMERCIAL

## 2022-04-27 DIAGNOSIS — N18.6 END STAGE RENAL DISEASE (HCC): ICD-10-CM

## 2022-04-27 DIAGNOSIS — Z01.818 PREOP EXAMINATION: ICD-10-CM

## 2022-04-27 PROCEDURE — 93971 EXTREMITY STUDY: CPT

## 2022-05-04 ENCOUNTER — OFFICE VISIT (OUTPATIENT)
Dept: CARDIOLOGY CLINIC | Age: 57
End: 2022-05-04
Payer: COMMERCIAL

## 2022-05-04 ENCOUNTER — PREP FOR PROCEDURE (OUTPATIENT)
Dept: SURGERY | Age: 57
End: 2022-05-04

## 2022-05-04 VITALS
BODY MASS INDEX: 22.89 KG/M2 | HEART RATE: 109 BPM | DIASTOLIC BLOOD PRESSURE: 67 MMHG | WEIGHT: 124.4 LBS | SYSTOLIC BLOOD PRESSURE: 107 MMHG | HEIGHT: 62 IN

## 2022-05-04 DIAGNOSIS — I87.8 CLAUDICATIO VENOSA INTERMITTENS: Primary | ICD-10-CM

## 2022-05-04 DIAGNOSIS — I25.10 CAD IN NATIVE ARTERY: ICD-10-CM

## 2022-05-04 DIAGNOSIS — I73.9 CLAUDICATION (HCC): ICD-10-CM

## 2022-05-04 PROCEDURE — 4004F PT TOBACCO SCREEN RCVD TLK: CPT | Performed by: INTERNAL MEDICINE

## 2022-05-04 PROCEDURE — 99214 OFFICE O/P EST MOD 30 MIN: CPT | Performed by: INTERNAL MEDICINE

## 2022-05-04 PROCEDURE — 3017F COLORECTAL CA SCREEN DOC REV: CPT | Performed by: INTERNAL MEDICINE

## 2022-05-04 PROCEDURE — G8427 DOCREV CUR MEDS BY ELIG CLIN: HCPCS | Performed by: INTERNAL MEDICINE

## 2022-05-04 PROCEDURE — G8420 CALC BMI NORM PARAMETERS: HCPCS | Performed by: INTERNAL MEDICINE

## 2022-05-04 NOTE — H&P
Sahil Washington MD [facility]  General Surgery  H and P for Surgery   Pt Name: Bethanie August  Date of Birth 1965   Today's Date: 5/4/2022  Medical Record Number: [id number]  Referring Provider: No ref. provider found  Primary Care Provider: PAOLA Gutierrez CNP  No chief complaint on file. ASSESSMENT      Problem List Items Addressed This Visit     None        Past Medical History:   Diagnosis Date    Broken ankle 2016    Broke right ankle.  CAD (coronary artery disease) October 2015    MI     Cerebral artery occlusion with cerebral infarction [facility])  march 2015    left side    CHF (congestive heart failure) (HCC)     Chronic kidney disease     peritoneal dialysis    Diabetes mellitus ([facility] [address]     GERD (gastroesophageal reflux disease)     Hyperlipidemia     Hypertension     Thyroid disease           PLANS      1. Schedule Jean for wound exploration for non healing wound  2. The risks, options and alternatives were discussed in the office. Bleeding, infection, reoperation and recurrence were discussed. Mesh infection specifically  was discussed, and the possible treatment options were discussed. The possibility of inadvertent injury to other structures was also covered. The patient was given opportunity to ask questions. Once answered, the patient has agreed to proceed with surgery. (OPEN)  3. The risks, options and alternatives were discussed in the office. Bleeding, infection, reoperation and recurrence were discussed. Injury to other intra abdominal structures and possible conversion to an open procedure as well as the risks of mesh infection were covered. The patient was given the opportunity to ask questions. Once answered, the patient was agreeable to proceed with the surgery. (L/S)  4. Status: outpatient  5. Planned anesthesia: MAC  6.  He will undergo pre-operative clearance per anesthesia guidelines with risk factors listed under the past medical history diagnosis & problem list.  7. Perioperative discontinuation of ASA, Plavix, warfarin, Brillinta, Effient, Pradaxa, Eliquis and Xarelto. Continuation of 81 mg Aspirin is acceptable. 8. Perioperative medical clearance will be scheduled with Dr. Jer Garza to stop asa and plavix  No orders of the defined types were placed in this encounter. Ashwin Dominguez is a 61 y.o. male seen in the office for evaluation of nonhealing site at the previous CAPD catheter removal done at [facility] by radiology February 21. He had this removed and went home and he developed bleeding from his peritoneal dialysis site and went back to his local hospital but he was transferred to [facility] because the facility was on diversion. He was found to have acute blood loss anemia received 2 units of packed red cells and CT scan at that time showed hematoma formation as peritoneal dialysis site. He remained stable they required no intervention. Since that time he has had issues with the wound not healing now its been over 2 months. There is a indurated spot that extends up towards the tract where the skin hole exit site was and there is an indurated area that measures about 3 cm with a small volcano like opening in the incision which drains a intermittent clear straw fluid. He says it is never closed right from the time they removed it  Past Medical History  Past Medical History:   Diagnosis Date    Broken ankle 2016    Broke right ankle.     CAD (coronary artery disease) October 2015    MI     Cerebral artery occlusion with cerebral infarction [facility])  march 2015    left side    CHF (congestive heart failure) (HCC)     Chronic kidney disease     peritoneal dialysis    Diabetes mellitus ([address]     GERD (gastroesophageal reflux disease)     Hyperlipidemia     Hypertension     Thyroid disease      Past Surgical History  Past Surgical History:   Procedure Laterality Date    APPENDECTOMY      CARDIAC SURGERY  Oct. 2015    2 stents placed    COLONOSCOPY      DIAGNOSTIC CARDIAC CATH LAB PROCEDURE  11/05/2020    EYE SURGERY       Medications  No current facility-administered medications on file prior to encounter. Current Outpatient Medications on File Prior to Encounter   Medication Sig Dispense Refill    hydrALAZINE (APRESOLINE) 25 MG tablet Take 3 tablets by mouth 3 times daily 90 tablet 0    isosorbide mononitrate (IMDUR) 30 MG extended release tablet Take 30 mg by mouth daily      Insulin Pen Needle (Prior [employer] PEN NEEDLES) 31G X 6 MM MISC 1 each by Does not apply route daily 100 each 0    insulin glargine (BASAGLAR KWIKPEN) 100 UNIT/ML injection pen Inject 10 Units into the skin daily      pantoprazole (PROTONIX) 40 MG tablet Take 40 mg by mouth 2 times daily      insulin aspart (NOVOLOG) 100 UNIT/ML injection vial Inject into the skin 3 times daily (before meals) PT TAKES IT BASED OFF OF HIS CARBS;  1  Unit per 8 cho      NONFORMULARY Dexcom 6 checks sugar      bumetanide (BUMEX) 2 MG tablet Take 1 tablet by mouth daily (Patient taking differently: Take 2 mg by mouth daily Swelling in ankles) 90 tablet 3    levothyroxine (SYNTHROID) 100 MCG tablet Take 1 tablet by mouth Daily 30 tablet 3    nitroGLYCERIN (NITROSTAT) 0.4 MG SL tablet DISSOLVE 1 TABLET UNDER THE TONGUE EVERY 5 MIN AS NEEDED FOR CHEST PAIN  0    atorvastatin (LIPITOR) 80 MG tablet Take 80 mg by mouth at bedtime       clopidogrel (PLAVIX) 75 MG tablet Take 75 mg by mouth daily Patient not taking at this time since Sunday      metoprolol (LOPRESSOR) 50 MG tablet Take 50 mg by mouth 2 times daily (with meals)       aspirin 81 MG EC tablet Take 81 mg by mouth daily        Allergies  Allergies   Allergen Reactions    Aranesp (Albumin Free) [Darbepoetin Damon] Hives     Patient tolerated 4/13/19.       Family History  Family History   Adopted: Yes   Problem Relation Age of Onset    Diabetes Mother     Cancer Sister      Social History  Social History Socioeconomic History    Marital status:      Spouse name: Kristen Murcia Number of children: 3    Years of education: Not on file    Highest education level: Not on file   Occupational History    Not on file   Tobacco Use    Smoking status: Former Smoker     Packs/day: 1.50     Years: 30.00     Pack years: 45.00     Quit date: 10/9/2015     Years since quittin.5    Smokeless tobacco: Former User    Tobacco comment: currently uses vap pens   Vaping Use    Vaping Use: Every day    Substances: Always   Substance and Sexual Activity    Alcohol use: Yes     Alcohol/week: 4.0 standard drinks     Types: 4 Cans of beer per week     Comment: occasional, once month    Drug use: No    Sexual activity: Yes     Partners: Female   Other Topics Concern    Not on file   Social History Narrative    Not on file     Social Determinants of Health     Financial Resource Strain:     Difficulty of Paying Living Expenses: Not on file   Food Insecurity:     Worried About Running Out of Food in the Last Year: Not on file    Santi of Food in the Last Year: Not on file   Transportation Needs:     Lack of Transportation (Medical): Not on file    Lack of Transportation (Non-Medical):  Not on file   Physical Activity:     Days of Exercise per Week: Not on file    Minutes of Exercise per Session: Not on file   Stress:     Feeling of Stress : Not on file   Social Connections:     Frequency of Communication with Friends and Family: Not on file    Frequency of Social Gatherings with Friends and Family: Not on file    Attends Restorationism Services: Not on file    Active Member of Clubs or Organizations: Not on file    Attends Club or Organization Meetings: Not on file    Marital Status: Not on file   Intimate Partner Violence:     Fear of Current or Ex-Partner: Not on file    Emotionally Abused: Not on file    Physically Abused: Not on file    Sexually Abused: Not on file   Housing Stability:     Unable to Pay for Housing in the Last Year: Not on file    Number of Places Lived in the Last Year: Not on file    Unstable Housing in the Last Year: Not on [address] Maintenance   Topic Date Due    Annual Wellness Visit (AWV)  Never done    Pneumococcal 0-64 years Vaccine (1 - PCV) 09/07/1970    Diabetic foot exam  Never done    Depression Screen  Never done    Hepatitis B vaccine (1 of 3 - Risk Recombivax 3-dose series) Never done    Colorectal Cancer Screen  Never done    Shingles vaccine (1 of 2) Never done    Low dose CT lung screening  Never done    TSH  06/03/2020    Lipids  10/12/2020    COVID-19 Vaccine (2 - Moderna 3-dose series) 04/16/2021    Diabetic retinal exam  10/21/2021    A1C test (Diabetic or Prediabetic)  02/25/2023    Potassium  02/26/2023    Creatinine  02/26/2023    DTaP/Tdap/Td vaccine (3 - Td or Tdap) 12/20/2030    Flu vaccine  Completed    Hepatitis C screen  Completed    HIV screen  Completed    Hepatitis A vaccine  Aged Out    Hib vaccine  Aged Out    Meningococcal (ACWY) vaccine  Aged Out     Review of Systems  Constitutional: Positive for activity change, appetite change and fatigue. Negative for chills, diaphoresis, fever and unexpected weight change. HENT: Negative for congestion, dental problem, drooling, ear discharge, ear pain, facial swelling, hearing loss, mouth sores, nosebleeds, postnasal drip, rhinorrhea, sinus pressure, sinus pain, sneezing, sore throat, tinnitus, trouble swallowing and voice change. Eyes: Negative for photophobia, pain, discharge, redness, itching and visual disturbance. Respiratory: Negative for apnea, cough, choking, chest tightness, shortness of breath, wheezing and stridor. Cardiovascular: Negative for chest pain, palpitations and leg swelling.    Gastrointestinal: Negative for abdominal distention, abdominal pain, anal bleeding, blood in stool, constipation, diarrhea, nausea, rectal pain and vomiting. Endocrine: Negative for cold intolerance, heat intolerance, polydipsia, polyphagia and polyuria. Musculoskeletal: Negative for arthralgias, back pain, gait problem, joint swelling, myalgias, neck pain and neck stiffness. Skin: Positive for wound. Negative for color change, pallor and rash. Allergic/Immunologic: Negative for environmental allergies, food allergies and immunocompromised state. Neurological: Positive for dizziness, weakness and light-headedness. Negative for tremors, seizures, syncope, facial asymmetry, speech difficulty, numbness and headaches. Hematological: Negative for adenopathy. Bruises/bleeds easily. Psychiatric/Behavioral: Negative for behavioral problems, confusion, decreased concentration, dysphoric mood, hallucinations, self-injury, sleep disturbance and suicidal ideas. The patient is not nervous/anxious and is not hyperactive. OBJECTIVE    VITALS:  vitals were not taken for this visit. CONSTITUTIONAL: Alert and oriented times 3, no acute distress and cooperative to examination with proper mood and affect. SKIN: Skin color, texture, turgor normal. No rashes or lesions. LYMPH: no cervical nodes, no inguinal nodes  HEENT: Head is normocephalic, atraumatic. EOMI, PERRLA. NECK: Supple, symmetrical, trachea midline, no adenopathy, thyroid symmetric, not enlarged and no tenderness, skin normal.  He has a right-sided dialysis catheter subclavian position  CHEST/LUNGS: chest symmetric with normal A/P diameter, normal respiratory rate and rhythm, lungs clear to auscultation without wheezes, rales or rhonchi. No accessory muscle use. Scars None   CARDIOVASCULAR: Heart sounds are normal.  Regular rate and rhythm without murmur, gallop or rub. Normal S1 and S2. . Carotid and femoral pulses 2+/4 and equal bilaterally. ABDOMEN: Normal shape. Appendectomy scar(s) present. Normal bowel sounds. No bruits. . soft, nontender, nondistended, no masses or organomegaly.   Just in

## 2022-05-04 NOTE — PROGRESS NOTES
[address] [place] [address]  Dept: [phone]  Dept Fax: [phone]  Loc: [phone]    Visit Date: 5/4/2022    Mr. Deluna is a 61 y.o. male  who presented for:  Chief Complaint   Patient presents with    Pre-op Exam    Coronary Artery Disease    Congestive Heart Failure     HPI:   MIRANDA Petersen is a pleasant 61year old male patient who  has a past medical history of Broken ankle, CAD (coronary artery disease), Cerebral artery occlusion with cerebral infarction ([address] CHF (congestive heart failure) ([facility] Chronic kidney disease, Diabetes mellitus ([facility] [address] GERD (gastroesophageal reflux disease), Hyperlipidemia, Hypertension, and Thyroid disease. The patient has known h/o CAD, HFpEF. He has had prior PCI of RCA. The patient underwent successful IVUS guided PCI/Stenting of RCA 10/2019. On 11/2020, he underwent RHC/LHC, patent RCA noted, FFR of LAD was negative (0.82). He states that he had \"bleeding at site of PD catheter\", anemia requiring PRBC transfusion, admitted to [facility]. His Plavix was stopped since then, he is on ASA. EF 50-55% on Echo 2/2022 at OSH, stress test on 3/2022 revealed inferior wall infarct, no ischemia. He has a nonhealing abdominal wound, scheduled for abdominal wound exploration. Preop cardiac clearance was requested. He is now on the kidney transplant list. Low BP was noticed by patient at home. BP in office today is 107/67. He stopped taking BP medications. He walks on a regular basis. He reports bilateral leg claudications. Patient denies chest pain, shortness of breath, dyspnea on exertion.        Current Outpatient Medications:     hydrALAZINE (APRESOLINE) 25 MG tablet, Take 3 tablets by mouth 3 times daily, Disp: 90 tablet, Rfl: 0    isosorbide mononitrate (IMDUR) 30 MG extended release tablet, Take 30 mg by mouth daily, Disp: , Rfl:     insulin glargine (BASAGLAR KWIKPEN) 100 UNIT/ML injection pen, Inject 10 Units into the skin daily, Disp: , Rfl:     pantoprazole (PROTONIX) 40 MG tablet, Take 40 mg by mouth 2 times daily, Disp: , Rfl:     insulin aspart (NOVOLOG) 100 UNIT/ML injection vial, Inject into the skin 3 times daily (before meals) PT TAKES IT BASED OFF OF HIS CARBS;  1  Unit per 8 cho, Disp: , Rfl:     NONFORMULARY, Dexcom 6 checks sugar, Disp: , Rfl:     bumetanide (BUMEX) 2 MG tablet, Take 1 tablet by mouth daily (Patient taking differently: Take 2 mg by mouth daily Swelling in ankles), Disp: 90 tablet, Rfl: 3    levothyroxine (SYNTHROID) 100 MCG tablet, Take 1 tablet by mouth Daily, Disp: 30 tablet, Rfl: 3    nitroGLYCERIN (NITROSTAT) 0.4 MG SL tablet, DISSOLVE 1 TABLET UNDER THE TONGUE EVERY 5 MIN AS NEEDED FOR CHEST PAIN, Disp: , Rfl: 0    atorvastatin (LIPITOR) 80 MG tablet, Take 80 mg by mouth at bedtime , Disp: , Rfl:     clopidogrel (PLAVIX) 75 MG tablet, Take 75 mg by mouth daily Patient not taking at this time since Sunday, Disp: , Rfl:     metoprolol (LOPRESSOR) 50 MG tablet, Take 50 mg by mouth 2 times daily (with meals) , Disp: , Rfl:     aspirin 81 MG EC tablet, Take 81 mg by mouth daily , Disp: , Rfl:     Insulin Pen Needle (Verandreinada Muna PEN NEEDLES) 31G X 6 MM MISC, 1 each by Does not apply route daily, Disp: 100 each, Rfl: 0    Past Medical History  Lamberto Carpio  has a past medical history of Broken ankle, CAD (coronary artery disease), Cerebral artery occlusion with cerebral infarction ([address] CHF (congestive heart failure) ([facility] [address] Chronic kidney disease, Diabetes mellitus ([address] GERD (gastroesophageal reflux disease), Hyperlipidemia, Hypertension, and Thyroid disease. Social History  Lamberto Carpio  reports that he quit smoking about 6 years ago. He has a 45.00 pack-year smoking history. He has quit using smokeless tobacco. He reports current alcohol use of about 4.0 standard drinks of alcohol per week. He reports that he does not use drugs.     Family History  Bettina Harman family history includes Cancer in his sister; Diabetes in his mother. He was adopted. Past Surgical History   Past Surgical History:   Procedure Laterality Date    APPENDECTOMY      CARDIAC SURGERY  Oct. 2015    2 stents placed    COLONOSCOPY      DIAGNOSTIC CARDIAC CATH LAB PROCEDURE  11/05/2020    EYE SURGERY         Review of Systems   Constitutional: Negative for chills and fever  HENT: Negative for congestion, sinus pressure, sneezing and sore throat. Eyes: Negative for pain, discharge, redness and itching. Respiratory: Negative for apnea, cough  Gastrointestinal: Negative for blood in stool, constipation, diarrhea   Endocrine: Negative for cold intolerance, heat intolerance, polydipsia. Genitourinary: Negative for dysuria, enuresis, flank pain and hematuria. Musculoskeletal: Negative for arthralgias, joint swelling and neck pain. Neurological: Negative for numbness and headaches. Psychiatric/Behavioral: Negative for agitation, confusion, decreased concentration and dysphoric mood. Objective:     /67   Pulse 109   Ht 5' 2\" (1.575 m)   Wt 124 lb 6.4 oz (56.4 kg)   BMI 22.75 kg/m²     Wt Readings from Last 3 Encounters:   05/04/22 124 lb 6.4 oz (56.4 kg)   04/25/22 122 lb (55.3 kg)   02/26/22 117 lb 4.6 oz (53.2 kg)     BP Readings from Last 3 Encounters:   05/04/22 107/67   04/25/22 (!) 100/58   02/26/22 (!) 128/56       Nursing note and vitals reviewed. Physical Exam   Constitutional: Oriented to person, place, and time. Appears well-developed and well-nourished. ENT: Moist mucous membranes. No bleeding. Tongue is midline. Head: Normocephalic and atraumatic. Eyes: EOM are normal. Pupils are equal, round, and reactive to light. Neck: Normal range of motion. Neck supple. No JVD present. Cardiovascular: Normal rate, regular rhythm, no murmur, no rubs, and intact distal pulses.     Pulmonary/Chest: Effort normal and breath sounds normal. No respiratory distress. No wheezes. No rales. Abdominal: Soft. Bowel sounds are normal. No distension. There is no tenderness. Musculoskeletal: Normal range of motion. no edema. Neurological: Alert and oriented to person, place, and time. No cranial nerve deficit. Coordination normal.   Skin: Skin is warm and dry. Psychiatric: Normal mood and affect.        Lab Results   Component Value Date    CKTOTAL 132 04/09/2016       Lab Results   Component Value Date    WBC 5.0 02/26/2022    RBC 3.18 02/26/2022    HGB 10.1 02/26/2022    HCT 30.7 02/26/2022    MCV 96.5 02/26/2022    MCH 31.8 02/26/2022    MCHC 32.9 02/26/2022    RDW 16.2 04/09/2016     02/26/2022    MPV 10.0 02/26/2022       Lab Results   Component Value Date     02/26/2022    K 4.2 02/26/2022    K 5.2 02/24/2022    CL 99 02/26/2022    CO2 23 02/26/2022    BUN 20 02/26/2022    LABALBU 3.2 02/26/2022    CREATININE 4.5 02/26/2022    CALCIUM 8.8 02/26/2022    LABGLOM 14 02/26/2022    GLUCOSE 206 02/26/2022       Lab Results   Component Value Date    ALKPHOS 98 02/26/2022    ALT 13 02/26/2022    AST 20 02/26/2022    PROT 6.2 02/26/2022    BILITOT 0.5 02/26/2022    BILIDIR 0.3 04/10/2019    LABALBU 3.2 02/26/2022       Lab Results   Component Value Date    MG 2.2 02/26/2022       Lab Results   Component Value Date    INR 0.96 02/21/2022    INR 0.86 06/30/2021    INR 0.91 11/04/2020    PROTIME 11.0 02/21/2022    PROTIME 12.5 08/27/2018         Lab Results   Component Value Date    LABA1C 7.8 02/25/2022       Lab Results   Component Value Date    TRIG 166 10/12/2019    HDL 56 10/12/2019    LDLCALC 59 10/12/2019       Lab Results   Component Value Date    .400 04/11/2019         Testing Reviewed:      I have individually reviewed the cardiac test below:    ECHO: Results for orders placed during the hospital encounter of 10/16/20    ECHO Complete 2D W Doppler W Color    Narrative  Transthoracic Echocardiography Report (TTE)    Demographics    Patient Name ALLISON Bates  Gender             Male  L    MR #          [id number]     Race                   Ethnicity    Account #     [de-identified]     Room Number    Accession     [id number]    Date of Study      10/16/2020  Number    Date of Birth 1965    Referring          Cleopatra Lopez. Rai Shoemaker MD    Age           63 year(s)    Sonographer        JUAN Mcfarlane, RDCS,  RDMS, RVT    Interpreting       Yanna Stinson MD  Physician    Procedure    Type of Study    TTE procedure:ECHOCARDIOGRAM COMPLETE 2D W DOPPLER W COLOR. Procedure Date  Date: 10/16/2020 Start: 10:36 AM    Study Location: Echo Lab  Technical Quality: Adequate visualization    Indications:Congestive heart failure and Hypertension. Additional Medical History:thyroid disease, diabetes, congestive heart  failure, stroke, coronary artery disease, hypertension, hyperlipidemia,  gastroesophageal reflux disease, chronic kidney disease, exsmoker    Patient Status: Routine    Height: 62 inches Weight: 130 pounds BSA: 1.59 m^2 BMI: 23.78 kg/m^2    BP: 178/78 mmHg    Allergies  - See Epic. Conclusions    Summary  Left ventricle size is normal.  Mild concentric left ventricular hypertrophy. There was mild global hypokinesis of the left ventricle. Ejection fraction is visually estimated in the range of 40% to 45%. Mildly enlarged right atrium size. Structurally normal mitral valve. Mild mitral regurgitation is present. Tricuspid valve is structurally normal.  Mild tricuspid regurgitation. Right ventricular systolic pressure of 50 mmHg consistent with moderate  pulmonary hypertension. Pulmonic valve is structurally normal.  Mild pulmonic regurgitation visualized. Dilated IVC with poor inspiration collapse consistent with elevated right  atrial pressure.     Signature    ----------------------------------------------------------------  Electronically signed by Yanna Stinson MD (Interpreting  physician) on 10/16/2020 at 04:18 PM  ----------------------------------------------------------------    Findings    Mitral Valve  Structurally normal mitral valve. Mild mitral regurgitation is present. Aortic Valve  The aortic valve was trileaflet with normal thickness and cuspal  separation. DOPPLER: Transaortic velocity was within the normal range with  no evidence of aortic stenosis. There was no evidence of aortic  regurgitation. Tricuspid Valve  Tricuspid valve is structurally normal.  Mild tricuspid regurgitation. Right ventricular systolic pressure of 50 mmHg consistent with moderate  pulmonary hypertension. Pulmonic Valve  Pulmonic valve is structurally normal.  Mild pulmonic regurgitation visualized. Left Atrium  Left atrial size was normal.    Left Ventricle  Left ventricle size is normal.  Mild concentric left ventricular hypertrophy. There was mild global hypokinesis of the left ventricle. Ejection fraction is visually estimated in the range of 40% to 45%. Right Atrium  Mildly enlarged right atrium size. Right Ventricle  The right ventricular size was normal with normal systolic function and  wall thickness. Pericardial Effusion  The pericardium was normal in appearance with no evidence of a pericardial  effusion. Pleural Effusion  No evidence of pleural effusion. Aorta / Great Vessels  The aorta is within normal limits. Dilated IVC with poor inspiration collapse consistent with elevated right  atrial pressure.     M-Mode/2D Measurements & Calculations    LV Diastolic    LV Systolic Dimension: 3  AV Cusp Separation: 2 cmLA  Dimension: 3.8  cm                        Dimension: 3.6 cmAO Root  cm              LV Volume Diastolic: 62   Dimension: 3.1 cmLA Area: 18.6  LV FS:21.1 %    ml                        cm^2  LV PW           LV Volume Systolic: 35 ml  Diastolic: 1.1  LV EDV/LV EDV Index: 62  cm              ml/39 m^2LV ESV/LV ESV  Septum          Index: 35 ml/22 m^2       RV Diastolic Dimension: 2.8 cm  Diastolic: 1.3  EF Calculated: 43.6 %  cm                                        LA/Aorta: 1.16  LV Length: 7.3 cm         Ascending Aorta: 3 cm  LA volume/Index: 51.5 ml /32m^2    LV Area  Diastolic: 59.5  cm^2  LV Area  Systolic: 01.5  cm^2    Doppler Measurements & Calculations    MV Peak E-Wave: 70.3 cm/s  AV Peak Velocity: 98.9 LVOT Peak Velocity: 81  cm/s                   cm/s  MV Peak Gradient: 1.98     AV Peak Gradient: 3.91 LVOT Peak Gradient: 3  mmHg                       mmHg                   mmHg    MV Deceleration Time: 162                         TV Peak E-Wave: 46.7  msec                                              cm/s  TV Peak A-Wave: 33 cm/s  IVRT: 155 msec  MV E' Septal Velocity: 3.8                        TV Peak Gradient: 0.87  cm/s                                              mmHg  MV A' Septal Velocity: 3.8 AV DVI (Vmax):0.82     TR Velocity:309 cm/s  cm/s                                              TR Gradient:38.19 mmHg  MV E' Lateral Velocity:                           PV Peak Velocity: 49.7  5.1 cm/s                                          cm/s  MV A' Lateral Velocity:                           PV Peak Gradient: 0.99  5.1 cm/s                                          mmHg  E/E' septal: 18.5  E/E' lateral: 13.78  MR Velocity: 364 cm/s                             MN ED Velocity: 168 cm/s    [id number]     TTE 02/2022 02/11/2022 2:31 PM EST    · Left Ventricle: Chamber size is normal. Severe concentric hypertrophy. Regional wall motion is normal. Ejection fraction is low normal (50-55%). · No ALLAN seen  · Diastolic function is consistent with pseudonormalization (grade II). · Right Ventricle: Chamber size is normal. Systolic function is normal.  · Left Atrium: Chamber size is mildly enlarged.   · No significant valvular abnormality seen  · Trivial pericardial effusion. Left Ventricle  Chamber size is normal. Severe concentric hypertrophy. Regional wall motion is normal. Ejection fraction is low normal (50-55%). Diastolic function is consistent with pseudonormalization (grade II). Right Ventricle  Chamber size is normal. Systolic function is normal.    Left Atrium  Chamber size is mildly enlarged. Right Atrium  Chamber size is normal.    IVC/SVC  The inferior vena cava structure has a diameter <21 mm and decreases >50% during inspiration. Mitral Valve  Normal appearing leaflets. Leaflet mobility is normal. Trace regurgitation. No valve stenosis. Tricuspid Valve  Normal leaflets. Leaflet mobility is normal. Trace regurgitation. No stenosis. Aortic Valve  Trileaflet valve. Leaflet mobility is normal. No regurgitation. No stenosis. Pulmonic Valve  Normal structure. Trace regurgitation. No stenosis. Pericardium  Appears normal. Trivial pericardial effusion. Septum  The atrial septum is normal.    Aorta  No dilation to extent seen. Study Details  Study(s) performed: complete. Overall study quality was fair. Imaging system used: [employer].      Cath:10/2019:  HEMODYNAMICS:  1.  RA 20 mmHg. 2.  RV 78/11 with a mean of 25.  3.  Pulmonary capillary wedge pressure, 21 mmHg. 4.  Pulmonary arterial pressure 80/33 with a mean of 49.  5.  LVEDP 29 mmHg. 6.  Cardiac output 3.94 liters per minute. 7.  Cardiac index 2.5 liters/minute per cubic meter.     CORONARY ANGIOGRAM:  1.  Right coronary artery, 70% in-stent restenosis in the proximal RCA. 90 to 95% in-stent restenosis in the mid RCA.  Distal RCA has mild  diffuse disease with patent intervention site.  RCA bifurcates into RPDA  and RPL.  RPDA has mild diffuse disease. Amedeo Nails is a 30 to 40% lesion in  the proximal part of RPL and then another 30% lesion at the mid part of  the RPL. 2.  Left main coronary artery patent.  No obstruction.  Bifurcates into  LAD and LCX.   3.  LCX:  No significant stenosis in the proximal segment.  Distal LCX  has mild diffuse disease. 4.  LAD.  There is mild diffuse disease in the proximal LAD.  LAD gives  moderate to large size first diagonal branch, which shows a branching  vessel with mild diffuse disease.  In the area after D1 in the mid LAD,  there is a 50% long stenotic segment, consistent with intermediate mid  LAD disease.  Distal LAD has mild diffuse disease.     CONCLUSION:  1.  Severe in-stent restenosis of mid RCA with neoatherosclerosis, 90%  lesion.  Status post successful PCI with drug-eluting stent placement. Xience 3.0 mm x 15 mm stent was deployed. 2.  70% proximal RCA in-stent restenosis.  IVUS findings are consistent  with significantly under-expanded previously placed stent.  Successful  PTCA with improvement  of lesion, down to residual around to 30%. 3.  Elevated filling pressures. 4.  Moderate pulmonary venous hypertension.     PLAN:  1.  The patient will be admitted to the hospital for overnight  observation. 2.  Preloaded with Plavix. 3.  Continue Plavix 75 mg p.o. daily. 4.  Continue aspirin 81 mg p.o. daily. 5.  We will give one dose of Bumex 2 mg IV x1.  6.  Resume p.o. Bumex at home dose. 7.  The patient will follow up with Nephrology for further evaluation of  his chronic kidney disease and volume overload as evidenced on this  study. 8.  Aggressive risk factor modification and medical management for CAD.   9.  Continue statin therapy.     CATH, FFR LAD 11/5/2020  CORONARY ANGIOGRAM:  1.  Right coronary artery:  Right coronary artery is a large dominant  vessel.  The proximal RCA had 30% in-stent restenosis that is stable  when compared to previous cath.  The stent in the mid-RCA is widely  patent.  Distal RCA has mild luminal irregularities.  The right  posterior descending artery has 10% to 20% stenosis.  The right  posterolateral branch is a large vessel with multiple branches. Pelon Roth  is evidence for about 60% stenosis in the RPL _____. 2.  Left main coronary artery:  Mild luminal irregularities, otherwise  patent.  No significant stenosis.  Bifurcates into LAD and LCX. 3.  Left circumflex artery:  Mild luminal irregularities noted in left  circumflex artery and obtuse marginal branches.  No significant stenotic  lesions. 4.  Left anterior descending artery:  Proximal LAD is patent.  The  mid-segment of the left anterior descending artery has a long 50% to 60%  tubular lesion.  D1 has mild diffuse disease.  Distal LAD with mild  diffuse disease.     RIGHT HEART CATHETERIZATION FINDINGS/HEMODYNAMICS:  1.  The pulmonary artery oxygen saturation was 60%. 2.  Pulmonary arterial pressure was 81/32 with a mean pulmonary arterial  pressure of 49 mmHg.  Pulmonary capillary wedge pressure was 32 mmHg. The right ventricular pressure was 80/13.  Left ventricular  end-diastolic pressure was elevated at 33 mmHg.  Cardiac output was 4.4  L/min.  Cardiac index was 2.8 L/min x m2.     MEDICATIONS:  See MAR.     COMPLICATIONS:  None.     ESTIMATED BLOOD LOSS:  Less than 50 mL.     ACCESS:  Right brachial vein access for right heart catheterization. Right radial artery access for left heart catheterization.     IMPRESSION:  1.  Patent RCA stents, only mild in-stent restenosis of proximal RCA,  stable from previous study. 2.  Intermediate mid left anterior descending artery lesion 60%. Hemodynamically insignificant by FFR.  FFR was 0.88 at baseline, final  FFR post adenosine infusion was 0.82.  3.  Acute-on-chronic congestive heart failure with reduced ejection  fraction, decompensated. 4.  Severe volume overload. 5.  Elevated filling pressures. 6.  Chronic kidney disease. 7.  Moderate pulmonary venous hypertension.     PLAN:  1.  Continue medical therapy and aggressive risk factor modification for  coronary artery disease.  No intervention is indicated. 2.  Optimize medical therapy for congestive heart failure.   3.  The patient needs optimization of his volume status. 4.  He was given one dose of Lasix 80 mg IV x1 in the cath lab. 5.  Admit the patient to telemetry. 6.  The patient is currently on peritoneal dialysis. 7.  Start IV diuretics. 6. [address] [place] [address] Nephrology for further management of volume status,  diuretics and peritoneal dialysis settings    Stress Test:03/2022 (OSH)  03/15/2022 6:09 PM EDT    · Stress myocardial perfusion scan is abnormal.  · No evidence of ischemia. · There is a medium sized, moderate severity, fixed defect involving the   inferior wall and adjacent infero-lateral walls. This does not resolve   completely with prone imaging. · This suggests inferior wall infarction  · Mildly reduced left ventricular systolic function ( LVEF 04% ). · The pharmacological stress ECG was indeterminate due to resting T wave   changes inferolaterally. AssessmentPlan:   Michelle Hernandez is a pleasant 61year old male patient who  has a past medical history of Broken ankle, CAD (coronary artery disease), Cerebral artery occlusion with cerebral infarction ([address] CHF (congestive heart failure) ([address] Chronic kidney disease, Diabetes mellitus ([address] GERD (gastroesophageal reflux disease), Hyperlipidemia, Hypertension, and Thyroid disease. The patient has known h/o CAD, HFpEF. He has had prior PCI of RCA. The patient underwent successful IVUS guided PCI/Stenting of RCA 10/2019. On 11/2020, he underwent RHC/LHC, patent RCA noted, FFR of LAD was negative (0.82). He states that he had \"bleeding at site of PD catheter\", anemia requiring PRBC transfusion, admitted to [facility]. His Plavix was stopped since then, he is on ASA. EF 50-55% on Echo 2/2022 at OSH, stress test on 3/2022 revealed inferior wall infarct, no ischemia. He has a nonhealing abdominal wound, scheduled for abdominal wound exploration. Preop cardiac clearance was requested. He is now on the kidney transplant list. Low BP was noticed by patient at home.  BP in office today is 107/67. He stopped taking BP medications. He walks on a regular basis. He reports bilateral leg claudications. Patient denies chest pain, shortness of breath, dyspnea on exertion. 1. Preoperative cardiac risk assessment   2. Chronic HFpEF  3. CAD  4. S/P PCI for severe RCA ISR 10/2019  5. 60% mLAD lesion, FFR 0.82 11/2020  6. CKD (On PD, follows with Nephrology, being considered for Renal Transplant)  7. H/o blood loss anemia, PD catheter bleeding   8. Claudications      Continue ASA   Consider resuming Plavix post surgery    Hgb 10.1   Lipitor   Hyperlipidemia: on statins, followed periodically. Patient need periodic lipid and liver profile   Imdur    The patient was instructed to check the blood pressure at home, and record the readings. Patient will call office with blood pressure readings, will adjust patient's antihypertensive medications as needed accordingly    Reports claudication, bilateral    Check REBECCA, Dopplers   No chest pain or KAUR   Good functional status    >4 METS    Has low cardiac risk     Above findings and plan of care were discussed with patient in details, patient's questions were answered.      Disposition:  RTC in 6 months             Electronically signed by America Freedman MD, [facility]    5/4/2022 at 2:08 PM EDT

## 2022-05-05 ENCOUNTER — ANESTHESIA EVENT (OUTPATIENT)
Dept: OPERATING ROOM | Age: 57
End: 2022-05-05
Payer: MEDICARE

## 2022-05-05 ENCOUNTER — HOSPITAL ENCOUNTER (OUTPATIENT)
Age: 57
Setting detail: OUTPATIENT SURGERY
Discharge: HOME | End: 2022-05-05
Attending: SURGERY | Admitting: SURGERY
Payer: MEDICARE

## 2022-05-05 ENCOUNTER — ANESTHESIA (OUTPATIENT)
Dept: OPERATING ROOM | Age: 57
End: 2022-05-05
Payer: MEDICARE

## 2022-05-05 VITALS
BODY MASS INDEX: 22.34 KG/M2 | TEMPERATURE: 96.7 F | SYSTOLIC BLOOD PRESSURE: 139 MMHG | HEART RATE: 91 BPM | OXYGEN SATURATION: 97 % | HEIGHT: 62 IN | DIASTOLIC BLOOD PRESSURE: 63 MMHG | WEIGHT: 121.4 LBS | RESPIRATION RATE: 16 BRPM

## 2022-05-05 VITALS — OXYGEN SATURATION: 99 % | SYSTOLIC BLOOD PRESSURE: 91 MMHG | DIASTOLIC BLOOD PRESSURE: 53 MMHG

## 2022-05-05 DIAGNOSIS — T81.89XA SURGICAL WOUND, NON HEALING, INITIAL ENCOUNTER: Primary | ICD-10-CM

## 2022-05-05 LAB
GLUCOSE BLD-MCNC: 273 MG/DL (ref 70–108)
POTASSIUM SERPL-SCNC: 4.6 MEQ/L (ref 3.5–5.2)

## 2022-05-05 PROCEDURE — 2709999900 HC NON-CHARGEABLE SUPPLY: Performed by: SURGERY

## 2022-05-05 PROCEDURE — 3600000002 HC SURGERY LEVEL 2 BASE: Performed by: SURGERY

## 2022-05-05 PROCEDURE — 6360000002 HC RX W HCPCS: Performed by: SURGERY

## 2022-05-05 PROCEDURE — 3700000001 HC ADD 15 MINUTES (ANESTHESIA): Performed by: SURGERY

## 2022-05-05 PROCEDURE — 2580000003 HC RX 258: Performed by: SURGERY

## 2022-05-05 PROCEDURE — 7100000011 HC PHASE II RECOVERY - ADDTL 15 MIN: Performed by: SURGERY

## 2022-05-05 PROCEDURE — 84132 ASSAY OF SERUM POTASSIUM: CPT

## 2022-05-05 PROCEDURE — 88304 TISSUE EXAM BY PATHOLOGIST: CPT

## 2022-05-05 PROCEDURE — 3600000012 HC SURGERY LEVEL 2 ADDTL 15MIN: Performed by: SURGERY

## 2022-05-05 PROCEDURE — 6360000002 HC RX W HCPCS: Performed by: NURSE ANESTHETIST, CERTIFIED REGISTERED

## 2022-05-05 PROCEDURE — 7100000010 HC PHASE II RECOVERY - FIRST 15 MIN: Performed by: SURGERY

## 2022-05-05 PROCEDURE — 10121 INC&RMVL FB SUBQ TISS COMP: CPT | Performed by: SURGERY

## 2022-05-05 PROCEDURE — 6370000000 HC RX 637 (ALT 250 FOR IP): Performed by: SURGERY

## 2022-05-05 PROCEDURE — 3700000000 HC ANESTHESIA ATTENDED CARE: Performed by: SURGERY

## 2022-05-05 PROCEDURE — 2500000003 HC RX 250 WO HCPCS: Performed by: NURSE ANESTHETIST, CERTIFIED REGISTERED

## 2022-05-05 PROCEDURE — 82948 REAGENT STRIP/BLOOD GLUCOSE: CPT

## 2022-05-05 PROCEDURE — 2500000003 HC RX 250 WO HCPCS: Performed by: SURGERY

## 2022-05-05 PROCEDURE — 36415 COLL VENOUS BLD VENIPUNCTURE: CPT

## 2022-05-05 RX ORDER — SODIUM CHLORIDE 0.9 % (FLUSH) 0.9 %
5-40 SYRINGE (ML) INJECTION EVERY 12 HOURS SCHEDULED
Status: DISCONTINUED | OUTPATIENT
Start: 2022-05-05 | End: 2022-05-05 | Stop reason: HOSPADM

## 2022-05-05 RX ORDER — HYDROCODONE BITARTRATE AND ACETAMINOPHEN 5; 325 MG/1; MG/1
1 TABLET ORAL EVERY 6 HOURS PRN
Qty: 20 TABLET | Refills: 0 | Status: SHIPPED | OUTPATIENT
Start: 2022-05-05 | End: 2022-05-10

## 2022-05-05 RX ORDER — SODIUM CHLORIDE 9 MG/ML
INJECTION, SOLUTION INTRAVENOUS PRN
Status: DISCONTINUED | OUTPATIENT
Start: 2022-05-05 | End: 2022-05-05 | Stop reason: HOSPADM

## 2022-05-05 RX ORDER — ACETAMINOPHEN 500 MG
1000 TABLET ORAL ONCE
Status: COMPLETED | OUTPATIENT
Start: 2022-05-05 | End: 2022-05-05

## 2022-05-05 RX ORDER — SODIUM CHLORIDE 0.9 % (FLUSH) 0.9 %
5-40 SYRINGE (ML) INJECTION PRN
Status: DISCONTINUED | OUTPATIENT
Start: 2022-05-05 | End: 2022-05-05 | Stop reason: HOSPADM

## 2022-05-05 RX ORDER — SODIUM CHLORIDE 9 MG/ML
INJECTION, SOLUTION INTRAVENOUS CONTINUOUS
Status: DISCONTINUED | OUTPATIENT
Start: 2022-05-05 | End: 2022-05-05 | Stop reason: HOSPADM

## 2022-05-05 RX ORDER — MIDAZOLAM HYDROCHLORIDE 1 MG/ML
INJECTION INTRAMUSCULAR; INTRAVENOUS PRN
Status: DISCONTINUED | OUTPATIENT
Start: 2022-05-05 | End: 2022-05-05 | Stop reason: SDUPTHER

## 2022-05-05 RX ORDER — PROPOFOL 10 MG/ML
INJECTION, EMULSION INTRAVENOUS PRN
Status: DISCONTINUED | OUTPATIENT
Start: 2022-05-05 | End: 2022-05-05 | Stop reason: SDUPTHER

## 2022-05-05 RX ORDER — DEXAMETHASONE SODIUM PHOSPHATE 10 MG/ML
INJECTION, EMULSION INTRAMUSCULAR; INTRAVENOUS PRN
Status: DISCONTINUED | OUTPATIENT
Start: 2022-05-05 | End: 2022-05-05 | Stop reason: SDUPTHER

## 2022-05-05 RX ORDER — ONDANSETRON 2 MG/ML
INJECTION INTRAMUSCULAR; INTRAVENOUS PRN
Status: DISCONTINUED | OUTPATIENT
Start: 2022-05-05 | End: 2022-05-05 | Stop reason: SDUPTHER

## 2022-05-05 RX ORDER — LIDOCAINE HCL/PF 100 MG/5ML
SYRINGE (ML) INJECTION PRN
Status: DISCONTINUED | OUTPATIENT
Start: 2022-05-05 | End: 2022-05-05 | Stop reason: SDUPTHER

## 2022-05-05 RX ORDER — FENTANYL CITRATE 50 UG/ML
INJECTION, SOLUTION INTRAMUSCULAR; INTRAVENOUS PRN
Status: DISCONTINUED | OUTPATIENT
Start: 2022-05-05 | End: 2022-05-05 | Stop reason: SDUPTHER

## 2022-05-05 RX ORDER — PROPOFOL 10 MG/ML
INJECTION, EMULSION INTRAVENOUS CONTINUOUS PRN
Status: DISCONTINUED | OUTPATIENT
Start: 2022-05-05 | End: 2022-05-05 | Stop reason: SDUPTHER

## 2022-05-05 RX ADMIN — FENTANYL CITRATE 100 MCG: 50 INJECTION, SOLUTION INTRAMUSCULAR; INTRAVENOUS at 10:38

## 2022-05-05 RX ADMIN — Medication 80 MG: at 10:39

## 2022-05-05 RX ADMIN — ACETAMINOPHEN 1000 MG: 500 TABLET ORAL at 09:20

## 2022-05-05 RX ADMIN — PROPOFOL 40 MG: 10 INJECTION, EMULSION INTRAVENOUS at 10:40

## 2022-05-05 RX ADMIN — ONDANSETRON 4 MG: 2 INJECTION INTRAMUSCULAR; INTRAVENOUS at 10:47

## 2022-05-05 RX ADMIN — DEXAMETHASONE SODIUM PHOSPHATE 10 MG: 10 INJECTION, EMULSION INTRAMUSCULAR; INTRAVENOUS at 10:47

## 2022-05-05 RX ADMIN — PROPOFOL 100 MCG/KG/MIN: 10 INJECTION, EMULSION INTRAVENOUS at 10:40

## 2022-05-05 RX ADMIN — SODIUM CHLORIDE: 9 INJECTION, SOLUTION INTRAVENOUS at 09:21

## 2022-05-05 RX ADMIN — MIDAZOLAM 2 MG: 1 INJECTION INTRAMUSCULAR; INTRAVENOUS at 10:39

## 2022-05-05 RX ADMIN — Medication 2000 MG: at 10:42

## 2022-05-05 RX ADMIN — SODIUM CHLORIDE: 9 INJECTION, SOLUTION INTRAVENOUS at 09:20

## 2022-05-05 ASSESSMENT — PULMONARY FUNCTION TESTS
PIF_VALUE: 0
PIF_VALUE: 1
PIF_VALUE: 0
PIF_VALUE: 1
PIF_VALUE: 0
PIF_VALUE: 1
PIF_VALUE: 0
PIF_VALUE: 1
PIF_VALUE: 0
PIF_VALUE: 1
PIF_VALUE: 0

## 2022-05-05 ASSESSMENT — PAIN SCALES - GENERAL
PAINLEVEL_OUTOF10: 0
PAINLEVEL_OUTOF10: 1
PAINLEVEL_OUTOF10: 0
PAINLEVEL_OUTOF10: 0

## 2022-05-05 ASSESSMENT — PAIN - FUNCTIONAL ASSESSMENT: PAIN_FUNCTIONAL_ASSESSMENT: 0-10

## 2022-05-05 ASSESSMENT — ENCOUNTER SYMPTOMS: SHORTNESS OF BREATH: 1

## 2022-05-05 NOTE — PROGRESS NOTES
Asked Dr. Matias Bush if patient had to urinate before he had to be discharged. Dr. Matias Bush voiced patient does not have to void before discharge.

## 2022-05-05 NOTE — PROGRESS NOTES
Pt returned to [facility] room 3. Vitals and assessment as charted. 0.9 infusing, @300ml to count from PACU. Pt has dallas alonzo and diet ginger ale. . Pt  verbalized understanding of discharge criteria and call light use. Call light in reach.

## 2022-05-05 NOTE — PROGRESS NOTES
Took  Patient downstairs to get medication at pharmacy. Pharmacy voiced it will be 20 minutes before they are filled. Patient refused to stay and voiced he wants his prescriptions sent to [facility] in [facility]. Pharmacy voiced they are unable to send script to [facility] due to it being a narcotic. Patient requesting for physician to send script. Voiced I will have to get a hold of Dr. Jesica Gloria. Patient insisted to be discharged and left with father. Message sent via perfect serve to Dr. Jesica Gloria.

## 2022-05-05 NOTE — PROGRESS NOTES
Pt admitted to [facility] room 3 and oriented to unit. SCD sleeves applied. Nares swabbed. Pt verbalized permission for first name, last initial and physicians name on white board. SDS board and discharge criteria explained, pt verbalized understanding. Pt denies thoughts of harming self or others. Call light in reach. Dad is waiting in the car for patient.

## 2022-05-05 NOTE — ANESTHESIA POSTPROCEDURE EVALUATION
Department of Anesthesiology  Postprocedure Note    Patient: Myrna Mehta  MRN: [id number]  YOB: 1965  Date of evaluation: 5/5/2022  Time:  1:13 PM     Procedure Summary     Date: 05/05/22 Room / Location: [address] [place] [place]    Anesthesia Start: 8659 Anesthesia Stop: 3896    Procedure: WOUND EXPLORATION (N/A Abdomen) Diagnosis: (non healing wound)    Surgeons: Veronica Parson MD Responsible Provider: Constantino Nails DO    Anesthesia Type: MAC ASA Status: 4          Anesthesia Type: MAC    Fermín Phase I: Fermín Score: 10    Fermín Phase II:      Last vitals: Reviewed and per EMR flowsheets.        Anesthesia Post Evaluation    Patient location during evaluation: bedside  Patient participation: complete - patient participated  Level of consciousness: awake  Airway patency: patent  Nausea & Vomiting: no vomiting and no nausea  Cardiovascular status: hemodynamically stable  Respiratory status: acceptable  Hydration status: stable

## 2022-05-05 NOTE — H&P
[facility]  History and Physical Update    Pt Name: Ada Hooker  MRN: [id number]  YOB: 1965  Date of evaluation: 5/5/2022    [x] I have examined the patient and reviewed the H&P/Consult and there are no changes to the patient or plans.     [] I have examined the patient and reviewed the H&P/Consult and have noted the following changes:        Whitney Travis MD  Electronically signed 5/5/2022 at 10:04 AM

## 2022-05-05 NOTE — ANESTHESIA PRE PROCEDURE
Department of Anesthesiology  Preprocedure Note       Name:  Jamila Herman   Age:  61 y.o.  :  1965                                          MRN:  [id number]         Date:  2022      Surgeon: Queta Escamilla):  Sita Bro MD    Procedure: Procedure(s):  WOUND EXPLORATION    Medications prior to admission:   Prior to Admission medications    Medication Sig Start Date End Date Taking?  Authorizing Provider   hydrALAZINE (APRESOLINE) 25 MG tablet Take 3 tablets by mouth 3 times daily 20   Meseret Humphries MD   isosorbide mononitrate (IMDUR) 30 MG extended release tablet Take 30 mg by mouth daily    Historical Provider, MD   Insulin Pen Needle ([employer] PEN NEEDLES) 31G X 6 MM MISC 1 each by Does not apply route daily 20  PAOLA Garcia - CNP   insulin glargine (BASAGLAR KWIKPEN) 100 UNIT/ML injection pen Inject 10 Units into the skin daily    Historical Provider, MD   pantoprazole (PROTONIX) 40 MG tablet Take 40 mg by mouth 2 times daily    Historical Provider, MD   insulin aspart (NOVOLOG) 100 UNIT/ML injection vial Inject into the skin 3 times daily (before meals) PT TAKES IT BASED OFF OF HIS CARBS;  1  Unit per 8 cho    Historical Provider, MD   NONFORMULARY Dexcom 6 checks sugar    Historical Provider, MD   bumetanide (BUMEX) 2 MG tablet Take 1 tablet by mouth daily  Patient taking differently: Take 2 mg by mouth daily Swelling in ankles 5/15/19   Bela Walter DO   levothyroxine (SYNTHROID) 100 MCG tablet Take 1 tablet by mouth Daily 19   Carl Santana MD   nitroGLYCERIN (NITROSTAT) 0.4 MG SL tablet DISSOLVE 1 TABLET UNDER THE TONGUE EVERY 5 MIN AS NEEDED FOR CHEST PAIN 18   Historical Provider, MD   atorvastatin (LIPITOR) 80 MG tablet Take 80 mg by mouth at bedtime     Historical Provider, MD   clopidogrel (PLAVIX) 75 MG tablet Take 75 mg by mouth daily Patient not taking at this time since     Historical Provider, MD   metoprolol (LOPRESSOR) 50 MG tablet Take 50 mg by mouth 2 times daily (with meals)     Historical Provider, MD   aspirin 81 MG EC tablet Take 81 mg by mouth daily     Historical Provider, MD       Current medications:    Current Facility-Administered Medications   Medication Dose Route Frequency Provider Last Rate Last Admin    0.9 % sodium chloride infusion   IntraVENous Continuous Sita Bro  mL/hr at 05/05/22 0920 New Bag at 05/05/22 0921    sodium chloride flush 0.9 % injection 5-40 mL  5-40 mL IntraVENous 2 times per day Sita Bro MD        sodium chloride flush 0.9 % injection 5-40 mL  5-40 mL IntraVENous PRN Sita Bro MD        0.9 % sodium chloride infusion   IntraVENous PRN Sita Bro MD        ceFAZolin (ANCEF) 2000 mg in sterile water 20 mL IV syringe  2,000 mg IntraVENous On Call to MD Zeke           Allergies: Allergies   Allergen Reactions    Aranesp (Albumin Free) [Darbepoetin Damon] Hives     Patient tolerated 4/13/19.         Problem List:    Patient Active Problem List   Diagnosis Code    CHF (congestive heart failure) ([facility]) I50.9    Precordial pain R07.2    DM type 2 causing CKD stage 5 ([facility]) E11.22, N18.5    Essential hypertension I10    CKD (chronic kidney disease) stage 4, GFR 15-29 ml/min ([facility]) N18.4    Coronary artery disease of native artery of native heart with stable angina pectoris ([facility]) I25.118    Anemia associated with chronic renal failure N18.9, D63.1    Anemia of chronic kidney failure N18.9, D63.1    Coronary atherosclerosis I25.10    Diabetes mellitus ([address] E11.9    History of CVA (cerebrovascular accident) Z80.78    MI (myocardial infarction) ([facility] I21.9    Cerebrovascular accident ([facility] I63.9    Type 1 diabetes mellitus with hyperglycemia ([facility]) E10.65    Diabetes mellitus with hyperglycemia ([facility]) E11.65    Anemia in chronic kidney disease N18.9, D63.1    Acute on chronic anemia D64.9    Bacterial pneumonia J15.9    Iron deficiency anemia D50.9  Encephalopathy G93.40    Severe malnutrition ([facility] E43    NANCY (acute kidney injury) ([facility] N17.9    Hypervolemia E87.70    Abnormal stress test R94.39    CAD in native artery I25.10    Acute HFrEF (heart failure with reduced ejection fraction) ([facility]) I50.21    Pulmonary hypertension ([facility]) I27.20    Myocardiopathy ([facility]) I42.9    KAUR (dyspnea on exertion) R06.00    Anginal equivalent ([facility]) I20.8    Acute on chronic HFrEF (heart failure with reduced ejection fraction) ([facility]) I50.23    ESRD on dialysis ([facility]) N18.6, Z99.2    Pulmonary edema, acute, with congestive heart failure ([facility]) R44.0    Metabolic acidosis U83.2    Uncontrolled type 2 diabetes mellitus with hyperglycemia ([facility]) E11.65    Hx of coronary artery disease Z86.79    Dyslipidemia E78.5    Hypothyroidism E03.9    Hx of gastroesophageal reflux (GERD) Z87.19    Chronic heart failure with preserved ejection fraction (HFpEF) ([facility]) I50.32    ESRD (end stage renal disease) ([facility]) N18.6    SOB (shortness of breath) R06.02    Fluid overload E87.70    Shortness of breath R06.02    Surgical wound, non healing, initial encounter T81.89XA       Past Medical History:        Diagnosis Date    Broken ankle 2016    Broke right ankle.     CAD (coronary artery disease) October 2015    MI     Cerebral artery occlusion with cerebral infarction [facility])  march 2015    left side    CHF (congestive heart failure) ([facility])     Chronic kidney disease     peritoneal dialysis    Diabetes mellitus ([facility]     GERD (gastroesophageal reflux disease)     Hyperlipidemia     Hypertension     Thyroid disease        Past Surgical History:        Procedure Laterality Date    APPENDECTOMY      CARDIAC SURGERY  Oct. 2015    2 stents placed    COLONOSCOPY      DIAGNOSTIC CARDIAC CATH LAB PROCEDURE  11/05/2020    EYE SURGERY         Social History:    Social History     Tobacco Use    Smoking status: Current Some Day Smoker     Packs/day: 0.25     Years: 30.00 Pack years: 7.50     Last attempt to quit: 10/9/2015     Years since quittin.5    Smokeless tobacco: Former User    Tobacco comment: 3-4 cigarettes occasionally   Substance Use Topics    Alcohol use: Yes     Alcohol/week: 2.0 standard drinks     Types: 2 Glasses of wine per week                                Ready to quit: Not Answered  Counseling given: Not Answered  Comment: 3-4 cigarettes occasionally      Vital Signs (Current):   Vitals:    22 0900   BP: 112/80   Pulse: 97   Resp: 20   Temp: 97.6 °F (36.4 °C)   TempSrc: Tympanic   SpO2: 99%   Weight: 121 lb 6.4 oz (55.1 kg)   Height: 5' 2\" (1.575 m)                                              BP Readings from Last 3 Encounters:   22 112/80   22 107/67   22 (!) 100/58       NPO Status: Time of last liquid consumption: 0500                        Time of last solid consumption: 1800                        Date of last liquid consumption: 22                        Date of last solid food consumption: 22    BMI:   Wt Readings from Last 3 Encounters:   22 121 lb 6.4 oz (55.1 kg)   22 124 lb 6.4 oz (56.4 kg)   22 122 lb (55.3 kg)     Body mass index is 22.2 kg/m².     CBC:   Lab Results   Component Value Date    WBC 5.0 2022    RBC 3.18 2022    HGB 10.1 2022    HCT 30.7 2022    MCV 96.5 2022    RDW 16.2 2016     2022       CMP:   Lab Results   Component Value Date     2022    K 4.6 2022    K 5.2 2022    CL 99 2022    CO2 23 2022    BUN 20 2022    CREATININE 4.5 2022    LABGLOM 14 2022    GLUCOSE 206 2022    PROT 6.2 2022    CALCIUM 8.8 2022    BILITOT 0.5 2022    ALKPHOS 98 2022    AST 20 2022    ALT 13 2022       POC Tests:   Recent Labs     22  0847   POCGLU 273*       Coags:   Lab Results   Component Value Date    PROTIME 11.0 2022    PROTIME 12.5 08/27/2018    INR 0.96 02/21/2022    APTT 37.2 06/30/2021       HCG (If Applicable): No results found for: PREGTESTUR, PREGSERUM, HCG, HCGQUANT     ABGs: No results found for: PHART, PO2ART, RIS0DES, FCM4DNG, BEART, R8HQJUQS     Type & Screen (If Applicable):  Lab Results   Component Value Date    LABRH NEG 06/30/2021       Drug/Infectious Status (If Applicable):  No results found for: HIV, HEPCAB    COVID-19 Screening (If Applicable):   Lab Results   Component Value Date    COVID19 Negative 02/21/2022           Anesthesia Evaluation  Patient summary reviewed  Airway: Mallampati: III  TM distance: >3 FB   Neck ROM: full  Mouth opening: > = 3 FB Dental:    (+) lower dentures and upper dentures      Pulmonary:   (+) pneumonia:  shortness of breath:                             Cardiovascular:    (+) angina:, past MI:, CAD:, KAUR:,       ECG reviewed                        Neuro/Psych:   (+) CVA: no interval change, neuromuscular disease:,             GI/Hepatic/Renal:   (+) renal disease: ESRD and dialysis,           Endo/Other:    (+) Diabetes, hypothyroidism::., .                 Abdominal:             Vascular: Other Findings:             Anesthesia Plan      MAC     ASA 4       Induction: intravenous. Anesthetic plan and risks discussed with patient. Plan discussed with CRNA. Armando Renteria.  Keli Benz DO   5/5/2022

## 2022-05-05 NOTE — PROGRESS NOTES
Called patient and updated that Dr. Uzma Clifton sent script to [facility] in [place].

## 2022-05-05 NOTE — OP NOTE
[address] [address]  Operative Report    PATIENT NAME: Sanju Constantino  MEDICAL RECORD NO. [id number]  SURGEON: Jessy Ricardo MD MD Merged with [facility]  Primary Care Physician: Ramin Lindsay, APRN - CNP  Date: 5/5/2022, 11:07 AM     PROCEDURE PERFORMED: Wound exploration nonhealing abdominal wound from prior CAPD catheter removal at outside center  PREOPERATIVE DIAGNOSIS:   C[address] Problems    Diagnosis Date Noted    Surgical wound, non healing, initial encounter Magdalene Anders 04/25/2022     Priority: Medium    Chronic heart failure with preserved ejection fraction (HFpEF) ([facility] [I50.32]     ESRD (end stage renal disease) ([facility] [N18.6]       POSTOPERATIVE DIAGNOSIS: Same, path pending  SURGEON:  Jessy Ricardo MD MD [facility]  ANESTHESIA:  Monitored Local Anesthesia with Sedation and local  ANESTHESIA:  15 OF 0.5% Marcaine and 1% xylocaine in equal parts  ESTIMATED BLOOD LOSS:  0  ml  SPECIMEN: Suture and foreign body reaction material  COMPLICATIONS:  None; patient tolerated the procedure well. DRAINS: none  DISPOSITION: Outpatient Surgery  CONDITION: stable      Narrative:    Patient brought to the operating room placed spine Timeout was taken preop antibiotics were given signed consent on the chart. He had a nonhealing wound left paramedian from a previous CAPD catheter removal.  After adequate sedation the wound was prepped and draped with ChloraPrep 3 minutes on the past draped sterilely. At this point the area that looks like the edges of the previous incision that was not involved were infiltrated local and then we injected all the way around this area and there was about a 1 cm raised red area with a scabbed area which has been intermittently draining. Elliptically excised this whole area down into dense scar tissue. There was a small cavity measuring just about a centimeter below this. I did not find an obvious felt cuff in this area.   I made my way down where I found sutures it looks like

## 2022-05-06 ENCOUNTER — TELEPHONE (OUTPATIENT)
Dept: SURGERY | Age: 57
End: 2022-05-06

## 2022-05-06 NOTE — TELEPHONE ENCOUNTER
Called patient this am doing well no complaints. Some mild discomfort. Dressing dry. Instructed to call with any issues.

## 2022-05-25 ENCOUNTER — OFFICE VISIT (OUTPATIENT)
Dept: SURGERY | Age: 57
End: 2022-05-25

## 2022-05-25 VITALS
SYSTOLIC BLOOD PRESSURE: 136 MMHG | RESPIRATION RATE: 20 BRPM | WEIGHT: 121 LBS | BODY MASS INDEX: 22.26 KG/M2 | OXYGEN SATURATION: 100 % | HEIGHT: 62 IN | TEMPERATURE: 97.1 F | HEART RATE: 96 BPM | DIASTOLIC BLOOD PRESSURE: 76 MMHG

## 2022-05-25 DIAGNOSIS — T81.89XA SURGICAL WOUND, NON HEALING, INITIAL ENCOUNTER: Primary | ICD-10-CM

## 2022-05-25 PROCEDURE — 99024 POSTOP FOLLOW-UP VISIT: CPT | Performed by: SURGERY

## 2022-05-25 NOTE — PROGRESS NOTES
Miryam Ellis MD [facility]  General Surgery  Postprocedure Evaluation in Office  Pt Name: Michelle Hernandez  Date of Birth 1965   Today's Date: 5/25/2022  Medical Record Number: [id number]  Referring Provider: No ref. provider found  Primary Care Provider: PAOLA White CNP  Chief Complaint   Patient presents with   [facility] Post-Op Check     s/p Wound exploration nonhealing abdominal wound from prior CAPD catheter removal at outside center-5/5/2022     ASSESSMENT      Problem List Items Addressed This Visit     RESOLVED: Surgical wound, non healing, initial encounter - Primary           PLAN       Pathology reviewed with the patient who understands. All questions were answered. The wound has completely healed and is closed at this time  Patient Instructions   Follow-up as needed    Sutures removed without incident  Follow up: Return if symptoms worsen or fail to improve. Orders Placed This Encounter:  No orders of the defined types were placed in this encounter. Kristina Morgan is seen today for post-op follow-up. He is 20 day(s) status post excision of a nonhealing wound from removal of a CAPD catheter. He is tolerating a regular diet, having regular bowel movements. Symptoms and activity have gradually improved compared to preoperative. The surgical site is clean and has no drainage. Pain is controlled without any medications. . He has compliant with postoperative instructions. Past Medical History  Past Medical History:   Diagnosis Date    Broken ankle 2016    Broke right ankle.     CAD (coronary artery disease) October 2015    MI     Cerebral artery occlusion with cerebral infarction [facility])  march 2015    left side    CHF (congestive heart failure) (HCC)     Chronic kidney disease     peritoneal dialysis    Diabetes mellitus ([address]     GERD (gastroesophageal reflux disease)     Hyperlipidemia     Hypertension     Thyroid disease      Past Surgical History  Past Surgical History: Procedure Laterality Date    APPENDECTOMY      CARDIAC SURGERY  Oct. 2015    2 stents placed    COLONOSCOPY      DIAGNOSTIC CARDIAC CATH LAB PROCEDURE  11/05/2020    EYE SURGERY      WOUND EXPLORATION N/A 5/5/2022    WOUND EXPLORATION performed by Whitney Travis MD at [facility]     Medications  Current Outpatient Medications on File Prior to Visit   Medication Sig Dispense Refill    hydrALAZINE (APRESOLINE) 25 MG tablet Take 3 tablets by mouth 3 times daily 90 tablet 0    isosorbide mononitrate (IMDUR) 30 MG extended release tablet Take 30 mg by mouth daily      insulin glargine (BASAGLAR KWIKPEN) 100 UNIT/ML injection pen Inject 10 Units into the skin daily      pantoprazole (PROTONIX) 40 MG tablet Take 40 mg by mouth 2 times daily      insulin aspart (NOVOLOG) 100 UNIT/ML injection vial Inject into the skin 3 times daily (before meals) PT TAKES IT BASED OFF OF HIS CARBS;  1  Unit per 8 cho      NONFORMULARY Dexcom 6 checks sugar      bumetanide (BUMEX) 2 MG tablet Take 1 tablet by mouth daily (Patient taking differently: Take 2 mg by mouth daily Swelling in ankles) 90 tablet 3    levothyroxine (SYNTHROID) 100 MCG tablet Take 1 tablet by mouth Daily 30 tablet 3    nitroGLYCERIN (NITROSTAT) 0.4 MG SL tablet DISSOLVE 1 TABLET UNDER THE TONGUE EVERY 5 MIN AS NEEDED FOR CHEST PAIN  0    atorvastatin (LIPITOR) 80 MG tablet Take 80 mg by mouth at bedtime       clopidogrel (PLAVIX) 75 MG tablet Take 75 mg by mouth daily Patient not taking at this time since Sunday      aspirin 81 MG EC tablet Take 81 mg by mouth daily       Insulin Pen Needle ([employer] PEN NEEDLES) 31G X 6 MM MISC 1 each by Does not apply route daily 100 each 0     No current facility-administered medications on file prior to visit. Allergies  Allergies   Allergen Reactions    Aranesp (Albumin Free) [Darbepoetin Damon] Hives     Patient tolerated 4/13/19.       Social History  Social History     Socioeconomic History    Marital file    Number of Places Lived in the Last Year: Not on file    Unstable Housing in the Last Year: Not on [address] [facility] Maintenance   Topic Date Due    Annual Wellness Visit (AWV)  Never done    Pneumococcal 0-64 years Vaccine (1 - PCV) 09/07/1970    Diabetic foot exam  Never done    Depression Screen  Never done    Hepatitis B vaccine (1 of 3 - Risk Recombivax 3-dose series) Never done    Colorectal Cancer Screen  Never done    Shingles vaccine (1 of 2) Never done    Lipids  10/12/2020    COVID-19 Vaccine (2 - Moderna 3-dose series) 04/16/2021    Diabetic retinal exam  10/21/2021    Prostate Specific Antigen (PSA) Screening or Monitoring  12/29/2022    A1C test (Diabetic or Prediabetic)  02/25/2023    DTaP/Tdap/Td vaccine (3 - Td or Tdap) 12/20/2030    Flu vaccine  Completed    Hepatitis C screen  Completed    HIV screen  Completed    Hepatitis A vaccine  Aged Out    Hib vaccine  Aged Out    Meningococcal (ACWY) vaccine  Aged Out     Review of Systems  History obtained from the patient. Constitutional: Denies any fever, chills, fatigue. Wound: Denies any rash, skin color changes or wound problems. Resp: Denies any cough, shortness of breath. CV: Denies any chest pain, orthopnea or syncope. GI: Positive for incisional discomfort only. Denies any nausea, vomiting, blood in the stool, constipation or diarrhea. : Denies any hematuria, hesitancy or dysuria. OBJECTIVE      VITALS:  height is 5' 2\" (1.575 m) and weight is 121 lb (54.9 kg). His temporal temperature is 97.1 °F (36.2 °C). His blood pressure is 136/76 and his pulse is 96. His respiration is 20 and oxygen saturation is 100%. CONSTITUTIONAL: Alert and oriented times 3, no acute distress and cooperative to examination. SKIN: Skin color, texture, turgor normal. No rashes or lesions. INCISION: wound margins intact and healing well. No signs of infection. No drainage.   Sutures were somewhat embedded since its been 20 days and he missed 2 prior follow-ups however they were removed today the incision is completely closed there is no drainage                 Electronically signed by Laura Green MD MD FACS on 5/25/2022 at 12:59 PM

## 2022-05-25 NOTE — LETTER
[address] Surgery  [address]  Phone: [phone]  Fax: [phone]    Kerry Merino MD    May 25, 2022     Bella Farrell, APRN - CNP  [address]    Patient: Elis Winn   MR Number: [id number]   YOB: 1965   Date of Visit: 5/25/2022       Dear Bella Farrell:    Thank you for referring Jose Montes to me for evaluation/treatment. Below are the relevant portions of my assessment and plan of care. Problem List Items Addressed This Visit     RESOLVED: Surgical wound, non healing, initial encounter - Primary          Pathology reviewed with the patient who understands. All questions were answered. The wound has completely healed and is closed at this time  Patient Instructions   Follow-up as needed    Sutures removed without incident  Follow up: Return if symptoms worsen or fail to improve. Orders Placed This Encounter:  No orders of the defined types were placed in this encounter. If you have questions, please do not hesitate to call me. I look forward to following Archana Monreal along with you.     Sincerely,      Kerry Merino MD

## 2022-06-02 ENCOUNTER — TELEPHONE (OUTPATIENT)
Dept: CARDIOTHORACIC SURGERY | Age: 57
End: 2022-06-02

## 2022-06-02 ENCOUNTER — OFFICE VISIT (OUTPATIENT)
Dept: CARDIOTHORACIC SURGERY | Age: 57
End: 2022-06-02
Payer: COMMERCIAL

## 2022-06-02 VITALS
SYSTOLIC BLOOD PRESSURE: 130 MMHG | WEIGHT: 123.2 LBS | HEART RATE: 95 BPM | DIASTOLIC BLOOD PRESSURE: 73 MMHG | BODY MASS INDEX: 22.67 KG/M2 | HEIGHT: 62 IN

## 2022-06-02 DIAGNOSIS — N18.6 ESRD ON DIALYSIS (HCC): Primary | ICD-10-CM

## 2022-06-02 DIAGNOSIS — Z99.2 ESRD ON DIALYSIS (HCC): Primary | ICD-10-CM

## 2022-06-02 PROCEDURE — 3017F COLORECTAL CA SCREEN DOC REV: CPT | Performed by: THORACIC SURGERY (CARDIOTHORACIC VASCULAR SURGERY)

## 2022-06-02 PROCEDURE — 99204 OFFICE O/P NEW MOD 45 MIN: CPT | Performed by: THORACIC SURGERY (CARDIOTHORACIC VASCULAR SURGERY)

## 2022-06-02 PROCEDURE — 4004F PT TOBACCO SCREEN RCVD TLK: CPT | Performed by: THORACIC SURGERY (CARDIOTHORACIC VASCULAR SURGERY)

## 2022-06-02 PROCEDURE — G8427 DOCREV CUR MEDS BY ELIG CLIN: HCPCS | Performed by: THORACIC SURGERY (CARDIOTHORACIC VASCULAR SURGERY)

## 2022-06-02 PROCEDURE — G8420 CALC BMI NORM PARAMETERS: HCPCS | Performed by: THORACIC SURGERY (CARDIOTHORACIC VASCULAR SURGERY)

## 2022-06-02 NOTE — TELEPHONE ENCOUNTER
Surgery scheduled for 6/30/22 at 730AM.    Surgery instructions are as follows:  -  Arrive to SDS at [facility] at 530AM  - NPO after midnight the night before surgery  - Take 1/2 dose of evening of insulin the night before surgery  - Must have a  the day of surgery  - STOP taking PLAVIX and ASA 81 MG 3 days prior to surgery (6/27)    LMOM with [facility] dept to schedule visit.

## 2022-06-02 NOTE — PROGRESS NOTES
CT/CV Surgery New Patient Office Visit      Patient's Name/Date of Birth: Opal Elias / 1965 (17 y.o.)      PCP: PAOLA Meade CNP    Date: June 2, 2022     CC:   Chief Complaint   Patient presents with    New Patient     Fistula consult, referral from [facility]    Other     Vein mapping 4/27/22        HPI:   We had the pleasure of seeing Opal Elias in the office today, as you know this is a very pleasant 61y.o. year old male with a history of hypertension, DM 1, hyperlipidemia, CVA 6 years ago, myocardial infarction 6 years ago, status post coronary stent x3, and chronic kidney disease stage V with a history of peritoneal dialysis for 2 years, and recent hemodialysis right IJ Tunneled catheter for 3-month. He came to our vascular surgery clinic for surgical evaluation for AV fistula. He reports he has minimal disability from CVA. He had an extensive cardiac work-up at [address] [place] [address] in preparation for kidney transplant. The recent nuclear cardiac stress was negative. He denies any midsternal chest pain or shortness of breath at rest.  He is a right hand dominant person. PastMedical History:  Lamberto Carpio  has a past medical history of Broken ankle, CAD (coronary artery disease), Cerebral artery occlusion with cerebral infarction ([address] CHF (congestive heart failure) ([address] Chronic kidney disease, Diabetes mellitus ([address] GERD (gastroesophageal reflux disease), Hyperlipidemia, Hypertension, and Thyroid disease. Past Surgical History:  The patient  has a past surgical history that includes Appendectomy; Cardiac surgery (Oct. 2015); Colonoscopy; eye surgery; Diagnostic Cardiac Cath Lab Procedure (11/05/2020); and Wound Exploration (N/A, 5/5/2022). Allergies: The patient is allergic to aranesp (albumin free) [darbepoetin eri].     Medications:    Current Outpatient Medications:     hydrALAZINE (APRESOLINE) 25 MG tablet, Take 3 tablets by mouth 3 times daily, Disp: 90 tablet, Rfl: 0    isosorbide mononitrate (IMDUR) 30 MG extended release tablet, Take 30 mg by mouth daily, Disp: , Rfl:     insulin glargine (BASAGLAR KWIKPEN) 100 UNIT/ML injection pen, Inject 10 Units into the skin daily, Disp: , Rfl:     pantoprazole (PROTONIX) 40 MG tablet, Take 40 mg by mouth 2 times daily, Disp: , Rfl:     insulin aspart (NOVOLOG) 100 UNIT/ML injection vial, Inject into the skin 3 times daily (before meals) PT TAKES IT BASED OFF OF HIS CARBS;  1  Unit per 8 cho, Disp: , Rfl:     NONFORMULARY, Dexcom 6 checks sugar, Disp: , Rfl:     bumetanide (BUMEX) 2 MG tablet, Take 1 tablet by mouth daily (Patient taking differently: Take 2 mg by mouth daily Swelling in ankles), Disp: 90 tablet, Rfl: 3    levothyroxine (SYNTHROID) 100 MCG tablet, Take 1 tablet by mouth Daily, Disp: 30 tablet, Rfl: 3    nitroGLYCERIN (NITROSTAT) 0.4 MG SL tablet, DISSOLVE 1 TABLET UNDER THE TONGUE EVERY 5 MIN AS NEEDED FOR CHEST PAIN, Disp: , Rfl: 0    atorvastatin (LIPITOR) 80 MG tablet, Take 80 mg by mouth at bedtime , Disp: , Rfl:     clopidogrel (PLAVIX) 75 MG tablet, Take 75 mg by mouth daily Patient not taking at this time since Sunday, Disp: , Rfl:     aspirin 81 MG EC tablet, Take 81 mg by mouth daily , Disp: , Rfl:     Insulin Pen Needle ([employer] PEN NEEDLES) 31G X 6 MM MISC, 1 each by Does not apply route daily, Disp: 100 each, Rfl: 0    Family History: This patient's family history includes Cancer in his sister; Diabetes in his mother. He was adopted. Social History:  Reinaldo Waldron  reports that he has been smoking. He has a 7.50 pack-year smoking history. He has quit using smokeless tobacco. He reports current alcohol use of about 2.0 standard drinks of alcohol per week. He reports that he does not use drugs.     Vital Signs:   /73 (Site: Right Upper Arm, Position: Sitting, Cuff Size: Medium Adult)   Pulse 95   Ht 5' 2\" (1.575 m)   Wt 123 lb 3.2 oz (55.9 kg)   BMI 22.53 kg/m²     ROS:   Constitutional: Negative for activity change, chills, fatigue, fever and unexpected weight change. Respiratory: Negative for sleep apnea, shortness of breath, wheezing, stridor, supplementary home oxygen. Cardiac: Old MI. S/p coronary artery stents x3 6 years ago. No midsternal chest pain or shortness of breath at rest.  Vascular: Negative for claudication, leg  calf muscle pain, hip pain. Gastrointestinal: Negative for hematochezia, melana, constipation, and N/V/D. Musculoskeletal: Negative for myalgias, amputation. Skin: Negative for color change, rash and wound. Neurological: Old CVA with minimal disability. Nephrology: Chronic kidney disease stage V. Physical Exam:  General appearance:  No acute distress, appears stated age and cooperative. Neck: No jugular venous distention. Trachea midline. No carotid bruits. Chest Wall: No deformity, midsternal scar, enlarged palpable supraclavicular lymphnode. Right IJ catheter in place. Respiratory:  Normal respiratory effort. Clear to auscultation, bilaterally without Rales/Wheezes/Rhonch. Cardiovascular:  Regular rate and rhythm with normal S1/S2 without murmurs, rubs or gallops. Abdomen: Soft, non-tender, non-distended with normal bowel sounds. Ext: No clubbing, cyanosis or edema bilaterally. No chronic ischemic changes, No varicorsity in lower leg. Extensive tattoos in the arms. Skin: Skin color, texture, turgor normal.  No rashes or lesions. No rubor. No venous stasis pigmentation. Neurologic:  Neurovascularly intact without any focal sensory/motor deficits.     Peripheral Pulses: +2 palpable femoral pulses bilaterally,    Labs:    CBC:  Lab Results   Component Value Date    WBC 5.0 02/26/2022    HGB 10.1 02/26/2022    HCT 30.7 02/26/2022    MCV 96.5 02/26/2022     02/26/2022    PROTIME 11.0 02/21/2022    PROTIME 12.5 08/27/2018    INR 0.96 02/21/2022     BMP:   Lab Results   Component Value Date     02/26/2022    K 4.6 05/05/2022    K 5.2 02/24/2022    CL 99 02/26/2022    CO2 23 02/26/2022    PHOS 3.7 11/25/2018    BUN 20 02/26/2022    CREATININE 4.5 02/26/2022    MG 2.2 02/26/2022       Imaging  I have reviewed all imaging.       Problem List:  Patient Active Problem List   Diagnosis    CHF (congestive heart failure) ([facility])    Precordial pain    DM type 2 causing CKD stage 5 ([address]    Essential hypertension    CKD (chronic kidney disease) stage 4, GFR 15-29 ml/min ([facility])    Coronary artery disease of native artery of native heart with stable angina pectoris ([facility])    Anemia associated with chronic renal failure    Anemia of chronic kidney failure    Coronary atherosclerosis    Diabetes mellitus ([address]    History of CVA (cerebrovascular accident)    MI (myocardial infarction) ([address]    Cerebrovascular accident ([address]    Type 1 diabetes mellitus with hyperglycemia ([address]    Diabetes mellitus with hyperglycemia (HCC)    Anemia in chronic kidney disease    Acute on chronic anemia    Bacterial pneumonia    Iron deficiency anemia    Encephalopathy    Severe malnutrition ([address]    NANCY (acute kidney injury) ([address]    Hypervolemia    Abnormal stress test    CAD in native artery    Acute HFrEF (heart failure with reduced ejection fraction) ([address]    Pulmonary hypertension ([address]    Myocardiopathy ([address]    KAUR (dyspnea on exertion)    Anginal equivalent (HCC)    Acute on chronic HFrEF (heart failure with reduced ejection fraction) ([address]    ESRD on dialysis ([address]    Pulmonary edema, acute, with congestive heart failure ([address]    Metabolic acidosis    Uncontrolled type 2 diabetes mellitus with hyperglycemia (HCC)    Hx of coronary artery disease    Dyslipidemia    Hypothyroidism    Hx of gastroesophageal reflux (GERD)    Chronic heart failure with preserved ejection fraction (HFpEF) ([address]    ESRD (end stage renal disease) (HCC)    SOB (shortness of breath)    Fluid overload    Shortness of breath       Assessment: Chronic kidney disease, stage V.    Plan 6/2/22:  1) left forearm AV fistula. The risks, benefits, and alternatives were discussed with the patient at length. He agrees to proceed proceed.       Electronically by Jerrell iTan MD  on 6/2/2022 at 9:59 AM

## 2022-06-02 NOTE — PATIENT INSTRUCTIONS
Candace Momin will call to schedule surgery. If you do not hear from our office in 1 week, please call [phone].

## 2022-06-03 ENCOUNTER — PREP FOR PROCEDURE (OUTPATIENT)
Dept: CARDIOTHORACIC SURGERY | Age: 57
End: 2022-06-03

## 2022-06-03 DIAGNOSIS — Z99.2 CKD (CHRONIC KIDNEY DISEASE) STAGE V REQUIRING CHRONIC DIALYSIS (HCC): Primary | ICD-10-CM

## 2022-06-03 DIAGNOSIS — N18.6 CKD (CHRONIC KIDNEY DISEASE) STAGE V REQUIRING CHRONIC DIALYSIS (HCC): Primary | ICD-10-CM

## 2022-06-03 RX ORDER — SODIUM CHLORIDE 0.9 % (FLUSH) 0.9 %
5-40 SYRINGE (ML) INJECTION PRN
Status: CANCELLED | OUTPATIENT
Start: 2022-06-03

## 2022-06-03 RX ORDER — SODIUM CHLORIDE 9 MG/ML
INJECTION, SOLUTION INTRAVENOUS PRN
Status: CANCELLED | OUTPATIENT
Start: 2022-06-03

## 2022-06-03 RX ORDER — SODIUM CHLORIDE 0.9 % (FLUSH) 0.9 %
5-40 SYRINGE (ML) INJECTION EVERY 12 HOURS SCHEDULED
Status: CANCELLED | OUTPATIENT
Start: 2022-06-03

## 2022-06-07 NOTE — TELEPHONE ENCOUNTER
Procedure scheduled for 06/30/2022 with cpt code 01323 approved. auth number: [id number]    Valid 06/07/2022-07/07/2022. auth scanned in under media tab.

## 2022-06-21 NOTE — PROGRESS NOTES
I spoke with patient in attempt to make reminder call regarding his PAT Visit on 6/23/22  in preparation for Left AV Fistula on 6//30/22. Irma Cox stated he didn't know anything about this visit, he does not need to come in before surgery as he lives in [facility] and he knows what it's all about. I told josse I would let Dr. Nyasia Gallegos office know. I relayed message to [facility] in Dr. Nyasia Gallegos office.

## 2022-06-22 NOTE — PROGRESS NOTES
Follow all instructions given by your physician    NPO after midnight   Sips of water am of surgery with allowed medications  Bring insurance info and 's license  Wear comfortable clean clothing  No jewelry or contact lenses to be worn day of surgery  No glue on dentures morning of surgery;you will be asked to remove them for surgery. Case for glasses. Shower night before and morning of surgery with a liquid antibacterial soap, dry with fresh clean towel; no lotions, creams or powder. Clean sheets and pillow case on bed night before surgery  Bring medications in original bottles  Bring CPAP/BIPAP machine if you have one ( you may be charged if one is needed in recovery room )   needed at discharge and someone over 18 to stay with you for 24 hours overnight (surgery may be cancelled if you don't have this)  Report to [facility] on 2nd floor  If you would become ill prior to surgery, please call the surgeon  May have a visitor with you, we request that you limit to 2 visitors in pre-op area  Please bring and wear mask  Call [phone] for any questions  Covid questionnaire Complete; Patient negative for symptoms or exposure. See documentation.

## 2022-06-23 ENCOUNTER — HOSPITAL ENCOUNTER (OUTPATIENT)
Dept: PREADMISSION TESTING | Age: 57
Discharge: HOME | End: 2022-06-23

## 2022-06-30 ENCOUNTER — ANESTHESIA (OUTPATIENT)
Dept: OPERATING ROOM | Age: 57
End: 2022-06-30
Payer: MEDICARE

## 2022-06-30 ENCOUNTER — ANESTHESIA EVENT (OUTPATIENT)
Dept: OPERATING ROOM | Age: 57
End: 2022-06-30
Payer: MEDICARE

## 2022-06-30 ENCOUNTER — HOSPITAL ENCOUNTER (OUTPATIENT)
Age: 57
Setting detail: OUTPATIENT SURGERY
Discharge: HOME | End: 2022-06-30
Attending: THORACIC SURGERY (CARDIOTHORACIC VASCULAR SURGERY) | Admitting: THORACIC SURGERY (CARDIOTHORACIC VASCULAR SURGERY)
Payer: MEDICARE

## 2022-06-30 VITALS
HEIGHT: 62 IN | DIASTOLIC BLOOD PRESSURE: 58 MMHG | HEART RATE: 103 BPM | WEIGHT: 124 LBS | RESPIRATION RATE: 18 BRPM | SYSTOLIC BLOOD PRESSURE: 120 MMHG | TEMPERATURE: 97.1 F | BODY MASS INDEX: 22.82 KG/M2 | OXYGEN SATURATION: 97 %

## 2022-06-30 DIAGNOSIS — N18.6 CKD (CHRONIC KIDNEY DISEASE) STAGE V REQUIRING CHRONIC DIALYSIS (HCC): Primary | ICD-10-CM

## 2022-06-30 DIAGNOSIS — Z99.2 CKD (CHRONIC KIDNEY DISEASE) STAGE V REQUIRING CHRONIC DIALYSIS (HCC): Primary | ICD-10-CM

## 2022-06-30 LAB
ANION GAP SERPL CALCULATED.3IONS-SCNC: 12 MEQ/L (ref 8–16)
BUN BLDV-MCNC: 11 MG/DL (ref 7–22)
CALCIUM SERPL-MCNC: 10.1 MG/DL (ref 8.5–10.5)
CHLORIDE BLD-SCNC: 94 MEQ/L (ref 98–111)
CO2: 30 MEQ/L (ref 23–33)
CREAT SERPL-MCNC: 4.2 MG/DL (ref 0.4–1.2)
GFR SERPL CREATININE-BSD FRML MDRD: 15 ML/MIN/1.73M2
GLUCOSE BLD-MCNC: 203 MG/DL (ref 70–108)
GLUCOSE BLD-MCNC: 206 MG/DL (ref 70–108)
POTASSIUM REFLEX MAGNESIUM: 4.6 MEQ/L (ref 3.5–5.2)
SODIUM BLD-SCNC: 136 MEQ/L (ref 135–145)

## 2022-06-30 PROCEDURE — 6360000002 HC RX W HCPCS: Performed by: PHYSICIAN ASSISTANT

## 2022-06-30 PROCEDURE — 7100000001 HC PACU RECOVERY - ADDTL 15 MIN: Performed by: THORACIC SURGERY (CARDIOTHORACIC VASCULAR SURGERY)

## 2022-06-30 PROCEDURE — 2580000003 HC RX 258: Performed by: PHYSICIAN ASSISTANT

## 2022-06-30 PROCEDURE — 36821 AV FUSION DIRECT ANY SITE: CPT | Performed by: THORACIC SURGERY (CARDIOTHORACIC VASCULAR SURGERY)

## 2022-06-30 PROCEDURE — 2709999900 HC NON-CHARGEABLE SUPPLY: Performed by: THORACIC SURGERY (CARDIOTHORACIC VASCULAR SURGERY)

## 2022-06-30 PROCEDURE — 3700000000 HC ANESTHESIA ATTENDED CARE: Performed by: THORACIC SURGERY (CARDIOTHORACIC VASCULAR SURGERY)

## 2022-06-30 PROCEDURE — 3700000001 HC ADD 15 MINUTES (ANESTHESIA): Performed by: THORACIC SURGERY (CARDIOTHORACIC VASCULAR SURGERY)

## 2022-06-30 PROCEDURE — 7100000000 HC PACU RECOVERY - FIRST 15 MIN: Performed by: THORACIC SURGERY (CARDIOTHORACIC VASCULAR SURGERY)

## 2022-06-30 PROCEDURE — 6360000002 HC RX W HCPCS: Performed by: THORACIC SURGERY (CARDIOTHORACIC VASCULAR SURGERY)

## 2022-06-30 PROCEDURE — 7100000010 HC PHASE II RECOVERY - FIRST 15 MIN: Performed by: THORACIC SURGERY (CARDIOTHORACIC VASCULAR SURGERY)

## 2022-06-30 PROCEDURE — 80048 BASIC METABOLIC PNL TOTAL CA: CPT

## 2022-06-30 PROCEDURE — APPSS15 APP SPLIT SHARED TIME 0-15 MINUTES: Performed by: PHYSICIAN ASSISTANT

## 2022-06-30 PROCEDURE — 36821 AV FUSION DIRECT ANY SITE: CPT | Performed by: PHYSICIAN ASSISTANT

## 2022-06-30 PROCEDURE — 2580000003 HC RX 258: Performed by: THORACIC SURGERY (CARDIOTHORACIC VASCULAR SURGERY)

## 2022-06-30 PROCEDURE — 3600000013 HC SURGERY LEVEL 3 ADDTL 15MIN: Performed by: THORACIC SURGERY (CARDIOTHORACIC VASCULAR SURGERY)

## 2022-06-30 PROCEDURE — 3600000003 HC SURGERY LEVEL 3 BASE: Performed by: THORACIC SURGERY (CARDIOTHORACIC VASCULAR SURGERY)

## 2022-06-30 PROCEDURE — 7100000011 HC PHASE II RECOVERY - ADDTL 15 MIN: Performed by: THORACIC SURGERY (CARDIOTHORACIC VASCULAR SURGERY)

## 2022-06-30 PROCEDURE — 82948 REAGENT STRIP/BLOOD GLUCOSE: CPT

## 2022-06-30 PROCEDURE — A4217 STERILE WATER/SALINE, 500 ML: HCPCS | Performed by: THORACIC SURGERY (CARDIOTHORACIC VASCULAR SURGERY)

## 2022-06-30 PROCEDURE — 6360000002 HC RX W HCPCS: Performed by: NURSE ANESTHETIST, CERTIFIED REGISTERED

## 2022-06-30 PROCEDURE — 2500000003 HC RX 250 WO HCPCS: Performed by: NURSE ANESTHETIST, CERTIFIED REGISTERED

## 2022-06-30 PROCEDURE — 36415 COLL VENOUS BLD VENIPUNCTURE: CPT

## 2022-06-30 RX ORDER — SODIUM CHLORIDE 9 MG/ML
25 INJECTION, SOLUTION INTRAVENOUS PRN
Status: DISCONTINUED | OUTPATIENT
Start: 2022-06-30 | End: 2022-06-30 | Stop reason: HOSPADM

## 2022-06-30 RX ORDER — HYDRALAZINE HYDROCHLORIDE 20 MG/ML
10 INJECTION INTRAMUSCULAR; INTRAVENOUS
Status: DISCONTINUED | OUTPATIENT
Start: 2022-06-30 | End: 2022-06-30 | Stop reason: HOSPADM

## 2022-06-30 RX ORDER — LORAZEPAM 2 MG/ML
0.5 INJECTION INTRAMUSCULAR
Status: DISCONTINUED | OUTPATIENT
Start: 2022-06-30 | End: 2022-06-30 | Stop reason: HOSPADM

## 2022-06-30 RX ORDER — SODIUM CHLORIDE 0.9 % (FLUSH) 0.9 %
5-40 SYRINGE (ML) INJECTION PRN
Status: DISCONTINUED | OUTPATIENT
Start: 2022-06-30 | End: 2022-06-30 | Stop reason: HOSPADM

## 2022-06-30 RX ORDER — FENTANYL CITRATE 50 UG/ML
50 INJECTION, SOLUTION INTRAMUSCULAR; INTRAVENOUS EVERY 5 MIN PRN
Status: DISCONTINUED | OUTPATIENT
Start: 2022-06-30 | End: 2022-06-30 | Stop reason: HOSPADM

## 2022-06-30 RX ORDER — SODIUM CHLORIDE 0.9 % (FLUSH) 0.9 %
5-40 SYRINGE (ML) INJECTION EVERY 12 HOURS SCHEDULED
Status: DISCONTINUED | OUTPATIENT
Start: 2022-06-30 | End: 2022-06-30 | Stop reason: HOSPADM

## 2022-06-30 RX ORDER — MIDAZOLAM HYDROCHLORIDE 1 MG/ML
INJECTION INTRAMUSCULAR; INTRAVENOUS PRN
Status: DISCONTINUED | OUTPATIENT
Start: 2022-06-30 | End: 2022-06-30 | Stop reason: SDUPTHER

## 2022-06-30 RX ORDER — ONDANSETRON 2 MG/ML
INJECTION INTRAMUSCULAR; INTRAVENOUS PRN
Status: DISCONTINUED | OUTPATIENT
Start: 2022-06-30 | End: 2022-06-30 | Stop reason: SDUPTHER

## 2022-06-30 RX ORDER — DEXAMETHASONE SODIUM PHOSPHATE 10 MG/ML
INJECTION, EMULSION INTRAMUSCULAR; INTRAVENOUS PRN
Status: DISCONTINUED | OUTPATIENT
Start: 2022-06-30 | End: 2022-06-30 | Stop reason: SDUPTHER

## 2022-06-30 RX ORDER — DROPERIDOL 2.5 MG/ML
0.62 INJECTION, SOLUTION INTRAMUSCULAR; INTRAVENOUS
Status: DISCONTINUED | OUTPATIENT
Start: 2022-06-30 | End: 2022-06-30 | Stop reason: HOSPADM

## 2022-06-30 RX ORDER — PHENYLEPHRINE HYDROCHLORIDE 10 MG/ML
INJECTION INTRAVENOUS PRN
Status: DISCONTINUED | OUTPATIENT
Start: 2022-06-30 | End: 2022-06-30 | Stop reason: SDUPTHER

## 2022-06-30 RX ORDER — FENTANYL CITRATE 50 UG/ML
INJECTION, SOLUTION INTRAMUSCULAR; INTRAVENOUS PRN
Status: DISCONTINUED | OUTPATIENT
Start: 2022-06-30 | End: 2022-06-30 | Stop reason: SDUPTHER

## 2022-06-30 RX ORDER — MORPHINE SULFATE 2 MG/ML
2 INJECTION, SOLUTION INTRAMUSCULAR; INTRAVENOUS EVERY 5 MIN PRN
Status: DISCONTINUED | OUTPATIENT
Start: 2022-06-30 | End: 2022-06-30 | Stop reason: HOSPADM

## 2022-06-30 RX ORDER — LABETALOL 20 MG/4 ML (5 MG/ML) INTRAVENOUS SYRINGE
10
Status: DISCONTINUED | OUTPATIENT
Start: 2022-06-30 | End: 2022-06-30 | Stop reason: HOSPADM

## 2022-06-30 RX ORDER — HEPARIN SODIUM 1000 [USP'U]/ML
INJECTION, SOLUTION INTRAVENOUS; SUBCUTANEOUS PRN
Status: DISCONTINUED | OUTPATIENT
Start: 2022-06-30 | End: 2022-06-30 | Stop reason: SDUPTHER

## 2022-06-30 RX ORDER — IPRATROPIUM BROMIDE AND ALBUTEROL SULFATE 2.5; .5 MG/3ML; MG/3ML
1 SOLUTION RESPIRATORY (INHALATION)
Status: DISCONTINUED | OUTPATIENT
Start: 2022-06-30 | End: 2022-06-30 | Stop reason: HOSPADM

## 2022-06-30 RX ORDER — TRAMADOL HYDROCHLORIDE 50 MG/1
50 TABLET ORAL EVERY 8 HOURS PRN
Qty: 15 TABLET | Refills: 0 | Status: SHIPPED | OUTPATIENT
Start: 2022-06-30 | End: 2022-07-05

## 2022-06-30 RX ORDER — DIPHENHYDRAMINE HYDROCHLORIDE 50 MG/ML
12.5 INJECTION INTRAMUSCULAR; INTRAVENOUS
Status: DISCONTINUED | OUTPATIENT
Start: 2022-06-30 | End: 2022-06-30 | Stop reason: HOSPADM

## 2022-06-30 RX ORDER — PROPOFOL 10 MG/ML
INJECTION, EMULSION INTRAVENOUS PRN
Status: DISCONTINUED | OUTPATIENT
Start: 2022-06-30 | End: 2022-06-30 | Stop reason: SDUPTHER

## 2022-06-30 RX ORDER — SODIUM CHLORIDE 9 MG/ML
INJECTION, SOLUTION INTRAVENOUS PRN
Status: DISCONTINUED | OUTPATIENT
Start: 2022-06-30 | End: 2022-06-30 | Stop reason: HOSPADM

## 2022-06-30 RX ORDER — ROCURONIUM BROMIDE 10 MG/ML
INJECTION, SOLUTION INTRAVENOUS PRN
Status: DISCONTINUED | OUTPATIENT
Start: 2022-06-30 | End: 2022-06-30 | Stop reason: SDUPTHER

## 2022-06-30 RX ORDER — CLOPIDOGREL BISULFATE 75 MG/1
75 TABLET ORAL DAILY
Qty: 30 TABLET | Refills: 3 | Status: SHIPPED
Start: 2022-07-01

## 2022-06-30 RX ORDER — LIDOCAINE HYDROCHLORIDE 20 MG/ML
INJECTION, SOLUTION INTRAVENOUS PRN
Status: DISCONTINUED | OUTPATIENT
Start: 2022-06-30 | End: 2022-06-30 | Stop reason: SDUPTHER

## 2022-06-30 RX ORDER — ONDANSETRON 2 MG/ML
4 INJECTION INTRAMUSCULAR; INTRAVENOUS
Status: DISCONTINUED | OUTPATIENT
Start: 2022-06-30 | End: 2022-06-30 | Stop reason: HOSPADM

## 2022-06-30 RX ADMIN — SODIUM CHLORIDE: 9 INJECTION, SOLUTION INTRAVENOUS at 10:16

## 2022-06-30 RX ADMIN — PHENYLEPHRINE HYDROCHLORIDE 100 MCG: 10 INJECTION INTRAVENOUS at 10:16

## 2022-06-30 RX ADMIN — FENTANYL CITRATE 50 MCG: 50 INJECTION, SOLUTION INTRAMUSCULAR; INTRAVENOUS at 07:50

## 2022-06-30 RX ADMIN — LIDOCAINE HYDROCHLORIDE 50 MG: 20 INJECTION INTRAVENOUS at 07:50

## 2022-06-30 RX ADMIN — SODIUM CHLORIDE: 9 INJECTION, SOLUTION INTRAVENOUS at 06:24

## 2022-06-30 RX ADMIN — HEPARIN SODIUM 1000 UNITS: 1000 INJECTION, SOLUTION INTRAVENOUS; SUBCUTANEOUS at 08:17

## 2022-06-30 RX ADMIN — PHENYLEPHRINE HYDROCHLORIDE 100 MCG: 10 INJECTION INTRAVENOUS at 10:28

## 2022-06-30 RX ADMIN — ROCURONIUM BROMIDE 50 MG: 50 INJECTION, SOLUTION INTRAVENOUS at 07:50

## 2022-06-30 RX ADMIN — CEFAZOLIN SODIUM 2000 MG: 10 INJECTION, POWDER, FOR SOLUTION INTRAVENOUS at 07:45

## 2022-06-30 RX ADMIN — PHENYLEPHRINE HYDROCHLORIDE 100 MCG: 10 INJECTION INTRAVENOUS at 09:18

## 2022-06-30 RX ADMIN — PHENYLEPHRINE HYDROCHLORIDE 100 MCG: 10 INJECTION INTRAVENOUS at 08:19

## 2022-06-30 RX ADMIN — FENTANYL CITRATE 50 MCG: 50 INJECTION, SOLUTION INTRAMUSCULAR; INTRAVENOUS at 10:00

## 2022-06-30 RX ADMIN — SUGAMMADEX 125 MG: 100 INJECTION, SOLUTION INTRAVENOUS at 10:43

## 2022-06-30 RX ADMIN — FENTANYL CITRATE 25 MCG: 50 INJECTION, SOLUTION INTRAMUSCULAR; INTRAVENOUS at 10:09

## 2022-06-30 RX ADMIN — HEPARIN SODIUM 5000 UNITS: 1000 INJECTION, SOLUTION INTRAVENOUS; SUBCUTANEOUS at 09:03

## 2022-06-30 RX ADMIN — ONDANSETRON 4 MG: 2 INJECTION INTRAMUSCULAR; INTRAVENOUS at 09:58

## 2022-06-30 RX ADMIN — MIDAZOLAM 2 MG: 1 INJECTION INTRAMUSCULAR; INTRAVENOUS at 07:30

## 2022-06-30 RX ADMIN — DEXAMETHASONE SODIUM PHOSPHATE 5 MG: 10 INJECTION, EMULSION INTRAMUSCULAR; INTRAVENOUS at 07:50

## 2022-06-30 RX ADMIN — PROPOFOL 150 MG: 10 INJECTION, EMULSION INTRAVENOUS at 07:50

## 2022-06-30 RX ADMIN — PROPOFOL 30 MG: 10 INJECTION, EMULSION INTRAVENOUS at 10:25

## 2022-06-30 RX ADMIN — FENTANYL CITRATE 25 MCG: 50 INJECTION, SOLUTION INTRAMUSCULAR; INTRAVENOUS at 10:49

## 2022-06-30 RX ADMIN — FENTANYL CITRATE 50 MCG: 50 INJECTION, SOLUTION INTRAMUSCULAR; INTRAVENOUS at 08:10

## 2022-06-30 RX ADMIN — PHENYLEPHRINE HYDROCHLORIDE 100 MCG: 10 INJECTION INTRAVENOUS at 10:26

## 2022-06-30 RX ADMIN — PHENYLEPHRINE HYDROCHLORIDE 100 MCG: 10 INJECTION INTRAVENOUS at 09:06

## 2022-06-30 RX ADMIN — LIDOCAINE HYDROCHLORIDE 50 MG: 20 INJECTION INTRAVENOUS at 10:25

## 2022-06-30 RX ADMIN — PHENYLEPHRINE HYDROCHLORIDE 100 MCG: 10 INJECTION INTRAVENOUS at 08:46

## 2022-06-30 ASSESSMENT — PAIN SCALES - GENERAL
PAINLEVEL_OUTOF10: 4
PAINLEVEL_OUTOF10: 2
PAINLEVEL_OUTOF10: 5

## 2022-06-30 ASSESSMENT — PAIN - FUNCTIONAL ASSESSMENT: PAIN_FUNCTIONAL_ASSESSMENT: 0-10

## 2022-06-30 ASSESSMENT — PAIN DESCRIPTION - PAIN TYPE
TYPE: SURGICAL PAIN

## 2022-06-30 ASSESSMENT — ENCOUNTER SYMPTOMS: SHORTNESS OF BREATH: 1

## 2022-06-30 NOTE — H&P
CT/CV Surgery H & P        Patient's Name/Date of Birth: Jose L Alvarez / 1965 (58 y.o.)        PCP: Lydia Myles, PAOLA - CNP     Date: June 30, 2022      CC: Kidney failure        HPI:            Jose L Alvarez is a very pleasant 61y.o. year old male with a history of hypertension, DM 1, hyperlipidemia, CVA 6 years ago, myocardial infarction 6 years ago, status post coronary stent x3, and chronic kidney disease stage V with a history of peritoneal dialysis for 2 years, and recent hemodialysis right IJ Tunneled catheter for 3-month.      He reports he has minimal disability from CVA. He had an extensive cardiac work-up at [facility] in preparation for kidney transplant. The recent nuclear cardiac stress was negative. He denies any midsternal chest pain or shortness of breath at rest.  He is a right hand dominant person.     PastMedical History:  Curt Gleason  has a past medical history of Broken ankle, CAD (coronary artery disease), Cerebral artery occlusion with cerebral infarction ([address] CHF (congestive heart failure) ([address] Chronic kidney disease, Diabetes mellitus ([address] GERD (gastroesophageal reflux disease), Hyperlipidemia, Hypertension, and Thyroid disease.     Past Surgical History:  The patient  has a past surgical history that includes Appendectomy; Cardiac surgery (Oct. 2015); Colonoscopy; eye surgery; Diagnostic Cardiac Cath Lab Procedure (11/05/2020); and Wound Exploration (N/A, 5/5/2022).    Allergies: The patient is allergic to aranesp (albumin free) [darbepoetin eri].      Medications:    Current Medication      Current Outpatient Medications:     hydrALAZINE (APRESOLINE) 25 MG tablet, Take 3 tablets by mouth 3 times daily, Disp: 90 tablet, Rfl: 0    isosorbide mononitrate (IMDUR) 30 MG extended release tablet, Take 30 mg by mouth daily, Disp: , Rfl:     insulin glargine (BASAGLAR KWIKPEN) 100 UNIT/ML injection pen, Inject 10 Units into the skin daily, Disp: , Rfl:     pantoprazole (PROTONIX) 40 MG tablet, Take 40 mg by mouth 2 times daily, Disp: , Rfl:     insulin aspart (NOVOLOG) 100 UNIT/ML injection vial, Inject into the skin 3 times daily (before meals) PT TAKES IT BASED OFF OF HIS CARBS;  1  Unit per 8 cho, Disp: , Rfl:     NONFORMULARY, Dexcom 6 checks sugar, Disp: , Rfl:     bumetanide (BUMEX) 2 MG tablet, Take 1 tablet by mouth daily (Patient taking differently: Take 2 mg by mouth daily Swelling in ankles), Disp: 90 tablet, Rfl: 3    levothyroxine (SYNTHROID) 100 MCG tablet, Take 1 tablet by mouth Daily, Disp: 30 tablet, Rfl: 3    nitroGLYCERIN (NITROSTAT) 0.4 MG SL tablet, DISSOLVE 1 TABLET UNDER THE TONGUE EVERY 5 MIN AS NEEDED FOR CHEST PAIN, Disp: , Rfl: 0    atorvastatin (LIPITOR) 80 MG tablet, Take 80 mg by mouth at bedtime , Disp: , Rfl:     clopidogrel (PLAVIX) 75 MG tablet, Take 75 mg by mouth daily Patient not taking at this time since Sunday, Disp: , Rfl:     aspirin 81 MG EC tablet, Take 81 mg by mouth daily , Disp: , Rfl:     Insulin Pen Needle ([employer] PEN NEEDLES) 31G X 6 MM MISC, 1 each by Does not apply route daily, Disp: 100 each, Rfl: 0        Family History: This patient's family history includes Cancer in his sister; Diabetes in his mother. He was adopted.     Social History:  Haily Schultz  reports that he has been smoking. He has a 7.50 pack-year smoking history. He has quit using smokeless tobacco. He reports current alcohol use of about 2.0 standard drinks of alcohol per week. He reports that he does not use drugs.     Vital Signs:   Vitals:    06/30/22 0550   BP: 129/61   Pulse: 88   Resp: 20   Temp: 98 °F (36.7 °C)   SpO2: 98%        Ht 5' 2\" (1.575 m)   Wt 123 lb 3.2 oz (55.9 kg)   BMI 22.53 kg/m²      ROS:   Constitutional: Negative for activity change, chills, fatigue, fever and unexpected weight change. Respiratory: Negative for sleep apnea, shortness of breath, wheezing, stridor, supplementary home oxygen. Cardiac: Old MI.   S/p coronary artery stents x3 6 years ago. No midsternal chest pain or shortness of breath at rest.  Vascular: Negative for claudication, leg  calf muscle pain, hip pain. Gastrointestinal: Negative for hematochezia, melana, constipation, and N/V/D. Musculoskeletal: Negative for myalgias, amputation. Skin: Negative for color change, rash and wound. Neurological: Old CVA with minimal disability. Nephrology: Chronic kidney disease stage V.     Physical Exam:  General appearance:  No acute distress, appears stated age and cooperative. Neck: No jugular venous distention. Trachea midline. No carotid bruits. Chest Wall: No deformity, midsternal scar. Right IJ catheter in place. Respiratory:  Normal respiratory effort. Clear to auscultation, bilaterally without Rales/Wheezes/Rhonch. Cardiovascular:  Regular rate and rhythm with normal S1/S2 without murmurs, rubs or gallops. Abdomen: Soft, non-tender, non-distended with normal bowel sounds. Ext: No clubbing, cyanosis or edema bilaterally. No chronic ischemic changes, No varicorsity in lower leg. Extensive tattoos in the arms. Skin: Skin color, texture, turgor normal.  No rashes or lesions. No rubor. No venous stasis pigmentation. Neurologic:  Neurovascularly intact without any focal sensory/motor deficits.     Peripheral Pulses: +2 palpable radial pulses bilaterally,     Labs:    CBC:        Lab Results   Component Value Date     WBC 5.0 02/26/2022     HGB 10.1 02/26/2022     HCT 30.7 02/26/2022     MCV 96.5 02/26/2022      02/26/2022     PROTIME 11.0 02/21/2022     PROTIME 12.5 08/27/2018     INR 0.96 02/21/2022      BMP:         Lab Results   Component Value Date      02/26/2022     K 4.6 05/05/2022     K 5.2 02/24/2022     CL 99 02/26/2022     CO2 23 02/26/2022     PHOS 3.7 11/25/2018     BUN 20 02/26/2022     CREATININE 4.5 02/26/2022     MG 2.2 02/26/2022         Imaging  Dr. John Monk has reviewed all imaging.        Problem List:      Patient Active Problem List Diagnosis    CHF (congestive heart failure) (HCC)    Precordial pain    DM type 2 causing CKD stage 5 (HCC)    Essential hypertension    CKD (chronic kidney disease) stage 4, GFR 15-29 ml/min (HCC)    Coronary artery disease of native artery of native heart with stable angina pectoris (HCC)    Anemia associated with chronic renal failure    Anemia of chronic kidney failure    Coronary atherosclerosis    Diabetes mellitus ([address]    History of CVA (cerebrovascular accident)    MI (myocardial infarction) ([address]    Cerebrovascular accident ([address]    Type 1 diabetes mellitus with hyperglycemia (HCC)    Diabetes mellitus with hyperglycemia (HCC)    Anemia in chronic kidney disease    Acute on chronic anemia    Bacterial pneumonia    Iron deficiency anemia    Encephalopathy    Severe malnutrition ([address]    NANCY (acute kidney injury) ([address]    Hypervolemia    Abnormal stress test    CAD in native artery    Acute HFrEF (heart failure with reduced ejection fraction) (HCC)    Pulmonary hypertension (HCC)    Myocardiopathy ([address]    KAUR (dyspnea on exertion)    Anginal equivalent ([facility])    Acute on chronic HFrEF (heart failure with reduced ejection fraction) ([address]    ESRD on dialysis ([address]    Pulmonary edema, acute, with congestive heart failure (HCC)    Metabolic acidosis    Uncontrolled type 2 diabetes mellitus with hyperglycemia (HCC)    Hx of coronary artery disease    Dyslipidemia    Hypothyroidism    Hx of gastroesophageal reflux (GERD)    Chronic heart failure with preserved ejection fraction (HFpEF) ([address]    ESRD (end stage renal disease) (HCC)    SOB (shortness of breath)    Fluid overload    Shortness of breath         Assessment: Chronic kidney disease, stage V.     Plan 6/30/22:  1) Dr. Nate Nayak plans for left forearm AV fistula today 6/30/22.

## 2022-06-30 NOTE — PROGRESS NOTES
Patient is refusing to wear SCD at this time- educated on the importance. Still refusing scd. Patient also refusing to remove pants. Educated on removing clothing for surgery. Still refusing to remove.

## 2022-06-30 NOTE — PROGRESS NOTES
Pt returned to [facility] room 19. Vitals and assessment as charted. 0.9 infusing. Pt has apple sauce, pudding, sandwich, and water. Pt verbalized understanding of discharge criteria and call light use. Call light in reach. Pt to call his ride to come get him at discharge.

## 2022-06-30 NOTE — PROGRESS NOTES
Pt has met discharge criteria and states he is ready for discharge to home. IV removed, gauze and tape applied. Dressed in own clothes and personal belongings gathered. Met with pts visitors in the lobby to go over dc instructions. Discharge instructions (with opioid medication education information) given to pt and visitors; pt and visitors verbalized understanding of discharge instructions, prescriptions and follow up appointments. Paper script x1 given to visitor. Pt transported to discharge lobby by [facility] staff.

## 2022-06-30 NOTE — ANESTHESIA PRE PROCEDURE
Department of Anesthesiology  Preprocedure Note       Name:  Amandeep Herbert   Age:  61 y.o.  :  1965                                          MRN:  [id number]         Date:  2022      Surgeon: Timo Britton):  Yuli Hu MD    Procedure: Procedure(s):  Left Forearm AV Fistula Creation    Medications prior to admission:   Prior to Admission medications    Medication Sig Start Date End Date Taking?  Authorizing Provider   hydrALAZINE (APRESOLINE) 25 MG tablet Take 3 tablets by mouth 3 times daily 20   Beba Weinberg MD   isosorbide mononitrate (IMDUR) 30 MG extended release tablet Take 30 mg by mouth at bedtime     Historical Provider, MD   insulin glargine (BASAGLAR KWIKPEN) 100 UNIT/ML injection pen Inject 10 Units into the skin daily breakfast    Historical Provider, MD   pantoprazole (PROTONIX) 40 MG tablet Take 40 mg by mouth 2 times daily    Historical Provider, MD   insulin aspart (NOVOLOG) 100 UNIT/ML injection vial Inject into the skin 3 times daily (before meals) PT TAKES IT BASED OFF OF HIS CARBS;  1  Unit per 8 cho    Historical Provider, MD   bumetanide (BUMEX) 2 MG tablet Take 1 tablet by mouth daily  Patient taking differently: Take 2 mg by mouth daily Swelling in ankles 5/15/19   Britton Mooney DO   levothyroxine (SYNTHROID) 100 MCG tablet Take 1 tablet by mouth Daily 19   Lilian Wilson MD   atorvastatin (LIPITOR) 80 MG tablet Take 80 mg by mouth at bedtime     Historical Provider, MD   clopidogrel (PLAVIX) 75 MG tablet Take 75 mg by mouth daily Patient not taking at this time since     Historical Provider, MD   aspirin 81 MG EC tablet Take 81 mg by mouth daily     Historical Provider, MD       Current medications:    Current Facility-Administered Medications   Medication Dose Route Frequency Provider Last Rate Last Admin    0.9 % sodium chloride infusion   IntraVENous PRN Saad Bettencourt PA-C 25 mL/hr at 22 New Bag at 22    ceFAZolin (ANCEF) 2000 mg in dextrose 5 % 50 mL IVPB  2,000 mg [facility]        sodium chloride flush 0.9 % injection 5-40 mL  5-40 mL IntraVENous 2 times per day Harlan Martines PA-C        sodium chloride flush 0.9 % injection 5-40 mL  5-40 mL IntraVENous PRN Harlan Martines PA-C           Allergies: Allergies   Allergen Reactions    Aranesp (Albumin Free) [Darbepoetin Damon] Hives     Patient tolerated 4/13/19.         Problem List:    Patient Active Problem List   Diagnosis Code    CHF (congestive heart failure) ([facility]) I50.9    Precordial pain R07.2    DM type 2 causing CKD stage 5 ([facility]) E11.22, N18.5    Essential hypertension I10    CKD (chronic kidney disease) stage 4, GFR 15-29 ml/min ([facility]) N18.4    Coronary artery disease of native artery of native heart with stable angina pectoris ([facility]) I25.118    Anemia associated with chronic renal failure N18.9, D63.1    Anemia of chronic kidney failure N18.9, D63.1    Coronary atherosclerosis I25.10    Diabetes mellitus ([facility] E11.9    History of CVA (cerebrovascular accident) Z80.78    MI (myocardial infarction) ([facility] I21.9    Cerebrovascular accident ([facility] I63.9    Type 1 diabetes mellitus with hyperglycemia ([facility]) E10.65    Diabetes mellitus with hyperglycemia ([facility]) E11.65    Anemia in chronic kidney disease N18.9, D63.1    Acute on chronic anemia D64.9    Bacterial pneumonia J15.9    Iron deficiency anemia D50.9    Encephalopathy G93.40    Severe malnutrition ([facility]) E43    NANCY (acute kidney injury) ([facility]) N17.9    Hypervolemia E87.70    Abnormal stress test R94.39    CAD in native artery I25.10    Acute HFrEF (heart failure with reduced ejection fraction) ([facility]) I50.21    Pulmonary hypertension ([facility]) I27.20    Myocardiopathy ([facility]) I42.9    KAUR (dyspnea on exertion) R06.00    Anginal equivalent ([facility]) I20.8    Acute on chronic HFrEF (heart failure with reduced ejection fraction) ([facility]) I50.23    ESRD on dialysis ([facility]) N18.6, Z99.2    Pulmonary edema, acute, with congestive heart failure (HCC) Q63.8    Metabolic acidosis N04.0    Uncontrolled type 2 diabetes mellitus with hyperglycemia ([facility]) E11.65    Hx of coronary artery disease Z86.79    Dyslipidemia E78.5    Hypothyroidism E03.9    Hx of gastroesophageal reflux (GERD) Z87.19    Chronic heart failure with preserved ejection fraction (HFpEF) ([facility]) I50.32    ESRD (end stage renal disease) ([facility]) N18.6    SOB (shortness of breath) R06.02    Fluid overload E87.70    Shortness of breath R06.02       Past Medical History:        Diagnosis Date    Broken ankle     Broke right ankle.  CAD (coronary artery disease) 2015    MI     Cerebral artery occlusion with cerebral infarction [facility])  2015    left side    CHF (congestive heart failure) ([facility])     Chronic kidney disease     peritoneal dialysis    Diabetes mellitus ([facility]     GERD (gastroesophageal reflux disease)     Hyperlipidemia     Hypertension     Thyroid disease        Past Surgical History:        Procedure Laterality Date    APPENDECTOMY      CARDIAC SURGERY  Oct. 2015    2 stents placed    COLONOSCOPY      DIAGNOSTIC CARDIAC CATH LAB PROCEDURE  2020    EYE SURGERY      WOUND EXPLORATION N/A 2022    WOUND EXPLORATION performed by Cornell De La O MD at [facility] History:    Social History     Tobacco Use    Smoking status: Current Some Day Smoker     Packs/day: 0.50     Years: 30.00     Pack years: 15.00     Types: Cigarettes     Last attempt to quit: 10/9/2015     Years since quittin.7    Smokeless tobacco: Former User    Tobacco comment: 3-4 cigarettes occasionally   Substance Use Topics    Alcohol use:  Yes     Alcohol/week: 2.0 standard drinks     Types: 2 Glasses of wine per week     Comment: daily                                Ready to quit: Not Answered  Counseling given: Not Answered  Comment: 3-4 cigarettes occasionally      Vital Signs (Current):   Vitals:    22 1339 06/30/22 0550   BP:  129/61   Pulse:  88   Resp:  20   Temp:  98 °F (36.7 °C)   TempSrc:  Tympanic   SpO2:  98%   Weight: 125 lb (56.7 kg) 124 lb (56.2 kg)   Height: 5' 2\" (1.575 m)                                               BP Readings from Last 3 Encounters:   06/30/22 129/61   06/02/22 130/73   05/25/22 136/76       NPO Status: Time of last liquid consumption: 2100                        Time of last solid consumption: 2100                        Date of last liquid consumption: 06/29/22                        Date of last solid food consumption: 06/29/22    BMI:   Wt Readings from Last 3 Encounters:   06/30/22 124 lb (56.2 kg)   06/02/22 123 lb 3.2 oz (55.9 kg)   05/25/22 121 lb (54.9 kg)     Body mass index is 22.68 kg/m².     CBC:   Lab Results   Component Value Date/Time    WBC 5.0 02/26/2022 04:16 AM    RBC 3.18 02/26/2022 04:16 AM    HGB 10.1 02/26/2022 04:16 AM    HCT 30.7 02/26/2022 04:16 AM    MCV 96.5 02/26/2022 04:16 AM    RDW 16.2 04/09/2016 03:15 AM     02/26/2022 04:16 AM       CMP:   Lab Results   Component Value Date/Time     06/30/2022 06:04 AM    K 4.6 06/30/2022 06:04 AM    CL 94 06/30/2022 06:04 AM    CO2 30 06/30/2022 06:04 AM    BUN 11 06/30/2022 06:04 AM    CREATININE 4.2 06/30/2022 06:04 AM    LABGLOM 15 06/30/2022 06:04 AM    GLUCOSE 203 06/30/2022 06:04 AM    PROT 6.2 02/26/2022 04:16 AM    CALCIUM 10.1 06/30/2022 06:04 AM    BILITOT 0.5 02/26/2022 04:16 AM    ALKPHOS 98 02/26/2022 04:16 AM    AST 20 02/26/2022 04:16 AM    ALT 13 02/26/2022 04:16 AM       POC Tests:   Recent Labs     06/30/22  0617   POCGLU 206*       Coags:   Lab Results   Component Value Date/Time    PROTIME 11.0 02/21/2022 11:47 AM    PROTIME 12.5 08/27/2018 12:00 AM    INR 0.96 02/21/2022 11:47 AM    APTT 37.2 06/30/2021 08:49 AM       HCG (If Applicable): No results found for: PREGTESTUR, PREGSERUM, HCG, HCGQUANT     ABGs: No results found for: PHART, PO2ART, UTL6THW, EQC7LTE, BEART, K8ITGLXY     Type & Screen (If Applicable):  Lab Results   Component Value Date    LABRH NEG 06/30/2021       Drug/Infectious Status (If Applicable):  No results found for: HIV, HEPCAB    COVID-19 Screening (If Applicable):   Lab Results   Component Value Date/Time    COVID19 Negative 02/21/2022 12:02 PM           Anesthesia Evaluation    Airway: Mallampati: II          Dental:          Pulmonary:   (+) shortness of breath:                             Cardiovascular:    (+) hypertension:, angina:, past MI:, CAD:, CHF:, KAUR:,                   Neuro/Psych:   (+) CVA:,             GI/Hepatic/Renal:   (+) renal disease: ESRD,           Endo/Other:    (+) Diabetes, hypothyroidism::., .                 Abdominal:             Vascular: Other Findings:           Anesthesia Plan      general     ASA 4             Anesthetic plan and risks discussed with patient.                         Te Veloz MD   6/30/2022

## 2022-06-30 NOTE — ANESTHESIA POSTPROCEDURE EVALUATION
Department of Anesthesiology  Postprocedure Note    Patient: Sanju Constantino  MRN: [id number]  YOB: 1965  Date of evaluation: 6/30/2022      Procedure Summary     Date: 06/30/22 Room / Location: [address] [facility]    Anesthesia Start: 0730 Anesthesia Stop: 0774    Procedure: Left Forearm AV Fistula Creation (Left ) Diagnosis:       Chronic kidney disease, unspecified CKD stage      (Chronic kidney disease, unspecified CKD stage [N18.9])    Surgeons: Tushar Pérez MD Responsible Provider: Mali Rock MD    Anesthesia Type: general ASA Status: 4          Anesthesia Type: No value filed.     Fermín Phase I: Fermín Score: 9    Fermín Phase II: Fermín Score: 10      Anesthesia Post Evaluation    Complications: no

## 2022-06-30 NOTE — PROGRESS NOTES
1051 pt arrived to PACU, awakens to voice. Denies pain at this time. VSS. Bruit present but unable to feel thrill. Nataliia TELLEZ and Delfin Salinas PA notified no new orders and OK with SBP in 120s  1106 pt awake in bed, states pain 4/10 and tolerable. VSS  1121 Pt states pain 4/10 and tolerable. Meets criteria for discharge from PACU, transported to [facility].  Bruit present

## 2022-06-30 NOTE — PROGRESS NOTES
Pt admitted to [facility] room 19 and oriented to unit. SCD sleeves applied. Nares swabbed. Pt verbalized permission for first name, last initial and physicians name on white board. SDS board and discharge criteria explained, pt and family verbalized understanding. Pt denies thoughts of harming self or others. Call light in reach. Family at the bedside.

## 2022-06-30 NOTE — OP NOTE
Operative Note      Patient: Zayra Le  YOB: 1965  MRN: [id number]    Date of Procedure: 6/30/2022    Pre-Op Diagnosis: Chronic kidney disease, stage V, status post right internal jugular vein tunneled cath. Post-Op Diagnosis: Same as preop. Procedure(s):  Left Forearm AV Fistula Creation, left radial artery to cephalic vein AV fistula in the left wrist.    Surgeon(s):  Jm Hernandez MD    Assistant:   Physician Assistant: Katy Norris PA-C   Because of the complexity of the procedure, the physician assistant was required to assist the surgeon throughout the procedure. Anesthesia: General    Estimated Blood Loss (mL): Minimal, less than 10 mL. Complications: None. Specimens: None. Implants: None. Drains: None. Findings: Small(1.5 mm) and calcified left radial artery. The cephalic vein was 3 mm in diameter. Detailed Description of Procedure: The patient was taken to the operating room after an informed consent was obtained. He was placed in a supine position. After a routine preparation and general endotracheal anesthesia, patient's left arm was exposed. I did an intraoperative ultrasound to locate and measure the size of the vein and radial artery. A decision was made to proceed with a left forearm AV fistula. Patient's left hand was painted and draped in a sterile fashion. A timeout was called and all necessary measures were taken. A 2 inch long skin incision made over the cephalic vein in the left wrist.  It was carried down to the deeper plane. The vein was isolated. Branches were divided between double ligature. Another 1 inch long skin incision was made over the left radial artery. It was carried down to the deeper plane. The radial artery was isolated. The artery was found to be heavily calcified. Vascular control was obtained by applying soft rubber vessel loop proximally and distally.   Then a subcutaneous tunnel was made connecting two incisions. The patient was given heparin intravenously. The cephalic vein was severed distally. Heparinized saline was injected through the cephalic vein. There was an excellent flow. The cephalic vein was brought to the radial artery through the subcutaneous tunnel. The radial artery was occluded by cinching the vesseloops proximally and distally. A 5 mm long arteriotomy opening was made. Small portion of calcified plaque was removed. The cephalic vein was cut in length, beveled, and was anastomosed to the side of the radial artery in an end-to-side fashion with a 7-0 Prolene. Complete hemostasis was confirmed. There was an excellent Doppler signal throughout the AV fistula. The incisions were closed in layers using absorbable sutures. The skin layer was closed with absorbable sutures in a subcuticular fashion. Patient tolerated the procedure well. He was extubated in the operating room. He was transferred to the recovery room in a stable condition.     Electronically signed by Danis Patterson MD on 6/30/2022 at 10:59 AM

## 2022-06-30 NOTE — DISCHARGE SUMMARY
CT/CV Surgery Discharge Summary     Pt Name: Romel Torres  MRN: [id number]  YOB: 1965  Primary Care Physician: PAOLA Tolbert CNP    Admit date:  6/30/2022  5:35 AM     Discharge date:  06/30/22     Disposition: Home    Admitting Diagnosis: Chronic kidney disease, stage V. Discharge Diagnosis: Chronic kidney disease, stage V.     Condition: Stable    Problem List:   Patient Active Problem List   Diagnosis Code    CHF (congestive heart failure) ([facility]) I50.9    Precordial pain R07.2    DM type 2 causing CKD stage 5 ([facility]) E11.22, N18.5    Essential hypertension I10    CKD (chronic kidney disease) stage 4, GFR 15-29 ml/min ([facility]) N18.4    Coronary artery disease of native artery of native heart with stable angina pectoris ([facility]) I25.118    Anemia associated with chronic renal failure N18.9, D63.1    Anemia of chronic kidney failure N18.9, D63.1    Coronary atherosclerosis I25.10    Diabetes mellitus ([facility] [address] E11.9    History of CVA (cerebrovascular accident) Z80.78    MI (myocardial infarction) ([facility] [address] I21.9    Cerebrovascular accident ([facility] [address] I63.9    Type 1 diabetes mellitus with hyperglycemia ([facility]) E10.65    Diabetes mellitus with hyperglycemia ([facility]) E11.65    Anemia in chronic kidney disease N18.9, D63.1    Acute on chronic anemia D64.9    Bacterial pneumonia J15.9    Iron deficiency anemia D50.9    Encephalopathy G93.40    Severe malnutrition ([facility]) E43    NANCY (acute kidney injury) ([facility]) N17.9    Hypervolemia E87.70    Abnormal stress test R94.39    CAD in native artery I25.10    Acute HFrEF (heart failure with reduced ejection fraction) ([facility]) I50.21    Pulmonary hypertension ([facility]) I27.20    Myocardiopathy ([facility]) I42.9    KAUR (dyspnea on exertion) R06.00    Anginal equivalent ([facility]) I20.8    Acute on chronic HFrEF (heart failure with reduced ejection fraction) ([facility]) I50.23    CKD (chronic kidney disease) stage V requiring chronic dialysis ([facility]) N18.6, Z99.2    Pulmonary known as: BUMEX  Take 1 tablet by mouth daily  What changed: additional instructions        CONTINUE taking these medications    aspirin 81 MG EC tablet     atorvastatin 80 MG tablet  Commonly known as: LIPITOR     Basaglar KwikPen 100 UNIT/ML injection pen  Generic drug: insulin glargine     clopidogrel 75 MG tablet  Commonly known as: PLAVIX  Take 1 tablet by mouth daily Patient not taking at this time since Sunday  Start taking on: July 1, 2022     hydrALAZINE 25 MG tablet  Commonly known as: APRESOLINE  Take 3 tablets by mouth 3 times daily     isosorbide mononitrate 30 MG extended release tablet  Commonly known as: IMDUR     levothyroxine 100 MCG tablet  Commonly known as: SYNTHROID  Take 1 tablet by mouth Daily     NovoLOG 100 UNIT/ML injection vial  Generic drug: insulin aspart     pantoprazole 40 MG tablet  Commonly known as: PROTONIX           Where to Get Your Medications      You can get these medications from any pharmacy    Bring a paper prescription for each of these medications  · traMADol 50 MG tablet     Information about where to get these medications is not yet available    Ask your nurse or doctor about these medications  · clopidogrel 75 MG tablet         EMERGENCIES   ? You should either call 911 or go to the Emergency Room to be evaluated if you need seen immediately   ? Sudden, severe shortness of breath go to the Emergency Room    If you have questions regarding these instructions, please call our office  [phone]. We have an answering service 24 hours a day to get your calls to the ON Call Physician, but we are often in surgery and it takes us awhile to get back to you.    ? BRING YOUR MEDICATION LIST and medications even over the counter medications to all your follow up apointments. ? Office Location:   [address], [facility], [address]     Follow-up:    1. Follow up with Rabia Guzman PA-C  in 2-3 weeks.   3. The pt was agreeable to discharge

## 2022-07-02 PROBLEM — I21.4 NSTEMI (NON-ST ELEVATED MYOCARDIAL INFARCTION) (HCC): Status: ACTIVE | Noted: 2022-07-02

## 2022-08-05 RX ORDER — BUMETANIDE 2 MG/1
TABLET ORAL
Qty: 180 TABLET | Refills: 3 | OUTPATIENT
Start: 2022-08-05

## 2022-08-12 ENCOUNTER — TELEPHONE (OUTPATIENT)
Dept: CARDIOTHORACIC SURGERY | Age: 57
End: 2022-08-12

## 2022-08-15 NOTE — TELEPHONE ENCOUNTER
Spoke with Mu Cramer-  HEYDI follow up was never scheduled during discharge after surgery on 6/30.  (?)  Appt scheduled for 8/23/22 at 1130AM

## 2022-08-23 ENCOUNTER — OFFICE VISIT (OUTPATIENT)
Dept: CARDIOTHORACIC SURGERY | Age: 57
End: 2022-08-23

## 2022-08-23 VITALS
HEIGHT: 62 IN | BODY MASS INDEX: 21.79 KG/M2 | WEIGHT: 118.4 LBS | DIASTOLIC BLOOD PRESSURE: 61 MMHG | HEART RATE: 99 BPM | OXYGEN SATURATION: 98 % | SYSTOLIC BLOOD PRESSURE: 119 MMHG

## 2022-08-23 DIAGNOSIS — Z98.890 S/P ARTERIOVENOUS (AV) FISTULA CREATION: Primary | ICD-10-CM

## 2022-08-23 PROCEDURE — 99024 POSTOP FOLLOW-UP VISIT: CPT | Performed by: PHYSICIAN ASSISTANT

## 2022-08-23 NOTE — PROGRESS NOTES
CT/CV Surgery Follow Up Office Visit      Patient's Name/Date of Birth: Oddis Meckel / 1965 (61 y.o.)    CC:   Chief Complaint   Patient presents with    Follow Up After Procedure     S/p left arm fistula 06.30.2022 with Dr. Pastrana [place]        PCP: Shirley Robertson, APRN - CNP    Date: August 23, 2022     HPI:   We had the pleasure of seeing Oddis Meckel in the office today, as you know this is a very pleasant 61y.o. year old male S/p Left Forearm AV Fistula Creation, left radial artery to cephalic vein AV fistula in the left wrist on 6/30/22. He has been receiving HD via IJ tunneled catheter. Past Medical History:  Heidi Sneed  has a past medical history of Broken ankle, CAD (coronary artery disease), Cerebral artery occlusion with cerebral infarction ([facility] [address] CHF (congestive heart failure) ([facility] [address] Chronic kidney disease, Diabetes mellitus ([facility] [address] GERD (gastroesophageal reflux disease), Hyperlipidemia, Hypertension, and Thyroid disease. Past Surgical History:  The patient  has a past surgical history that includes Appendectomy; Cardiac surgery (Oct. 2015); Colonoscopy; eye surgery; Diagnostic Cardiac Cath Lab Procedure (11/05/2020); Wound Exploration (N/A, 5/5/2022); and Dialysis fistula creation (Left, 6/30/2022). Allergies: The patient is allergic to aranesp (albumin free) [darbepoetin eri]. Medications:  Prior to Admission medications    Medication Sig Start Date End Date Taking?  Authorizing Provider   clopidogrel (PLAVIX) 75 MG tablet Take 1 tablet by mouth daily Patient not taking at this time since Sunday 7/1/22  Yes Christal Martin PA-C   isosorbide mononitrate (IMDUR) 30 MG extended release tablet Take 30 mg by mouth at bedtime    Yes Historical Provider, MD   insulin glargine (BASAGLAR KWIKPEN) 100 UNIT/ML injection pen Inject 10 Units into the skin daily breakfast   Yes Historical Provider, MD   pantoprazole (PROTONIX) 40 MG tablet Take 40 mg by mouth 2 times daily   Yes Historical Provider, MD insulin aspart (NOVOLOG) 100 UNIT/ML injection vial Inject into the skin 3 times daily (before meals) PT TAKES IT BASED OFF OF HIS CARBS;  1  Unit per 8 cho   Yes Historical Provider, MD   bumetanide (BUMEX) 2 MG tablet Take 1 tablet by mouth daily  Patient taking differently: Take 2 mg by mouth daily Swelling in ankles 5/15/19  Yes Shayna Green,    levothyroxine (SYNTHROID) 100 MCG tablet Take 1 tablet by mouth Daily 4/19/19  Yes Duong Vanegas MD   atorvastatin (LIPITOR) 80 MG tablet Take 80 mg by mouth at bedtime    Yes Historical Provider, MD   aspirin 81 MG EC tablet Take 81 mg by mouth daily    Yes Historical Provider, MD   hydrALAZINE (APRESOLINE) 25 MG tablet Take 3 tablets by mouth 3 times daily  Patient not taking: Reported on 8/23/2022 11/6/20   Alex Valderrama MD       Family History: This patient's family history includes Cancer in his sister; Diabetes in his mother. He was adopted. Social History:  Dung Llamas  reports that he has been smoking cigarettes. He has a 15.00 pack-year smoking history. He has quit using smokeless tobacco. He reports current alcohol use of about 2.0 standard drinks per week. He reports that he does not use drugs. Vital Signs:   /61 (Site: Right Upper Arm)   Pulse 99   Ht 5' 2\" (1.575 m)   Wt 118 lb 6.4 oz (53.7 kg)   SpO2 98%   BMI 21.66 kg/m²        Physical Exam:  General appearance:  No acute distress, appears stated age and cooperative. Neck: No jugular venous distention. Trachea midline. Respiratory:  Normal respiratory effort. Ext: No clubbing, cyanosis or edema bilaterally. Left- radial pulse 2+,  well-healed incisions with eschar formation. Palpable thrill over fistula, good flow on doppler  Right- radial pulse 2+  Psychiatric:  Alert and oriented, thought content appropriate, normal insight.        Labs:    CBC:  Lab Results   Component Value Date/Time    WBC 5.0 02/26/2022 04:16 AM    HGB 10.1 02/26/2022 04:16 AM    HCT 30.7 02/26/2022 04:16 AM    MCV 96.5 02/26/2022 04:16 AM     02/26/2022 04:16 AM    PROTIME 11.0 02/21/2022 11:47 AM    PROTIME 12.5 08/27/2018 12:00 AM    INR 0.96 02/21/2022 11:47 AM     BMP:   Lab Results   Component Value Date/Time     06/30/2022 06:04 AM    K 4.6 06/30/2022 06:04 AM    CL 94 06/30/2022 06:04 AM    CO2 30 06/30/2022 06:04 AM    PHOS 3.7 11/25/2018 05:35 AM    BUN 11 06/30/2022 06:04 AM    CREATININE 4.2 06/30/2022 06:04 AM    MG 2.2 02/26/2022 04:16 AM       Active Problem List  Patient Active Problem List   Diagnosis    CHF (congestive heart failure) ([facility])    Precordial pain    DM type 2 causing CKD stage 5 ([facility])    Essential hypertension    CKD (chronic kidney disease) stage 4, GFR 15-29 ml/min ([facility])    Coronary artery disease of native artery of native heart with stable angina pectoris ([facility])    Anemia associated with chronic renal failure    Anemia of chronic kidney failure    Coronary atherosclerosis    Diabetes mellitus ([address]    History of CVA (cerebrovascular accident)    MI (myocardial infarction) ([address]    Cerebrovascular accident ([address]    Type 1 diabetes mellitus with hyperglycemia ([address]    Diabetes mellitus with hyperglycemia ([address]    Anemia in chronic kidney disease    Acute on chronic anemia    Bacterial pneumonia    Iron deficiency anemia    Encephalopathy    Severe malnutrition ([facility])    NANCY (acute kidney injury) ([facility])    Hypervolemia    Abnormal stress test    CAD in native artery    Acute HFrEF (heart failure with reduced ejection fraction) ([facility])    Pulmonary hypertension ([address]    Myocardiopathy ([facility])    KAUR (dyspnea on exertion)    Anginal equivalent ([facility])    Acute on chronic HFrEF (heart failure with reduced ejection fraction) ([facility])    CKD (chronic kidney disease) stage V requiring chronic dialysis ([address]    Pulmonary edema, acute, with congestive heart failure ([facility])    Metabolic acidosis    Uncontrolled type 2 diabetes mellitus with hyperglycemia ([facility])    Hx of coronary artery disease Dyslipidemia    Hypothyroidism    Hx of gastroesophageal reflux (GERD)    Chronic heart failure with preserved ejection fraction (HFpEF) ([facility])    ESRD (end stage renal disease) ([facility])    SOB (shortness of breath)    Fluid overload    Shortness of breath    NSTEMI (non-ST elevated myocardial infarction) [facility])       Assessment:  S/p Left radial-cephalic AVF      Plan 0/84/97: May use fistula for dialysis starting in 2 weeks. 2.  Follow up office visit prn    Thank you for allowing us to be involved in the patient's care.     Electronically by Ace Shannon PA-C  on 8/23/2022 at 11:46 AM

## 2022-11-15 ENCOUNTER — OFFICE VISIT (OUTPATIENT)
Dept: CARDIOTHORACIC SURGERY | Age: 57
End: 2022-11-15

## 2022-11-15 ENCOUNTER — OFFICE VISIT (OUTPATIENT)
Dept: CARDIOLOGY CLINIC | Age: 57
End: 2022-11-15
Payer: MEDICARE

## 2022-11-15 VITALS
DIASTOLIC BLOOD PRESSURE: 62 MMHG | HEIGHT: 62 IN | BODY MASS INDEX: 22.31 KG/M2 | WEIGHT: 121.2 LBS | HEART RATE: 72 BPM | SYSTOLIC BLOOD PRESSURE: 120 MMHG

## 2022-11-15 VITALS
WEIGHT: 121 LBS | DIASTOLIC BLOOD PRESSURE: 73 MMHG | HEART RATE: 87 BPM | BODY MASS INDEX: 22.26 KG/M2 | SYSTOLIC BLOOD PRESSURE: 128 MMHG | HEIGHT: 62 IN

## 2022-11-15 DIAGNOSIS — I50.33 ACUTE ON CHRONIC DIASTOLIC CONGESTIVE HEART FAILURE (HCC): ICD-10-CM

## 2022-11-15 DIAGNOSIS — Z99.2 CKD (CHRONIC KIDNEY DISEASE) STAGE V REQUIRING CHRONIC DIALYSIS (HCC): ICD-10-CM

## 2022-11-15 DIAGNOSIS — I10 ESSENTIAL HYPERTENSION: ICD-10-CM

## 2022-11-15 DIAGNOSIS — N18.6 CKD (CHRONIC KIDNEY DISEASE) STAGE V REQUIRING CHRONIC DIALYSIS (HCC): ICD-10-CM

## 2022-11-15 DIAGNOSIS — I25.10 CAD IN NATIVE ARTERY: Primary | ICD-10-CM

## 2022-11-15 DIAGNOSIS — N18.6 END STAGE RENAL DISEASE (HCC): Primary | ICD-10-CM

## 2022-11-15 DIAGNOSIS — N18.6 ESRD (END STAGE RENAL DISEASE) (HCC): ICD-10-CM

## 2022-11-15 PROCEDURE — 3017F COLORECTAL CA SCREEN DOC REV: CPT | Performed by: NURSE PRACTITIONER

## 2022-11-15 PROCEDURE — 4004F PT TOBACCO SCREEN RCVD TLK: CPT | Performed by: NURSE PRACTITIONER

## 2022-11-15 PROCEDURE — 3074F SYST BP LT 130 MM HG: CPT | Performed by: NURSE PRACTITIONER

## 2022-11-15 PROCEDURE — G8420 CALC BMI NORM PARAMETERS: HCPCS | Performed by: NURSE PRACTITIONER

## 2022-11-15 PROCEDURE — 3078F DIAST BP <80 MM HG: CPT | Performed by: NURSE PRACTITIONER

## 2022-11-15 PROCEDURE — G8427 DOCREV CUR MEDS BY ELIG CLIN: HCPCS | Performed by: NURSE PRACTITIONER

## 2022-11-15 PROCEDURE — 99213 OFFICE O/P EST LOW 20 MIN: CPT | Performed by: NURSE PRACTITIONER

## 2022-11-15 PROCEDURE — G8484 FLU IMMUNIZE NO ADMIN: HCPCS | Performed by: NURSE PRACTITIONER

## 2022-11-15 PROCEDURE — 99024 POSTOP FOLLOW-UP VISIT: CPT | Performed by: THORACIC SURGERY (CARDIOTHORACIC VASCULAR SURGERY)

## 2022-11-15 RX ORDER — BUMETANIDE 2 MG/1
2 TABLET ORAL DAILY
Qty: 90 TABLET | Refills: 3 | Status: SHIPPED
Start: 2022-11-15

## 2022-11-15 RX ORDER — LIDOCAINE AND PRILOCAINE 25; 25 MG/G; MG/G
CREAM TOPICAL
COMMUNITY
Start: 2022-09-23

## 2022-11-15 NOTE — PROGRESS NOTES
6 month follow up    EKG done 2/24/22    Patient denies cp, sob, palpitations and JINNY.     Patient c/o dizziness

## 2022-11-15 NOTE — PATIENT INSTRUCTIONS
Take the Bumex 2 mg as following: Take 1 tablet daily on non-dialysis days. Take 1/2 tablet on dialysis days. Continue to monitor your blood pressure and swelling. If no increase in swelling but blood pressure still low then:  Take 1/2 tablet on non-dialysis days and do not take the Bumex at all on dialysis days. Call with an update in 1 - 2 weeks. Continue current medications as prescribed. Follow-up with your PCP as scheduled. Follow-up with Dr. Kobi Clayton  in 6 months  as scheduled or sooner if need.

## 2022-11-15 NOTE — PROGRESS NOTES
[address] [address] [place] [address]  Dept: [phone]  Dept Fax: [phone]  Loc: [phone]    Visit Date: 11/15/2022    Dr. Brandy Valdivia    Mr. Tyler Scott is a 61 y.o. male  who presented for:  Chief Complaint   Patient presents with    6 Month Follow-Up     HPI:   HPI   Last seen in office on 5/4/22 per Dr. Brandy Valdivia. Per office note:  Ranjan Whitlock is a pleasant 61year old male patient who  has a past medical history of Broken ankle, CAD (coronary artery disease), Cerebral artery occlusion with cerebral infarction ([facility] [address] CHF (congestive heart failure) ([facility] [address] Chronic kidney disease, Diabetes mellitus ([facility] [address] GERD (gastroesophageal reflux disease), Hyperlipidemia, Hypertension, and Thyroid disease. The patient has known h/o CAD, HFpEF. He has had prior PCI of RCA. The patient underwent successful IVUS guided PCI/Stenting of RCA 10/2019. On 11/2020, he underwent RHC/LHC, patent RCA noted, FFR of LAD was negative (0.82). He states that he had \"bleeding at site of PD catheter\", anemia requiring PRBC transfusion, admitted to [address] [place] [address]. His Plavix was stopped since then, he is on ASA. EF 50-55% on Echo 2/2022 at OSH, stress test on 3/2022 revealed inferior wall infarct, no ischemia. He has a nonhealing abdominal wound, scheduled for abdominal wound exploration. Preop cardiac clearance was requested. He is now on the kidney transplant list. Low BP was noticed by patient at home. BP in office today is 107/67. He stopped taking BP medications. He walks on a regular basis. He reports bilateral leg claudications. Patient denies chest pain, shortness of breath, dyspnea on exertion.    Preoperative cardiac risk assessment   Chronic HFpEF  CAD  S/P PCI for severe RCA ISR 10/2019  60% mLAD lesion, FFR 0.82 11/2020  CKD (On PD, follows with Nephrology, being considered for Renal Transplant)  H/o blood loss anemia, PD catheter bleeding   Claudications Continue ASA  Consider resuming Plavix post surgery   Hgb 10.1  Lipitor  Hyperlipidemia: on statins, followed periodically. Patient need periodic lipid and liver profile  Imdur   The patient was instructed to check the blood pressure at home, and record the readings. Patient will call office with blood pressure readings, will adjust patient's antihypertensive medications as needed accordingly   Reports claudication, bilateral   Check REBECCA, Dopplers  No chest pain or KAUR  Good functional status   >4 METS   Has low cardiac risk     Current Outpatient Medications:     lidocaine-prilocaine (EMLA) 2.5-2.5 % cream, APPLY SMALL AMOUNT TO ACCESS SITE (AVF) 1 HOUR BEFORE DIALYSIS. COVER WITH OCCLUSIVE DRESSING (SARAN WRAP), Disp: , Rfl:     bumetanide (BUMEX) 2 MG tablet, Take 1 tablet by mouth daily Take 1 tablet daily on non-dialysis days. Take 1/2 tablet on dialysis days. , Disp: 90 tablet, Rfl: 3    clopidogrel (PLAVIX) 75 MG tablet, Take 1 tablet by mouth daily Patient not taking at this time since Sunday, Disp: 30 tablet, Rfl: 3    isosorbide mononitrate (IMDUR) 30 MG extended release tablet, Take 30 mg by mouth at bedtime , Disp: , Rfl:     insulin glargine (BASAGLAR KWIKPEN) 100 UNIT/ML injection pen, Inject 10 Units into the skin daily breakfast, Disp: , Rfl:     pantoprazole (PROTONIX) 40 MG tablet, Take 40 mg by mouth 2 times daily, Disp: , Rfl:     insulin aspart (NOVOLOG) 100 UNIT/ML injection vial, Inject into the skin 3 times daily (before meals) PT TAKES IT BASED OFF OF HIS CARBS;  1  Unit per 8 cho, Disp: , Rfl:     levothyroxine (SYNTHROID) 100 MCG tablet, Take 1 tablet by mouth Daily, Disp: 30 tablet, Rfl: 3    atorvastatin (LIPITOR) 80 MG tablet, Take 80 mg by mouth at bedtime , Disp: , Rfl:     aspirin 81 MG EC tablet, Take 81 mg by mouth daily , Disp: , Rfl:     Past Medical History  Monica Boyce  has a past medical history of Broken ankle, CAD (coronary artery disease), Cerebral artery occlusion with cerebral infarction [facility]), CHF (congestive heart failure) ([facility] [address] Chronic kidney disease, Diabetes mellitus ([facility] [address] GERD (gastroesophageal reflux disease), Hyperlipidemia, Hypertension, and Thyroid disease. Social History  Pelon Toney  reports that he has been smoking cigarettes. He has a 15.00 pack-year smoking history. He has quit using smokeless tobacco. He reports current alcohol use of about 2.0 standard drinks per week. He reports that he does not use drugs. Family History  Pelon Toney family history includes Cancer in his sister; Diabetes in his mother. He was adopted. Past Surgical History   Past Surgical History:   Procedure Laterality Date    APPENDECTOMY      CARDIAC SURGERY  Oct. 2015    2 stents placed    COLONOSCOPY      DIAGNOSTIC CARDIAC CATH LAB PROCEDURE  11/05/2020    DIALYSIS FISTULA CREATION Left 6/30/2022    Left Forearm AV Fistula Creation performed by Oracio Bliss MD at [address] [place] [address] N/A 5/5/2022    WOUND EXPLORATION performed by Burt Ramirez MD at [address] [place] [address] visit:   Subjective:  BP remains on low side  Having to have fluid post HD prior to leaving office  Feb - had bleeding from HD catheter removal site - had to be transferred to [facility]; no bleeding issues since  Has not been taking Hydralazine since BP lower  No chest pain or -pressure  No breathing issues  Lightheaded and dizziness - especially on HD days  No claudication sx - tires easily overall since HD patient    Review of Systems   Constitutional: Negative for chills and fever  HENT: Negative for congestion, sinus pressure, sneezing and sore throat. Eyes: Negative for pain, discharge, redness and itching. Respiratory: Negative for PND, orthopnea, cough  Gastrointestinal: Negative for blood in stool, constipation, diarrhea   Endocrine: Negative for cold intolerance, heat intolerance, polydipsia. Genitourinary: Negative for dysuria, hematuria.    Musculoskeletal: Negative for arthralgias, joint swelling and neck pain. Neurological: Negative for numbness and headaches. Psychiatric/Behavioral: Negative for agitation, confusion, decreased concentration and dysphoric mood. Objective:     /62   Pulse 72   Ht 5' 2\" (1.575 m)   Wt 121 lb 3.2 oz (55 kg)   BMI 22.17 kg/m²     Wt Readings from Last 3 Encounters:   11/29/22 126 lb (57.2 kg)   11/15/22 121 lb 3.2 oz (55 kg)   11/15/22 121 lb (54.9 kg)     BP Readings from Last 3 Encounters:   11/29/22 (!) 145/66   11/15/22 120/62   11/15/22 128/73       Nursing note and vitals reviewed. Physical Exam   Constitutional: Oriented to person, place, and time. Appears well-developed and well-nourished. ENT: Moist mucous membranes. No bleeding. Tongue is midline. Head: Normocephalic and atraumatic. Eyes: EOM are normal. Pupils are equal, round, and reactive to light. Neck: Normal range of motion. Neck supple. No JVD present. Cardiovascular: Normal rate, regular rhythm, no murmur, no rubs, and intact distal pulses. Pulmonary/Chest: Effort normal and breath sounds normal. No respiratory distress. No wheezes. No rales. Abdominal: Soft. Bowel sounds are normal. No distension. There is no tenderness. Musculoskeletal: Normal range of motion. no edema. Neurological: Alert and oriented to person, place, and time. No cranial nerve deficit. Coordination normal.   Skin: Skin is warm and dry. Psychiatric: Normal mood and affect.        Lab Results   Component Value Date/Time    CKTOTAL 132 04/09/2016 03:15 AM       Lab Results   Component Value Date/Time    WBC 5.0 02/26/2022 04:16 AM    RBC 3.18 02/26/2022 04:16 AM    HGB 10.1 02/26/2022 04:16 AM    HCT 30.7 02/26/2022 04:16 AM    MCV 96.5 02/26/2022 04:16 AM    MCH 31.8 02/26/2022 04:16 AM    MCHC 32.9 02/26/2022 04:16 AM    RDW 16.2 04/09/2016 03:15 AM     02/26/2022 04:16 AM    MPV 10.0 02/26/2022 04:16 AM       Lab Results   Component Value Date/Time     06/30/2022 06:04 AM K 4.6 2022 06:04 AM    CL 94 2022 06:04 AM    CO2 30 2022 06:04 AM    BUN 11 2022 06:04 AM    LABALBU 3.2 2022 04:16 AM    CREATININE 4.2 2022 06:04 AM    CALCIUM 10.1 2022 06:04 AM    LABGLOM 15 2022 06:04 AM    GLUCOSE 203 2022 06:04 AM       Lab Results   Component Value Date/Time    ALKPHOS 98 2022 04:16 AM    ALT 13 2022 04:16 AM    AST 20 2022 04:16 AM    PROT 6.2 2022 04:16 AM    BILITOT 0.5 2022 04:16 AM    BILIDIR 0.3 04/10/2019 08:59 PM    LABALBU 3.2 2022 04:16 AM       Lab Results   Component Value Date/Time    MG 2.2 2022 04:16 AM       Lab Results   Component Value Date    INR 0.96 2022    INR 0.86 2021    INR 0.91 2020    PROTIME 11.0 2022    PROTIME 12.5 2018         Lab Results   Component Value Date/Time    LABA1C 7.8 2022 01:37 PM       Lab Results   Component Value Date/Time    TRIG 166 10/12/2019 12:00 AM    HDL 56 10/12/2019 12:00 AM    LDLCALC 59 10/12/2019 12:00 AM       Lab Results   Component Value Date/Time    .400 2019 02:58 PM         Testing Reviewed:      I have individually reviewed the cardiac test below:    EK22  EKG 12 Lead  Order: [id number]  Status: Final result    Visible to patient: No (not released)    Next appt: 12/15/2022 at 09:45 AM in Cardiothoracic Surgery Mayte Urban MD)    0 Result Notes  Component Ref Range & Units 22 0953 21 0900 20 0551 10/15/19 2021 4/10/19 0219 18 1824 16 0556   Ventricular Rate BPM 78  60  58  73  60  76  66    Atrial Rate BPM 78  60  58  73  60  76  66    P-R Interval ms 156  142  160  164  152  148  146    QRS Duration ms 90  86  90  94  82  86  92    Q-T Interval ms 430  496  474  426  468  416  432    QTc Calculation (Bazett) ms 490  496  465  469  468  468  452    P Axis degrees 39  39  48  41  34  40  38    R Axis degrees 48  19  54  25  46  47  44    T Axis degrees -122  -140  -101  -112  -90  -101  -147    Resulting Agency  [address] [facility] [address] [facility] [address] [facility] [address] [facility] [address] [facility] [address] [facility] [address] [facility]           Narrative & Impression    Normal sinus rhythm  Possible Left atrial enlargement  ST & Marked T wave abnormality, consider anterolateral ischemia  Prolonged QT interval or tu fusion, consider myocardial disease, electrolyte imbalance, or drug effects  Abnormal ECG  When compared with ECG of 30-JUN-2021 09:00,  No significant change was found  Confirmed by [facility] MD, [id number] [facility] ([id number]) on 2/25/2022 5:26:25 AM           ECHO:  10/16/20  Summary  Left ventricle size is normal.  Mild concentric left ventricular hypertrophy. There was mild global hypokinesis of the left ventricle. Ejection fraction is visually estimated in the range of 40% to 45%. Mildly enlarged right atrium size. Structurally normal mitral valve. Mild mitral regurgitation is present. Tricuspid valve is structurally normal.  Mild tricuspid regurgitation. Right ventricular systolic pressure of 50 mmHg consistent with moderate  pulmonary hypertension. Pulmonic valve is structurally normal.  Mild pulmonic regurgitation visualized. Dilated IVC with poor inspiration collapse consistent with elevated right  atrial pressure. Signature  ----------------------------------------------------------------  Electronically signed by Kath Goodson MD (Interpreting  physician) on 10/16/2020 at 04:18 PM     TTE 02/2022 02/11/2022 2:31 PM EST    · Left Ventricle: Chamber size is normal. Severe concentric hypertrophy. Regional wall motion is normal. Ejection fraction is low normal (50-55%). · No ALLAN seen  · Diastolic function is consistent with pseudonormalization (grade II). · Right Ventricle: Chamber size is normal. Systolic function is normal.  · Left Atrium: Chamber size is mildly enlarged. · No significant valvular abnormality seen  · Trivial pericardial effusion.   Left Ventricle  Chamber size is normal. Severe concentric hypertrophy. Regional wall motion is normal. Ejection fraction is low normal (50-55%). Diastolic function is consistent with pseudonormalization (grade II). Right Ventricle  Chamber size is normal. Systolic function is normal.    Left Atrium  Chamber size is mildly enlarged. Right Atrium  Chamber size is normal.    IVC/SVC  The inferior vena cava structure has a diameter <21 mm and decreases >50% during inspiration. Mitral Valve  Normal appearing leaflets. Leaflet mobility is normal. Trace regurgitation. No valve stenosis. Tricuspid Valve  Normal leaflets. Leaflet mobility is normal. Trace regurgitation. No stenosis. Aortic Valve  Trileaflet valve. Leaflet mobility is normal. No regurgitation. No stenosis. Pulmonic Valve  Normal structure. Trace regurgitation. No stenosis. Pericardium  Appears normal. Trivial pericardial effusion. Septum  The atrial septum is normal.    Aorta  No dilation to extent seen. Study Details  Study(s) performed: complete. Overall study quality was fair. Imaging system used: Mc4. Cath:10/2019:  HEMODYNAMICS:  1.  RA 20 mmHg. 2.  RV 78/11 with a mean of 25.  3.  Pulmonary capillary wedge pressure, 21 mmHg. 4.  Pulmonary arterial pressure 80/33 with a mean of 49.  5. LVEDP 29 mmHg. 6.  Cardiac output 3.94 liters per minute. 7.  Cardiac index 2.5 liters/minute per cubic meter. CORONARY ANGIOGRAM:  1. Right coronary artery, 70% in-stent restenosis in the proximal RCA. 90 to 95% in-stent restenosis in the mid RCA. Distal RCA has mild  diffuse disease with patent intervention site. RCA bifurcates into RPDA  and RPL. RPDA has mild diffuse disease. There is a 30 to 40% lesion in  the proximal part of RPL and then another 30% lesion at the mid part of  the RPL. 2.  Left main coronary artery patent. No obstruction. Bifurcates into  LAD and LCX. 3.  LCX:  No significant stenosis in the proximal segment.   Distal LCX  has mild diffuse disease. 4. LAD. There is mild diffuse disease in the proximal LAD. LAD gives  moderate to large size first diagonal branch, which shows a branching  vessel with mild diffuse disease. In the area after D1 in the mid LAD,  there is a 50% long stenotic segment, consistent with intermediate mid  LAD disease. Distal LAD has mild diffuse disease. CONCLUSION:  1. Severe in-stent restenosis of mid RCA with neoatherosclerosis, 90%  lesion. Status post successful PCI with drug-eluting stent placement. Xience 3.0 mm x 15 mm stent was deployed. 2.  70% proximal RCA in-stent restenosis. IVUS findings are consistent  with significantly under-expanded previously placed stent. Successful  PTCA with improvement  of lesion, down to residual around to 30%. 3.  Elevated filling pressures. 4.  Moderate pulmonary venous hypertension. PLAN:  1. The patient will be admitted to the hospital for overnight  observation. 2.  Preloaded with Plavix. 3.  Continue Plavix 75 mg p.o. daily. 4.  Continue aspirin 81 mg p.o. daily. 5.  We will give one dose of Bumex 2 mg IV x1.  6.  Resume p.o. Bumex at home dose. 7.  The patient will follow up with Nephrology for further evaluation of  his chronic kidney disease and volume overload as evidenced on this  study. 8.  Aggressive risk factor modification and medical management for CAD. 9.  Continue statin therapy. CATH, FFR LAD 11/5/2020  CORONARY ANGIOGRAM:  1. Right coronary artery:  Right coronary artery is a large dominant  vessel. The proximal RCA had 30% in-stent restenosis that is stable  when compared to previous cath. The stent in the mid-RCA is widely  patent. Distal RCA has mild luminal irregularities. The right  posterior descending artery has 10% to 20% stenosis. The right  posterolateral branch is a large vessel with multiple branches. There  is evidence for about 60% stenosis in the RPL _____.   2.  Left main coronary artery: Mild luminal irregularities, otherwise  patent. No significant stenosis. Bifurcates into LAD and LCX. 3.  Left circumflex artery:  Mild luminal irregularities noted in left  circumflex artery and obtuse marginal branches. No significant stenotic  lesions. 4.  Left anterior descending artery:  Proximal LAD is patent. The  mid-segment of the left anterior descending artery has a long 50% to 60%  tubular lesion. D1 has mild diffuse disease. Distal LAD with mild  diffuse disease. RIGHT HEART CATHETERIZATION FINDINGS/HEMODYNAMICS:  1. The pulmonary artery oxygen saturation was 60%. 2.  Pulmonary arterial pressure was 81/32 with a mean pulmonary arterial  pressure of 49 mmHg. Pulmonary capillary wedge pressure was 32 mmHg. The right ventricular pressure was 80/13. Left ventricular  end-diastolic pressure was elevated at 33 mmHg. Cardiac output was 4.4  L/min. Cardiac index was 2.8 L/min x m2. MEDICATIONS:  See MAR. COMPLICATIONS:  None. ESTIMATED BLOOD LOSS:  Less than 50 mL. ACCESS:  Right brachial vein access for right heart catheterization. Right radial artery access for left heart catheterization. IMPRESSION:  1. Patent RCA stents, only mild in-stent restenosis of proximal RCA,  stable from previous study. 2.  Intermediate mid left anterior descending artery lesion 60%. Hemodynamically insignificant by FFR. FFR was 0.88 at baseline, final  FFR post adenosine infusion was 0.82.  3.  Acute-on-chronic congestive heart failure with reduced ejection  fraction, decompensated. 4.  Severe volume overload. 5.  Elevated filling pressures. 6.  Chronic kidney disease. 7.  Moderate pulmonary venous hypertension. PLAN:  1. Continue medical therapy and aggressive risk factor modification for  coronary artery disease. No intervention is indicated. 2.  Optimize medical therapy for congestive heart failure. 3.  The patient needs optimization of his volume status.   4.  He was given one dose of Lasix 80 mg IV x1 in the cath lab. 5.  Admit the patient to telemetry. 6.  The patient is currently on peritoneal dialysis. 7.  Start IV diuretics. 8.  Consult Nephrology for further management of volume status,  diuretics and peritoneal dialysis settings    Stress Test:03/2022 (OSH)  03/15/2022 6:09 PM EDT    · Stress myocardial perfusion scan is abnormal.  · No evidence of ischemia. · There is a medium sized, moderate severity, fixed defect involving the   inferior wall and adjacent infero-lateral walls. This does not resolve   completely with prone imaging. · This suggests inferior wall infarction  · Mildly reduced left ventricular systolic function ( LVEF 84% ). · The pharmacological stress ECG was indeterminate due to resting T wave   changes inferolaterally. AssessmentPlan:   Jesus Baldwin is a pleasant 61year old male patient who  has a past medical history of Broken ankle, CAD (coronary artery disease), Cerebral artery occlusion with cerebral infarction ([address] CHF (congestive heart failure) ([address] Chronic kidney disease, Diabetes mellitus ([address] GERD (gastroesophageal reflux disease), Hyperlipidemia, Hypertension, and Thyroid disease. The patient has known h/o CAD, HFpEF. He has had prior PCI of RCA. The patient underwent successful IVUS guided PCI/Stenting of RCA 10/2019. On 11/2020, he underwent RHC/LHC, patent RCA noted, FFR of LAD was negative (0.82). He states that he had \"bleeding at site of PD catheter\", anemia requiring PRBC transfusion, admitted to [address] [place] [address]. His Plavix was stopped since then, he is on ASA. EF 50-55% on Echo 2/2022 at OSH, stress test on 3/2022 revealed inferior wall infarct, no ischemia. He has a nonhealing abdominal wound, scheduled for abdominal wound exploration. Preop cardiac clearance was requested. He is now on the kidney transplant list. Low BP was noticed by patient at home. BP in office today is 107/67. He stopped taking BP medications.  He walks on a regular basis. He reports bilateral leg claudications. Patient denies chest pain, shortness of breath, dyspnea on exertion. Chronic HFpEF  CAD  S/P PCI for severe RCA ISR 10/2019  60% mLAD lesion, FFR 0.82 11/2020  CKD (On PD, follows with Nephrology, being considered for Renal Transplant)  H/o blood loss anemia, PD catheter bleeding   Claudications     Overall doing well - BP running lower - off Hydralazine. Still low post HD requiring some volume administration post HD. Likely too dry on Bumex. Adjust as outlined below:  Take the Bumex 2 mg as following: Take 1 tablet daily on non-dialysis days. Take 1/2 tablet on dialysis days. Continue to monitor your blood pressure and swelling. If no increase in swelling but blood pressure still low then:  Take 1/2 tablet on non-dialysis days and do not take the Bumex at all on dialysis days. Call with an update in 1 - 2 weeks. Continue current medications as prescribed. Follow-up with your PCP as scheduled. Follow-up with Dr. Heide Alcantara  in 6 months  as scheduled or sooner if need. Above findings and plan of care were discussed with patient in details, patient's questions were answered.

## 2022-11-15 NOTE — PROGRESS NOTES
Patient presents in office today for a follow up. States he is having issues with his fistula. Patient did not bring medications to this visit. Patient verified medications verbally.

## 2022-11-16 ENCOUNTER — TELEPHONE (OUTPATIENT)
Dept: CARDIOTHORACIC SURGERY | Age: 57
End: 2022-11-16

## 2022-11-29 ENCOUNTER — HOSPITAL ENCOUNTER (OUTPATIENT)
Dept: INTERVENTIONAL RADIOLOGY/VASCULAR | Age: 57
Discharge: HOME | End: 2022-11-29
Payer: COMMERCIAL

## 2022-11-29 VITALS
HEART RATE: 84 BPM | TEMPERATURE: 97.6 F | WEIGHT: 126 LBS | OXYGEN SATURATION: 95 % | SYSTOLIC BLOOD PRESSURE: 145 MMHG | HEIGHT: 62 IN | DIASTOLIC BLOOD PRESSURE: 66 MMHG | BODY MASS INDEX: 23.19 KG/M2 | RESPIRATION RATE: 16 BRPM

## 2022-11-29 DIAGNOSIS — N18.6 END STAGE RENAL DISEASE (HCC): ICD-10-CM

## 2022-11-29 PROCEDURE — 2709999900 IR FISTULAGRAM

## 2022-11-29 PROCEDURE — 6360000004 HC RX CONTRAST MEDICATION: Performed by: RADIOLOGY

## 2022-11-29 PROCEDURE — 2500000003 HC RX 250 WO HCPCS: Performed by: RADIOLOGY

## 2022-11-29 PROCEDURE — 6360000002 HC RX W HCPCS: Performed by: RADIOLOGY

## 2022-11-29 PROCEDURE — 6370000000 HC RX 637 (ALT 250 FOR IP): Performed by: RADIOLOGY

## 2022-11-29 PROCEDURE — 2580000003 HC RX 258: Performed by: RADIOLOGY

## 2022-11-29 PROCEDURE — 36902 INTRO CATH DIALYSIS CIRCUIT: CPT

## 2022-11-29 RX ORDER — CLINDAMYCIN PHOSPHATE 600 MG/50ML
600 INJECTION INTRAVENOUS
Status: COMPLETED | OUTPATIENT
Start: 2022-11-29 | End: 2022-11-29

## 2022-11-29 RX ORDER — HEPARIN SODIUM 1000 [USP'U]/ML
INJECTION, SOLUTION INTRAVENOUS; SUBCUTANEOUS
Status: COMPLETED | OUTPATIENT
Start: 2022-11-29 | End: 2022-11-29

## 2022-11-29 RX ORDER — MIDAZOLAM HYDROCHLORIDE 1 MG/ML
INJECTION INTRAMUSCULAR; INTRAVENOUS
Status: COMPLETED | OUTPATIENT
Start: 2022-11-29 | End: 2022-11-29

## 2022-11-29 RX ORDER — SODIUM CHLORIDE 450 MG/100ML
INJECTION, SOLUTION INTRAVENOUS CONTINUOUS
Status: DISCONTINUED | OUTPATIENT
Start: 2022-11-29 | End: 2022-11-30 | Stop reason: HOSPADM

## 2022-11-29 RX ORDER — LIDOCAINE 40 MG/G
CREAM TOPICAL ONCE
Status: COMPLETED | OUTPATIENT
Start: 2022-11-29 | End: 2022-11-29

## 2022-11-29 RX ORDER — MIDAZOLAM HYDROCHLORIDE 1 MG/ML
1 INJECTION INTRAMUSCULAR; INTRAVENOUS ONCE
Status: DISCONTINUED | OUTPATIENT
Start: 2022-11-29 | End: 2022-11-30 | Stop reason: HOSPADM

## 2022-11-29 RX ADMIN — SODIUM CHLORIDE: 4.5 INJECTION, SOLUTION INTRAVENOUS at 09:37

## 2022-11-29 RX ADMIN — HYDROMORPHONE HYDROCHLORIDE 0.5 MG: 1 INJECTION, SOLUTION INTRAMUSCULAR; INTRAVENOUS; SUBCUTANEOUS at 10:45

## 2022-11-29 RX ADMIN — CLINDAMYCIN IN 5 PERCENT DEXTROSE 600 MG: 12 INJECTION, SOLUTION INTRAVENOUS at 09:37

## 2022-11-29 RX ADMIN — HEPARIN SODIUM 2000 UNITS: 1000 INJECTION INTRAVENOUS; SUBCUTANEOUS at 10:53

## 2022-11-29 RX ADMIN — HYDROMORPHONE HYDROCHLORIDE 0.5 MG: 1 INJECTION, SOLUTION INTRAMUSCULAR; INTRAVENOUS; SUBCUTANEOUS at 11:27

## 2022-11-29 RX ADMIN — MIDAZOLAM 0.5 MG: 1 INJECTION INTRAMUSCULAR; INTRAVENOUS at 11:26

## 2022-11-29 RX ADMIN — MIDAZOLAM 1 MG: 1 INJECTION INTRAMUSCULAR; INTRAVENOUS at 10:35

## 2022-11-29 RX ADMIN — LIDOCAINE 4%: 4 CREAM TOPICAL at 09:39

## 2022-11-29 RX ADMIN — IOPAMIDOL 50 ML: 612 INJECTION, SOLUTION INTRAVENOUS at 11:50

## 2022-11-29 RX ADMIN — HYDROMORPHONE HYDROCHLORIDE 1 MG: 1 INJECTION, SOLUTION INTRAMUSCULAR; INTRAVENOUS; SUBCUTANEOUS at 10:35

## 2022-11-29 RX ADMIN — MIDAZOLAM 0.5 MG: 1 INJECTION INTRAMUSCULAR; INTRAVENOUS at 10:44

## 2022-11-29 RX ADMIN — HEPARIN SODIUM 1000 UNITS: 1000 INJECTION INTRAVENOUS; SUBCUTANEOUS at 11:27

## 2022-11-29 ASSESSMENT — PAIN - FUNCTIONAL ASSESSMENT: PAIN_FUNCTIONAL_ASSESSMENT: NONE - DENIES PAIN

## 2022-11-29 NOTE — H&P
Formulation and discussion of sedation / procedure plans, risks, benefits, side effects and alternatives with patient and/or responsible adult completed. History and Physical reviewed and unchanged.     Electronically signed by Preston Cedeno MD on 11/29/22 at 9:50 AM EST

## 2022-11-29 NOTE — DISCHARGE INSTRUCTIONS
SEDATION/ANALGESIA INFORMATION HOME GOING ADVICE    Review the following information with the patient prior to the procedure. Sedation/agalgesia is used during short medical procedures under controlled supervision. The medication will produce a strong relaxation. You will be able to hear, speak and follow instructions, but you memory and alertness will be decreased. You will be able to swallow and breathe on your own. During sedation/analgesia you blood pressure, hear and breathing will be watched closely. After the procedure, you may not remember what was said or done. Procedure: fistulagram       Date: 11/29/22  You may have the following effects from the medication. Drowsiness, dizziness, sleepiness or confusion. Difficulty remembering or delayed reaction times. Loss of fine muscle control or difficulty with your balance especially while walking. Difficulty focusing or blurred vision. You may not be aware of slight changes in your behavior and/or your reaction time because of the medication used during the procedure. Therefore you should follow these instructions. Have someone responsible help you with your care. Do not drive for 24 hours. Do not operate equipment for 24 hours (lawnmowers, power tools, kitchen accessories, stove). Do not drink any alcoholic beverages for a minimum of 24 hours. Do not make important personal, legal or business decisions for 24 hours. You may experience dizziness or lightheadedness. Move slowly and carefully, do not make sudden position changes. Drink extra amounts of fluids today. Increase your diet as tolerated (unless you have received specific instructions from your doctor). If you feel nauseated, continue with liquids until nausea is gone  Notify your physician if you have not urinated within 8 hours after the procedure. Resume your medications unless otherwise instructed. contact your physician if you have any questions or concerns.   I have been informed and understand how to care for myself/the patient at home. i know who to call if Skyler Gonzalese question or concerns. The adult responsible for my discharge care is: dad        You have received the sedation/analgesia medications/s: dilaudid, versed        IF YOU REPORT TO AN EMERGENCY ROOM, DOCTOR'S OFFICE OR HOSPITAL WITHIN 24 HRS AFTER PROCEDURE, BRING THIS SHEET WITH YOU AND GIVE IT THE PHYSICIAN OR NURSE ATTENDING YOU.   KEEP DRESSING CLEAN AND DRY UNTIL DIALYSIS APPOINTMENT. IF SEVERE BLEEDING, PAIN OR SWELLING OCCURS, GO TO EMERGENCY ROOM.

## 2022-11-29 NOTE — OP NOTE
Department of Radiology  Post Procedure Progress Note      Pre-Procedure Diagnosis: Malfunctioning dialysis fistula/graft     Procedure Performed:  Dialysis fistulagram with angioplasty     Anesthesia: local / versed and dilaudid    Findings: successful    Immediate Complications:  None    Estimated Blood Loss: minimal    SEE DICTATED PROCEDURE NOTE FOR COMPLETE DETAILS.     Clarissa Gonsalves MD   11/29/2022 11:58 AM

## 2022-11-29 NOTE — H&P
[address] [address]  Sedation/Analgesia History & Physical    Pt Name: Smiley Moreno  MRN: [id number]  YOB: 1965  Provider Performing Procedure: Roberta Wellington MD, MD  Primary Care Physician: PAOLA López CNP    Formulation and discussion of sedation / procedure plans, risks, benefits, side effects and alternatives with patient and/or responsible adult completed. PRE-PROCEDURE   DNR-CCA/DNR-CC []Yes [x]No  Brief History/Pre-Procedure Diagnosis: Malfunctioning dialysis fistula/graft           MEDICAL HISTORY  []CAD/Valve  []Liver Disease  []Lung Disease []Diabetes  []Hypertension []Renal Disease  []Additional information:       has a past medical history of Broken ankle, CAD (coronary artery disease), Cerebral artery occlusion with cerebral infarction ([facility] [address] CHF (congestive heart failure) ([facility] [address] Chronic kidney disease, Diabetes mellitus ([facility] [address] GERD (gastroesophageal reflux disease), Hyperlipidemia, Hypertension, and Thyroid disease. SURGICAL HISTORY   has a past surgical history that includes Appendectomy; Cardiac surgery (Oct. 2015); Colonoscopy; eye surgery; Diagnostic Cardiac Cath Lab Procedure (11/05/2020); Wound Exploration (N/A, 5/5/2022); and Dialysis fistula creation (Left, 6/30/2022). Additional information:       ALLERGIES   Allergies as of 11/29/2022 - Fully Reviewed 11/29/2022   Allergen Reaction Noted    Aranesp (albumin free) [darbepoetin eri] Hives 12/19/2018     Additional information:       MEDICATIONS   Coumadin Use Last 5 Days [x]No []Yes  Antiplatelet drug therapy use last 5 days  []No [x]Yes  Other anticoagulant use last 5 days  [x]No []Yes    Current Outpatient Medications:     lidocaine-prilocaine (EMLA) 2.5-2.5 % cream, APPLY SMALL AMOUNT TO ACCESS SITE (AVF) 1 HOUR BEFORE DIALYSIS. COVER WITH OCCLUSIVE DRESSING (SARAN WRAP), Disp: , Rfl:     bumetanide (BUMEX) 2 MG tablet, Take 1 tablet by mouth daily Take 1 tablet daily on non-dialysis days.  Take 1/2 tablet on dialysis days. , Disp: 90 tablet, Rfl: 3    clopidogrel (PLAVIX) 75 MG tablet, Take 1 tablet by mouth daily Patient not taking at this time since Sunday, Disp: 30 tablet, Rfl: 3    isosorbide mononitrate (IMDUR) 30 MG extended release tablet, Take 30 mg by mouth at bedtime , Disp: , Rfl:     insulin glargine (BASAGLAR KWIKPEN) 100 UNIT/ML injection pen, Inject 10 Units into the skin daily breakfast, Disp: , Rfl:     pantoprazole (PROTONIX) 40 MG tablet, Take 40 mg by mouth 2 times daily, Disp: , Rfl:     insulin aspart (NOVOLOG) 100 UNIT/ML injection vial, Inject into the skin 3 times daily (before meals) PT TAKES IT BASED OFF OF HIS CARBS;  1  Unit per 8 cho, Disp: , Rfl:     levothyroxine (SYNTHROID) 100 MCG tablet, Take 1 tablet by mouth Daily, Disp: 30 tablet, Rfl: 3    atorvastatin (LIPITOR) 80 MG tablet, Take 80 mg by mouth at bedtime , Disp: , Rfl:     aspirin 81 MG EC tablet, Take 81 mg by mouth daily , Disp: , Rfl:     Current Facility-Administered Medications:     0.45 % sodium chloride infusion, , IntraVENous, Continuous, Audrey Augustin MD, Last Rate: 20 mL/hr at 11/29/22 0937, New Bag at 11/29/22 0937    clindamycin (CLEOCIN) 600 mg in dextrose 5 % 50 mL IVPB, 600 mg, IntraVENous, 60 Min Pre-Op, Audrey Augustin MD, Last Rate: 100 mL/hr at 11/29/22 0937, 600 mg at 11/29/22 0937    HYDROmorphone (DILAUDID) injection 1 mg, 1 mg, IntraVENous, Once, Audrey Augustin MD    midazolam (VERSED) injection 1 mg, 1 mg, IntraVENous, Once, Audrey Augustin MD  Prior to Admission medications    Medication Sig Start Date End Date Taking? Authorizing Provider   lidocaine-prilocaine (EMLA) 2.5-2.5 % cream APPLY SMALL AMOUNT TO ACCESS SITE (AVF) 1 HOUR BEFORE DIALYSIS. COVER WITH OCCLUSIVE DRESSING (SARAN WRAP) 9/23/22   Historical Provider, MD   bumetanide (BUMEX) 2 MG tablet Take 1 tablet by mouth daily Take 1 tablet daily on non-dialysis days. Take 1/2 tablet on dialysis days.  11/15/22   Veronica Aiken APRN - CNP   clopidogrel (PLAVIX) 75 MG tablet Take 1 tablet by mouth daily Patient not taking at this time since Sunday 7/1/22   Misael Ugalde PA-C   isosorbide mononitrate (IMDUR) 30 MG extended release tablet Take 30 mg by mouth at bedtime     Historical Provider, MD   insulin glargine (BASAGLAR KWIKPEN) 100 UNIT/ML injection pen Inject 10 Units into the skin daily breakfast    Historical Provider, MD   pantoprazole (PROTONIX) 40 MG tablet Take 40 mg by mouth 2 times daily    Historical Provider, MD   insulin aspart (NOVOLOG) 100 UNIT/ML injection vial Inject into the skin 3 times daily (before meals) PT TAKES IT BASED OFF OF HIS CARBS;  1  Unit per 8 cho    Historical Provider, MD   levothyroxine (SYNTHROID) 100 MCG tablet Take 1 tablet by mouth Daily 4/19/19   Linda Mosley MD   atorvastatin (LIPITOR) 80 MG tablet Take 80 mg by mouth at bedtime     Historical Provider, MD   aspirin 81 MG EC tablet Take 81 mg by mouth daily     Historical Provider, MD     Additional information:       VITAL SIGNS   Vitals:    11/29/22 0926   BP: (!) 141/69   Pulse: 85   Resp: 18   Temp: 97.6 °F (36.4 °C)   SpO2: 96%       PHYSICAL:   Heart:  [x]Regular rate and rhythm  []Other:    Lungs:  [x]Clear    []Other:    Abdomen: [x]Soft    []Other:    Mental Status: [x]Alert & Oriented  []Other:      PLANNED PROCEDURE   []Biospy []Arteriogram              []Drainage   []Mediport Insertion  [x]Fistulogram []IV access       []Vertebroplasty / Augmentation  []IVC filter []Dialysis catheter []Biliary drainage  []Other: []CAPD Catheter []Nephrostomy Tube / Stent  SEDATION  Planned agent:[x]Midazolam []Meperidine [x]Sublimaze []Dilaudid []Morphine     []Diazepam  []Other:     ASA Classification:  []1 []2 []3 []4 []5  Class 1: A normal healthy patient  Class 2: Pt with mild to moderate systemic disease  Class 3: Severe systemic disease or disturbance  Class 4: Severe systemic disorders that are already life threatening.   Class 5: Moribund pt with

## 2022-11-29 NOTE — PROGRESS NOTES
6203 Patient arrived to [facility] ambulatory for fistulagram. Oriented to room and call light  PT RIGHTS AND RESPONSIBILITIES OFFERED TO PT. He states his dad is driving, he has been NPO, and he has held his blood thinners since 11/24/22.      0937 antibiotic started and he denies complaints. 1215 Patient returned to [facility]. Vasc bands are intact. No drainage noted. Patient denies numbness. Food and drink provided. 1230 vasc bands are intact. No drainage noted. Patient denies numbness. Tolerating food and drink    1245 vasc bands intact. 2ml air released from both bands. No drainage noted. Patient denies numbness    1300 vasc bands intact. 2ml air released from both bands. No drainage noted. Patient denies numbness    1315 vasc bands intact. 2 ml air released from both band. All air is now removed from distal band. Band removed and dressing placed. Patient denies numbness. 1330 dressing is clean, dry, intact. 2 ml air removed from remaining vasc band. All air is out. Dressing placed. No drainage noted. Patient denies numbness     1345 dressings are clean, dry, intact. Patient denies complaints    1350 spoke with  Dr. Francia Urena. Updated him on patient status. He stated patient can be discharged at 1400 if everything is well. 1400 dressings are clean, dry, intact. Patient denies complaints. Iv removed. Discharge instructions given and explained and he denies questions.   Discharged in wheel chair     __M__ Safety:       (Environmental)  [place] to environment  Ensure ID band is correct and in place/ allergy band as needed  Assess for fall risk  Initiate fall precautions as applicable (fall band, side rails, etc.)  Call light within reach  Bed in low position/ wheels locked    _M___ Pain:       Assess pain level and characteristics  Administer analgesics as ordered  Assess effectiveness of pain management and report to MD as needed    _M___ Knowledge Deficit:  Assess baseline knowledge  Provide teaching at level of understanding  Provide teaching via preferred learning method  Evaluate teaching effectiveness    __M__ Hemodynamic/Respiratory Status:       (Pre and Post Procedure Monitoring)  Assess/Monitor vital signs and LOC  Assess Baseline SpO2 prior to any sedation  Obtain weight/height  Assess vital signs/ LOC until patient meets discharge criteria  Monitor procedure site and notify MD of any issues

## 2022-11-29 NOTE — PROGRESS NOTES
1010 Patient received in IR for fistulogram.   1015 This procedure has been fully reviewed with the patient and written informed consent has been obtained. 1030 Procedure started with Dr. James Moscoso. 1038 Access to vein with use of sonosite. 1052 Angioplasty of the run off vein with a [place] 4 x 40 balloon. 1107 Acces to distal vein with use of sonosite. 1126 Angioplasty of the cephalic run off vein with an 0.35 [place] 4 x 40 balloon  1141 Angioplasty of the run off vein with an 0.35 [place] 6 x 40 balloon. 1150 Procedure completed; patient tolerated well. 1155 Proximal venous access sheath removed and vasc band applied with 7mmHg of air. 1157 Distal venous access sheath removed and vasc band applied with 10mmHg of air.   1205 Patient on cart; comfort ensured. Down to 5mmHg of air in proximal vasc band and 8mmHg of air in distal vasc band. 91 21 06 Patient taken to OPN via cart.

## 2022-11-30 ENCOUNTER — TELEPHONE (OUTPATIENT)
Dept: CARDIOTHORACIC SURGERY | Age: 57
End: 2022-11-30

## 2022-11-30 NOTE — TELEPHONE ENCOUNTER
Otelia Query completed fistulagram yesterday. No follow up appt scheduled at this time. Please review and advise.

## 2022-12-15 ENCOUNTER — OFFICE VISIT (OUTPATIENT)
Dept: CARDIOTHORACIC SURGERY | Age: 57
End: 2022-12-15

## 2022-12-15 VITALS
HEIGHT: 62 IN | DIASTOLIC BLOOD PRESSURE: 76 MMHG | SYSTOLIC BLOOD PRESSURE: 135 MMHG | HEART RATE: 87 BPM | BODY MASS INDEX: 23.19 KG/M2 | WEIGHT: 126 LBS

## 2022-12-15 DIAGNOSIS — N18.6 END STAGE RENAL DISEASE (HCC): Primary | ICD-10-CM

## 2022-12-15 PROCEDURE — 99024 POSTOP FOLLOW-UP VISIT: CPT | Performed by: THORACIC SURGERY (CARDIOTHORACIC VASCULAR SURGERY)

## 2022-12-15 NOTE — PROGRESS NOTES
Latricia Weeks returns to our cardiovascular surgery clinic for follow-up. I\He is status post left forearm AV fistula on 6/30/2022. He then underwent fistulogram and balloon angioplasty on 11/29/2022 by interventional radiologist.    Skin examination: An ultrasound examination was performed at the time of this office visit. There is a 10 cm long matured AV fistula with 5 to 6 mm in diameter the left forearm. Recommendation: Follow-up in 4 weeks for final evaluation.     Jennifer Lynn MD

## 2022-12-15 NOTE — PATIENT INSTRUCTIONS
If you receive a survey asking about your care experience, please respond. Your answers will help ensure you receive high-quality care at this office. Thank you!

## 2022-12-15 NOTE — PROGRESS NOTES
Follow up for fistulagram done on 11/29/22  No swelling or pain in extremities  [facility] in [place] - M, W, F

## 2023-01-17 ENCOUNTER — OFFICE VISIT (OUTPATIENT)
Dept: CARDIOTHORACIC SURGERY | Age: 58
End: 2023-01-17

## 2023-01-17 VITALS
WEIGHT: 123 LBS | HEART RATE: 91 BPM | DIASTOLIC BLOOD PRESSURE: 70 MMHG | SYSTOLIC BLOOD PRESSURE: 136 MMHG | HEIGHT: 62 IN | BODY MASS INDEX: 22.63 KG/M2

## 2023-01-17 DIAGNOSIS — N18.6 END STAGE RENAL DISEASE (HCC): Primary | ICD-10-CM

## 2023-01-17 PROCEDURE — 99024 POSTOP FOLLOW-UP VISIT: CPT | Performed by: THORACIC SURGERY (CARDIOTHORACIC VASCULAR SURGERY)

## 2023-01-17 NOTE — PROGRESS NOTES
María Chi return to the cardiovascular surgery clinic for follow-up. He underwent a left forearm AV fistula on 6/30/2022, and underwent a fistulogram, which showed no significant stenosis. Ultrasound examination was performed at the time of office visit. It shows that AV fistula is 5 to 6 mm in diameter, 10 cm long and with overlying soft tissue less than 5 mm under the skin. Assessment: Matured AV fistula and ready for use for hemodialysis. Plan: Removal of right IJ tunneled catheter after successful hemodialysis using left forearm AV fistula more than 2 weeks.

## 2023-05-02 ENCOUNTER — OFFICE VISIT (OUTPATIENT)
Dept: CARDIOTHORACIC SURGERY | Age: 58
End: 2023-05-02

## 2023-05-02 VITALS
WEIGHT: 115.6 LBS | SYSTOLIC BLOOD PRESSURE: 128 MMHG | DIASTOLIC BLOOD PRESSURE: 64 MMHG | BODY MASS INDEX: 21.27 KG/M2 | HEIGHT: 62 IN | HEART RATE: 92 BPM

## 2023-05-02 DIAGNOSIS — Z98.890 S/P ARTERIOVENOUS (AV) FISTULA CREATION: Primary | ICD-10-CM

## 2023-05-02 PROCEDURE — 99024 POSTOP FOLLOW-UP VISIT: CPT | Performed by: THORACIC SURGERY (CARDIOTHORACIC VASCULAR SURGERY)

## 2023-05-02 NOTE — PATIENT INSTRUCTIONS
If you receive a survey asking about your care experience, please respond. Your answers will help ensure you receive high-quality care at this office. Thank you!      Your Medical Assistant today: Waqas Enciso.

## 2023-05-02 NOTE — PROGRESS NOTES
Jean Hand to cardiovascular surgery clinic for follow-up. He reports successful hemodialysis using his left forearm AV fistula twice. However he complained about pain associated with needlestick to the fistula site. A limited examination: Palpable AV fistula with continued thrills and Doppler signals. Assessment: Well-functioning left forearm AV fistula. Recommendation: Continue using the left forearm AV fistula. And removal of the right IJ tunneled catheter in the future.

## 2023-05-21 NOTE — TELEPHONE ENCOUNTER
Kristian to be scheduled for LEFT FOREARM AV FISTULA CREATION with DR Austyn White. Next available DOS is 6/30/22 at 730AM.    He is currently taking PLAVIX and ASA 81- when should he d/c prior to surgery? Fair (50-75%)

## 2023-05-24 ENCOUNTER — OFFICE VISIT (OUTPATIENT)
Dept: CARDIOLOGY CLINIC | Age: 58
End: 2023-05-24
Payer: MEDICARE

## 2023-05-24 VITALS
BODY MASS INDEX: 20.98 KG/M2 | HEIGHT: 62 IN | HEART RATE: 82 BPM | DIASTOLIC BLOOD PRESSURE: 42 MMHG | WEIGHT: 114 LBS | SYSTOLIC BLOOD PRESSURE: 130 MMHG

## 2023-05-24 DIAGNOSIS — I73.9 PAD (PERIPHERAL ARTERY DISEASE) (HCC): ICD-10-CM

## 2023-05-24 DIAGNOSIS — I73.9 CLAUDICATION (HCC): ICD-10-CM

## 2023-05-24 DIAGNOSIS — I25.10 CAD IN NATIVE ARTERY: Primary | ICD-10-CM

## 2023-05-24 DIAGNOSIS — I87.8 CLAUDICATIO VENOSA INTERMITTENS: ICD-10-CM

## 2023-05-24 DIAGNOSIS — I10 ESSENTIAL HYPERTENSION: ICD-10-CM

## 2023-05-24 PROCEDURE — 3017F COLORECTAL CA SCREEN DOC REV: CPT | Performed by: INTERNAL MEDICINE

## 2023-05-24 PROCEDURE — 99214 OFFICE O/P EST MOD 30 MIN: CPT | Performed by: INTERNAL MEDICINE

## 2023-05-24 PROCEDURE — 4004F PT TOBACCO SCREEN RCVD TLK: CPT | Performed by: INTERNAL MEDICINE

## 2023-05-24 PROCEDURE — 3078F DIAST BP <80 MM HG: CPT | Performed by: INTERNAL MEDICINE

## 2023-05-24 PROCEDURE — G8420 CALC BMI NORM PARAMETERS: HCPCS | Performed by: INTERNAL MEDICINE

## 2023-05-24 PROCEDURE — G8427 DOCREV CUR MEDS BY ELIG CLIN: HCPCS | Performed by: INTERNAL MEDICINE

## 2023-05-24 PROCEDURE — 3075F SYST BP GE 130 - 139MM HG: CPT | Performed by: INTERNAL MEDICINE

## 2023-05-24 PROCEDURE — 93000 ELECTROCARDIOGRAM COMPLETE: CPT | Performed by: INTERNAL MEDICINE

## 2023-05-24 RX ORDER — CLOPIDOGREL BISULFATE 75 MG/1
75 TABLET ORAL DAILY
Qty: 30 TABLET | Refills: 3 | Status: SHIPPED | OUTPATIENT
Start: 2023-05-24

## 2023-05-24 RX ORDER — PANTOPRAZOLE SODIUM 40 MG/1
40 TABLET, DELAYED RELEASE ORAL 2 TIMES DAILY
COMMUNITY

## 2023-05-24 NOTE — PROGRESS NOTES
[address] [facility] [place] [address] [place] [address]  Dept: [phone]  Dept Fax: [phone]  Loc: [phone]    Visit Date: 5/24/2023    Mr. Deluna is a 61 y.o. male  who presented for:  CHF  CAD  HPI:   MIRANDA Cates is a pleasant 61year old male patient who  has a past medical history of Broken ankle, CAD (coronary artery disease), Cerebral artery occlusion with cerebral infarction ([facility] [address] CHF (congestive heart failure) ([facility] [address] Chronic kidney disease, Diabetes mellitus ([facility] [address] GERD (gastroesophageal reflux disease), Hyperlipidemia, Hypertension, and Thyroid disease. The patient has known h/o CAD, HFpEF. He has had prior PCI of RCA. The patient underwent successful IVUS guided PCI/Stenting of RCA 10/2019. On 11/2020, he underwent RHC/LHC, patent RCA noted, FFR of LAD was negative (0.82). EF 50-55% on Echo 2/2022 at OSH, stress test on 3/2022 revealed inferior wall infarct, no ischemia. REBECCA/Arterial doppler US weree ordered on prior visit due to claudication, not done (patient did not want to have the study). He now reports mild right calf pain, claudication. Pain occurs after walking only short distance (walking from his handicap parking spot to the store at [facility]). Patient denies chest pain, shortness of breath, dyspnea on exertion, orthopnea, leg swelling. Current Outpatient Medications:     lidocaine-prilocaine (EMLA) 2.5-2.5 % cream, APPLY SMALL AMOUNT TO ACCESS SITE (AVF) 1 HOUR BEFORE DIALYSIS. COVER WITH OCCLUSIVE DRESSING (SARAN WRAP), Disp: , Rfl:     bumetanide (BUMEX) 2 MG tablet, Take 1 tablet by mouth daily Take 1 tablet daily on non-dialysis days. Take 1/2 tablet on dialysis days. , Disp: 90 tablet, Rfl: 3    clopidogrel (PLAVIX) 75 MG tablet, Take 1 tablet by mouth daily Patient not taking at this time since Sunday, Disp: 30 tablet, Rfl: 3    isosorbide mononitrate (IMDUR) 30 MG extended release tablet, Take 1

## 2023-05-31 LAB
A/G RATIO: NORMAL
ALBUMIN SERPL-MCNC: NORMAL G/DL
ALP BLD-CCNC: NORMAL U/L
ALT SERPL-CCNC: 13 U/L
AST SERPL-CCNC: 14 U/L
BILIRUB SERPL-MCNC: 0.58 MG/DL (ref 0.1–1.4)
BILIRUBIN DIRECT: NORMAL
BILIRUBIN, INDIRECT: NORMAL
CHOLESTEROL, TOTAL: 154 MG/DL
CHOLESTEROL/HDL RATIO: ABNORMAL
GLOBULIN: NORMAL
HDLC SERPL-MCNC: 87 MG/DL (ref 35–70)
LDL CHOLESTEROL CALCULATED: 53 MG/DL (ref 0–160)
NONHDLC SERPL-MCNC: ABNORMAL MG/DL
PROTEIN TOTAL: 6.2 G/DL
TRIGL SERPL-MCNC: 72 MG/DL
VLDLC SERPL CALC-MCNC: 14 MG/DL

## 2023-06-30 ENCOUNTER — TELEPHONE (OUTPATIENT)
Dept: CARDIOLOGY CLINIC | Age: 58
End: 2023-06-30

## 2023-06-30 DIAGNOSIS — I25.10 CAD IN NATIVE ARTERY: ICD-10-CM

## 2023-06-30 DIAGNOSIS — I10 ESSENTIAL HYPERTENSION: Primary | ICD-10-CM

## 2023-07-18 ENCOUNTER — HOSPITAL ENCOUNTER (OUTPATIENT)
Dept: NON INVASIVE DIAGNOSTICS | Age: 58
Discharge: HOME | End: 2023-07-18
Attending: INTERNAL MEDICINE
Payer: MEDICARE

## 2023-07-18 ENCOUNTER — HOSPITAL ENCOUNTER (OUTPATIENT)
Dept: NON INVASIVE DIAGNOSTICS | Age: 58
Discharge: HOME | End: 2023-07-18
Payer: MEDICARE

## 2023-07-18 DIAGNOSIS — I25.10 CAD IN NATIVE ARTERY: ICD-10-CM

## 2023-07-18 DIAGNOSIS — I10 ESSENTIAL HYPERTENSION: ICD-10-CM

## 2023-07-18 LAB
LV EF: 28 %
LVEF MODALITY: NORMAL

## 2023-07-18 PROCEDURE — 93017 CV STRESS TEST TRACING ONLY: CPT | Performed by: INTERNAL MEDICINE

## 2023-07-18 PROCEDURE — 78452 HT MUSCLE IMAGE SPECT MULT: CPT | Performed by: INTERNAL MEDICINE

## 2023-07-18 PROCEDURE — 93306 TTE W/DOPPLER COMPLETE: CPT

## 2023-07-18 PROCEDURE — 6360000002 HC RX W HCPCS

## 2023-07-18 PROCEDURE — A9500 TC99M SESTAMIBI: HCPCS | Performed by: INTERNAL MEDICINE

## 2023-07-18 PROCEDURE — 3430000000 HC RX DIAGNOSTIC RADIOPHARMACEUTICAL: Performed by: INTERNAL MEDICINE

## 2023-07-18 RX ORDER — TETRAKIS(2-METHOXYISOBUTYLISOCYANIDE)COPPER(I) TETRAFLUOROBORATE 1 MG/ML
29.4 INJECTION, POWDER, LYOPHILIZED, FOR SOLUTION INTRAVENOUS
Status: COMPLETED | OUTPATIENT
Start: 2023-07-18 | End: 2023-07-18

## 2023-07-18 RX ORDER — TETRAKIS(2-METHOXYISOBUTYLISOCYANIDE)COPPER(I) TETRAFLUOROBORATE 1 MG/ML
8.8 INJECTION, POWDER, LYOPHILIZED, FOR SOLUTION INTRAVENOUS
Status: COMPLETED | OUTPATIENT
Start: 2023-07-18 | End: 2023-07-18

## 2023-07-18 RX ADMIN — Medication 8.8 MILLICURIE: at 10:38

## 2023-07-18 RX ADMIN — Medication 29.4 MILLICURIE: at 11:55

## 2023-07-19 ENCOUNTER — TELEPHONE (OUTPATIENT)
Dept: CARDIOLOGY CLINIC | Age: 58
End: 2023-07-19

## 2023-07-19 NOTE — TELEPHONE ENCOUNTER
Pt had stress 7/18/23    Summary   This Nuclear Medicine study was abnormal .      Recommendation   Clinical correlation is recommended. Invasive ischemia workup is recommended if clinically indicated. Echo      Summary   Left ventricle size is normal.   Mild concentric left ventricular hypertrophy. There was severe global hypokinesis of the left ventricle. Ejection fraction is visually estimated in the range of 25% to 30%. New drop in EF was noted when compared to prior study. Dr. Aby Shirley advise?

## 2023-07-20 ENCOUNTER — TELEPHONE (OUTPATIENT)
Dept: CARDIOLOGY CLINIC | Age: 58
End: 2023-07-20

## 2023-07-20 DIAGNOSIS — I25.10 CAD IN NATIVE ARTERY: ICD-10-CM

## 2023-07-20 DIAGNOSIS — R06.02 SOB (SHORTNESS OF BREATH): ICD-10-CM

## 2023-07-20 DIAGNOSIS — I10 ESSENTIAL HYPERTENSION: ICD-10-CM

## 2023-07-20 DIAGNOSIS — R93.1 ABNORMAL ECHOCARDIOGRAM: Primary | ICD-10-CM

## 2023-07-20 DIAGNOSIS — I27.20 PULMONARY HYPERTENSION (HCC): ICD-10-CM

## 2023-07-20 NOTE — TELEPHONE ENCOUNTER
Pt notified and would like to proceed with Cath and angio the others he would like to wait and see how the testing goes.  He does not want life vest now or chf referral.     Orders given to scheduling

## 2023-07-20 NOTE — TELEPHONE ENCOUNTER
----- Message from Darrick Soto MD sent at 7/19/2023  5:36 PM EDT -----  New drop in EF on Echocardiogram  Abnormal stress test  Has h/o CAD, prior PCI  ESRD, On PD  Studies were requested by renal transplant team at [facility], forward results  Inform patient   Also, has claudications history with suspected PAD  Will benefit from [facility], possible peripheral angiogram in the same setting, schedule if patient agrees   Order lifevest if patient agrees  Refer to CHF clinic

## 2023-07-25 NOTE — TELEPHONE ENCOUNTER
Patient agrees to both heart cath and peripheral angiogram    When would you like these scheduled?  (I spoke to cath lab, if dr feels peripheral will need intervention will not be able to do both on same day in cath lab)    (Melinda Castaneda watching for date(s))

## 2023-07-26 NOTE — TELEPHONE ENCOUNTER
Heart cath and peripheral angiogram scheduled 08-24-23  Prehydration arrive 08-23-23 at 8:00pm    Call to pt, date and time reviewed. Pt states can not come in night before.   Rescheduled to 2:00pm arrival 7:00am  Date, time and instructions reviewed and mailed to pt

## 2023-08-03 ENCOUNTER — TELEPHONE (OUTPATIENT)
Dept: CARDIOTHORACIC SURGERY | Age: 58
End: 2023-08-03

## 2023-08-03 DIAGNOSIS — Z98.890 S/P ARTERIOVENOUS (AV) FISTULA CREATION: Primary | ICD-10-CM

## 2023-08-03 NOTE — TELEPHONE ENCOUNTER
Referral received from [facility]-   They are requesting fistulagram d/t difficult cannulation. Pt last seen 5/2/23 by Dr. Gavin Granados. Last fistulagram 11/29/22- ok to order?

## 2023-08-04 NOTE — TELEPHONE ENCOUNTER
Fistulagram ordered. Attempted to contact patient to inform him. Left detailed message. IR will call pt to schedule. Will follow up with pt once scheduled.

## 2023-08-18 NOTE — PRE-PROCEDURE INSTRUCTIONS
Notified of Heart Cath procedure arrival time 0700 on Thursday  [place] on 2nd floor [facility]  NPO after midnight  Bring drivers license and insurance information  Wear comfortable clean clothes  Shower morning of and night before with liquid antibacterial soap  Remove jewelry   May have to stay overnight if have PTCA/stent - bring small overnight bag  Bring medications in original bottles - may take am meds with small amount of water. Do not take any diabetic meds day of procedure.   Made aware of visitors limit to 2 at a time  Follow all instructions given by your physician  Please notify doctor office if you need to cancel or reschedule your procedure   needed at discharge  If you use CPap or BiPap for sleep apnea, please bring machine with you  Leave valuables at home

## 2023-08-21 ENCOUNTER — TELEPHONE (OUTPATIENT)
Dept: CARDIOLOGY CLINIC | Age: 58
End: 2023-08-21

## 2023-08-21 NOTE — TELEPHONE ENCOUNTER
Due to transportation issues, pt request to reschedule cath and peripheral angiogram    Call to pt, Rescheduled to 09-14-23, arrival at 7:00am, procedure at 2:00pm    Call to RTC to arrange transportation ([phone])  Spoke to inna [facility] can bring pt to hospital but will need a specific time to be  to take home. Informed inna to schedule pt for  at this point, pt will be picked up about 6:00am or slightly earlier than that.     [facility], spoke grecia urena, [facility] can take patient home whether it be [employer] (same day) or for; to [facility] (next day) but will have to call back a few days before to arrange     Call to pt informed RTC bringing, [facility] taking home

## 2023-08-24 ENCOUNTER — HOSPITAL ENCOUNTER (OUTPATIENT)
Dept: INPATIENT UNIT | Age: 58
Discharge: HOME | End: 2023-08-24

## 2023-08-30 RX ORDER — CLOPIDOGREL BISULFATE 75 MG/1
75 TABLET ORAL DAILY
Qty: 90 TABLET | Refills: 2 | Status: SHIPPED | OUTPATIENT
Start: 2023-08-30

## 2023-09-13 ENCOUNTER — PREP FOR PROCEDURE (OUTPATIENT)
Dept: CARDIOLOGY | Age: 58
End: 2023-09-13

## 2023-09-13 RX ORDER — ASPIRIN 325 MG
325 TABLET ORAL ONCE
Status: CANCELLED | OUTPATIENT
Start: 2023-09-13 | End: 2023-09-13

## 2023-09-13 RX ORDER — SODIUM CHLORIDE 9 MG/ML
INJECTION, SOLUTION INTRAVENOUS PRN
Status: CANCELLED | OUTPATIENT
Start: 2023-09-13

## 2023-09-13 RX ORDER — SODIUM CHLORIDE 9 MG/ML
INJECTION, SOLUTION INTRAVENOUS CONTINUOUS
Status: CANCELLED | OUTPATIENT
Start: 2023-09-13

## 2023-09-13 RX ORDER — SODIUM CHLORIDE 0.9 % (FLUSH) 0.9 %
5-40 SYRINGE (ML) INJECTION EVERY 12 HOURS SCHEDULED
Status: CANCELLED | OUTPATIENT
Start: 2023-09-13

## 2023-09-13 RX ORDER — SODIUM CHLORIDE 0.9 % (FLUSH) 0.9 %
5-40 SYRINGE (ML) INJECTION PRN
Status: CANCELLED | OUTPATIENT
Start: 2023-09-13

## 2023-09-13 RX ORDER — NITROGLYCERIN 0.4 MG/1
0.4 TABLET SUBLINGUAL EVERY 5 MIN PRN
Status: CANCELLED | OUTPATIENT
Start: 2023-09-13

## 2023-09-14 ENCOUNTER — HOSPITAL ENCOUNTER (OUTPATIENT)
Dept: INPATIENT UNIT | Age: 58
Discharge: HOME | End: 2023-09-15
Attending: INTERNAL MEDICINE | Admitting: INTERNAL MEDICINE
Payer: MEDICARE

## 2023-09-14 LAB
ABO: NORMAL
ACTIVATED CLOTTING TIME: 281 SECONDS (ref 1–150)
ANION GAP SERPL CALC-SCNC: 18 MEQ/L (ref 8–16)
ANTIBODY SCREEN: NORMAL
BUN SERPL-MCNC: 24 MG/DL (ref 7–22)
CALCIUM SERPL-MCNC: 10.5 MG/DL (ref 8.5–10.5)
CHLORIDE SERPL-SCNC: 89 MEQ/L (ref 98–111)
CHOLEST SERPL-MCNC: 172 MG/DL (ref 100–199)
CO2 SERPL-SCNC: 26 MEQ/L (ref 23–33)
CREAT SERPL-MCNC: 4.5 MG/DL (ref 0.4–1.2)
DEPRECATED MEAN GLUCOSE BLD GHB EST-ACNC: 228 MG/DL (ref 70–126)
DEPRECATED RDW RBC AUTO: 49.1 FL (ref 35–45)
ERYTHROCYTE [DISTWIDTH] IN BLOOD BY AUTOMATED COUNT: 13.4 % (ref 11.5–14.5)
GFR SERPL CREATININE-BSD FRML MDRD: 14 ML/MIN/1.73M2
GLUCOSE BLD STRIP.AUTO-MCNC: 317 MG/DL (ref 70–108)
GLUCOSE SERPL-MCNC: 315 MG/DL (ref 70–108)
HBA1C MFR BLD HPLC: 9.6 % (ref 4.4–6.4)
HCT VFR BLD AUTO: 31.7 % (ref 42–52)
HDLC SERPL-MCNC: 74 MG/DL
HGB BLD-MCNC: 10.8 GM/DL (ref 14–18)
INR PPP: 0.91 (ref 0.85–1.13)
LDLC SERPL CALC-MCNC: 78 MG/DL
MCH RBC QN AUTO: 34.1 PG (ref 26–33)
MCHC RBC AUTO-ENTMCNC: 34.1 GM/DL (ref 32.2–35.5)
MCV RBC AUTO: 100 FL (ref 80–94)
PLATELET # BLD AUTO: 284 THOU/MM3 (ref 130–400)
PMV BLD AUTO: 10 FL (ref 9.4–12.4)
POTASSIUM SERPL-SCNC: 4.6 MEQ/L (ref 3.5–5.2)
RBC # BLD AUTO: 3.17 MILL/MM3 (ref 4.7–6.1)
RH FACTOR: NORMAL
SODIUM SERPL-SCNC: 133 MEQ/L (ref 135–145)
TRIGL SERPL-MCNC: 102 MG/DL (ref 0–199)
WBC # BLD AUTO: 6.3 THOU/MM3 (ref 4.8–10.8)

## 2023-09-14 PROCEDURE — C1753 CATH, INTRAVAS ULTRASOUND: HCPCS

## 2023-09-14 PROCEDURE — 85027 COMPLETE CBC AUTOMATED: CPT

## 2023-09-14 PROCEDURE — 86900 BLOOD TYPING SEROLOGIC ABO: CPT

## 2023-09-14 PROCEDURE — 93005 ELECTROCARDIOGRAM TRACING: CPT | Performed by: INTERNAL MEDICINE

## 2023-09-14 PROCEDURE — 82948 REAGENT STRIP/BLOOD GLUCOSE: CPT

## 2023-09-14 PROCEDURE — 92979 ENDOLUMINL IVUS OCT C EA: CPT

## 2023-09-14 PROCEDURE — 85347 COAGULATION TIME ACTIVATED: CPT

## 2023-09-14 PROCEDURE — 93458 L HRT ARTERY/VENTRICLE ANGIO: CPT

## 2023-09-14 PROCEDURE — 93005 ELECTROCARDIOGRAM TRACING: CPT | Performed by: NURSE PRACTITIONER

## 2023-09-14 PROCEDURE — C1894 INTRO/SHEATH, NON-LASER: HCPCS

## 2023-09-14 PROCEDURE — 93458 L HRT ARTERY/VENTRICLE ANGIO: CPT | Performed by: INTERNAL MEDICINE

## 2023-09-14 PROCEDURE — 83036 HEMOGLOBIN GLYCOSYLATED A1C: CPT

## 2023-09-14 PROCEDURE — C1887 CATHETER, GUIDING: HCPCS

## 2023-09-14 PROCEDURE — 6360000004 HC RX CONTRAST MEDICATION: Performed by: INTERNAL MEDICINE

## 2023-09-14 PROCEDURE — C1874 STENT, COATED/COV W/DEL SYS: HCPCS

## 2023-09-14 PROCEDURE — 86901 BLOOD TYPING SEROLOGIC RH(D): CPT

## 2023-09-14 PROCEDURE — 2500000003 HC RX 250 WO HCPCS

## 2023-09-14 PROCEDURE — 92979 ENDOLUMINL IVUS OCT C EA: CPT | Performed by: INTERNAL MEDICINE

## 2023-09-14 PROCEDURE — 2580000003 HC RX 258: Performed by: INTERNAL MEDICINE

## 2023-09-14 PROCEDURE — 2580000003 HC RX 258: Performed by: NURSE PRACTITIONER

## 2023-09-14 PROCEDURE — 92928 PRQ TCAT PLMT NTRAC ST 1 LES: CPT | Performed by: INTERNAL MEDICINE

## 2023-09-14 PROCEDURE — 92978 ENDOLUMINL IVUS OCT C 1ST: CPT

## 2023-09-14 PROCEDURE — 80048 BASIC METABOLIC PNL TOTAL CA: CPT

## 2023-09-14 PROCEDURE — 80061 LIPID PANEL: CPT

## 2023-09-14 PROCEDURE — C1725 CATH, TRANSLUMIN NON-LASER: HCPCS

## 2023-09-14 PROCEDURE — 99222 1ST HOSP IP/OBS MODERATE 55: CPT | Performed by: INTERNAL MEDICINE

## 2023-09-14 PROCEDURE — C9600 PERC DRUG-EL COR STENT SING: HCPCS

## 2023-09-14 PROCEDURE — 36415 COLL VENOUS BLD VENIPUNCTURE: CPT

## 2023-09-14 PROCEDURE — 92978 ENDOLUMINL IVUS OCT C 1ST: CPT | Performed by: INTERNAL MEDICINE

## 2023-09-14 PROCEDURE — 93010 ELECTROCARDIOGRAM REPORT: CPT | Performed by: INTERNAL MEDICINE

## 2023-09-14 PROCEDURE — C1769 GUIDE WIRE: HCPCS

## 2023-09-14 PROCEDURE — 6360000002 HC RX W HCPCS

## 2023-09-14 PROCEDURE — 86850 RBC ANTIBODY SCREEN: CPT

## 2023-09-14 PROCEDURE — 6370000000 HC RX 637 (ALT 250 FOR IP)

## 2023-09-14 PROCEDURE — 85610 PROTHROMBIN TIME: CPT

## 2023-09-14 RX ORDER — HYDROCODONE BITARTRATE AND ACETAMINOPHEN 7.5; 325 MG/1; MG/1
1 TABLET ORAL EVERY 6 HOURS PRN
Status: DISCONTINUED | OUTPATIENT
Start: 2023-09-14 | End: 2023-09-14

## 2023-09-14 RX ORDER — SODIUM CHLORIDE 0.9 % (FLUSH) 0.9 %
5-40 SYRINGE (ML) INJECTION EVERY 12 HOURS SCHEDULED
Status: DISCONTINUED | OUTPATIENT
Start: 2023-09-14 | End: 2023-09-14 | Stop reason: CLARIF

## 2023-09-14 RX ORDER — SODIUM CHLORIDE 9 MG/ML
INJECTION, SOLUTION INTRAVENOUS CONTINUOUS
Status: DISCONTINUED | OUTPATIENT
Start: 2023-09-14 | End: 2023-09-14 | Stop reason: CLARIF

## 2023-09-14 RX ORDER — ASPIRIN 325 MG
325 TABLET ORAL ONCE
Status: DISCONTINUED | OUTPATIENT
Start: 2023-09-14 | End: 2023-09-15 | Stop reason: HOSPADM

## 2023-09-14 RX ORDER — NITROGLYCERIN 0.4 MG/1
0.4 TABLET SUBLINGUAL EVERY 5 MIN PRN
Status: DISCONTINUED | OUTPATIENT
Start: 2023-09-14 | End: 2023-09-15 | Stop reason: HOSPADM

## 2023-09-14 RX ORDER — HYDROCODONE BITARTRATE AND ACETAMINOPHEN 7.5; 325 MG/1; MG/1
1 TABLET ORAL EVERY 4 HOURS PRN
Status: DISCONTINUED | OUTPATIENT
Start: 2023-09-14 | End: 2023-09-15 | Stop reason: HOSPADM

## 2023-09-14 RX ORDER — SODIUM CHLORIDE 0.9 % (FLUSH) 0.9 %
5-40 SYRINGE (ML) INJECTION PRN
Status: DISCONTINUED | OUTPATIENT
Start: 2023-09-14 | End: 2023-09-14 | Stop reason: CLARIF

## 2023-09-14 RX ORDER — SODIUM CHLORIDE 9 MG/ML
INJECTION, SOLUTION INTRAVENOUS CONTINUOUS
Status: DISCONTINUED | OUTPATIENT
Start: 2023-09-14 | End: 2023-09-15 | Stop reason: HOSPADM

## 2023-09-14 RX ORDER — CLOPIDOGREL BISULFATE 75 MG/1
75 TABLET ORAL DAILY
Status: DISCONTINUED | OUTPATIENT
Start: 2023-09-15 | End: 2023-09-15 | Stop reason: HOSPADM

## 2023-09-14 RX ORDER — ISOSORBIDE MONONITRATE 30 MG/1
30 TABLET, EXTENDED RELEASE ORAL NIGHTLY
Status: DISCONTINUED | OUTPATIENT
Start: 2023-09-14 | End: 2023-09-15 | Stop reason: HOSPADM

## 2023-09-14 RX ORDER — SODIUM CHLORIDE 9 MG/ML
INJECTION, SOLUTION INTRAVENOUS PRN
Status: DISCONTINUED | OUTPATIENT
Start: 2023-09-14 | End: 2023-09-14 | Stop reason: SDUPTHER

## 2023-09-14 RX ORDER — PANTOPRAZOLE SODIUM 40 MG/1
40 TABLET, DELAYED RELEASE ORAL 2 TIMES DAILY
Status: DISCONTINUED | OUTPATIENT
Start: 2023-09-14 | End: 2023-09-15 | Stop reason: HOSPADM

## 2023-09-14 RX ORDER — SODIUM CHLORIDE 9 MG/ML
INJECTION, SOLUTION INTRAVENOUS PRN
Status: DISCONTINUED | OUTPATIENT
Start: 2023-09-14 | End: 2023-09-15 | Stop reason: HOSPADM

## 2023-09-14 RX ORDER — DEXTROSE MONOHYDRATE 100 MG/ML
INJECTION, SOLUTION INTRAVENOUS CONTINUOUS PRN
Status: DISCONTINUED | OUTPATIENT
Start: 2023-09-14 | End: 2023-09-15 | Stop reason: HOSPADM

## 2023-09-14 RX ORDER — ATORVASTATIN CALCIUM 80 MG/1
80 TABLET, FILM COATED ORAL NIGHTLY
Status: DISCONTINUED | OUTPATIENT
Start: 2023-09-14 | End: 2023-09-15 | Stop reason: HOSPADM

## 2023-09-14 RX ORDER — ACETAMINOPHEN 325 MG/1
650 TABLET ORAL EVERY 4 HOURS PRN
Status: DISCONTINUED | OUTPATIENT
Start: 2023-09-14 | End: 2023-09-15 | Stop reason: HOSPADM

## 2023-09-14 RX ORDER — SODIUM CHLORIDE 0.9 % (FLUSH) 0.9 %
5-40 SYRINGE (ML) INJECTION PRN
Status: DISCONTINUED | OUTPATIENT
Start: 2023-09-14 | End: 2023-09-15 | Stop reason: HOSPADM

## 2023-09-14 RX ORDER — SODIUM CHLORIDE 0.9 % (FLUSH) 0.9 %
5-40 SYRINGE (ML) INJECTION EVERY 12 HOURS SCHEDULED
Status: DISCONTINUED | OUTPATIENT
Start: 2023-09-14 | End: 2023-09-15 | Stop reason: HOSPADM

## 2023-09-14 RX ORDER — LEVOTHYROXINE SODIUM 0.1 MG/1
100 TABLET ORAL DAILY
Status: DISCONTINUED | OUTPATIENT
Start: 2023-09-15 | End: 2023-09-15 | Stop reason: HOSPADM

## 2023-09-14 RX ORDER — ASPIRIN 81 MG/1
81 TABLET ORAL DAILY
Status: DISCONTINUED | OUTPATIENT
Start: 2023-09-15 | End: 2023-09-15 | Stop reason: HOSPADM

## 2023-09-14 RX ORDER — METOPROLOL SUCCINATE 25 MG/1
12.5 TABLET, EXTENDED RELEASE ORAL DAILY
Status: DISCONTINUED | OUTPATIENT
Start: 2023-09-15 | End: 2023-09-15 | Stop reason: HOSPADM

## 2023-09-14 RX ADMIN — SODIUM CHLORIDE: 9 INJECTION, SOLUTION INTRAVENOUS at 08:39

## 2023-09-14 RX ADMIN — ISOSORBIDE MONONITRATE 30 MG: 30 TABLET, EXTENDED RELEASE ORAL at 21:20

## 2023-09-14 RX ADMIN — PANTOPRAZOLE SODIUM 40 MG: 40 TABLET, DELAYED RELEASE ORAL at 21:20

## 2023-09-14 RX ADMIN — ATORVASTATIN CALCIUM 80 MG: 80 TABLET, FILM COATED ORAL at 21:20

## 2023-09-14 RX ADMIN — IOPAMIDOL 150 ML: 755 INJECTION, SOLUTION INTRAVENOUS at 15:57

## 2023-09-14 RX ADMIN — SODIUM CHLORIDE, PRESERVATIVE FREE 10 ML: 5 INJECTION INTRAVENOUS at 21:18

## 2023-09-14 ASSESSMENT — PAIN SCALES - GENERAL
PAINLEVEL_OUTOF10: 0
PAINLEVEL_OUTOF10: 5

## 2023-09-14 ASSESSMENT — PAIN DESCRIPTION - ORIENTATION: ORIENTATION: RIGHT

## 2023-09-14 ASSESSMENT — PAIN DESCRIPTION - LOCATION: LOCATION: TOE (COMMENT WHICH ONE)

## 2023-09-14 NOTE — FLOWSHEET NOTE
Received from cath lab. Right radial arterial procedure site stable ace wrap, vasc band and armboard cont. Pt aware to keep right arm still and to not lift, push or pull with it. Right mid chest dialysis catheter capped and intact. Ramos Logan left arm limb restrictions cont due to dialysis fistula. 0.9 normal saline at 20 mls/hr. Taking ice chips. Denies pain or needs. Resting with easy resp. Stent card placed in pt's overnight bag.

## 2023-09-14 NOTE — PROCEDURES
[place] [address]                            CARDIAC CATHETERIZATION    PATIENT NAME: MAYA COOPER                     :        1965  MED REC NO:   [id number]                           ROOM:       [id number]  ACCOUNT NO:   [de-identified]                           ADMIT DATE: 2023  PROVIDER:     Usman Ricardo MD    DATE OF PROCEDURE:  2023    INDICATIONS FOR PROCEDURE:  1.  Worsening cardiomyopathy with new drop in ejection fraction to 25%  to 30% on recent echocardiogram.  2.  Abnormal stress test.  3.  Preoperative cardiac risk assessment. The patient is being  evaluated for possible renal transplantation. 4.  Fatigue, angina equivalent symptoms. 5.  History of coronary artery disease, prior history of PCI. 6.  Peripheral arterial disease with claudication. PROCEDURES PERFORMED:  1. Left cardiac catheterization with selective coronary angiogram.  2.  Left ventriculogram.  3.  Intravascular ultrasound of the left main coronary artery. 4.  Intravascular ultrasound of the left anterior descending artery. 5.  Successful PCI and stenting of the left anterior descending artery. DESCRIPTION OF PROCEDURE/INTERVENTION DETAILS:  After informed consent,  the patient was brought to the cardiac catheterization room. He was  prepped and draped in a sterile fashion. 2% lidocaine was injected in  the skin and subcutaneous tissue overlying the right radial artery. Under ultrasound guidance using modified Seldinger technique, access was  obtained in the right radial artery. A 5/6 Slender sheath was inserted. Standard antithrombotic/antispasmodic medications were given. I used  6-Ser[place] JR4 diagnostic catheter to engage the right coronary artery. A  6-Serbian EBU3.5 guide catheter was used to cannulate the left main  coronary artery. Heparin was given. ACT was monitored and kept within  therapeutic range.

## 2023-09-14 NOTE — BRIEF OP NOTE
[address]  Sedation/Analgesia Post Sedation Record    Pt Name: Gage Hamlin  Account number: [de-identified]  MRN: [id number]  YOB: 1965  Procedure Performed By: Robert Jacinto MD MD   Primary Care Physician: Jaquelin Barillas MD  Date: 9/14/2023    POST-PROCEDURE    Physicians/Assistants: Robert Jacinto MD MD     Procedure Performed:Cath/ PCI      Sedation/Anesthesia: Versed/ Fentanyl and 2% xylocaine local anesthesia. Estimated Blood Loss: < 50 ml. Specimens Removed: None         Disposition of Specimen: N/A        Complications: No Immediate Complications. Post-procedure Diagnosis/Findings:       S/p IVUS guided PCI/GET of the LAD  LM Ostial 20-30% stenosis, not significant by IVUS with MLA of ~ 15 mm2  Residual nonobstructive CAD, including mild to moderate ISR of the RCA  Severe ischemic cardiomyopathy   Suspected PAD, claudications     Recommendations:  Bed rest for 2 hours post procedure   Admit overnight for observation post PCI   Monitor on Telemetry   Optimize medical therapy for Coronary Artery Disease  Aggressive risk factor modification   Access site care  Antiplatelet therapy: ASA 81 mg PO daily and Plavix 75 mg po daily   High intensity statin therapy  Gradually optimize GDMT for HFrEF (limited by borderline blood pressure and ESRD)  Lifevest ordered  Will need to have outpatient follow up with CHF clinic  Repeat echocardiogram to reassess LVEF three months after PCI and GDMT optimization   Peripheral angiogram was not performed today to limit further contrast load. Will proceed with Arterial Doppler US and REBECCA. Will consider CTA Vs Angiogram based on findings   Consult Nephrology for HD tomorrow in AM prior to discharge   Repeat EKG on the floor   AM Labs: BMP, CBC     Above findings and plan of care were discussed with patient, questions were answered, agreeable with plan.        Robert Jacinto MD, Patrick Perry   Electronically signed 9/14/2023 at 4:02

## 2023-09-14 NOTE — H&P
Sabrina [facility]  Sedation/Analgesia History & Physical    Pt Name: Steven Ta  Account number: [de-identified]  MRN: [id number]  YOB: 1965  Provider Performing Procedure: Andrew Meza MD MD  Referring Provider: Andrew Meza MD   Primary Care Physician: Pedrito Tee MD  Date: 9/14/2023    PRE-PROCEDURE    Code Status: FULL CODE  Brief History/Pre-Procedure Diagnosis:     Preoperative cardiac risk assessment, patient is being evaluated for renal transplantation   Worsening cardiomyopathy, new drop in EF to 25-30% on recent echocardiogram   Abnormal stress test  Fatigue   Has h/o CAD, prior PCI   PAD  Worsening lifestyle limiting claudications, right>left    Consent: : I have discussed with the patient risks, benefits, and alternatives (and relevant risks, benefits, and side effects related to alternatives or not receiving care), and likelihood of the success. The patient and/or representative appear to understand and agree to proceed. The discussion encompasses risks, benefits, and side effects related to the alternatives and the risks related to not receiving the proposed care, treatment, and services. The indication, risks and benefits of the procedure and possible therapeutic consequences and alternatives were discussed with the patient. The patient was given the opportunity to ask questions and to have them answered to his/her satisfaction.  Risks of the procedure include but are not limited to the following: Bleeding, hematoma including retroperitoneal hemmorhage, infection, pain and discomfort, injury to the aorta and other blood vessels, rhythm disturbance, low blood pressure, myocardial infarction, stroke, kidney damage/failure, myocardial perforation, allergic reactions to sedatives/contrast material, loss of pulse/vascular compromise requiring surgery, aneurysm/pseudoaneurysm formation, possible loss of a limb/hand/leg, needing blood transfusion, requiring emergent open

## 2023-09-14 NOTE — FLOWSHEET NOTE
Pt took home novalog 4.5 units and basaglar insulin 10 units. States his blood sugar is 399 per his dexascan. Sara Faria on 8A updated. Pt's right wrist stable. Vasc band and acewrap and armboard cont.

## 2023-09-14 NOTE — PROGRESS NOTES
0740 Patient admitted to 2E14  Ambulatory for heart cath and peripheral studies. Patient NPO. Patient accompanied by self , will need ride home. Vital signs obtained. Assessment and data collection intiated. Oriented to room. Policies and procedures for 2E explained. All questions answered with no further questions at this time. Fall prevention and safety precautions discussed with patient. Patient request referral to a podiatrist for ingrown toenail rt foot. States toe pain at times of a 5. Tesseo catheter in chest for dialysis. [place] reviewed with patient . Patient verbalize understanding of the plan of care and contribute to goal setting. 0800 Dr Cora Alberto in to see patient, patient is a prehydrate patient but also a dialysis patient. IV order for 20 ml per hour, patient states he urinates too. Bun and creat results, critical given to Dr Cora Alberto. Dr Cora Alberto wants a life vest ordered, patient is agreeable at present. Fistula in left arm/wrist area, pulse felt, patient states fistula is screwed up, left limb alert posted at door and limb alert band on left arm.   4647 Information requested faxed to [facility]. Fax says successful. 9358 Dr Kaden Gordon paged for consult, Dr Natanael Crabtree returned call. Ashly from [facility] returned call, watching for information. 4847 Information refaxed to [facility], fax says successful. 1015 Dr Natanael Crabtree in to see patient, patient recommended to get a podiatrist close to him. 1056 accu check for blood sugar 317  Dexcom blood sugar monitor says 362  Patient states this is the last time you are sticking my finger, this is why I have the Dexcom monitor, states I hate having my blood sugar taken out of my fingers. Explained to patient we want to use ours due to accreditation etc.     1100 Dr Cora Alberto called labs/bloods sugar. 1130  3 units Novolog insulin given to patient in left arm as ordered. [address] from [facility], states patient is approved.    [address] [place]  Sheri Shields from [facility] called wanting to fit patient, states they will call back later to fit after cath procedure. Reassurance given. [id number] Patient updated cath lab states they will be here soon as done with prior patient. Patient upset he was told to be here so early. Wants to talk with manager, Anna Antoine Rn in to speak with patient. factors. 1422 To cath lab per bed,  stable condition.

## 2023-09-14 NOTE — CONSULTS
Essential hypertension    CKD (chronic kidney disease) stage 4, GFR 15-29 ml/min ([facility])    Coronary artery disease of native artery of native heart with stable angina pectoris ([facility])    Anemia associated with chronic renal failure    Anemia of chronic kidney failure    Coronary atherosclerosis    Diabetes mellitus ([facility])    History of CVA (cerebrovascular accident)    MI (myocardial infarction) ([address])    Cerebrovascular accident ([address])    Type 1 diabetes mellitus with hyperglycemia ([address])    Diabetes mellitus with hyperglycemia ([address])    Anemia in chronic kidney disease    Acute on chronic anemia    Bacterial pneumonia    Iron deficiency anemia    Encephalopathy    Severe malnutrition ([facility])    NANCY (acute kidney injury) ([facility])    Hypervolemia    Abnormal stress test    CAD in native artery    Acute HFrEF (heart failure with reduced ejection fraction) ([facility])    Pulmonary hypertension ([facility])    Myocardiopathy ([facility])    KAUR (dyspnea on exertion)    Anginal equivalent ([facility])    Acute on chronic HFrEF (heart failure with reduced ejection fraction) ([facility])    CKD (chronic kidney disease) stage V requiring chronic dialysis ([address])    Pulmonary edema, acute, with congestive heart failure ([facility])    Metabolic acidosis    Uncontrolled type 2 diabetes mellitus with hyperglycemia ([facility])    Hx of coronary artery disease    Dyslipidemia    Hypothyroidism    Hx of gastroesophageal reflux (GERD)    Chronic heart failure with preserved ejection fraction (HFpEF) ([facility])    ESRD (end stage renal disease) ([facility])    SOB (shortness of breath)    Fluid overload    Shortness of breath    NSTEMI (non-ST elevated myocardial infarction) ([address])       Plan    - Patient initially examined and evaluated  - WBC 6.3;  Afebrile  - Discussed and educated with patient that the medial border of his right great toenail is incurvated and this is what we refer to as an ingrown nail.  - Discussed and educated with patient that because there is no erythema, edema, warmth, or drainage there is no concern that needs addressed immediately. - Discussed with patient that his ingrown nail can be treated outpatient and discussed potential treatment options with him. - Patient relates he lives in [place] and it is difficult for [facility] to [facility].   - Recommended to patient that he find a podiatrist in his area to see in clinic who can treat his condition.  - Weightbearing as tolerated to the right foot  - Patient verbalized understanding and agreement with the treatment plan as stated. All of their questions were answered to their satisfaction.  - Patient instructed to find a podiatrist near him who can treat and manage his ingrown nail outpatient. DISPO: Patient is okay to be discharged from a podiatry standpoint. Thank you for the consultation allowing podiatry to assist in the medical welfare of this patient.      Anurag Johnson -PGY1  9/14/2023 10:24 AM

## 2023-09-14 NOTE — PLAN OF CARE
Problem: Chronic Conditions and Co-morbidities  Goal: Patient's chronic conditions and co-morbidity symptoms are monitored and maintained or improved  Outcome: Adequate for Discharge  Flowsheets (Taken 9/14/2023 0740 by Albertina Botello RN)  Care Plan - Patient's Chronic Conditions and Co-Morbidity Symptoms are Monitored and Maintained or Improved:   Monitor and assess patient's chronic conditions and comorbid symptoms for stability, deterioration, or improvement   Collaborate with multidisciplinary team to address chronic and comorbid conditions and prevent exacerbation or deterioration   Update acute care plan with appropriate goals if chronic or comorbid symptoms are exacerbated and prevent overall improvement and discharge     Problem: Discharge Planning  Goal: Discharge to home or other facility with appropriate resources  Outcome: Adequate for Discharge  Flowsheets  Taken 9/14/2023 0809 by Albertina Botello RN  Discharge to home or other facility with appropriate resources:   Identify barriers to discharge with patient and caregiver   Identify discharge learning needs (meds, wound care, etc)   Refer to discharge planning if patient needs post-hospital services based on physician order or complex needs related to functional status, cognitive ability or social support system  Taken 9/14/2023 0740 by Albertina Botello RN  Discharge to home or other facility with appropriate resources: Identify barriers to discharge with patient and caregiver     Problem: Safety - Adult  Goal: Free from fall injury  Outcome: Adequate for Discharge     Problem: ABCDS Injury Assessment  Goal: Absence of physical injury  Outcome: Adequate for Discharge     Problem: Pain  Goal: Verbalizes/displays adequate comfort level or baseline comfort level  Outcome: Adequate for Discharge

## 2023-09-14 NOTE — CONSULTS
- CNP   isosorbide mononitrate (IMDUR) 30 MG extended release tablet Take 1 tablet by mouth at bedtime    Historical Provider, MD   insulin glargine (BASAGLAR KWIKPEN) 100 UNIT/ML injection pen Inject 10 Units into the skin daily breakfast    Historical Provider, MD   insulin aspart (NOVOLOG) 100 UNIT/ML injection vial Inject into the skin 3 times daily (before meals) PT TAKES IT BASED OFF OF HIS CARBS;  1  Unit per 8 cho  Takes 3 times daily and prn    Historical Provider, MD   levothyroxine (SYNTHROID) 100 MCG tablet Take 1 tablet by mouth Daily 4/19/19   Loida Prakash MD   atorvastatin (LIPITOR) 80 MG tablet Take 1 tablet by mouth at bedtime    Historical Provider, MD   aspirin 81 MG EC tablet Take 1 tablet by mouth daily    Historical Provider, MD       Review of Systems:  Constitutional: no fever or chills  Head: No headaches  Eyes: no blurry vision, no discharge  Ears: no ear pain or hearing changes  Nose: no runny nose or epistaxis  Respiratory: no shortness of breath or cough or sputum production  Cardiovascular: no chest pain, no edema  GI: no nausea, no vomiting or diarrhea  : denies any hematuria, no flank pain  Skin: no rash  Musculoskeletal: no joint pain, moves all ext  Neuro: no tremor, no slurred speech  Psychiatric: stable mood, no depression or insomnia    Current Meds:  Infusion:    sodium chloride      sodium chloride 20 mL/hr at 09/14/23 0839    sodium chloride      dextrose       Meds:    aspirin  325 mg Oral Once    aspirin  81 mg Oral Daily    atorvastatin  80 mg Oral Nightly    insulin glargine  10 Units SubCUTAneous Daily    isosorbide mononitrate  30 mg Oral Nightly    levothyroxine  100 mcg Oral Daily    pantoprazole  40 mg Oral BID    clopidogrel  75 mg Oral Daily    sodium chloride flush  5-40 mL IntraVENous 2 times per day    [START ON 9/15/2023] metoprolol succinate  12.5 mg Oral Daily     Meds prn: sodium chloride, nitroGLYCERIN, HYDROcodone-acetaminophen, sodium chloride flush, 09/14/23  0726   LABA1C 9.6*     Hepatic: No results for input(s): \"LABALBU\", \"AST\", \"ALT\", \"ALB\", \"BILITOT\", \"ALKPHOS\" in the last 72 hours. ABGs: No results found for: \"PHART\", \"PO2ART\", \"FPR3TZV\"  Troponin: No results for input(s): \"TROPONINI\" in the last 72 hours. BNP: No results for input(s): \"BNP\" in the last 72 hours. Old labs reviewed. Impression and Plan:  ESRD on HD MWF  -will arrange for HD tomorrow on his chronic orders    2. Mild hyponatremia  3. CAD s/p PCI  4. Cardiomyopathy low EF  5. HTN  6. Macrocytic anemia  7. PAD  8. DM    Thank you for allowing me to participate in the care of this patient. Please feel free to call me if you have any questions.      Electronically signed by Edie Delaney DO on 9/14/23 at 4:48 PM EDT    [facility]

## 2023-09-14 NOTE — DISCHARGE INSTRUCTIONS
Get an appointment with a podiatrist in your area to monitor your feet and ingrown toenail. Diabetics need good foot care. F/u with DR Springer as scheduled for office visit. **if your leg pain gets worsre, call our office for further recommendations. Discharge Instructions for Radial Heart Catherization    1. Take it easy for 3-4 days. 2.  No driving for 2 days. 3.  No lifting of 5 lbs or more for 5 days with the affected arm. 4.  Can shower after 24 hours. 5.  Remove arm board after 24 hours. 6.  Apply a band aid to the insertion site daily for 5 days. May apply antibiotic ointment if desired, but not necessary. Wash site daily with soap and water. 7.  No creams, ointments, or powders near the insertion site. 8.  No tub baths, swimming, hot tubs, or hand washing dishes for 1 week. 9.  Watch for signs of infection (redness, warmth, swelling, or pus drainage) or coolness of extremity and call physician if this occurs  10. If bleeding occurs from insertion site, apply pressure and call 911.

## 2023-09-15 ENCOUNTER — APPOINTMENT (OUTPATIENT)
Dept: INTERVENTIONAL RADIOLOGY/VASCULAR | Age: 58
End: 2023-09-15
Attending: INTERNAL MEDICINE
Payer: MEDICARE

## 2023-09-15 VITALS
TEMPERATURE: 98.1 F | SYSTOLIC BLOOD PRESSURE: 143 MMHG | BODY MASS INDEX: 21.38 KG/M2 | HEIGHT: 62 IN | RESPIRATION RATE: 16 BRPM | HEART RATE: 87 BPM | OXYGEN SATURATION: 97 % | DIASTOLIC BLOOD PRESSURE: 64 MMHG | WEIGHT: 116.2 LBS

## 2023-09-15 LAB
ANION GAP SERPL CALC-SCNC: 21 MEQ/L (ref 8–16)
BUN SERPL-MCNC: 35 MG/DL (ref 7–22)
CALCIUM SERPL-MCNC: 9.7 MG/DL (ref 8.5–10.5)
CHLORIDE SERPL-SCNC: 89 MEQ/L (ref 98–111)
CO2 SERPL-SCNC: 22 MEQ/L (ref 23–33)
CREAT SERPL-MCNC: 5.7 MG/DL (ref 0.4–1.2)
DEPRECATED RDW RBC AUTO: 49.8 FL (ref 35–45)
EKG ATRIAL RATE: 79 BPM
EKG ATRIAL RATE: 86 BPM
EKG P AXIS: 61 DEGREES
EKG P-R INTERVAL: 142 MS
EKG P-R INTERVAL: 144 MS
EKG Q-T INTERVAL: 418 MS
EKG Q-T INTERVAL: 420 MS
EKG QRS DURATION: 88 MS
EKG QRS DURATION: 92 MS
EKG QTC CALCULATION (BAZETT): 481 MS
EKG QTC CALCULATION (BAZETT): 500 MS
EKG R AXIS: 147 DEGREES
EKG R AXIS: 57 DEGREES
EKG T AXIS: -107 DEGREES
EKG T AXIS: -129 DEGREES
EKG VENTRICULAR RATE: 79 BPM
EKG VENTRICULAR RATE: 86 BPM
ERYTHROCYTE [DISTWIDTH] IN BLOOD BY AUTOMATED COUNT: 13.4 % (ref 11.5–14.5)
GFR SERPL CREATININE-BSD FRML MDRD: 11 ML/MIN/1.73M2
GLUCOSE SERPL-MCNC: 249 MG/DL (ref 70–108)
HCT VFR BLD AUTO: 28.2 % (ref 42–52)
HGB BLD-MCNC: 9.6 GM/DL (ref 14–18)
MCH RBC QN AUTO: 34.2 PG (ref 26–33)
MCHC RBC AUTO-ENTMCNC: 34 GM/DL (ref 32.2–35.5)
MCV RBC AUTO: 100.4 FL (ref 80–94)
PLATELET # BLD AUTO: 259 THOU/MM3 (ref 130–400)
PMV BLD AUTO: 10 FL (ref 9.4–12.4)
POTASSIUM SERPL-SCNC: 4.5 MEQ/L (ref 3.5–5.2)
RBC # BLD AUTO: 2.81 MILL/MM3 (ref 4.7–6.1)
SODIUM SERPL-SCNC: 132 MEQ/L (ref 135–145)
WBC # BLD AUTO: 7.6 THOU/MM3 (ref 4.8–10.8)

## 2023-09-15 PROCEDURE — 6370000000 HC RX 637 (ALT 250 FOR IP): Performed by: INTERNAL MEDICINE

## 2023-09-15 PROCEDURE — 93925 LOWER EXTREMITY STUDY: CPT

## 2023-09-15 PROCEDURE — 85027 COMPLETE CBC AUTOMATED: CPT

## 2023-09-15 PROCEDURE — 99232 SBSQ HOSP IP/OBS MODERATE 35: CPT | Performed by: STUDENT IN AN ORGANIZED HEALTH CARE EDUCATION/TRAINING PROGRAM

## 2023-09-15 PROCEDURE — 36415 COLL VENOUS BLD VENIPUNCTURE: CPT

## 2023-09-15 PROCEDURE — 93010 ELECTROCARDIOGRAM REPORT: CPT | Performed by: INTERNAL MEDICINE

## 2023-09-15 PROCEDURE — 80048 BASIC METABOLIC PNL TOTAL CA: CPT

## 2023-09-15 RX ORDER — METOPROLOL SUCCINATE 25 MG/1
12.5 TABLET, EXTENDED RELEASE ORAL DAILY
Qty: 30 TABLET | Refills: 3 | Status: SHIPPED | OUTPATIENT
Start: 2023-09-16

## 2023-09-15 RX ORDER — NITROGLYCERIN 0.4 MG/1
0.4 TABLET SUBLINGUAL EVERY 5 MIN PRN
Qty: 25 TABLET | Refills: 3 | Status: SHIPPED | OUTPATIENT
Start: 2023-09-15

## 2023-09-15 RX ADMIN — ASPIRIN 81 MG: 81 TABLET ORAL at 08:46

## 2023-09-15 RX ADMIN — HYDROCODONE BITARTRATE AND ACETAMINOPHEN 1 TABLET: 7.5; 325 TABLET ORAL at 03:22

## 2023-09-15 RX ADMIN — LEVOTHYROXINE SODIUM 100 MCG: 0.1 TABLET ORAL at 08:47

## 2023-09-15 RX ADMIN — PANTOPRAZOLE SODIUM 40 MG: 40 TABLET, DELAYED RELEASE ORAL at 08:47

## 2023-09-15 ASSESSMENT — PAIN SCALES - GENERAL
PAINLEVEL_OUTOF10: 5
PAINLEVEL_OUTOF10: 8

## 2023-09-15 ASSESSMENT — PAIN DESCRIPTION - DESCRIPTORS: DESCRIPTORS: SORE;SHARP

## 2023-09-15 ASSESSMENT — PAIN DESCRIPTION - LOCATION: LOCATION: WRIST

## 2023-09-15 ASSESSMENT — PAIN DESCRIPTION - ORIENTATION: ORIENTATION: RIGHT

## 2023-09-15 ASSESSMENT — PAIN DESCRIPTION - PAIN TYPE: TYPE: ACUTE PAIN

## 2023-09-15 ASSESSMENT — PAIN - FUNCTIONAL ASSESSMENT: PAIN_FUNCTIONAL_ASSESSMENT: ACTIVITIES ARE NOT PREVENTED

## 2023-09-15 NOTE — PROGRESS NOTES
Patient discharged home in cab. Educated on discharge instructions, follow ups and medications. No questions or concerns voiced. Patient received pamphlet about cardiac intervention, how to take care of the incision site, mended hearts program, cardiac rehab and the hours of operations, and Nutritional information regarding cardiac diet. MI teaching done reviewing causes, signs symptoms, and risk factors.

## 2023-09-15 NOTE — PROGRESS NOTES
Inpatient Cardiac Rehabilitation Consult    Received consult for Phase II Cardiac Rehabilitation. Patient needs cardiac rehab due to PCI on 9/14/23. Importance of Cardiac Rehab discussed with patient. Reviewed cardiac rehab class times. Patient questions answered. Pt is not interested in cardiac rehab and does not want his referral sent to [facility]. Cardiac Rehab brochure given.

## 2023-09-15 NOTE — PROGRESS NOTES
recommended. Invasive ischemia workup is recommended if clinically indicated. Signatures      ----------------------------------------------------------------   Electronically signed by Nichole Donald MD (Interpreting   Cardiologist) on 07/18/2023   Left Heart Cath:   FINDINGS:  LEFT VENTRICULOGRAM:  Severe global hypokinesis. Ejection fraction was  estimated at 25% to 30%. HEMODYNAMICS:  Left ventricular end-diastolic pressure 29 mmHg. Upon  pullback across the aortic valve, the mean gradient was measured at 13  mmHg. CORONARY ANGIOGRAM:  1. Right coronary artery:  Right coronary artery is a dominant vessel. 30% to 50% in-stent restenosis in the proximal part. 30% to 50%  in-stent restenosis in the mid part. Distal RCA is patent. Gives rise  to PDA and PLB. PDA is patent. PLB has 30% to 50% stenosis. 2.  Left main coronary artery:  Ostial 20% to 30% calcified lesion was  noted, not significant by IVUS measurement as detailed above with MLA of  15 mm2.  3.  Left circumflex artery:  Nondominant vessel. Luminal irregularities  were noted. No evidence for obstructive disease. 4.  Left anterior descending artery:  Proximal segment has luminal  irregularities. Mid segment has 70% to 80% stenosis. Distal segment  with 80% to 90% stenosis. MEDICATIONS:  See MAR. COMPLICATIONS:  None. ESTIMATED BLOOD LOSS:  Less than 50 mL. ACCESS:  Right radial artery access. Vasc Band was applied. Hemostasis  was achieved. IMPRESSION:  1. Status post IVUS-guided PCI and stenting of the left anterior  descending artery. 2.  Left main ostial 20% to 30% stenosis, not significant by IVUS with  MLA of around 15 mm2.  3.  Residual nonobstructive coronary artery disease including  mild-to-moderate in-stent restenosis of the right coronary artery. 4.  Severe ischemic cardiomyopathy. 5.  Suspected peripheral arterial disease with evidence for  claudication.      RECOMMENDATIONS:  Bedrest for CALCIUM 9.7 09/15/2023 05:54 AM       Hepatic Function Panel:    Lab Results   Component Value Date/Time    ALKPHOS 98 02/26/2022 04:16 AM    ALT 13 05/31/2023 12:00 AM    AST 14 05/31/2023 12:00 AM    PROT 6.2 05/31/2023 12:00 AM    BILITOT 0.58 05/31/2023 12:00 AM    BILIDIR 0.3 04/10/2019 08:59 PM    LABALBU 3.2 02/26/2022 04:16 AM       Magnesium:    Lab Results   Component Value Date/Time    MG 2.2 02/26/2022 04:16 AM       PT/INR:    Lab Results   Component Value Date/Time    PROTIME 11.0 02/21/2022 11:47 AM    PROTIME 12.5 08/27/2018 12:00 AM    INR 0.91 09/14/2023 07:26 AM       HgBA1c:    Lab Results   Component Value Date/Time    LABA1C 9.6 09/14/2023 07:26 AM       FLP:    Lab Results   Component Value Date/Time    TRIG 102 09/14/2023 07:26 AM    HDL 74 09/14/2023 07:26 AM    LDLCALC 78 09/14/2023 07:26 AM       TSH:    Lab Results   Component Value Date/Time    .400 04/11/2019 02:58 PM         Assessment:  Fatigue/angina  ICMP/systolic CHF, EF 52-33%  Abnormal stress test  1. Status post IVUS-guided PCI and stenting of the left anterior  descending artery. 2.  Left main ostial 20% to 30% stenosis, not significant by IVUS with  MLA of around 15 mm2.  3.  Residual nonobstructive coronary artery disease including  mild-to-moderate in-stent restenosis of the right coronary artery. 4.  Severe ischemic cardiomyopathy. 5.  Suspected peripheral arterial disease with evidence for  claudication. VELOCITY MEASUREMENTS: Moderately diminished REBECCA right posterior tibial artery. Altogether ABIs could not be calculated due to calcified noncompressible vessels. RIGHT LEG: Excellent pulsatility in the common femoral artery and profunda. Severely dampened pulsatility shortly beyond the origin the SFA. Total occlusion of the proximal and mid SFA. Moderately dampened reconstituted pulsatile flow in the distal SFA   and popliteal artery. Moderately dampened pulsatility in all 3 trifurcation vessels.

## 2023-09-19 ENCOUNTER — TELEPHONE (OUTPATIENT)
Dept: CARDIOLOGY CLINIC | Age: 58
End: 2023-09-19

## 2023-09-19 DIAGNOSIS — M79.661 PAIN IN BOTH LOWER LEGS: ICD-10-CM

## 2023-09-19 DIAGNOSIS — I73.9 PAD (PERIPHERAL ARTERY DISEASE) (HCC): Primary | ICD-10-CM

## 2023-09-19 DIAGNOSIS — I10 ESSENTIAL HYPERTENSION: ICD-10-CM

## 2023-09-19 DIAGNOSIS — M79.662 PAIN IN BOTH LOWER LEGS: ICD-10-CM

## 2023-09-19 DIAGNOSIS — I73.9 CLAUDICATION (HCC): ICD-10-CM

## 2023-09-19 NOTE — TELEPHONE ENCOUNTER
Patient called and LM on nurse line. I called him back and he is questioning as to when appt for \"stents in his legs will be scheduled. \"  Patient has a fistulogram on 9/21 and follow-up with Dr. Meghna Barron on 9/26/2023. Next follow-up with Dr. Kadeem Warren on 10/18/2023.   Do you need to see patient in office before scheduling angiogram?

## 2023-09-19 NOTE — TELEPHONE ENCOUNTER
Per discharge summary, plan for PAD was as copied below: \"Artieral doppler result as above. Plan for OP peripheral angiogram after seeing back in office\"     Vascular study was reviewed, significantly abnormal  Call patient and inquire about his symptoms (leg pain, cramps, claudications, cold foot, discoloration. ... )  Based on symptoms reported, we may proceed with peripheral angiogram before next office visit (schedule on 9/25/2023 if symptoms reported)

## 2023-09-20 ENCOUNTER — TELEPHONE (OUTPATIENT)
Dept: CARDIOLOGY CLINIC | Age: 58
End: 2023-09-20

## 2023-09-20 NOTE — TELEPHONE ENCOUNTER
Spoke with pt. Pt states he is having leg pain and leg weakness. Order placed and given to scheduling.

## 2023-09-20 NOTE — TELEPHONE ENCOUNTER
Set patient up  for [facility] angio on 10/5/23 at 11:00. He will  be picked up by [facility] at 8:00am.  And dropped off at the front of the hospital.      Patient will need taken home after he is done with his procedure and can go home. [facility] come [facility]. Spoke to Dick at [facility] and he stated that they will get him home when we call.     RTC # for future rides is [phone]

## 2023-09-21 ENCOUNTER — HOSPITAL ENCOUNTER (OUTPATIENT)
Dept: INTERVENTIONAL RADIOLOGY/VASCULAR | Age: 58
Discharge: HOME | End: 2023-09-21
Attending: THORACIC SURGERY (CARDIOTHORACIC VASCULAR SURGERY)
Payer: MEDICARE

## 2023-09-21 VITALS
OXYGEN SATURATION: 96 % | RESPIRATION RATE: 25 BRPM | DIASTOLIC BLOOD PRESSURE: 66 MMHG | TEMPERATURE: 97 F | SYSTOLIC BLOOD PRESSURE: 153 MMHG | HEART RATE: 88 BPM

## 2023-09-21 DIAGNOSIS — Z98.890 S/P ARTERIOVENOUS (AV) FISTULA CREATION: ICD-10-CM

## 2023-09-21 PROCEDURE — 2580000003 HC RX 258: Performed by: RADIOLOGY

## 2023-09-21 PROCEDURE — 6370000000 HC RX 637 (ALT 250 FOR IP): Performed by: RADIOLOGY

## 2023-09-21 PROCEDURE — 6360000002 HC RX W HCPCS: Performed by: RADIOLOGY

## 2023-09-21 RX ORDER — MIDAZOLAM HYDROCHLORIDE 1 MG/ML
1 INJECTION INTRAMUSCULAR; INTRAVENOUS ONCE
Status: DISCONTINUED | OUTPATIENT
Start: 2023-09-21 | End: 2023-09-22 | Stop reason: HOSPADM

## 2023-09-21 RX ORDER — LIDOCAINE 40 MG/G
CREAM TOPICAL ONCE
Status: COMPLETED | OUTPATIENT
Start: 2023-09-21 | End: 2023-09-21

## 2023-09-21 RX ORDER — SODIUM CHLORIDE 450 MG/100ML
INJECTION, SOLUTION INTRAVENOUS CONTINUOUS
Status: DISCONTINUED | OUTPATIENT
Start: 2023-09-21 | End: 2023-09-22 | Stop reason: HOSPADM

## 2023-09-21 RX ADMIN — LIDOCAINE 4%: 4 CREAM TOPICAL at 12:38

## 2023-09-21 RX ADMIN — SODIUM CHLORIDE: 4.5 INJECTION, SOLUTION INTRAVENOUS at 12:24

## 2023-09-21 RX ADMIN — Medication 2000 MG: at 12:36

## 2023-09-21 NOTE — PROGRESS NOTES
1250 Pt in specials radiology for fistulogram with possible intervention. Discussed procedure with pt and pt verbalizes understanding. 1255 Pt moved to table and attached to monitor. [address] informed pt does not have a ride home with [facility] and we can not transport with [employer] if we use sedation. This was explained to pt and he wishes to reschedule-\"I do not want to be in pain. \"   5933 OPN informed and pt positioned on cart for comfort. 46 Dr Makeda Monroy informed of pt's transportation issue. 1324 Report called to [facility]. 1330 Spoke to someone at Dr Heriberto Ramirez office to reschedule pt with transportation with sedation. Pt transferred to OPN per cart.

## 2023-09-21 NOTE — PROGRESS NOTES
1200- Patient arrived to [facility] for fistulagram. States his fistula has \"never really worked\". Patient familiar with procedure. Patient takes blood thinners and they were last taking last night and had cardiac stents placed last week. Patient has been NPO all day. Patient is using [facility] transportation post procedure. Vitals obtained. IV inserted. Call light placed within reach. PT RIGHTS AND RESPONSIBILITIES OFFERED TO PT.     6583- antibiotic started and lidocaine cream applied to fistula site. 1330- Patient decided to reschedule procedure due to transportation issue and not being able to receive sedation. IV removed. Discharged via wheelchair to [facility] in stable condition.          __M__ Safety:       (Environmental)  [place] to environment  Ensure ID band is correct and in place/ allergy band as needed  Assess for fall risk  Initiate fall precautions as applicable (fall band, side rails, etc.)  Call light within reach  Bed in low position/ wheels locked    __M__ Pain:       Assess pain level and characteristics  Administer analgesics as ordered  Assess effectiveness of pain management and report to MD as needed    __M__ Knowledge Deficit:  Assess baseline knowledge  Provide teaching at level of understanding  Provide teaching via preferred learning method  Evaluate teaching effectiveness    __M__ Hemodynamic/Respiratory Status:       (Pre and Post Procedure Monitoring)  Assess/Monitor vital signs and LOC  Assess Baseline SpO2 prior to any sedation  Obtain weight/height  Assess vital signs/ LOC until patient meets discharge criteria  Monitor procedure site and notify MD of any issues    __M__ Infection-Risk of Central Venous Catheter:  Monitor for infection signs and symptoms (catheter site redness, temperature elevation, etc)  Assess for infection risks  Educate regarding infection prevention  Manage central venous catheter (flushes/ dressing changes per protocol)

## 2023-09-21 NOTE — DISCHARGE INSTRUCTIONS
KEEP DRESSING CLEAN AND DRY UNTIL DIALYSIS APPOINTMENT. IF SEVERE BLEEDING, PAIN OR SWELLING OCCURS, GO TO EMERGENCY ROOM. SEDATION/ANALGESIA INFORMATION HOME GOING ADVICE    Review the following information with the patient prior to the procedure. Sedation/agalgesia is used during short medical procedures under controlled supervision. The medication will produce a strong relaxation. You will be able to hear, speak and follow instructions, but you memory and alertness will be decreased. You will be able to swallow and breathe on your own. During sedation/analgesia you blood pressure, hear and breathing will be watched closely. After the procedure, you may not remember what was said or done. Procedure: Fistulagram      Date: 9/21/23  You may have the following effects from the medication. Drowsiness, dizziness, sleepiness or confusion. Difficulty remembering or delayed reaction times. Loss of fine muscle control or difficulty with your balance especially while walking. Difficulty focusing or blurred vision. You may not be aware of slight changes in your behavior and/or your reaction time because of the medication used during the procedure. Therefore you should follow these instructions. Have someone responsible help you with your care. Do not drive for 24 hours. Do not operate equipment for 24 hours (lawnmowers, power tools, kitchen accessories, stove). Do not drink any alcoholic beverages for a minimum of 24 hours. Do not make important personal, legal or business decisions for 24 hours. You may experience dizziness or lightheadedness. Move slowly and carefully, do not make sudden position changes. Drink extra amounts of fluids today. Increase your diet as tolerated (unless you have received specific instructions from your doctor).   If you feel nauseated, continue with liquids until nausea is gone  Notify your physician if you have not urinated within 8 hours after the

## 2023-10-04 ENCOUNTER — PREP FOR PROCEDURE (OUTPATIENT)
Dept: CARDIOLOGY | Age: 58
End: 2023-10-04

## 2023-10-04 RX ORDER — NITROGLYCERIN 0.4 MG/1
0.4 TABLET SUBLINGUAL EVERY 5 MIN PRN
Status: CANCELLED | OUTPATIENT
Start: 2023-10-04

## 2023-10-04 RX ORDER — SODIUM CHLORIDE 0.9 % (FLUSH) 0.9 %
5-40 SYRINGE (ML) INJECTION EVERY 12 HOURS SCHEDULED
Status: CANCELLED | OUTPATIENT
Start: 2023-10-04

## 2023-10-04 RX ORDER — SODIUM CHLORIDE 0.9 % (FLUSH) 0.9 %
5-40 SYRINGE (ML) INJECTION PRN
Status: CANCELLED | OUTPATIENT
Start: 2023-10-04

## 2023-10-04 RX ORDER — SODIUM CHLORIDE 9 MG/ML
INJECTION, SOLUTION INTRAVENOUS PRN
Status: CANCELLED | OUTPATIENT
Start: 2023-10-04

## 2023-10-04 RX ORDER — ASPIRIN 325 MG
325 TABLET ORAL ONCE
Status: CANCELLED | OUTPATIENT
Start: 2023-10-04 | End: 2023-10-04

## 2023-10-04 RX ORDER — SODIUM CHLORIDE 9 MG/ML
INJECTION, SOLUTION INTRAVENOUS CONTINUOUS
Status: CANCELLED | OUTPATIENT
Start: 2023-10-04

## 2023-10-05 ENCOUNTER — APPOINTMENT (OUTPATIENT)
Dept: INTERVENTIONAL RADIOLOGY/VASCULAR | Age: 58
End: 2023-10-05
Attending: INTERNAL MEDICINE
Payer: MEDICARE

## 2023-10-05 ENCOUNTER — HOSPITAL ENCOUNTER (INPATIENT)
Dept: INTERVENTIONAL RADIOLOGY/VASCULAR | Age: 58
LOS: 1 days | Discharge: ANOTHER HOSPITAL | End: 2023-10-05
Attending: INTERNAL MEDICINE | Admitting: INTERNAL MEDICINE
Payer: MEDICARE

## 2023-10-05 VITALS
WEIGHT: 118 LBS | DIASTOLIC BLOOD PRESSURE: 75 MMHG | OXYGEN SATURATION: 100 % | SYSTOLIC BLOOD PRESSURE: 173 MMHG | TEMPERATURE: 98.5 F | HEART RATE: 82 BPM | BODY MASS INDEX: 21.71 KG/M2 | RESPIRATION RATE: 20 BRPM | HEIGHT: 62 IN

## 2023-10-05 DIAGNOSIS — M79.661 PAIN IN BOTH LOWER LEGS: ICD-10-CM

## 2023-10-05 DIAGNOSIS — I97.618: ICD-10-CM

## 2023-10-05 DIAGNOSIS — I73.9 PAD (PERIPHERAL ARTERY DISEASE) (HCC): ICD-10-CM

## 2023-10-05 DIAGNOSIS — M79.662 PAIN IN BOTH LOWER LEGS: ICD-10-CM

## 2023-10-05 DIAGNOSIS — M79.604 RIGHT LEG PAIN: ICD-10-CM

## 2023-10-05 DIAGNOSIS — I73.9 PERIPHERAL ARTERY DISEASE (HCC): ICD-10-CM

## 2023-10-05 DIAGNOSIS — I10 ESSENTIAL HYPERTENSION: ICD-10-CM

## 2023-10-05 DIAGNOSIS — I73.9 CLAUDICATION (HCC): ICD-10-CM

## 2023-10-05 LAB
ABO: NORMAL
ACTIVATED CLOTTING TIME: 173 SECONDS (ref 1–150)
ACTIVATED CLOTTING TIME: 311 SECONDS (ref 1–150)
ANION GAP SERPL CALC-SCNC: 17 MEQ/L (ref 8–16)
ANTIBODY SCREEN: NORMAL
APTT PPP: 32.9 SECONDS (ref 22–38)
BUN SERPL-MCNC: 22 MG/DL (ref 7–22)
CALCIUM SERPL-MCNC: 9.9 MG/DL (ref 8.5–10.5)
CHLORIDE SERPL-SCNC: 88 MEQ/L (ref 98–111)
CHOLEST SERPL-MCNC: 171 MG/DL (ref 100–199)
CO2 SERPL-SCNC: 27 MEQ/L (ref 23–33)
CREAT SERPL-MCNC: 4.2 MG/DL (ref 0.4–1.2)
DEPRECATED RDW RBC AUTO: 58.2 FL (ref 35–45)
ERYTHROCYTE [DISTWIDTH] IN BLOOD BY AUTOMATED COUNT: 15.3 % (ref 11.5–14.5)
GFR SERPL CREATININE-BSD FRML MDRD: 15 ML/MIN/1.73M2
GLUCOSE SERPL-MCNC: 444 MG/DL (ref 70–108)
HCT VFR BLD AUTO: 30.1 % (ref 42–52)
HDLC SERPL-MCNC: 85 MG/DL
HGB BLD-MCNC: 10 GM/DL (ref 14–18)
INR PPP: 0.88 (ref 0.85–1.13)
LDLC SERPL CALC-MCNC: 71 MG/DL
MCH RBC QN AUTO: 34.8 PG (ref 26–33)
MCHC RBC AUTO-ENTMCNC: 33.2 GM/DL (ref 32.2–35.5)
MCV RBC AUTO: 104.9 FL (ref 80–94)
PLATELET # BLD AUTO: 232 THOU/MM3 (ref 130–400)
PMV BLD AUTO: 10.2 FL (ref 9.4–12.4)
POTASSIUM SERPL-SCNC: 5.8 MEQ/L (ref 3.5–5.2)
RBC # BLD AUTO: 2.87 MILL/MM3 (ref 4.7–6.1)
RH FACTOR: NORMAL
SODIUM SERPL-SCNC: 132 MEQ/L (ref 135–145)
TRIGL SERPL-MCNC: 77 MG/DL (ref 0–199)
WBC # BLD AUTO: 5.5 THOU/MM3 (ref 4.8–10.8)

## 2023-10-05 PROCEDURE — 86900 BLOOD TYPING SEROLOGIC ABO: CPT

## 2023-10-05 PROCEDURE — 85347 COAGULATION TIME ACTIVATED: CPT

## 2023-10-05 PROCEDURE — 6360000002 HC RX W HCPCS: Performed by: INTERNAL MEDICINE

## 2023-10-05 PROCEDURE — 85027 COMPLETE CBC AUTOMATED: CPT

## 2023-10-05 PROCEDURE — 75774 ARTERY X-RAY EACH VESSEL: CPT

## 2023-10-05 PROCEDURE — 85610 PROTHROMBIN TIME: CPT

## 2023-10-05 PROCEDURE — 80061 LIPID PANEL: CPT

## 2023-10-05 PROCEDURE — 36246 INS CATH ABD/L-EXT ART 2ND: CPT

## 2023-10-05 PROCEDURE — 86901 BLOOD TYPING SEROLOGIC RH(D): CPT

## 2023-10-05 PROCEDURE — 37224 PR REVSC OPN/PRG FEM/POP W/ANGIOPLASTY UNI: CPT | Performed by: INTERNAL MEDICINE

## 2023-10-05 PROCEDURE — 75625 CONTRAST EXAM ABDOMINL AORTA: CPT

## 2023-10-05 PROCEDURE — 85730 THROMBOPLASTIN TIME PARTIAL: CPT

## 2023-10-05 PROCEDURE — C1894 INTRO/SHEATH, NON-LASER: HCPCS

## 2023-10-05 PROCEDURE — 86850 RBC ANTIBODY SCREEN: CPT

## 2023-10-05 PROCEDURE — 80048 BASIC METABOLIC PNL TOTAL CA: CPT

## 2023-10-05 PROCEDURE — 75716 ARTERY X-RAYS ARMS/LEGS: CPT

## 2023-10-05 PROCEDURE — 2580000003 HC RX 258: Performed by: INTERNAL MEDICINE

## 2023-10-05 PROCEDURE — 2580000003 HC RX 258: Performed by: PHYSICIAN ASSISTANT

## 2023-10-05 PROCEDURE — 36415 COLL VENOUS BLD VENIPUNCTURE: CPT

## 2023-10-05 PROCEDURE — B41D1ZZ FLUOROSCOPY OF AORTA AND BILATERAL LOWER EXTREMITY ARTERIES USING LOW OSMOLAR CONTRAST: ICD-10-PCS | Performed by: INTERNAL MEDICINE

## 2023-10-05 PROCEDURE — 36247 INS CATH ABD/L-EXT ART 3RD: CPT

## 2023-10-05 PROCEDURE — 75625 CONTRAST EXAM ABDOMINL AORTA: CPT | Performed by: INTERNAL MEDICINE

## 2023-10-05 PROCEDURE — 2140000000 HC CCU INTERMEDIATE R&B

## 2023-10-05 PROCEDURE — C1769 GUIDE WIRE: HCPCS

## 2023-10-05 PROCEDURE — 75716 ARTERY X-RAYS ARMS/LEGS: CPT | Performed by: INTERNAL MEDICINE

## 2023-10-05 PROCEDURE — 6360000004 HC RX CONTRAST MEDICATION: Performed by: INTERNAL MEDICINE

## 2023-10-05 RX ORDER — SODIUM CHLORIDE 0.9 % (FLUSH) 0.9 %
5-40 SYRINGE (ML) INJECTION PRN
Status: DISCONTINUED | OUTPATIENT
Start: 2023-10-05 | End: 2023-10-05 | Stop reason: HOSPADM

## 2023-10-05 RX ORDER — MIDAZOLAM HYDROCHLORIDE 1 MG/ML
INJECTION INTRAMUSCULAR; INTRAVENOUS PRN
Status: COMPLETED | OUTPATIENT
Start: 2023-10-05 | End: 2023-10-05

## 2023-10-05 RX ORDER — SODIUM CHLORIDE 9 MG/ML
INJECTION, SOLUTION INTRAVENOUS CONTINUOUS
Status: DISCONTINUED | OUTPATIENT
Start: 2023-10-05 | End: 2023-10-05 | Stop reason: SDUPTHER

## 2023-10-05 RX ORDER — ASPIRIN 325 MG
325 TABLET ORAL ONCE
Status: DISCONTINUED | OUTPATIENT
Start: 2023-10-05 | End: 2023-10-05 | Stop reason: HOSPADM

## 2023-10-05 RX ORDER — SODIUM CHLORIDE 9 MG/ML
INJECTION, SOLUTION INTRAVENOUS CONTINUOUS
Status: DISCONTINUED | OUTPATIENT
Start: 2023-10-05 | End: 2023-10-05 | Stop reason: HOSPADM

## 2023-10-05 RX ORDER — HEPARIN SODIUM 10000 [USP'U]/100ML
500 INJECTION, SOLUTION INTRAVENOUS CONTINUOUS
Status: DISCONTINUED | OUTPATIENT
Start: 2023-10-05 | End: 2023-10-05 | Stop reason: HOSPADM

## 2023-10-05 RX ORDER — FENTANYL CITRATE 50 UG/ML
INJECTION, SOLUTION INTRAMUSCULAR; INTRAVENOUS PRN
Status: COMPLETED | OUTPATIENT
Start: 2023-10-05 | End: 2023-10-05

## 2023-10-05 RX ORDER — ACETAMINOPHEN 325 MG/1
650 TABLET ORAL EVERY 4 HOURS PRN
Status: DISCONTINUED | OUTPATIENT
Start: 2023-10-05 | End: 2023-10-05 | Stop reason: HOSPADM

## 2023-10-05 RX ORDER — SODIUM CHLORIDE 0.9 % (FLUSH) 0.9 %
5-40 SYRINGE (ML) INJECTION EVERY 12 HOURS SCHEDULED
Status: DISCONTINUED | OUTPATIENT
Start: 2023-10-05 | End: 2023-10-05 | Stop reason: HOSPADM

## 2023-10-05 RX ORDER — BUMETANIDE 2 MG/1
2 TABLET ORAL
COMMUNITY

## 2023-10-05 RX ORDER — HEPARIN SODIUM 1000 [USP'U]/ML
INJECTION, SOLUTION INTRAVENOUS; SUBCUTANEOUS PRN
Status: COMPLETED | OUTPATIENT
Start: 2023-10-05 | End: 2023-10-05

## 2023-10-05 RX ORDER — ATROPINE SULFATE 0.4 MG/ML
0.5 INJECTION, SOLUTION INTRAVENOUS
Status: DISCONTINUED | OUTPATIENT
Start: 2023-10-05 | End: 2023-10-05 | Stop reason: HOSPADM

## 2023-10-05 RX ORDER — ATORVASTATIN CALCIUM 80 MG/1
80 TABLET, FILM COATED ORAL NIGHTLY
Status: DISCONTINUED | OUTPATIENT
Start: 2023-10-05 | End: 2023-10-05 | Stop reason: HOSPADM

## 2023-10-05 RX ORDER — FENTANYL CITRATE 50 UG/ML
50 INJECTION, SOLUTION INTRAMUSCULAR; INTRAVENOUS ONCE
Status: COMPLETED | OUTPATIENT
Start: 2023-10-05 | End: 2023-10-05

## 2023-10-05 RX ORDER — SODIUM CHLORIDE 9 MG/ML
INJECTION, SOLUTION INTRAVENOUS PRN
Status: DISCONTINUED | OUTPATIENT
Start: 2023-10-05 | End: 2023-10-05 | Stop reason: HOSPADM

## 2023-10-05 RX ORDER — METOPROLOL SUCCINATE 25 MG/1
12.5 TABLET, EXTENDED RELEASE ORAL DAILY
Status: DISCONTINUED | OUTPATIENT
Start: 2023-10-05 | End: 2023-10-05 | Stop reason: HOSPADM

## 2023-10-05 RX ORDER — FENTANYL CITRATE 50 UG/ML
25 INJECTION, SOLUTION INTRAMUSCULAR; INTRAVENOUS EVERY 4 HOURS PRN
Status: DISCONTINUED | OUTPATIENT
Start: 2023-10-05 | End: 2023-10-05 | Stop reason: HOSPADM

## 2023-10-05 RX ORDER — ASPIRIN 81 MG/1
81 TABLET ORAL DAILY
Status: DISCONTINUED | OUTPATIENT
Start: 2023-10-06 | End: 2023-10-05 | Stop reason: HOSPADM

## 2023-10-05 RX ORDER — ONDANSETRON 2 MG/ML
4 INJECTION INTRAMUSCULAR; INTRAVENOUS ONCE
Status: COMPLETED | OUTPATIENT
Start: 2023-10-05 | End: 2023-10-05

## 2023-10-05 RX ORDER — HYDROCODONE BITARTRATE AND ACETAMINOPHEN 5; 325 MG/1; MG/1
2 TABLET ORAL EVERY 4 HOURS PRN
Status: DISCONTINUED | OUTPATIENT
Start: 2023-10-05 | End: 2023-10-05 | Stop reason: HOSPADM

## 2023-10-05 RX ORDER — ACETAMINOPHEN 325 MG/1
650 TABLET ORAL EVERY 4 HOURS PRN
Status: DISCONTINUED | OUTPATIENT
Start: 2023-10-05 | End: 2023-10-05 | Stop reason: SDUPTHER

## 2023-10-05 RX ORDER — LEVOTHYROXINE SODIUM 0.1 MG/1
100 TABLET ORAL DAILY
Status: DISCONTINUED | OUTPATIENT
Start: 2023-10-06 | End: 2023-10-05 | Stop reason: HOSPADM

## 2023-10-05 RX ORDER — CLOPIDOGREL BISULFATE 75 MG/1
75 TABLET ORAL DAILY
Status: DISCONTINUED | OUTPATIENT
Start: 2023-10-06 | End: 2023-10-05 | Stop reason: HOSPADM

## 2023-10-05 RX ORDER — PANTOPRAZOLE SODIUM 40 MG/1
40 TABLET, DELAYED RELEASE ORAL 2 TIMES DAILY
Status: DISCONTINUED | OUTPATIENT
Start: 2023-10-05 | End: 2023-10-05 | Stop reason: HOSPADM

## 2023-10-05 RX ORDER — BUMETANIDE 1 MG/1
2 TABLET ORAL
Status: DISCONTINUED | OUTPATIENT
Start: 2023-10-05 | End: 2023-10-05 | Stop reason: HOSPADM

## 2023-10-05 RX ORDER — FENTANYL CITRATE 50 UG/ML
25 INJECTION, SOLUTION INTRAMUSCULAR; INTRAVENOUS ONCE
Status: DISCONTINUED | OUTPATIENT
Start: 2023-10-05 | End: 2023-10-05

## 2023-10-05 RX ORDER — NITROGLYCERIN 0.4 MG/1
0.4 TABLET SUBLINGUAL EVERY 5 MIN PRN
Status: DISCONTINUED | OUTPATIENT
Start: 2023-10-05 | End: 2023-10-05 | Stop reason: HOSPADM

## 2023-10-05 RX ORDER — ISOSORBIDE MONONITRATE 30 MG/1
30 TABLET, EXTENDED RELEASE ORAL NIGHTLY
Status: DISCONTINUED | OUTPATIENT
Start: 2023-10-05 | End: 2023-10-05 | Stop reason: HOSPADM

## 2023-10-05 RX ADMIN — MIDAZOLAM 1 MG: 1 INJECTION INTRAMUSCULAR; INTRAVENOUS at 12:50

## 2023-10-05 RX ADMIN — FENTANYL CITRATE 25 MCG: 50 INJECTION, SOLUTION INTRAMUSCULAR; INTRAVENOUS at 17:06

## 2023-10-05 RX ADMIN — MIDAZOLAM 1 MG: 1 INJECTION INTRAMUSCULAR; INTRAVENOUS at 17:44

## 2023-10-05 RX ADMIN — FENTANYL CITRATE 50 MCG: 50 INJECTION, SOLUTION INTRAMUSCULAR; INTRAVENOUS at 12:19

## 2023-10-05 RX ADMIN — HEPARIN SODIUM 5000 UNITS: 1000 INJECTION INTRAVENOUS; SUBCUTANEOUS at 12:59

## 2023-10-05 RX ADMIN — FENTANYL CITRATE 50 MCG: 50 INJECTION, SOLUTION INTRAMUSCULAR; INTRAVENOUS at 16:54

## 2023-10-05 RX ADMIN — FENTANYL CITRATE 25 MCG: 50 INJECTION, SOLUTION INTRAMUSCULAR; INTRAVENOUS at 17:40

## 2023-10-05 RX ADMIN — IOPAMIDOL 100 ML: 612 INJECTION, SOLUTION INTRAVENOUS at 18:40

## 2023-10-05 RX ADMIN — FENTANYL CITRATE 25 MCG: 50 INJECTION, SOLUTION INTRAMUSCULAR; INTRAVENOUS at 18:20

## 2023-10-05 RX ADMIN — MIDAZOLAM 1 MG: 1 INJECTION INTRAMUSCULAR; INTRAVENOUS at 17:56

## 2023-10-05 RX ADMIN — FENTANYL CITRATE 25 MCG: 50 INJECTION, SOLUTION INTRAMUSCULAR; INTRAVENOUS at 12:24

## 2023-10-05 RX ADMIN — HEPARIN SODIUM 3000 UNITS: 1000 INJECTION INTRAVENOUS; SUBCUTANEOUS at 13:44

## 2023-10-05 RX ADMIN — FENTANYL CITRATE 25 MCG: 50 INJECTION, SOLUTION INTRAMUSCULAR; INTRAVENOUS at 17:56

## 2023-10-05 RX ADMIN — ONDANSETRON 4 MG: 2 INJECTION INTRAMUSCULAR; INTRAVENOUS at 20:37

## 2023-10-05 RX ADMIN — FENTANYL CITRATE 25 MCG: 50 INJECTION, SOLUTION INTRAMUSCULAR; INTRAVENOUS at 12:50

## 2023-10-05 RX ADMIN — IOPAMIDOL 100 ML: 612 INJECTION, SOLUTION INTRAVENOUS at 14:16

## 2023-10-05 RX ADMIN — MIDAZOLAM 1 MG: 1 INJECTION INTRAMUSCULAR; INTRAVENOUS at 12:19

## 2023-10-05 RX ADMIN — FENTANYL CITRATE 25 MCG: 50 INJECTION, SOLUTION INTRAMUSCULAR; INTRAVENOUS at 13:00

## 2023-10-05 RX ADMIN — FENTANYL CITRATE 25 MCG: 50 INJECTION, SOLUTION INTRAMUSCULAR; INTRAVENOUS at 14:12

## 2023-10-05 RX ADMIN — SODIUM CHLORIDE: 9 INJECTION, SOLUTION INTRAVENOUS at 11:19

## 2023-10-05 RX ADMIN — FENTANYL CITRATE 25 MCG: 50 INJECTION, SOLUTION INTRAMUSCULAR; INTRAVENOUS at 20:12

## 2023-10-05 RX ADMIN — HEPARIN SODIUM 2000 UNITS: 1000 INJECTION INTRAVENOUS; SUBCUTANEOUS at 13:17

## 2023-10-05 RX ADMIN — MIDAZOLAM 1 MG: 1 INJECTION INTRAMUSCULAR; INTRAVENOUS at 16:55

## 2023-10-05 RX ADMIN — MIDAZOLAM 1 MG: 1 INJECTION INTRAMUSCULAR; INTRAVENOUS at 13:00

## 2023-10-05 RX ADMIN — MIDAZOLAM 1 MG: 1 INJECTION INTRAMUSCULAR; INTRAVENOUS at 16:57

## 2023-10-05 RX ADMIN — HEPARIN SODIUM AND DEXTROSE 500 UNITS/HR: 10000; 5 INJECTION INTRAVENOUS at 19:57

## 2023-10-05 RX ADMIN — FENTANYL CITRATE 50 MCG: 50 INJECTION, SOLUTION INTRAMUSCULAR; INTRAVENOUS at 19:10

## 2023-10-05 RX ADMIN — FENTANYL CITRATE 25 MCG: 50 INJECTION, SOLUTION INTRAMUSCULAR; INTRAVENOUS at 17:15

## 2023-10-05 RX ADMIN — HEPARIN SODIUM 1000 UNITS: 1000 INJECTION INTRAVENOUS; SUBCUTANEOUS at 18:33

## 2023-10-05 RX ADMIN — FENTANYL CITRATE 25 MCG: 50 INJECTION, SOLUTION INTRAMUSCULAR; INTRAVENOUS at 17:29

## 2023-10-05 RX ADMIN — FENTANYL CITRATE 50 MCG: 50 INJECTION, SOLUTION INTRAMUSCULAR; INTRAVENOUS at 16:26

## 2023-10-05 RX ADMIN — SODIUM CHLORIDE: 9 INJECTION, SOLUTION INTRAVENOUS at 20:01

## 2023-10-05 ASSESSMENT — PAIN DESCRIPTION - ORIENTATION: ORIENTATION: RIGHT

## 2023-10-05 ASSESSMENT — PAIN DESCRIPTION - LOCATION: LOCATION: LEG

## 2023-10-05 ASSESSMENT — PAIN SCALES - GENERAL
PAINLEVEL_OUTOF10: 10
PAINLEVEL_OUTOF10: 6
PAINLEVEL_OUTOF10: 10

## 2023-10-05 ASSESSMENT — PAIN DESCRIPTION - DESCRIPTORS: DESCRIPTORS: DISCOMFORT

## 2023-10-05 NOTE — PROGRESS NOTES
Pt admitted to  2E17 per wheelchair for peripheral angiogram. [facility]. Patient unaccompanied, daughter, Raj Scheuermann lives with pt and knows he is here today. .   Vital signs obtained. Assessment and data collection initiated. Oriented to room. Policies and procedures for 2E explained   All questions answered with no further questions at this time. Fall prevention and safety precautions discussed with patient. Care plan reviewed with patient. Patient  verbalize understanding of the plan of care and contribute to goal setting. Pt states \"He left his cell phone sitting out front by front entrance on bench. He Went back out to look for it and it was gone. Smart Flip phone with green case. Security notified. \"    Has Life Vest on   Dialysis catheter to right upper chest intact   1030  Dr Lucie Agudelo in to see pt   Dr Lucie Agudelo assessed right foot and ingrown toenail  4781 lab results reviewed with Dr Lucie Agudelo   [id number]  to procedure per bed

## 2023-10-05 NOTE — FLOWSHEET NOTE
Starting to complain \"pain behind right knee\"    Assessment done, no swelling or tightness noted   Will continue to monitor

## 2023-10-05 NOTE — PROGRESS NOTES
46 Pt in specials radiology for bilateral femoral angiogram with femoral runoffs. Discussed procedure with pt and pt verbalizes understanding. Pt able to move feet with numbness and tingling in toes at all times. 1145 Moved to table and attached to monitor. 1150 Left groin prepped and draped. [facility] Dr Kadeem Warren here. 1228 5 fr sheath inserted in left femoral artery. 1230 SPO2 85%. O2 2 liters per nasal cannula applied. 1249 5 fr sheath exchanged for 6 fr ANL sheath. Charo for intervention. 1323 Using Enteer catheter for procedure. Report to [facility].

## 2023-10-05 NOTE — PROGRESS NOTES
26 Pt returned to specials radiology for right leg angiogram. Discussed procedure with pt and pt verbalizes understanding. 1640 Moved to table and attached to monitor. Pt c/o pain behind right knee \"20\" on scale 1-10. Ace wrap intact to right knee area. Right foot cool and dusky. No pulses in right foot audible. Pt unable to move toes upon command. Right foot prepped and left groin sheath site prepped and draped. Left groin without redness, swelling or hematoma. [address] [facility] Dr Eliu Dickinson notified of readiness. 25[address] [facility] Dr Eliu Dickinson notified of pt's pain. 1654 Order received. [address] Dr Eliu Dickinson here. 1659 SPO2 85%. O2 6 liters per nasal cannula applied. 1700 Dr Eliu Dickinson attempting to gain access to right foot artery. 1703 O2 decreased to 4 liters per nasal cannula. 1720 Cont to attempt to gain access to right foot artery. 1736 O2 decreased to 2 liters per nasal cannula. 1738 Pt cont to c/o pain in right leg. Moaning and cussing. Encouraged to relax. 1750 Left groin re-prepped. 1754 Pt cont to moan in pain. States my \"Whole leg is hurting. \"  2665 Ace wrap to right knee removed. Right thigh and calf soft with no swelling noted. Right popliteal area swollen and firm. 9842 Calling transfer center for transfer to [address] [place] [address] ACT drawn. Right pedal pulse weak with doppler. 1826 Left femoral sheath secured with suture. Alejandro Dickinson speaking to [facility] transfer center. 2E SHAWN Carrera attempting to reach family. 1838 Op-site dressings intact to left femoral artery sheath with quick clot. Slight oozing noted at site. 4 x 4's with op-site applied to right foot attempted access site-PT. No bleeding or swelling at site. [address] cont to attempt to reach family. 1856 Update given to daughter per family. [address] [facility] Dr Dr Eliu Dickinson speaking to [facility]. 1905 Report called to Prema Ward. 1911 Pt transferred to  per bed and report updated to RN. Right thigh and calf remain soft with no swelling noted.  Firmness unchanged to right popliteal area. Right foot dusky and cold with pedal pulse weak with doppler. Pt has pain in left foot with decreased movement. Left foot cool with numbness and tingling pt normally has. Pedal and posterior tibial pulse weak with doppler. Left femoral sheath intact with no bleeding or hematoma noted at site. Pt states pain in right leg is better.

## 2023-10-05 NOTE — POST SEDATION
Sedation Post Procedure Note    Patient Name: Shwetha Millan   YOB: 1965  Room/Bed: [facility]/030-A  Medical Record Number: [id number]  [place] [facility]  [place]ation/Analgesia Post Sedation Record    Pt Name: Shwetha Millan  Account number: [de-identified]  MRN: [id number]  YOB: 1965  Procedure Performed By: Chantelle Robbins MD MD   Primary Care Physician: Chantelle Robbins MD  Date: 10/5/2023    POST-PROCEDURE    Physicians/Assistants: Chantelle Robbins MD MD     Procedure Performed:Peripheral Angiogram      Sedation/Anesthesia: Versed/ Fentanyl and 2% xylocaine local anesthesia. Estimated Blood Loss: < 50 ml. Specimens Removed: None         Disposition of Specimen: N/A        Complications: No Immediate Complications. Post-procedure Diagnosis/Findings:       Persistent  of the right SFA with decreased flow to the right leg/foot compared to baseline  No active contrast extravasation to suggest bleeding      Above findings and plan of care were discussed with patient and his family, questions were answered, agreeable with plan.        Chantelle Robbins MD, Omar Hagan   Electronically signed 10/5/2023 at 7:58 PM  Interventional Cardiology

## 2023-10-05 NOTE — FLOWSHEET NOTE
64  Continues to \"complain tightness behind right knee\"    Staff from [address] [facility] lab up to assess as well   1615 Dr Pedro Cornelius notified    1626 Dr Pedro Cornelius in to see pt   Medicated for pain   1640  to Specials Lab per bed to re evaluate   1800  message left on father cell phone number again to please call 2E for medical update   [phone]  called [place] police to notify [place] police to see if they can locate daughter at her residence   Will notify daughter to call [facility] for medical update   200  daughter, Marci Pro called and medical update given   Then transferred call to Specials to talk to Dr Megan Gerber information updated in epic   1910 personal belongings brought to 3B 30  Glasses, clothes, home meds, dexcom reader, dentures

## 2023-10-05 NOTE — PROGRESS NOTES
1323 Report received from [address].   3063 Procedure completed; patient tolerated well. 1419 ACT is 311. Dr Carmen Youngblood stated to pull sheath when ACT is less than 180 and told site for a minimum of 15 minutes. 1432 Patient on bed; comfort ensured. Left femoral sheath remains dry and intact with area soft; connected to pressure infusor. 1433 Attempted to call modified report to 2E; no answer. Will take pt to 2E and give bedside report. 1435 Patient taken to 2E via bed. Left femoral sheath remains dry and intact with area soft, connected to pressure infusor.

## 2023-10-05 NOTE — PROGRESS NOTES
[address] called again to see if pt cell phone was found, negative   Pt \"does not know daughters cell phone number\"    Attempt to call father, Erin Antony, with the number on file with no answer, message left on his cell   1800  message left on father cell phone number again to please call  for medical update   [phone]  called [place] police to notify [place] police to see if they can locate daughter at her residence   Will notify daughter to call [facility] for medical update

## 2023-10-05 NOTE — PROCEDURES
[place] [address]                            CARDIAC CATHETERIZATION    PATIENT NAME: MAYA COOPER                     :        1965  MED REC NO:   [id number]                           ROOM:       0017  ACCOUNT NO:   [de-identified]                           ADMIT DATE: 10/05/2023  PROVIDER:     Samy Ibanez MD    DATE OF PROCEDURE:  10/05/2023    PERIPHERAL ANGIOGRAM REPORT    INDICATIONS FOR PROCEDURE:  This is a 50-year-old male patient with  known history of coronary artery disease as well as known history of  peripheral arterial disease. He has chronic history of worsening  claudication that was most prominent in the right lower extremity. The  claudication was lifestyle-limiting, and the patient reports having pain  after walking less than 25 yards. 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 PROCEDURES PERFORMED:  1. Abdominal aortogram.  2.  Selective angiogram of the right lower extremity. 3.  Selective angiogram of the left lower extremity. 4.  Attempted PTA of right SFA chronic total occlusion. DESCRIPTION OF PROCEDURE/INTERVENTIONAL DETAILS:  After informed  consent, the patient was brought to the interventional radiology suite. He was prepped and draped in a sterile fashion. 2% lidocaine was  injected in the skin and subcutaneous tissue overlying the left groin  area. Under ultrasound and fluoroscopy guidance, access was obtained in  the left common femoral artery. 5-South African sheath was inserted and  flushed with normal saline. Selective angiogram of the left lower  extremity was performed.   A 5-South African VCF catheter was advanced to the  distal abdominal aorta over the

## 2023-10-05 NOTE — H&P
[facility]  Sedation/Analgesia History & Physical    Pt Name: Salma Em  Account number: [de-identified]  MRN: [id number]  YOB: 1965  Provider Performing Procedure: Coty Sosa MD MD  Referring Provider: Coty Sosa MD   Primary Care Physician: Patrisha Lesch, MD  Date: 10/5/2023    PRE-PROCEDURE    Code Status: FULL CODE  Brief History/Pre-Procedure Diagnosis:   PAD   Consent: : I have discussed with the patient risks, benefits, and alternatives (and relevant risks, benefits, and side effects related to alternatives or not receiving care), and likelihood of the success. The patient and/or representative appear to understand and agree to proceed. The discussion encompasses risks, benefits, and side effects related to the alternatives and the risks related to not receiving the proposed care, treatment, and services. The indication, risks and benefits of the procedure and possible therapeutic consequences and alternatives were discussed with the patient. The patient was given the opportunity to ask questions and to have them answered to his/her satisfaction. Risks of the procedure include but are not limited to the following: Bleeding, hematoma including retroperitoneal hemmorhage, infection, pain and discomfort, injury to the aorta and other blood vessels, rhythm disturbance, low blood pressure, myocardial infarction, stroke, kidney damage/failure, myocardial perforation, allergic reactions to sedatives/contrast material, loss of pulse/vascular compromise requiring surgery, aneurysm/pseudoaneurysm formation, possible loss of a limb/hand/leg, needing blood transfusion, requiring emergent open heart surgery or emergent coronary intervention, the need for intubation/respiratory support, the requirement for defibrillation/cardioversion, and death. Alternatives to and omission of the suggested procedure were discussed.  The patient had no further questions and wished to proceed;

## 2023-10-05 NOTE — H&P
[facility]  Sedation/Analgesia History & Physical    Pt Name: Marcie Brady  Account number: [de-identified]  MRN: [id number]  YOB: 1965  Provider Performing Procedure: Consuelo Barrientos MD MD  Referring Provider: Consuelo Barrientos MD   Primary Care Physician: Consuelo Barrientos MD  Date: 10/5/2023    PRE-PROCEDURE    Code Status: FULL CODE  Brief History/Pre-Procedure Diagnosis:   PAD  S/p attempt for PTA of a chronically occluded right SFA   Severe RLE pain in recovery room   Consent: : I have discussed with the patient risks, benefits, and alternatives (and relevant risks, benefits, and side effects related to alternatives or not receiving care), and likelihood of the success. The patient and/or representative appear to understand and agree to proceed. The discussion encompasses risks, benefits, and side effects related to the alternatives and the risks related to not receiving the proposed care, treatment, and services. The indication, risks and benefits of the procedure and possible therapeutic consequences and alternatives were discussed with the patient. The patient was given the opportunity to ask questions and to have them answered to his/her satisfaction. Risks of the procedure include but are not limited to the following: Bleeding, hematoma including retroperitoneal hemmorhage, infection, pain and discomfort, injury to the aorta and other blood vessels, rhythm disturbance, low blood pressure, myocardial infarction, stroke, kidney damage/failure, myocardial perforation, allergic reactions to sedatives/contrast material, loss of pulse/vascular compromise requiring surgery, aneurysm/pseudoaneurysm formation, possible loss of a limb/hand/leg, needing blood transfusion, requiring emergent open heart surgery or emergent coronary intervention, the need for intubation/respiratory support, the requirement for defibrillation/cardioversion, and death.  Alternatives to and omission of the

## 2023-10-05 NOTE — BRIEF OP NOTE
[facility]  Sedation/Analgesia Post Sedation Record    Pt Name: Steven Ta  Account number: [de-identified]  MRN: [id number]  YOB: 1965  Procedure Performed By: Andrew Meza MD MD   Primary Care Physician: Pedrito Tee MD  Date: 10/5/2023    POST-PROCEDURE    Physicians/Assistants: Andrew Meza MD MD     Procedure Performed:Peripheral Angiogram      Sedation/Anesthesia: Versed/ Fentanyl and 2% xylocaine local anesthesia. Estimated Blood Loss: < 50 ml. Specimens Removed: None         Disposition of Specimen: N/A        Complications: No Immediate Complications. Post-procedure Diagnosis/Findings:       PAD, S/p attempt PTA of right SFA       Above findings and plan of care were discussed with patient, questions were answered, agreeable with plan.        Andrew Meza MD, Marissa Ritchie   Electronically signed 10/5/2023 at 3:13 PM  Interventional Cardiology

## 2023-10-06 NOTE — DISCHARGE SUMMARY
As detailed in procedure note, the patient has  chronic total occlusion of the right superficial femoral artery. PTA  was attempted earlier today. However, on antegrade approach, the PTA  was not successful. While monitored in recovery room, the patient  developed pain and symptoms as detailed above. The angiogram does not  show evidence for extravasation. There was no evidence for active  bleeding. Due to worsening pain with evidence for decreased flow, the  patient will need emergent vascular surgery outpatient at the tertiary  care center with capability to perform at best interventions including  possible bypass surgery. The case and findings were discussed with the  patient. He states that he is known to [facility] and  requested to be transferred over there. The case was discussed with  vascular surgeon on call at [facility], Dr. Cammie Martin, who kindly  accepted the patient in transfer. The sheath in the left groin was kept  in place. The patient has received heparin earlier as detailed above. We will continue heparin drip at 500 units per hour through the side  port of the left groin sheath. Continue dual antiplatelet therapy with  aspirin and Plavix. Continue high-intensity statin therapy. The  patient has known history of end-stage renal disease. He is due for  dialysis tomorrow. The patient will be monitored on step-down unit at  [address] until emergent transfer is completed. Findings and plan of care were discussed with the patient and his  daughter, Nusrat Forde, who was called and updated on clinical evidence and  plan of care including plan to transfer the patient to [facility]. The patient and family are agreeable to the plan.

## 2023-10-06 NOTE — PROCEDURES
Called and spoke with pt. Informed her that we would place an order for urine culture as she has already completed a course of antibiotic. Pt is in agreement and will stop in to provide a sample.     [place] [address]                            CARDIAC CATHETERIZATION    PATIENT NAME: MAYA COOPER                     :        1965  MED REC NO:   [id number]                           ROOM:       0030  ACCOUNT NO:   [de-identified]                           ADMIT DATE: 10/05/2023  PROVIDER:     Oriana Sanches MD    DATE OF PROCEDURE:  10/05/2023    PERIPHERAL ANGIOGRAM REPORT    INDICATION:  This is a 71-year-old male patient with known history of  coronary artery disease and peripheral arterial disease and end-stage  renal disease. The patient has a history of severe lifestyle-limiting  claudication, with the symptoms consistent with [place] stage IV. He  has known history of abnormal vascular study, indicative of  of the  SFA. He underwent peripheral angiogram today. An attempt for  angioplasty of chronic total occlusion of the right SFA was done. However, PTA attempt was unsuccessful. The patient was sent back to the  Recovery and was doing okay. However, later, I was called by the nurse  who reported that the patient was having worsening pain in the right  lower extremity with some swelling in the lower thigh area. The posterior tibial artery pulse was detected by Doppler prior  to procedure; however, it was lost afterwards. He continued to have  persistent dorsalis pedis pulse detected by Doppler. Based on findings,  risks, benefits, indications, alternatives of peripheral angiogram and  possible PTA were discussed with the patient and he agreed to proceed. PROCEDURE PERFORMED:  Selective angiogram of the right lower extremity. DESCRIPTION OF PROCEDURE:  After informed consent, the patient was  brought to the interventional radiology suite. He was prepped and  draped in a sterile fashion.   The previously deployed sheath in the left  common femoral artery was still in place due to the fact that

## 2023-10-06 NOTE — PROGRESS NOTES
Patient transferred via [facility] in stable condition. Left arterial sheath intact with heparin running at 500units. No bleeding noted at site. All belongings sent with patient. Will call and update daughter of transfer.

## 2023-11-28 RX ORDER — METOPROLOL SUCCINATE 25 MG/1
12.5 TABLET, EXTENDED RELEASE ORAL DAILY
Qty: 30 TABLET | Refills: 3 | Status: SHIPPED | OUTPATIENT
Start: 2023-11-28

## 2023-11-28 NOTE — TELEPHONE ENCOUNTER
Jean Deluna called requesting a refill on the following medications:  Requested Prescriptions     Pending Prescriptions Disp Refills    metoprolol succinate (TOPROL XL) 25 MG extended release tablet 30 tablet 3     Sig: Take 0.5 tablets by mouth daily     [facility]   .pv      Date of last visit:   Date of next visit (if applicable): 4/06/5809

## 2023-12-04 ENCOUNTER — TELEPHONE (OUTPATIENT)
Dept: CARDIOLOGY CLINIC | Age: 58
End: 2023-12-04

## 2023-12-04 NOTE — TELEPHONE ENCOUNTER
Pt lm saying he talked to someone last week and they were suppose to call him back     LM for pt to call office

## 2023-12-27 NOTE — PROGRESS NOTES
[facility] PROFESSIONAL SERVICES  [facility] [address]   [address] [address]   Dept: [phone]   Dept Fax: [phone]   Loc: [phone]      Chief Complaint   Patient presents with    Follow-up     Cardiologist:  Dr. Risa Kern  61 yo male presents for f/u of PVD< fempop bypass at [facility]. Had [address] with PCI Of LAD 09/14/23, severe ICMP. Angiogram attempt for PTA was unsuccessful, transferred to [facility] for emergent bypass. Hx of chronic HFrEF, CAD/PCI 2019, ESRD, claudication/PAD. Still intense pain in the legs. Saw [facility] after bypass once and cannot get back to them due to lack of transportation. He states they were concerned for bypass failure and wanted repeat scan done and f/u. He has worsening pain in his right leg. Wants to f[facility]u with [facility] instead of [facility] due to transport issues. Heart wise, no cp or sob. Has been feeling better since PCI few months ago. .     General:   No fever, no chills, no weight loss, + fatigue  Pulmonary:    no dyspnea, no wheezing  Cardiac:    Denies recent chest pain   GI:     No nausea or vomiting, no abdominal pain  Neuro:    No dizziness or light headedness  Musculoskeletal:  No recent active issues  Extremities:   Right leg swelling and intense pain       Past Medical History:   Diagnosis Date    Broken ankle 2016    Broke right ankle. CAD (coronary artery disease) October 2015    MI     Cerebral artery occlusion with cerebral infarction [facility])  march 2015    left side    CHF (congestive heart failure) (HCC)     Chronic kidney disease     peritoneal dialysis    Diabetes mellitus (HCC)     GERD (gastroesophageal reflux disease)     Hyperlipidemia     Hypertension     Thyroid disease        Allergies   Allergen Reactions    Aranesp (Albumin Free) [Darbepoetin Damon] Hives     Patient tolerated 4/13/19.      Seasonal      Pollen, year round       Current Outpatient Medications   Medication Sig Dispense Refill    metoprolol succinate (TOPROL

## 2023-12-28 ENCOUNTER — OFFICE VISIT (OUTPATIENT)
Dept: CARDIOLOGY CLINIC | Age: 58
End: 2023-12-28
Payer: MEDICARE

## 2023-12-28 ENCOUNTER — TELEPHONE (OUTPATIENT)
Dept: CARDIOLOGY CLINIC | Age: 58
End: 2023-12-28

## 2023-12-28 VITALS
BODY MASS INDEX: 22.45 KG/M2 | HEART RATE: 79 BPM | DIASTOLIC BLOOD PRESSURE: 79 MMHG | HEIGHT: 62 IN | WEIGHT: 122 LBS | SYSTOLIC BLOOD PRESSURE: 153 MMHG

## 2023-12-28 DIAGNOSIS — I25.5 ISCHEMIC CARDIOMYOPATHY: ICD-10-CM

## 2023-12-28 DIAGNOSIS — I73.9 PAD (PERIPHERAL ARTERY DISEASE) (HCC): ICD-10-CM

## 2023-12-28 DIAGNOSIS — I25.10 CAD IN NATIVE ARTERY: Primary | ICD-10-CM

## 2023-12-28 DIAGNOSIS — I50.43 ACUTE ON CHRONIC COMBINED SYSTOLIC AND DIASTOLIC CONGESTIVE HEART FAILURE (HCC): ICD-10-CM

## 2023-12-28 DIAGNOSIS — I70.211 ATHEROSCLER OF NATIVE ARTERY OF RIGHT LEG WITH INTERMIT CLAUDICATION (HCC): ICD-10-CM

## 2023-12-28 DIAGNOSIS — I73.9 CLAUDICATION IN PERIPHERAL VASCULAR DISEASE (HCC): ICD-10-CM

## 2023-12-28 DIAGNOSIS — I10 ESSENTIAL HYPERTENSION: ICD-10-CM

## 2023-12-28 PROCEDURE — G8420 CALC BMI NORM PARAMETERS: HCPCS | Performed by: STUDENT IN AN ORGANIZED HEALTH CARE EDUCATION/TRAINING PROGRAM

## 2023-12-28 PROCEDURE — 3078F DIAST BP <80 MM HG: CPT | Performed by: STUDENT IN AN ORGANIZED HEALTH CARE EDUCATION/TRAINING PROGRAM

## 2023-12-28 PROCEDURE — G8427 DOCREV CUR MEDS BY ELIG CLIN: HCPCS | Performed by: STUDENT IN AN ORGANIZED HEALTH CARE EDUCATION/TRAINING PROGRAM

## 2023-12-28 PROCEDURE — G8484 FLU IMMUNIZE NO ADMIN: HCPCS | Performed by: STUDENT IN AN ORGANIZED HEALTH CARE EDUCATION/TRAINING PROGRAM

## 2023-12-28 PROCEDURE — 4004F PT TOBACCO SCREEN RCVD TLK: CPT | Performed by: STUDENT IN AN ORGANIZED HEALTH CARE EDUCATION/TRAINING PROGRAM

## 2023-12-28 PROCEDURE — 99214 OFFICE O/P EST MOD 30 MIN: CPT | Performed by: STUDENT IN AN ORGANIZED HEALTH CARE EDUCATION/TRAINING PROGRAM

## 2023-12-28 PROCEDURE — 3017F COLORECTAL CA SCREEN DOC REV: CPT | Performed by: STUDENT IN AN ORGANIZED HEALTH CARE EDUCATION/TRAINING PROGRAM

## 2023-12-28 PROCEDURE — 3077F SYST BP >= 140 MM HG: CPT | Performed by: STUDENT IN AN ORGANIZED HEALTH CARE EDUCATION/TRAINING PROGRAM

## 2023-12-28 RX ORDER — OXYCODONE HYDROCHLORIDE 5 MG/1
5 TABLET ORAL EVERY 6 HOURS PRN
Qty: 15 TABLET | Refills: 0 | Status: SHIPPED | OUTPATIENT
Start: 2023-12-28 | End: 2024-01-02

## 2023-12-28 NOTE — TELEPHONE ENCOUNTER
Patient saw Deepak today for follow up of PVD and has recent fempod bypass surgery at [facility] but is unable to follow up there. So Deepak would like patient to follow up with Dr. Valentine. Schedule pt as Dr. Valentine is available

## 2024-01-02 ENCOUNTER — TELEPHONE (OUTPATIENT)
Dept: CARDIOLOGY CLINIC | Age: 59
End: 2024-01-02

## 2024-01-02 NOTE — TELEPHONE ENCOUNTER
Florence from [employer]  stating patient isn't wearing lifevest. Patient has no plans to wear it.  Florence asking for letter to discontinue use?    [employer] fax [phone]  Call back number [phone]

## 2024-01-03 NOTE — TELEPHONE ENCOUNTER
Please call patient and inquire  Advise him that lifevest is advised to prevent sudden cardiac death  Discontinue if he refuses

## 2024-01-03 NOTE — TELEPHONE ENCOUNTER
Spoke to patient. Does not want to wear the vest, Accepts risks of SCD. Letter out for signature.

## 2024-01-12 ENCOUNTER — TELEPHONE (OUTPATIENT)
Dept: CARDIOLOGY CLINIC | Age: 59
End: 2024-01-12

## 2024-01-12 NOTE — TELEPHONE ENCOUNTER
Pt calling, asking for a refill of pain medication-oxycodone.  He is still continuing with severe pain in his right leg and without the medication he cannot function. His testing is scheduled for 1/18/24.  Please advise???

## 2024-01-18 ENCOUNTER — HOSPITAL ENCOUNTER (OUTPATIENT)
Dept: CT IMAGING | Age: 59
Discharge: HOME | End: 2024-01-18
Payer: MEDICARE

## 2024-01-18 ENCOUNTER — HOSPITAL ENCOUNTER (OUTPATIENT)
Age: 59
Discharge: HOME | End: 2024-01-20
Payer: MEDICARE

## 2024-01-18 ENCOUNTER — TELEPHONE (OUTPATIENT)
Dept: CARDIOLOGY CLINIC | Age: 59
End: 2024-01-18

## 2024-01-18 VITALS
BODY MASS INDEX: 22.45 KG/M2 | HEIGHT: 62 IN | DIASTOLIC BLOOD PRESSURE: 79 MMHG | SYSTOLIC BLOOD PRESSURE: 153 MMHG | WEIGHT: 122 LBS

## 2024-01-18 DIAGNOSIS — I25.5 ISCHEMIC CARDIOMYOPATHY: ICD-10-CM

## 2024-01-18 DIAGNOSIS — I73.9 PAD (PERIPHERAL ARTERY DISEASE) (HCC): ICD-10-CM

## 2024-01-18 DIAGNOSIS — I70.211 ATHEROSCLER OF NATIVE ARTERY OF RIGHT LEG WITH INTERMIT CLAUDICATION (HCC): ICD-10-CM

## 2024-01-18 DIAGNOSIS — I50.43 ACUTE ON CHRONIC COMBINED SYSTOLIC AND DIASTOLIC CONGESTIVE HEART FAILURE (HCC): ICD-10-CM

## 2024-01-18 DIAGNOSIS — I25.10 CAD IN NATIVE ARTERY: ICD-10-CM

## 2024-01-18 LAB
ECHO AV CUSP MM: 1.1 CM
ECHO BSA: 1.56 M2
ECHO LA AREA 2C: 15.1 CM2
ECHO LA AREA 4C: 13.6 CM2
ECHO LA DIAMETER INDEX: 2.32 CM/M2
ECHO LA DIAMETER: 3.6 CM
ECHO LA MAJOR AXIS: 5.1 CM
ECHO LA MINOR AXIS: 4.7 CM
ECHO LA VOL BP: 34 ML (ref 18–58)
ECHO LA VOL MOD A2C: 39 ML (ref 18–58)
ECHO LA VOL MOD A4C: 28 ML (ref 18–58)
ECHO LA VOL/BSA BIPLANE: 22 ML/M2 (ref 16–34)
ECHO LA VOLUME INDEX MOD A2C: 25 ML/M2 (ref 16–34)
ECHO LA VOLUME INDEX MOD A4C: 18 ML/M2 (ref 16–34)
ECHO LV EDV A2C: 90 ML
ECHO LV EDV A4C: 70 ML
ECHO LV EDV INDEX A4C: 45 ML/M2
ECHO LV EDV NDEX A2C: 58 ML/M2
ECHO LV EJECTION FRACTION A2C: 32 %
ECHO LV EJECTION FRACTION A4C: 33 %
ECHO LV EJECTION FRACTION BIPLANE: 34 % (ref 55–100)
ECHO LV ESV A2C: 61 ML
ECHO LV ESV A4C: 47 ML
ECHO LV ESV INDEX A2C: 39 ML/M2
ECHO LV ESV INDEX A4C: 30 ML/M2
ECHO LV FRACTIONAL SHORTENING: 16 % (ref 28–44)
ECHO LV INTERNAL DIMENSION DIASTOLE INDEX: 2.39 CM/M2
ECHO LV INTERNAL DIMENSION DIASTOLIC: 3.7 CM (ref 4.2–5.9)
ECHO LV INTERNAL DIMENSION SYSTOLIC INDEX: 2 CM/M2
ECHO LV INTERNAL DIMENSION SYSTOLIC: 3.1 CM
ECHO LV IVSD: 1.4 CM (ref 0.6–1)
ECHO LV MASS 2D: 176.6 G (ref 88–224)
ECHO LV MASS INDEX 2D: 113.9 G/M2 (ref 49–115)
ECHO LV POSTERIOR WALL DIASTOLIC: 1.3 CM (ref 0.6–1)
ECHO LV RELATIVE WALL THICKNESS RATIO: 0.7
ECHO RV INTERNAL DIMENSION: 3.2 CM
ECHO RV TAPSE: 1.3 CM (ref 1.7–?)

## 2024-01-18 PROCEDURE — 75635 CT ANGIO ABDOMINAL ARTERIES: CPT

## 2024-01-18 PROCEDURE — 6360000004 HC RX CONTRAST MEDICATION: Performed by: STUDENT IN AN ORGANIZED HEALTH CARE EDUCATION/TRAINING PROGRAM

## 2024-01-18 PROCEDURE — 93307 TTE W/O DOPPLER COMPLETE: CPT

## 2024-01-18 RX ADMIN — IOPAMIDOL 125 ML: 755 INJECTION, SOLUTION INTRAVENOUS at 10:31

## 2024-01-18 NOTE — TELEPHONE ENCOUNTER
Recent Fem-Pop bypass at [facility]  Has calcific PAD and h/o kidney disease   CTA noted  Flow is maintained bilaterally  Femoropopliteal bypass graft right side which is patent   No immediate indication for intervention   Give ER warning signs  Agree with vascular surgery evaluation, suggest [facility] to him as that is were he had the bypass

## 2024-01-18 NOTE — TELEPHONE ENCOUNTER
Dr. Springer please review note below.  Would you like to see pt in office or would you like to review his chart?    Result note for CTA ABDOMINAL AORTA W BILAT RUNOFF W WO CONTRAST     ----- Message from Connor Mortimer, PA-C sent at 1/18/2024  4:23 PM EST -----  He has abnormal CTA but he has good runoff to all distal regions. However, he keeps complaining of persistent extensive leg pains. I had initially planned to have him f/u with Dr Valentine for further recs given he cannot get to [facility] vascular surgery. I would have Dr Springer review and/or see patient to discuss further at this point. Severe pain still but CTA appears to show good distal flow.

## 2024-01-19 NOTE — TELEPHONE ENCOUNTER
Spoke to patient, he feels something is still not right with his legs. Informed of Dr Springer's recommendations. He cannot make it back to [facility] d/t transportation. Informed him that Dr Valentine's office had tried to reach him 3x to schedule an appointment in early January. He states he never received their calls/messages. Will route back to [facility] to get patient scheduled. Pt instructed to watch for the call from Dr Valentine's office.

## 2024-01-19 NOTE — TELEPHONE ENCOUNTER
Spoke with patient- appt scheduled for 2/13/24 at 11:15a.   [facility] scheduled with Nena. Confirmed for a 10:00a .  Pt aware.

## 2024-01-24 ENCOUNTER — TELEPHONE (OUTPATIENT)
Dept: CARDIOLOGY CLINIC | Age: 59
End: 2024-01-24

## 2024-02-13 ENCOUNTER — OFFICE VISIT (OUTPATIENT)
Age: 59
End: 2024-02-13
Payer: MEDICARE

## 2024-02-13 VITALS
SYSTOLIC BLOOD PRESSURE: 87 MMHG | HEIGHT: 62 IN | WEIGHT: 105 LBS | HEART RATE: 84 BPM | BODY MASS INDEX: 19.32 KG/M2 | DIASTOLIC BLOOD PRESSURE: 50 MMHG

## 2024-02-13 DIAGNOSIS — I73.9 PAD (PERIPHERAL ARTERY DISEASE) (HCC): Primary | ICD-10-CM

## 2024-02-13 PROCEDURE — G8420 CALC BMI NORM PARAMETERS: HCPCS | Performed by: THORACIC SURGERY (CARDIOTHORACIC VASCULAR SURGERY)

## 2024-02-13 PROCEDURE — 3074F SYST BP LT 130 MM HG: CPT | Performed by: THORACIC SURGERY (CARDIOTHORACIC VASCULAR SURGERY)

## 2024-02-13 PROCEDURE — 99203 OFFICE O/P NEW LOW 30 MIN: CPT | Performed by: THORACIC SURGERY (CARDIOTHORACIC VASCULAR SURGERY)

## 2024-02-13 PROCEDURE — G8484 FLU IMMUNIZE NO ADMIN: HCPCS | Performed by: THORACIC SURGERY (CARDIOTHORACIC VASCULAR SURGERY)

## 2024-02-13 PROCEDURE — G8427 DOCREV CUR MEDS BY ELIG CLIN: HCPCS | Performed by: THORACIC SURGERY (CARDIOTHORACIC VASCULAR SURGERY)

## 2024-02-13 PROCEDURE — 3078F DIAST BP <80 MM HG: CPT | Performed by: THORACIC SURGERY (CARDIOTHORACIC VASCULAR SURGERY)

## 2024-02-13 PROCEDURE — 4004F PT TOBACCO SCREEN RCVD TLK: CPT | Performed by: THORACIC SURGERY (CARDIOTHORACIC VASCULAR SURGERY)

## 2024-02-13 PROCEDURE — 3017F COLORECTAL CA SCREEN DOC REV: CPT | Performed by: THORACIC SURGERY (CARDIOTHORACIC VASCULAR SURGERY)

## 2024-02-13 RX ORDER — LANOLIN ALCOHOL/MO/W.PET/CERES
100 CREAM (GRAM) TOPICAL DAILY
COMMUNITY
Start: 2024-02-12 | End: 2024-03-13

## 2024-02-13 RX ORDER — ERGOCALCIFEROL 1.25 MG/1
50000 CAPSULE ORAL
COMMUNITY
Start: 2024-02-11

## 2024-02-13 NOTE — PATIENT INSTRUCTIONS
If you receive a survey asking about your care experience, please respond. Your answers will help ensure you receive high-quality care at this office. Thank you!    Your Medical Assistant today: Jade  Thank you for coming to our office! It was a pleasure to serve you.

## 2024-02-13 NOTE — PROGRESS NOTES
10/05/2023 10:31 AM    HGB 10.0 10/05/2023 10:31 AM    HCT 30.1 10/05/2023 10:31 AM    .9 10/05/2023 10:31 AM     10/05/2023 10:31 AM    PROTIME 11.0 02/21/2022 11:47 AM    PROTIME 12.5 08/27/2018 12:00 AM    INR 0.88 10/05/2023 10:31 AM     BMP:   Lab Results   Component Value Date/Time     10/05/2023 10:31 AM    K 5.8 10/05/2023 10:31 AM    K 4.6 06/30/2022 06:04 AM    CL 88 10/05/2023 10:31 AM    CO2 27 10/05/2023 10:31 AM    PHOS 3.7 11/25/2018 05:35 AM    BUN 22 10/05/2023 10:31 AM    CREATININE 4.2 10/05/2023 10:31 AM    MG 2.2 02/26/2022 04:16 AM       Imaging  I have reviewed all imaging including CTA.  I also did ultrasound of his left hand.  There is patent 3 to 4 mm left basilic vein in the left upper arm.      Problem List:  Patient Active Problem List   Diagnosis    CHF (congestive heart failure) ([facility])    Precordial pain    DM type 2 causing CKD stage 5 ([facility])    Essential hypertension    CKD (chronic kidney disease) stage 4, GFR 15-29 ml/min ([facility])    Coronary artery disease of native artery of native heart with stable angina pectoris ([facility])    Anemia associated with chronic renal failure    Anemia of chronic kidney failure    Coronary atherosclerosis    Diabetes mellitus ([facility])    History of CVA (cerebrovascular accident)    MI (myocardial infarction) ([facility])    Cerebrovascular accident ([facility])    Type 1 diabetes mellitus with hyperglycemia ([facility])    Diabetes mellitus with hyperglycemia ([facility])    Anemia in chronic kidney disease    Acute on chronic anemia    Bacterial pneumonia    Iron deficiency anemia    Encephalopathy    Severe malnutrition ([facility])    NANCY (acute kidney injury) ([facility])    Hypervolemia    Abnormal stress test    CAD in native artery    Acute HFrEF (heart failure with reduced ejection fraction) ([facility])    Pulmonary hypertension ([facility])    Myocardiopathy ([facility])    KAUR (dyspnea on exertion)    Anginal equivalent    Acute on chronic HFrEF (heart failure with reduced ejection fraction)

## 2024-02-15 ENCOUNTER — TELEPHONE (OUTPATIENT)
Age: 59
End: 2024-02-15

## 2024-02-15 NOTE — TELEPHONE ENCOUNTER
Pt is scheduled for Left upper arm AV Fistula creation with Dr. Valentine on 3/25/2024 at 7:30 am. Pt agreed to date/time.     Surgery instructions are as follows:  - Arrive to Same Day Surgery at [facility]  at 5:30 am  - Nothing to eat or drink after midnight the night before surgery  - Take your Metoprolol the morning of surgery with a sip of water  - Stop taking Plavix 3 days prior to surgery    - [facility] service is scheduled to  at 4:30 am morning of surgery and return patient home after surgery.       PAT visit scheduled for 3/20/2024 at 10:30 am. Confirmed with [place].  Alexandra Brasher is scheduled to  patient at 9:00 am. And return patient home after visit.     All instructions discussed with pt, she verbalized understanding. Denied questions or concerns at this time.     Primary insurance is Medicare/ Medicaid. No prior authorization is necessary.

## 2024-02-15 NOTE — TELEPHONE ENCOUNTER
Surgery instructions, including pick time times with [facility] [employer] given to pt. Verbally and mailed to pt as requested.

## 2024-02-19 ENCOUNTER — PREP FOR PROCEDURE (OUTPATIENT)
Dept: CARDIOTHORACIC SURGERY | Age: 59
End: 2024-02-19

## 2024-02-19 DIAGNOSIS — N18.6 ESRD (END STAGE RENAL DISEASE) (HCC): Primary | ICD-10-CM

## 2024-02-19 RX ORDER — SODIUM CHLORIDE 0.9 % (FLUSH) 0.9 %
5-40 SYRINGE (ML) INJECTION EVERY 12 HOURS SCHEDULED
Status: CANCELLED | OUTPATIENT
Start: 2024-02-19

## 2024-02-19 RX ORDER — SODIUM CHLORIDE 0.9 % (FLUSH) 0.9 %
5-40 SYRINGE (ML) INJECTION PRN
Status: CANCELLED | OUTPATIENT
Start: 2024-02-19

## 2024-02-19 RX ORDER — SODIUM CHLORIDE 9 MG/ML
INJECTION, SOLUTION INTRAVENOUS PRN
Status: CANCELLED | OUTPATIENT
Start: 2024-02-19

## 2024-03-12 NOTE — PROGRESS NOTES
PAT VISIT  Appointment reminder call given  Date: 3/20  Arrival time: 10:30 and location; [facility]   Bring Drivers license and insurance  Bring Medications in original bottles  Please be ready to give Urine Sample  If possible bring caregiver for appointment  Take am medications with water unless you are holding any for surgery  Appointment may last 2 hours

## 2024-03-18 ENCOUNTER — TELEPHONE (OUTPATIENT)
Dept: CARDIOLOGY CLINIC | Age: 59
End: 2024-03-18

## 2024-03-18 NOTE — TELEPHONE ENCOUNTER
Enedina from [facility] called asking if we were aware pt was still supposed to be wearing a life vest but is refusing to wear. She also stated pt \"had been in and out of the hospital for several months\".     Enedina wants to know if procedure will still be completed on 3/25/24 given that pt is non-compliant with the life vest and following up with this issue.    Enedina stated she will call back on Wednesday, 3/20/24 and follow up on this pt.

## 2024-03-19 NOTE — TELEPHONE ENCOUNTER
I spoke with Jean and he is not able to have surgery 3/20/2024 due to a prior obligation. Surgery has been rescheduled to 5/20/2024. [facility] has been scheduled for transportation.     A PAT appointment is not needed per Dr. Valentine.    Andie in OR notified.

## 2024-03-20 ENCOUNTER — HOSPITAL ENCOUNTER (OUTPATIENT)
Dept: PREADMISSION TESTING | Age: 59
Discharge: HOME | End: 2024-03-20

## 2024-03-20 ENCOUNTER — TELEPHONE (OUTPATIENT)
Dept: CARDIOLOGY CLINIC | Age: 59
End: 2024-03-20

## 2024-03-20 NOTE — TELEPHONE ENCOUNTER
Enedina from [facility] requested patient last two office notes.  Enedina wondering why patient want's wearing lifevest. Note in chart states patient refused. Letter also faxed to Enedina.

## 2024-04-10 NOTE — PROGRESS NOTES
Tried completing PAT phone interview with patient.  He did not have time to talk to PAT and wants to do when he arrives day of surgery.  Tried to explain that PAT just wanted to update his chart and review home medications.  Pt did not have time and refused.

## 2024-04-19 ENCOUNTER — ANESTHESIA (OUTPATIENT)
Dept: OPERATING ROOM | Age: 59
End: 2024-04-19
Payer: MEDICARE

## 2024-04-19 ENCOUNTER — ANESTHESIA EVENT (OUTPATIENT)
Dept: OPERATING ROOM | Age: 59
End: 2024-04-19
Payer: MEDICARE

## 2024-04-19 ENCOUNTER — HOSPITAL ENCOUNTER (OUTPATIENT)
Age: 59
Setting detail: OUTPATIENT SURGERY
Discharge: HOME | End: 2024-04-19
Attending: THORACIC SURGERY (CARDIOTHORACIC VASCULAR SURGERY) | Admitting: THORACIC SURGERY (CARDIOTHORACIC VASCULAR SURGERY)
Payer: MEDICARE

## 2024-04-19 VITALS
RESPIRATION RATE: 18 BRPM | DIASTOLIC BLOOD PRESSURE: 67 MMHG | BODY MASS INDEX: 20.43 KG/M2 | HEART RATE: 87 BPM | SYSTOLIC BLOOD PRESSURE: 140 MMHG | OXYGEN SATURATION: 93 % | HEIGHT: 62 IN | WEIGHT: 111 LBS | TEMPERATURE: 97 F

## 2024-04-19 DIAGNOSIS — Z98.890 S/P ARTERIOVENOUS (AV) FISTULA CREATION: Primary | ICD-10-CM

## 2024-04-19 LAB
ANION GAP SERPL CALC-SCNC: 20 MEQ/L (ref 8–16)
BUN SERPL-MCNC: 32 MG/DL (ref 7–22)
CALCIUM SERPL-MCNC: 9.5 MG/DL (ref 8.5–10.5)
CHLORIDE SERPL-SCNC: 89 MEQ/L (ref 98–111)
CO2 SERPL-SCNC: 24 MEQ/L (ref 23–33)
CREAT SERPL-MCNC: 4.5 MG/DL (ref 0.4–1.2)
GFR SERPL CREATININE-BSD FRML MDRD: 14 ML/MIN/1.73M2
GLUCOSE BLD STRIP.AUTO-MCNC: 280 MG/DL (ref 70–108)
GLUCOSE BLD STRIP.AUTO-MCNC: 320 MG/DL (ref 70–108)
GLUCOSE BLD STRIP.AUTO-MCNC: 346 MG/DL (ref 70–108)
GLUCOSE SERPL-MCNC: 281 MG/DL (ref 70–108)
POTASSIUM SERPL-SCNC: 4.5 MEQ/L (ref 3.5–5.2)
SODIUM SERPL-SCNC: 133 MEQ/L (ref 135–145)

## 2024-04-19 PROCEDURE — 80048 BASIC METABOLIC PNL TOTAL CA: CPT

## 2024-04-19 PROCEDURE — 7100000011 HC PHASE II RECOVERY - ADDTL 15 MIN: Performed by: THORACIC SURGERY (CARDIOTHORACIC VASCULAR SURGERY)

## 2024-04-19 PROCEDURE — A4217 STERILE WATER/SALINE, 500 ML: HCPCS | Performed by: THORACIC SURGERY (CARDIOTHORACIC VASCULAR SURGERY)

## 2024-04-19 PROCEDURE — 7100000001 HC PACU RECOVERY - ADDTL 15 MIN: Performed by: THORACIC SURGERY (CARDIOTHORACIC VASCULAR SURGERY)

## 2024-04-19 PROCEDURE — 7100000000 HC PACU RECOVERY - FIRST 15 MIN: Performed by: THORACIC SURGERY (CARDIOTHORACIC VASCULAR SURGERY)

## 2024-04-19 PROCEDURE — 3600000013 HC SURGERY LEVEL 3 ADDTL 15MIN: Performed by: THORACIC SURGERY (CARDIOTHORACIC VASCULAR SURGERY)

## 2024-04-19 PROCEDURE — 3700000000 HC ANESTHESIA ATTENDED CARE: Performed by: THORACIC SURGERY (CARDIOTHORACIC VASCULAR SURGERY)

## 2024-04-19 PROCEDURE — 6370000000 HC RX 637 (ALT 250 FOR IP): Performed by: THORACIC SURGERY (CARDIOTHORACIC VASCULAR SURGERY)

## 2024-04-19 PROCEDURE — 2709999900 HC NON-CHARGEABLE SUPPLY: Performed by: THORACIC SURGERY (CARDIOTHORACIC VASCULAR SURGERY)

## 2024-04-19 PROCEDURE — 7100000010 HC PHASE II RECOVERY - FIRST 15 MIN: Performed by: THORACIC SURGERY (CARDIOTHORACIC VASCULAR SURGERY)

## 2024-04-19 PROCEDURE — 3600000003 HC SURGERY LEVEL 3 BASE: Performed by: THORACIC SURGERY (CARDIOTHORACIC VASCULAR SURGERY)

## 2024-04-19 PROCEDURE — 2580000003 HC RX 258: Performed by: PHYSICIAN ASSISTANT

## 2024-04-19 PROCEDURE — 3700000001 HC ADD 15 MINUTES (ANESTHESIA): Performed by: THORACIC SURGERY (CARDIOTHORACIC VASCULAR SURGERY)

## 2024-04-19 PROCEDURE — 6370000000 HC RX 637 (ALT 250 FOR IP): Performed by: ANESTHESIOLOGY

## 2024-04-19 PROCEDURE — 36415 COLL VENOUS BLD VENIPUNCTURE: CPT

## 2024-04-19 PROCEDURE — 2580000003 HC RX 258: Performed by: THORACIC SURGERY (CARDIOTHORACIC VASCULAR SURGERY)

## 2024-04-19 PROCEDURE — 6360000002 HC RX W HCPCS: Performed by: NURSE ANESTHETIST, CERTIFIED REGISTERED

## 2024-04-19 PROCEDURE — 2500000003 HC RX 250 WO HCPCS: Performed by: NURSE ANESTHETIST, CERTIFIED REGISTERED

## 2024-04-19 PROCEDURE — 6360000002 HC RX W HCPCS: Performed by: PHYSICIAN ASSISTANT

## 2024-04-19 PROCEDURE — 82948 REAGENT STRIP/BLOOD GLUCOSE: CPT

## 2024-04-19 PROCEDURE — 6360000002 HC RX W HCPCS: Performed by: THORACIC SURGERY (CARDIOTHORACIC VASCULAR SURGERY)

## 2024-04-19 PROCEDURE — 36819 AV FUSE UPPR ARM BASILIC: CPT | Performed by: PHYSICIAN ASSISTANT

## 2024-04-19 PROCEDURE — 36819 AV FUSE UPPR ARM BASILIC: CPT | Performed by: THORACIC SURGERY (CARDIOTHORACIC VASCULAR SURGERY)

## 2024-04-19 RX ORDER — PROPOFOL 10 MG/ML
INJECTION, EMULSION INTRAVENOUS PRN
Status: DISCONTINUED | OUTPATIENT
Start: 2024-04-19 | End: 2024-04-19 | Stop reason: SDUPTHER

## 2024-04-19 RX ORDER — TRAMADOL HYDROCHLORIDE 50 MG/1
50 TABLET ORAL ONCE
Status: COMPLETED | OUTPATIENT
Start: 2024-04-19 | End: 2024-04-19

## 2024-04-19 RX ORDER — LIDOCAINE HCL/PF 100 MG/5ML
SYRINGE (ML) INJECTION PRN
Status: DISCONTINUED | OUTPATIENT
Start: 2024-04-19 | End: 2024-04-19 | Stop reason: SDUPTHER

## 2024-04-19 RX ORDER — TRAMADOL HYDROCHLORIDE 50 MG/1
50 TABLET ORAL EVERY 8 HOURS PRN
Qty: 15 TABLET | Refills: 0 | Status: SHIPPED | OUTPATIENT
Start: 2024-04-19 | End: 2024-04-24

## 2024-04-19 RX ORDER — EPHEDRINE SULFATE/0.9% NACL/PF 25 MG/5 ML
SYRINGE (ML) INTRAVENOUS PRN
Status: DISCONTINUED | OUTPATIENT
Start: 2024-04-19 | End: 2024-04-19 | Stop reason: SDUPTHER

## 2024-04-19 RX ORDER — SODIUM CHLORIDE 9 MG/ML
INJECTION, SOLUTION INTRAVENOUS PRN
Status: DISCONTINUED | OUTPATIENT
Start: 2024-04-19 | End: 2024-04-19 | Stop reason: HOSPADM

## 2024-04-19 RX ORDER — SODIUM CHLORIDE 0.9 % (FLUSH) 0.9 %
5-40 SYRINGE (ML) INJECTION EVERY 12 HOURS SCHEDULED
Status: DISCONTINUED | OUTPATIENT
Start: 2024-04-19 | End: 2024-04-19 | Stop reason: HOSPADM

## 2024-04-19 RX ORDER — ROCURONIUM BROMIDE 10 MG/ML
INJECTION, SOLUTION INTRAVENOUS PRN
Status: DISCONTINUED | OUTPATIENT
Start: 2024-04-19 | End: 2024-04-19 | Stop reason: SDUPTHER

## 2024-04-19 RX ORDER — SODIUM CHLORIDE 0.9 % (FLUSH) 0.9 %
5-40 SYRINGE (ML) INJECTION PRN
Status: DISCONTINUED | OUTPATIENT
Start: 2024-04-19 | End: 2024-04-19 | Stop reason: HOSPADM

## 2024-04-19 RX ORDER — HEPARIN SODIUM 1000 [USP'U]/ML
INJECTION, SOLUTION INTRAVENOUS; SUBCUTANEOUS PRN
Status: DISCONTINUED | OUTPATIENT
Start: 2024-04-19 | End: 2024-04-19 | Stop reason: SDUPTHER

## 2024-04-19 RX ORDER — GLYCOPYRROLATE 0.2 MG/ML
INJECTION INTRAMUSCULAR; INTRAVENOUS PRN
Status: DISCONTINUED | OUTPATIENT
Start: 2024-04-19 | End: 2024-04-19 | Stop reason: SDUPTHER

## 2024-04-19 RX ORDER — FENTANYL CITRATE 50 UG/ML
INJECTION, SOLUTION INTRAMUSCULAR; INTRAVENOUS PRN
Status: DISCONTINUED | OUTPATIENT
Start: 2024-04-19 | End: 2024-04-19 | Stop reason: SDUPTHER

## 2024-04-19 RX ORDER — DEXAMETHASONE SODIUM PHOSPHATE 10 MG/ML
INJECTION, EMULSION INTRAMUSCULAR; INTRAVENOUS PRN
Status: DISCONTINUED | OUTPATIENT
Start: 2024-04-19 | End: 2024-04-19

## 2024-04-19 RX ORDER — ONDANSETRON 2 MG/ML
INJECTION INTRAMUSCULAR; INTRAVENOUS PRN
Status: DISCONTINUED | OUTPATIENT
Start: 2024-04-19 | End: 2024-04-19 | Stop reason: SDUPTHER

## 2024-04-19 RX ADMIN — FENTANYL CITRATE 50 MCG: 50 INJECTION, SOLUTION INTRAMUSCULAR; INTRAVENOUS at 12:12

## 2024-04-19 RX ADMIN — SODIUM CHLORIDE 50 ML/HR: 9 INJECTION, SOLUTION INTRAVENOUS at 07:04

## 2024-04-19 RX ADMIN — INSULIN HUMAN 4 UNITS: 100 INJECTION, SOLUTION PARENTERAL at 12:26

## 2024-04-19 RX ADMIN — ROCURONIUM BROMIDE 20 MG: 10 INJECTION INTRAVENOUS at 10:34

## 2024-04-19 RX ADMIN — WATER 2000 MG: 1 INJECTION INTRAMUSCULAR; INTRAVENOUS; SUBCUTANEOUS at 07:50

## 2024-04-19 RX ADMIN — FENTANYL CITRATE 25 MCG: 50 INJECTION, SOLUTION INTRAMUSCULAR; INTRAVENOUS at 07:39

## 2024-04-19 RX ADMIN — EPHEDRINE SULFATE 10 MG: 5 INJECTION INTRAVENOUS at 08:40

## 2024-04-19 RX ADMIN — SODIUM CHLORIDE: 9 INJECTION, SOLUTION INTRAVENOUS at 10:35

## 2024-04-19 RX ADMIN — SUGAMMADEX 200 MG: 100 INJECTION, SOLUTION INTRAVENOUS at 11:51

## 2024-04-19 RX ADMIN — FENTANYL CITRATE 25 MCG: 50 INJECTION, SOLUTION INTRAMUSCULAR; INTRAVENOUS at 08:39

## 2024-04-19 RX ADMIN — HEPARIN SODIUM 4000 UNITS: 1000 INJECTION INTRAVENOUS; SUBCUTANEOUS at 10:09

## 2024-04-19 RX ADMIN — ONDANSETRON 4 MG: 2 INJECTION INTRAMUSCULAR; INTRAVENOUS at 11:45

## 2024-04-19 RX ADMIN — WATER 2000 MG: 1 INJECTION INTRAMUSCULAR; INTRAVENOUS; SUBCUTANEOUS at 11:41

## 2024-04-19 RX ADMIN — PROPOFOL 30 MG: 10 INJECTION, EMULSION INTRAVENOUS at 10:35

## 2024-04-19 RX ADMIN — EPHEDRINE SULFATE 5 MG: 5 INJECTION INTRAVENOUS at 10:40

## 2024-04-19 RX ADMIN — EPHEDRINE SULFATE 5 MG: 5 INJECTION INTRAVENOUS at 08:31

## 2024-04-19 RX ADMIN — TRAMADOL HYDROCHLORIDE 50 MG: 50 TABLET ORAL at 13:30

## 2024-04-19 RX ADMIN — Medication 80 MG: at 07:41

## 2024-04-19 RX ADMIN — GLYCOPYRROLATE 0.2 MG: 0.2 INJECTION INTRAMUSCULAR; INTRAVENOUS at 08:01

## 2024-04-19 RX ADMIN — ROCURONIUM BROMIDE 40 MG: 10 INJECTION INTRAVENOUS at 07:42

## 2024-04-19 RX ADMIN — ROCURONIUM BROMIDE 10 MG: 10 INJECTION INTRAVENOUS at 09:28

## 2024-04-19 RX ADMIN — PROPOFOL 150 MG: 10 INJECTION, EMULSION INTRAVENOUS at 07:42

## 2024-04-19 RX ADMIN — HEPARIN SODIUM 1000 UNITS: 1000 INJECTION INTRAVENOUS; SUBCUTANEOUS at 08:33

## 2024-04-19 ASSESSMENT — PAIN SCALES - GENERAL
PAINLEVEL_OUTOF10: 5
PAINLEVEL_OUTOF10: 4
PAINLEVEL_OUTOF10: 3
PAINLEVEL_OUTOF10: 3
PAINLEVEL_OUTOF10: 5
PAINLEVEL_OUTOF10: 4
PAINLEVEL_OUTOF10: 4

## 2024-04-19 ASSESSMENT — PAIN DESCRIPTION - DESCRIPTORS
DESCRIPTORS: ACHING
DESCRIPTORS: DISCOMFORT
DESCRIPTORS: ACHING

## 2024-04-19 ASSESSMENT — PAIN DESCRIPTION - ORIENTATION
ORIENTATION: RIGHT
ORIENTATION: LEFT
ORIENTATION: LEFT

## 2024-04-19 ASSESSMENT — PAIN DESCRIPTION - LOCATION
LOCATION: ARM

## 2024-04-19 ASSESSMENT — PAIN DESCRIPTION - PAIN TYPE
TYPE: SURGICAL PAIN

## 2024-04-19 ASSESSMENT — PAIN - FUNCTIONAL ASSESSMENT: PAIN_FUNCTIONAL_ASSESSMENT: 0-10

## 2024-04-19 ASSESSMENT — ENCOUNTER SYMPTOMS: SHORTNESS OF BREATH: 1

## 2024-04-19 NOTE — DISCHARGE SUMMARY
CT/CV Surgery Discharge Summary     Pt Name: Jean Deluna  MRN: [id number]  YOB: 1965  Primary Care Physician: Nestor Amos MD    Admit date:  4/19/2024  6:06 AM     Discharge date:  04/19/24     Disposition: Home    Admitting Diagnosis: CKD stage V, Failed left forearm AVF     Discharge Diagnosis:CKD stage V, Failed left forearm AVF     Condition: Stable    Problem List:   Patient Active Problem List   Diagnosis Code    CHF (congestive heart failure) ([facility]) I50.9    Precordial pain R07.2    DM type 2 causing CKD stage 5 ([facility]) E11.22, N18.5    Essential hypertension I10    CKD (chronic kidney disease) stage 4, GFR 15-29 ml/min ([facility]) N18.4    Coronary artery disease of native artery of native heart with stable angina pectoris ([facility]) I25.118    Anemia associated with chronic renal failure N18.9, D63.1    Anemia of chronic kidney failure N18.9, D63.1    Coronary atherosclerosis I25.10    Diabetes mellitus ([facility]) E11.9    History of CVA (cerebrovascular accident) Z86.73    MI (myocardial infarction) ([facility]) I21.9    Cerebrovascular accident ([facility]) I63.9    Type 1 diabetes mellitus with hyperglycemia ([facility]) E10.65    Diabetes mellitus with hyperglycemia ([facility]) E11.65    Anemia in chronic kidney disease N18.9, D63.1    Acute on chronic anemia D64.9    Bacterial pneumonia J15.9    Iron deficiency anemia D50.9    Encephalopathy G93.40    Severe malnutrition ([facility]) E43    NANCY (acute kidney injury) ([facility]) N17.9    Hypervolemia E87.70    Abnormal stress test R94.39    CAD in native artery I25.10    Acute HFrEF (heart failure with reduced ejection fraction) ([facility]) I50.21    Pulmonary hypertension ([facility]) I27.20    Myocardiopathy ([facility]) I42.9    KAUR (dyspnea on exertion) R06.09    Anginal equivalent ([facility]) I20.89    Acute on chronic HFrEF (heart failure with reduced ejection fraction) ([facility]) I50.23    CKD (chronic kidney disease) requiring chronic dialysis ([facility]) N18.6, Z99.2    Pulmonary edema, acute, with congestive heart      traMADol 50 MG tablet  Commonly known as: Ultram  Take 1 tablet by mouth every 8 hours as needed for Pain for up to 5 days. Intended supply: 3 days. Take lowest dose possible to manage pain Max Daily Amount: 150 mg            CONTINUE taking these medications      aspirin 81 MG EC tablet     atorvastatin 80 MG tablet  Commonly known as: LIPITOR     Basaglar KwikPen 100 UNIT/ML injection pen  Generic drug: insulin glargine     Bumex 2 MG tablet  Generic drug: bumetanide     clopidogrel 75 MG tablet  Commonly known as: PLAVIX  TAKE 1 TABLET BY MOUTH DAILY PATIENT NOT TAKING AT THIS TIME SINCE SUNDAY     insulin aspart 100 UNIT/ML injection vial  Commonly known as: NOVOLOG     isosorbide mononitrate 30 MG extended release tablet  Commonly known as: IMDUR     levothyroxine 100 MCG tablet  Commonly known as: SYNTHROID  Take 1 tablet by mouth Daily     lidocaine-prilocaine 2.5-2.5 % cream  Commonly known as: EMLA     metoprolol succinate 25 MG extended release tablet  Commonly known as: TOPROL XL  Take 0.5 tablets by mouth daily     nitroGLYCERIN 0.4 MG SL tablet  Commonly known as: NITROSTAT  Place 1 tablet under the tongue every 5 minutes as needed for Chest pain up to max of 3 total doses. If no relief after 1 dose, call 911.     pantoprazole 40 MG tablet  Commonly known as: PROTONIX     vitamin D 1.25 MG (67931 UT) Caps capsule  Commonly known as: ERGOCALCIFEROL               Where to Get Your Medications        These medications were sent to [facility] [address] - P [phone] - F [phone]  [address] [place] [address]      Phone: [phone]   traMADol 50 MG tablet       EMERGENCIES   ? You should either call 911 or go to the Emergency Room to be evaluated if you need seen immediately   ?         Sudden, severe shortness of breath go to the Emergency Room    If you have questions regarding these instructions, please call our office  ([phone])

## 2024-04-19 NOTE — PROGRESS NOTES
1213- pt to pacu, resp easy and unlabored, VSS, pt appears in no acute distress, pt medicated per CRNA  1215- fistula auscultated and palpated, resp easy and unlabored, VSS,  pt appears in no acute distress  1220- blood glucose 364, pt states discomfort but tolerable at 3/10  1223- orders for 4 units regular insulin received from Dr. Herrera  1226- 4 units given  1235- pt resting in bed with eyes closed, resp easy and unlabored, VSS, pt appears in no acute distress, pt states pain is 4/10 and tolerable  1243- pt meets criteria for discharge from pacu, pt transported to [facility] in stable condition

## 2024-04-19 NOTE — ANESTHESIA PRE PROCEDURE
risks discussed with patient.      Plan discussed with CRNA.                Joel Herrera,    4/19/2024

## 2024-04-19 NOTE — H&P
CT/CV Surgery H and P      Patient's Name/Date of Birth: Jean Deluna / 1965 (58 y.o.)      PCP: Nestor Amos MD    Date: April 19, 2024     CC:   CKD stage V     HPI:   Jean Deluna is scheduled for a AVF surgery today, as you know this is a very pleasant 58 y.o. year old male with a history of hypertension, DM, hyperlipidemia, CVA, myocardial infarction, coronary artery stent x 3, peripheral arterial disease, chronic kidney disease stage V, status post right IJ tunneled catheter, status post failed left forearm AV fistula, and status post right femoral-popliteal below the knee bypass grafting with 9 vein graft at [facility] on 10/11/2023.      He reports discomfort in the right foot.  He denies any ischemic type of rest pain in the right foot. He denies any fever, chills, or drainage from the incision site.    PastMedical History:  Jean  has a past medical history of Broken ankle, CAD (coronary artery disease), Cerebral artery occlusion with cerebral infarction (HCC), CHF (congestive heart failure) (HCC), Chronic kidney disease, Diabetes mellitus (HCC), GERD (gastroesophageal reflux disease), Hyperlipidemia, Hypertension, and Thyroid disease.    Past Surgical History:  The patient  has a past surgical history that includes Appendectomy; Cardiac surgery (Oct. 2015); Colonoscopy; eye surgery; Diagnostic Cardiac Cath Lab Procedure (11/05/2020); Wound Exploration (N/A, 5/5/2022); and Dialysis fistula creation (Left, 6/30/2022).    Allergies:  The patient is allergic to aranesp (albumin free) [darbepoetin eri] and seasonal.    Medications:  No current outpatient medications on file.    Family History:  This patient's family history includes Cancer in his sister; Diabetes in his mother. He was adopted.    Social History:  Jean  reports that he has been smoking cigarettes. He started smoking about 38 years ago. He has a 15.0 pack-year smoking history. He has quit using smokeless tobacco. He reports current

## 2024-04-19 NOTE — PROGRESS NOTES
Patient request not to take out dentures. His blood sugar is 280. Dr. Herrera made aware of both. No coverage ordered at this time.

## 2024-04-19 NOTE — OP NOTE
Operative Note      Patient: Jean Deluna  YOB: 1965  MRN: [id number]    Date of Procedure: 4/19/2024    Pre-Op Diagnosis Codes:     * Chronic kidney disease (CKD), stage V ([facility]) [N18.5] status post right IJ tunneled catheter, status post failed left forearm AV fistula    Post-Op Diagnosis: Same as preop.       Procedure: Left upper arm brachial artery to basilic vein transposition AV fistula.    Surgeon(s):  Tomas Valentine MD    Assistant:   Physician Assistant: Juan Carlos Ramirez PA-C  Because of the complexity of the procedure, the physician assistant was required to assist the surgeon throughout the procedure.    Anesthesia: General    Estimated Blood Loss (mL): Minimal, less than 20 mL.    Complications: None.    Specimens: None.    Implants: None.      Drains: None    Findings: Moderately calcified left brachial artery.  The artery was 4 mm in diameter.  The basilic vein was 4-5 mm in diameter    Detailed Description of Procedure:   The patient was taken to the operating room after an informed consent was obtained.  He was placed in supine position.  After routine preparation and general endotracheal anesthesia, his left arm was exposed.  I did an intraoperative ultrasound to measure the size of the artery and vein and their locations.  I decided to proceed with a left upper arm brachial artery to basilic vein transposition AV fistula.  The entire left arm was painted and draped in a sterile fashion.  Timeout was called and all necessary measures were taken.    A longitudinal incision was made over the left basilic vein in the left upper arm.  It was carried down to the deeper plane.  The basilic vein was dissected and freed.  Branches were divided between double ligatures.  Another skipped skin incision was made along the basilic vein in the upper portion of the left upper arm.  The same procedure was performed to free the basilic vein.  Total 15 cm long basilic vein was prepared.    A small incision was

## 2024-04-19 NOTE — PROGRESS NOTES
1330- Blood sugar recheck 320. Patient refusing treatment Dr. Herrera notified. Hi Ramirez notified about patient restart coumadin. Patient told to restart blood thinners tonight.     Pt has met discharge criteria and states he is ready for discharge to home. IV removed, gauze and tape applied. Dressed in own clothes and personal belongings gathered. Discharge instructions (with opioid medication education information) given to pt and family; pt and family verbalized understanding of discharge instructions, prescriptions and follow up appointments. Pt transported to discharge lobby by [facility] staff.

## 2024-04-19 NOTE — PROGRESS NOTES
Pt returned to [facility] room 20. Vitals and assessment as charted. 0.9 infusing, @450ml to count from PACU. Pt has applesauce and water. Family at the bedside. Pt and family verbalized understanding of discharge criteria and call light use. Call light in reach.

## 2024-04-19 NOTE — ANESTHESIA POSTPROCEDURE EVALUATION
Department of Anesthesiology  Postprocedure Note    Patient: Jean Deluna  MRN: [id number]  YOB: 1965  Date of evaluation: 4/19/2024    Procedure Summary       Date: 04/19/24 Room / Location: [facility] OR 03 / [facility] OR    Anesthesia Start: 0735 Anesthesia Stop: 1217    Procedure: LEFT UPPER AV FISTULA CREATION (Left) Diagnosis:       Chronic kidney disease (CKD), stage V (HCC)      (Chronic kidney disease (CKD), stage V (HCC) [N18.5])    Surgeons: Tomas Valentine MD Responsible Provider: Joel Herrera DO    Anesthesia Type: general ASA Status: 4            Anesthesia Type: No value filed.    Fermín Phase I: Fermín Score: 9    Fermín Phase II: Fermín Score: 10    Anesthesia Post Evaluation    Patient location during evaluation: bedside  Patient participation: complete - patient participated  Level of consciousness: awake  Airway patency: patent  Nausea & Vomiting: no vomiting and no nausea  Cardiovascular status: hemodynamically stable  Respiratory status: acceptable  Hydration status: stable  Pain management: adequate    No notable events documented.

## 2024-04-28 ENCOUNTER — HOSPITAL ENCOUNTER (OUTPATIENT)
Dept: GENERAL RADIOLOGY | Age: 59
Discharge: HOME | End: 2024-04-28

## 2024-04-28 DIAGNOSIS — Z00.6 ENCOUNTER FOR EXAMINATION FOR NORMAL COMPARISON OR CONTROL IN CLINICAL RESEARCH PROGRAM: ICD-10-CM

## 2024-05-23 ENCOUNTER — OFFICE VISIT (OUTPATIENT)
Dept: CARDIOLOGY CLINIC | Age: 59
End: 2024-05-23
Payer: MEDICARE

## 2024-05-23 VITALS
HEART RATE: 87 BPM | SYSTOLIC BLOOD PRESSURE: 145 MMHG | DIASTOLIC BLOOD PRESSURE: 65 MMHG | BODY MASS INDEX: 20.61 KG/M2 | WEIGHT: 112 LBS | HEIGHT: 62 IN

## 2024-05-23 DIAGNOSIS — I25.5 ISCHEMIC CARDIOMYOPATHY: Primary | ICD-10-CM

## 2024-05-23 PROCEDURE — 3017F COLORECTAL CA SCREEN DOC REV: CPT | Performed by: INTERNAL MEDICINE

## 2024-05-23 PROCEDURE — 99214 OFFICE O/P EST MOD 30 MIN: CPT | Performed by: INTERNAL MEDICINE

## 2024-05-23 PROCEDURE — 3078F DIAST BP <80 MM HG: CPT | Performed by: INTERNAL MEDICINE

## 2024-05-23 PROCEDURE — G8427 DOCREV CUR MEDS BY ELIG CLIN: HCPCS | Performed by: INTERNAL MEDICINE

## 2024-05-23 PROCEDURE — G8420 CALC BMI NORM PARAMETERS: HCPCS | Performed by: INTERNAL MEDICINE

## 2024-05-23 PROCEDURE — 3077F SYST BP >= 140 MM HG: CPT | Performed by: INTERNAL MEDICINE

## 2024-05-23 PROCEDURE — 4004F PT TOBACCO SCREEN RCVD TLK: CPT | Performed by: INTERNAL MEDICINE

## 2024-05-23 NOTE — PROGRESS NOTES
[facility] [place] [facility]  [facility] CARDIOLOGY  [address]  [address]  [facility] [address]  Dept: [phone]  Dept Fax: [phone]  Loc: [phone]    Visit Date: 5/23/2024    Mr. Deluna is a 58 y.o. male  who presented for:  Chief Complaint   Patient presents with    1 Year Follow Up    Coronary Artery Disease     HPI:   HPI   Jean Deluna is a pleasant 58 year old male patient who  has a past medical history of Broken ankle, CAD (coronary artery disease), Cerebral artery occlusion with cerebral infarction (HCC), CHF (congestive heart failure) (HCC), Chronic kidney disease, Diabetes mellitus (HCC), GERD (gastroesophageal reflux disease), Hyperlipidemia, Hypertension, and Thyroid disease. Has h/o CAD, HFpEF, PCI, PAD. Echo 1/2024 revealed improved EF to 35-40% (improved from 25-30%). On 9/2023, patient underwent LHC which revealed severe LAD disease (underwent PCI), RCA mild ISR was noted with nonobstructive LM disease (IVUS was utilized). Patient underwent right vein graft Fem-pop bypass surgery at [facility] on 10/2023. Reports mild claudications, no resting pain. He is now followed by Dr Valentine from vascular surgery. CTA on 1/2024 revealed patent right FemPop bypass with nonobstructive disease, 50% b/l iliac a. stenoses, left SFA . Patient denies chest pain. Some mild shortness of breath. Denies dyspnea on exertion, orthopnea, paroxysmal nocturnal dyspnea, palpitations, dizziness, syncope, weight gain.        Current Outpatient Medications:     vitamin D (ERGOCALCIFEROL) 1.25 MG (42116 UT) CAPS capsule, Take 1 capsule by mouth, Disp: , Rfl:     metoprolol succinate (TOPROL XL) 25 MG extended release tablet, Take 0.5 tablets by mouth daily, Disp: 30 tablet, Rfl: 3    bumetanide (BUMEX) 2 MG tablet, Take 1 tablet by mouth four times a week Non dialysis days, Disp: , Rfl:     nitroGLYCERIN (NITROSTAT) 0.4 MG SL tablet, Place 1 tablet under the tongue every 5 minutes as needed for

## 2024-05-23 NOTE — PROGRESS NOTES
1 year follow up  Med list up to date and pharmacy verified.     Denies chest pain, or palpitations,  SOB at times. Reports edema.     When he had the bypass done early October all the sutures dissolved but one.
